# Patient Record
Sex: MALE | Race: WHITE | NOT HISPANIC OR LATINO | Employment: OTHER | URBAN - METROPOLITAN AREA
[De-identification: names, ages, dates, MRNs, and addresses within clinical notes are randomized per-mention and may not be internally consistent; named-entity substitution may affect disease eponyms.]

---

## 2017-01-02 ENCOUNTER — OFFICE VISIT (OUTPATIENT)
Dept: URGENT CARE | Facility: PHYSICIAN GROUP | Age: 77
End: 2017-01-02
Payer: COMMERCIAL

## 2017-01-02 VITALS
WEIGHT: 160 LBS | HEIGHT: 67 IN | OXYGEN SATURATION: 95 % | TEMPERATURE: 99.9 F | RESPIRATION RATE: 12 BRPM | BODY MASS INDEX: 25.11 KG/M2 | SYSTOLIC BLOOD PRESSURE: 124 MMHG | HEART RATE: 81 BPM | DIASTOLIC BLOOD PRESSURE: 62 MMHG

## 2017-01-02 DIAGNOSIS — J02.9 SORE THROAT: ICD-10-CM

## 2017-01-02 DIAGNOSIS — J10.1 INFLUENZA A: ICD-10-CM

## 2017-01-02 DIAGNOSIS — R05.9 COUGH: ICD-10-CM

## 2017-01-02 DIAGNOSIS — R50.9 FEVER, UNSPECIFIED FEVER CAUSE: ICD-10-CM

## 2017-01-02 DIAGNOSIS — R68.89 FLU-LIKE SYMPTOMS: ICD-10-CM

## 2017-01-02 LAB
FLUAV+FLUBV AG SPEC QL IA: NORMAL
INT CON NEG: NEGATIVE
INT CON NEG: NEGATIVE
INT CON POS: POSITIVE
INT CON POS: POSITIVE
S PYO AG THROAT QL: NEGATIVE

## 2017-01-02 PROCEDURE — 99203 OFFICE O/P NEW LOW 30 MIN: CPT | Performed by: NURSE PRACTITIONER

## 2017-01-02 PROCEDURE — 87880 STREP A ASSAY W/OPTIC: CPT | Performed by: NURSE PRACTITIONER

## 2017-01-02 PROCEDURE — 87804 INFLUENZA ASSAY W/OPTIC: CPT | Performed by: NURSE PRACTITIONER

## 2017-01-02 RX ORDER — PRAVASTATIN SODIUM 80 MG/1
80 TABLET ORAL NIGHTLY
Status: ON HOLD | COMMUNITY
End: 2023-05-19

## 2017-01-02 RX ORDER — OSELTAMIVIR PHOSPHATE 75 MG/1
75 CAPSULE ORAL 2 TIMES DAILY
Qty: 10 CAP | Refills: 0 | Status: ON HOLD | OUTPATIENT
Start: 2017-01-02 | End: 2023-05-19

## 2017-01-02 RX ORDER — ALBUTEROL SULFATE 90 UG/1
2 AEROSOL, METERED RESPIRATORY (INHALATION) EVERY 6 HOURS PRN
Qty: 8.5 G | Refills: 0 | Status: ON HOLD | OUTPATIENT
Start: 2017-01-02 | End: 2023-05-19

## 2017-01-02 RX ORDER — DILTIAZEM HYDROCHLORIDE 60 MG/1
60 TABLET, FILM COATED ORAL 4 TIMES DAILY
Status: ON HOLD | COMMUNITY
End: 2023-05-19

## 2017-01-02 RX ORDER — EZETIMIBE 10 MG/1
10 TABLET ORAL EVERY EVENING
COMMUNITY

## 2017-01-02 ASSESSMENT — ENCOUNTER SYMPTOMS
ABDOMINAL PAIN: 0
SHORTNESS OF BREATH: 1
CONSTIPATION: 0
DIARRHEA: 0
VOMITING: 0
NAUSEA: 0
BACK PAIN: 0
CHILLS: 1
PALPITATIONS: 0
WEAKNESS: 0
SORE THROAT: 1
ORTHOPNEA: 0
FEVER: 1
SPUTUM PRODUCTION: 0
WHEEZING: 0
HEADACHES: 0
DIZZINESS: 0
COUGH: 1
MYALGIAS: 0

## 2017-01-02 NOTE — MR AVS SNAPSHOT
"        Sandip Zhao   2017 5:00 PM   Office Visit   MRN: [id number]    Department:  [facility]   Dept Phone:  [phone]    Description:  Male : 1940   Provider:  CORBIN Kirk           Reason for Visit     Cough Sore throat, fever, chest congestion, SOB, loss of voice, headache x 1 day       Allergies as of 2017     No Known Allergies      You were diagnosed with     Fever, unspecified fever cause   [1146830]       Flu-like symptoms   [389816]       Cough   [786.2.ICD-9-CM]       Sore throat   [114424]       Influenza A   [652792]         Vital Signs     Blood Pressure Pulse Temperature Respirations Height Weight    124/62 mmHg 81 37.7 °C (99.9 °F) 12 1.702 m (5' 7\") 72.576 kg (160 lb)    Body Mass Index Oxygen Saturation Smoking Status             25.05 kg/m2 95% Never Smoker          Basic Information     Date Of Birth Sex Race Ethnicity Preferred Language    1940 Male White Non- English      Health Maintenance        Date Due Completion Dates    IMM DTaP/Tdap/Td Vaccine (1 - Tdap) 1959 ---    COLONOSCOPY 1990 ---    IMM ZOSTER VACCINE 2000 ---    IMM PNEUMOCOCCAL 65+ (ADULT) LOW/MEDIUM RISK SERIES (1 of 2 - PCV13) 2005 ---    IMM INFLUENZA (1) 2016 ---            Current Immunizations     No immunizations on file.      Below and/or attached are the medications your provider expects you to take. Review all of your home medications and newly ordered medications with your provider and/or pharmacist. Follow medication instructions as directed by your provider and/or pharmacist. Please keep your medication list with you and share with your provider. Update the information when medications are discontinued, doses are changed, or new medications (including over-the-counter products) are added; and carry medication information at all times in the event of emergency situations     Allergies:  No Known Allergies          Medications  Valid as " of: January 02, 2017 -  6:02 PM    Generic Name Brand Name Tablet Size Instructions for use    Albuterol Sulfate (Aero Soln) albuterol 108 (90 BASE) MCG/ACT Inhale 2 Puffs by mouth every 6 hours as needed for Shortness of Breath.        Apixaban (Tab) ELIQUIS 5mg Take 5 mg by mouth 2 Times a Day.        Ascorbic Acid   Take  by mouth.        Azithromycin (Tab) ZITHROMAX 250 MG 2 tabs by mouth day 1, 1 tab by mouth days 2-5        DiltiaZEM HCl (Tab) CARDIZEM 60 MG Take 60 mg by mouth 4 times a day.        Ezetimibe (Tab) ZETIA 10 MG Take 10 mg by mouth every day.        Ibuprofen (Cap) Ibuprofen 200 MG Take  by mouth.        Lisinopril   Take  by mouth.        Oseltamivir Phosphate (Cap) TAMIFLU 75 MG Take 1 Cap by mouth 2 times a day.        Pravastatin Sodium (Tab) PRAVACHOL 80 MG Take 80 mg by mouth every evening.        .                 Medicines prescribed today were sent to:     [facility][id number] - [place] - [address]    [address] [place] [address]    Phone: [phone] Fax: [phone]    Open 24 Hours?: No      Medication refill instructions:       If your prescription bottle indicates you have medication refills left, it is not necessary to call your provider’s office. Please contact your pharmacy and they will refill your medication.    If your prescription bottle indicates you do not have any refills left, you may request refills at any time through one of the following ways: The online Mobcart system (except Urgent Care), by calling your provider’s office, or by asking your pharmacy to contact your provider’s office with a refill request. Medication refills are processed only during regular business hours and may not be available until the next business day. Your provider may request additional information or to have a follow-up visit with you prior to refilling your medication.   *Please Note: Medication refills are assigned a new Rx number when refilled electronically. Your pharmacy may  indicate that no refills were authorized even though a new prescription for the same medication is available at the pharmacy. Please request the medicine by name with the pharmacy before contacting your provider for a refill.           Sciencescape Access Code: [email or web address]  Expires: 2/1/2017  6:02 PM    Your email address is not on file at [facility].  Email Addresses are required for you to sign up for Sciencescape, please contact [phone] to verify your personal information and to provide your email address prior to attempting to register for Sciencescape.    Sandip Zhao  [address] [place] [address]    Sciencescape  A secure, online tool to manage your health information     [facility]’s Sciencescape® is a secure, online tool that connects you to your personalized health information from the privacy of your home -- day or night - making it very easy for you to manage your healthcare. Once the activation process is completed, you can even access your medical information using the Sciencescape chance, which is available for free in the Apple Chance store or Google Play store.     To learn more about Sciencescape, visit [email or web address]    There are two levels of access available (as shown below):   My Chart Features  [facility] Primary Care Doctor [facility]  Specialists [facility]  Urgent  Care Non-[facility] Primary Care Doctor   Email your healthcare team securely and privately 24/7 X X X    Manage appointments: schedule your next appointment; view details of past/upcoming appointments X      Request prescription refills. X      View recent personal medical records, including lab and immunizations X X X X   View health record, including health history, allergies, medications X X X X   Read reports about your outpatient visits, procedures, consult and ER notes X X X X   See your discharge summary, which is a recap of your hospital and/or ER visit that includes your diagnosis, lab results, and care plan X X  X     How to register for  MyChart:  Once your e-mail address has been verified, follow the following steps to sign up for Fluther.     1. Go to  [email or web address]  2. Click on the Sign Up Now box, which takes you to the New Member Sign Up page. You will need to provide the following information:  a. Enter your Fluther Access Code exactly as it appears at the top of this page. (You will not need to use this code after you’ve completed the sign-up process. If you do not sign up before the expiration date, you must request a new code.)   b. Enter your date of birth.   c. Enter your home email address.   d. Click Submit, and follow the next screen’s instructions.  3. Create a ReviewProt ID. This will be your ReviewProt login ID and cannot be changed, so think of one that is secure and easy to remember.  4. Create a ReviewProt password. You can change your password at any time.  5. Enter your Password Reset Question and Answer. This can be used at a later time if you forget your password.   6. Enter your e-mail address. This allows you to receive e-mail notifications when new information is available in Fluther.  7. Click Sign Up. You can now view your health information.    For assistance activating your Fluther account, call [phone]

## 2017-01-03 NOTE — PROGRESS NOTES
Subjective:      Sandip Zhao is a 76 y.o. male who presents with Cough            Cough  Associated symptoms include chills, a fever, a sore throat and shortness of breath. Pertinent negatives include no chest pain, ear pain, headaches, myalgias or wheezing. There is no history of environmental allergies.   Sandip is a 76 year old male who is here for cough and flu-like symptoms and fever today. Wife present. C/o nasal congestion, chills, body aches. Slight cough without phlegm but slight SOB without chest tightness. Taking no meds for symptoms. Admits to poor water drinking. Low grade fever at home. Denies facial pressure, ear pressure. Slight sore throat without difficulty swallowing. Has tessalon perles at home.    PMH:  has a past medical history of Environmental allergies.  MEDS:   Current outpatient prescriptions:   •  apixaban (ELIQUIS) 5mg Tab, Take 5 mg by mouth 2 Times a Day., Disp: , Rfl:   •  pravastatin (PRAVACHOL) 80 MG tablet, Take 80 mg by mouth every evening., Disp: , Rfl:   •  diltiazem (CARDIZEM) 60 MG Tab, Take 60 mg by mouth 4 times a day., Disp: , Rfl:   •  ezetimibe (ZETIA) 10 MG Tab, Take 10 mg by mouth every day., Disp: , Rfl:   •  Ascorbic Acid (VITAMIN C PO), Take  by mouth., Disp: , Rfl:   •  oseltamivir (TAMIFLU) 75 MG Cap, Take 1 Cap by mouth 2 times a day., Disp: 10 Cap, Rfl: 0  •  albuterol 108 (90 BASE) MCG/ACT Aero Soln inhalation aerosol, Inhale 2 Puffs by mouth every 6 hours as needed for Shortness of Breath., Disp: 8.5 g, Rfl: 0  •  LISINOPRIL PO, Take  by mouth., Disp: , Rfl:   •  azithromycin (ZITHROMAX) 250 MG TABS, 2 tabs by mouth day 1, 1 tab by mouth days 2-5, Disp: 6 Tab, Rfl: 0  •  Ibuprofen (ADVIL) 200 MG CAPS, Take  by mouth., Disp: , Rfl:   ALLERGIES: No Known Allergies  SURGHX:   Past Surgical History   Procedure Laterality Date   • Abdominal exploration     • Appendectomy     • Knee arthroscopy       SOCHX:  reports that he has never smoked. He does not have  "any smokeless tobacco history on file. He reports that he drinks alcohol.  FH: Family history was reviewed, no pertinent findings to report      Review of Systems   Constitutional: Positive for fever and chills. Negative for malaise/fatigue.   HENT: Positive for congestion and sore throat. Negative for ear pain.    Respiratory: Positive for cough and shortness of breath. Negative for sputum production and wheezing.    Cardiovascular: Negative for chest pain, palpitations and orthopnea.   Gastrointestinal: Negative for nausea, vomiting, abdominal pain, diarrhea and constipation.   Musculoskeletal: Negative for myalgias and back pain.   Neurological: Negative for dizziness, weakness and headaches.   Endo/Heme/Allergies: Negative for environmental allergies.          Objective:     /62 mmHg  Pulse 81  Temp(Src) 37.7 °C (99.9 °F)  Resp 12  Ht 1.702 m (5' 7\")  Wt 72.576 kg (160 lb)  BMI 25.05 kg/m2  SpO2 95%     Physical Exam   Constitutional: He is oriented to person, place, and time. He appears well-developed and well-nourished. No distress.   HENT:   Head: Normocephalic.   Right Ear: Hearing, tympanic membrane, external ear and ear canal normal.   Left Ear: Hearing, tympanic membrane, external ear and ear canal normal.   Nose: Rhinorrhea present. No mucosal edema or sinus tenderness.   Mouth/Throat: Mucous membranes are dry. No uvula swelling. Posterior oropharyngeal erythema present. No posterior oropharyngeal edema.   Eyes: Conjunctivae and EOM are normal. Pupils are equal, round, and reactive to light.   Neck: Normal range of motion. Neck supple.   Cardiovascular: Normal rate and regular rhythm.    Pulmonary/Chest: Effort normal and breath sounds normal. No accessory muscle usage. No respiratory distress. He has no decreased breath sounds. He has no wheezes. He has no rhonchi. He has no rales.   Musculoskeletal: Normal range of motion.   Neurological: He is alert and oriented to person, place, and " time.   Skin: Skin is warm and dry. He is not diaphoretic.   Vitals reviewed.              Assessment/Plan:     1. Fever, unspecified fever cause    - POCT Influenza A/B: POS    2. Flu-like symptoms  - POCT Influenza A/B: POS    3. Cough    - albuterol 108 (90 BASE) MCG/ACT Aero Soln inhalation aerosol; Inhale 2 Puffs by mouth every 6 hours as needed for Shortness of Breath.  Dispense: 8.5 g; Refill: 0    4. Sore throat    - POCT Rapid Strep A: NEG    5. Influenza A    - oseltamivir (TAMIFLU) 75 MG Cap; Take 1 Cap by mouth 2 times a day.  Dispense: 10 Cap; Refill: 0    Increase water intake  May use Ibuprofen/Tylenol prn for fever or body aches  Get rest  May use saline nasal spray prn to flush nasal congestion  May use Mucinex as an expectorant prn  Use inhaler prn for SOB with cough  May use OTC cough suppressant medications like Robitussin prn or prescribed Tessalon perles  Monitor for fevers, productive cough, SOB, CP, chest tightness- need re-evaluation

## 2017-12-28 ENCOUNTER — HOSPITAL ENCOUNTER (INPATIENT)
Dept: HOSPITAL 8 - ED | Age: 77
LOS: 1 days | Discharge: HOME | DRG: 303 | End: 2017-12-29
Attending: HOSPITALIST | Admitting: HOSPITALIST
Payer: COMMERCIAL

## 2017-12-28 VITALS — DIASTOLIC BLOOD PRESSURE: 82 MMHG | SYSTOLIC BLOOD PRESSURE: 147 MMHG

## 2017-12-28 VITALS — BODY MASS INDEX: 24.33 KG/M2 | HEIGHT: 67 IN | WEIGHT: 154.98 LBS

## 2017-12-28 VITALS — SYSTOLIC BLOOD PRESSURE: 137 MMHG | DIASTOLIC BLOOD PRESSURE: 72 MMHG

## 2017-12-28 VITALS — DIASTOLIC BLOOD PRESSURE: 76 MMHG | SYSTOLIC BLOOD PRESSURE: 126 MMHG

## 2017-12-28 DIAGNOSIS — I25.110: Primary | ICD-10-CM

## 2017-12-28 DIAGNOSIS — Z90.49: ICD-10-CM

## 2017-12-28 DIAGNOSIS — Z95.5: ICD-10-CM

## 2017-12-28 DIAGNOSIS — Z79.82: ICD-10-CM

## 2017-12-28 DIAGNOSIS — I25.2: ICD-10-CM

## 2017-12-28 DIAGNOSIS — E78.00: ICD-10-CM

## 2017-12-28 DIAGNOSIS — I48.0: ICD-10-CM

## 2017-12-28 DIAGNOSIS — I10: ICD-10-CM

## 2017-12-28 LAB
ALBUMIN SERPL-MCNC: 3.9 G/DL (ref 3.4–5)
ALP SERPL-CCNC: 93 U/L (ref 45–117)
ALT SERPL-CCNC: 27 U/L (ref 12–78)
ANION GAP SERPL CALC-SCNC: 6 MMOL/L (ref 5–15)
BASOPHILS # BLD AUTO: 0 X10^3/UL (ref 0–0.1)
BASOPHILS NFR BLD AUTO: 0 % (ref 0–1)
BILIRUB SERPL-MCNC: 0.9 MG/DL (ref 0.2–1)
CALCIUM SERPL-MCNC: 8.7 MG/DL (ref 8.5–10.1)
CHLORIDE SERPL-SCNC: 107 MMOL/L (ref 98–107)
CREAT SERPL-MCNC: 0.89 MG/DL (ref 0.7–1.3)
EOSINOPHIL # BLD AUTO: 0.08 X10^3/UL (ref 0–0.4)
EOSINOPHIL NFR BLD AUTO: 2 % (ref 1–7)
ERYTHROCYTE [DISTWIDTH] IN BLOOD BY AUTOMATED COUNT: 12.3 % (ref 9.4–14.8)
LYMPHOCYTES # BLD AUTO: 1.35 X10^3/UL (ref 1–3.4)
LYMPHOCYTES NFR BLD AUTO: 29 % (ref 22–44)
MCH RBC QN AUTO: 32.4 PG (ref 27.5–34.5)
MCHC RBC AUTO-ENTMCNC: 35 G/DL (ref 33.2–36.2)
MCV RBC AUTO: 92.5 FL (ref 81–97)
MD: NO
MONOCYTES # BLD AUTO: 0.47 X10^3/UL (ref 0.2–0.8)
MONOCYTES NFR BLD AUTO: 10 % (ref 2–9)
NEUTROPHILS # BLD AUTO: 2.71 X10^3/UL (ref 1.8–6.8)
NEUTROPHILS NFR BLD AUTO: 59 % (ref 42–75)
PLATELET # BLD AUTO: 174 X10^3/UL (ref 130–400)
PMV BLD AUTO: 7.7 FL (ref 7.4–10.4)
PROT SERPL-MCNC: 7.8 G/DL (ref 6.4–8.2)
RBC # BLD AUTO: 3.96 X10^6/UL (ref 4.38–5.82)
TROPONIN I SERPL-MCNC: < 0.015 NG/ML (ref 0–0.04)

## 2017-12-28 PROCEDURE — 93017 CV STRESS TEST TRACING ONLY: CPT

## 2017-12-28 PROCEDURE — C9898 INPNT STAY RADIOLABELED ITEM: HCPCS

## 2017-12-28 PROCEDURE — A9502 TC99M TETROFOSMIN: HCPCS

## 2017-12-28 PROCEDURE — 80053 COMPREHEN METABOLIC PANEL: CPT

## 2017-12-28 PROCEDURE — 71010: CPT

## 2017-12-28 PROCEDURE — 84484 ASSAY OF TROPONIN QUANT: CPT

## 2017-12-28 PROCEDURE — 78452 HT MUSCLE IMAGE SPECT MULT: CPT

## 2017-12-28 PROCEDURE — 85025 COMPLETE CBC W/AUTO DIFF WBC: CPT

## 2017-12-28 PROCEDURE — 93005 ELECTROCARDIOGRAM TRACING: CPT

## 2017-12-28 PROCEDURE — 36415 COLL VENOUS BLD VENIPUNCTURE: CPT

## 2017-12-28 RX ADMIN — APIXABAN SCH MG: 5 TABLET, FILM COATED ORAL at 21:13

## 2017-12-28 RX ADMIN — APIXABAN SCH MG: 5 TABLET, FILM COATED ORAL at 10:53

## 2017-12-28 RX ADMIN — DILTIAZEM HYDROCHLORIDE SCH MG: 120 TABLET, FILM COATED ORAL at 21:12

## 2017-12-29 VITALS — DIASTOLIC BLOOD PRESSURE: 82 MMHG | SYSTOLIC BLOOD PRESSURE: 136 MMHG

## 2017-12-29 VITALS — SYSTOLIC BLOOD PRESSURE: 133 MMHG | DIASTOLIC BLOOD PRESSURE: 69 MMHG

## 2017-12-29 VITALS — DIASTOLIC BLOOD PRESSURE: 63 MMHG | SYSTOLIC BLOOD PRESSURE: 110 MMHG

## 2017-12-29 RX ADMIN — DILTIAZEM HYDROCHLORIDE SCH MG: 120 TABLET, FILM COATED ORAL at 10:26

## 2017-12-29 RX ADMIN — APIXABAN SCH MG: 5 TABLET, FILM COATED ORAL at 10:28

## 2018-02-20 ENCOUNTER — HOSPITAL ENCOUNTER (OUTPATIENT)
Dept: LAB | Facility: MEDICAL CENTER | Age: 78
End: 2018-02-20
Attending: INTERNAL MEDICINE
Payer: COMMERCIAL

## 2018-02-20 LAB
ALBUMIN SERPL BCP-MCNC: 4.6 G/DL (ref 3.2–4.9)
ALP SERPL-CCNC: 77 U/L (ref 30–99)
ALT SERPL-CCNC: 18 U/L (ref 2–50)
AST SERPL-CCNC: 15 U/L (ref 12–45)
BILIRUB CONJ SERPL-MCNC: 0.2 MG/DL (ref 0.1–0.5)
BILIRUB INDIRECT SERPL-MCNC: 0.4 MG/DL (ref 0–1)
BILIRUB SERPL-MCNC: 0.6 MG/DL (ref 0.1–1.5)
CHOLEST SERPL-MCNC: 150 MG/DL (ref 100–199)
HDLC SERPL-MCNC: 54 MG/DL
LDLC SERPL CALC-MCNC: 70 MG/DL
PROT SERPL-MCNC: 7.6 G/DL (ref 6–8.2)
TRIGL SERPL-MCNC: 129 MG/DL (ref 0–149)

## 2018-02-20 PROCEDURE — 36415 COLL VENOUS BLD VENIPUNCTURE: CPT

## 2018-02-20 PROCEDURE — 80061 LIPID PANEL: CPT

## 2018-02-20 PROCEDURE — 80076 HEPATIC FUNCTION PANEL: CPT

## 2019-02-14 ENCOUNTER — HOSPITAL ENCOUNTER (OUTPATIENT)
Dept: LAB | Facility: MEDICAL CENTER | Age: 79
End: 2019-02-14
Attending: INTERNAL MEDICINE
Payer: COMMERCIAL

## 2019-02-14 LAB
ALBUMIN SERPL BCP-MCNC: 4.6 G/DL (ref 3.2–4.9)
ALBUMIN/GLOB SERPL: 1.4 G/DL
ALP SERPL-CCNC: 79 U/L (ref 30–99)
ALT SERPL-CCNC: 23 U/L (ref 2–50)
ANION GAP SERPL CALC-SCNC: 6 MMOL/L (ref 0–11.9)
AST SERPL-CCNC: 21 U/L (ref 12–45)
BILIRUB SERPL-MCNC: 0.6 MG/DL (ref 0.1–1.5)
BUN SERPL-MCNC: 21 MG/DL (ref 8–22)
CALCIUM SERPL-MCNC: 10.3 MG/DL (ref 8.5–10.5)
CHLORIDE SERPL-SCNC: 103 MMOL/L (ref 96–112)
CHOLEST SERPL-MCNC: 158 MG/DL (ref 100–199)
CO2 SERPL-SCNC: 27 MMOL/L (ref 20–33)
CREAT SERPL-MCNC: 0.98 MG/DL (ref 0.5–1.4)
ERYTHROCYTE [DISTWIDTH] IN BLOOD BY AUTOMATED COUNT: 46.2 FL (ref 35.9–50)
EST. AVERAGE GLUCOSE BLD GHB EST-MCNC: 120 MG/DL
FASTING STATUS PATIENT QL REPORTED: NORMAL
GLOBULIN SER CALC-MCNC: 3.3 G/DL (ref 1.9–3.5)
GLUCOSE SERPL-MCNC: 121 MG/DL (ref 65–99)
HBA1C MFR BLD: 5.8 % (ref 0–5.6)
HCT VFR BLD AUTO: 41.5 % (ref 42–52)
HDLC SERPL-MCNC: 58 MG/DL
HGB BLD-MCNC: 13.5 G/DL (ref 14–18)
LDLC SERPL CALC-MCNC: 80 MG/DL
MCH RBC QN AUTO: 32.1 PG (ref 27–33)
MCHC RBC AUTO-ENTMCNC: 32.5 G/DL (ref 33.7–35.3)
MCV RBC AUTO: 98.6 FL (ref 81.4–97.8)
PLATELET # BLD AUTO: 186 K/UL (ref 164–446)
PMV BLD AUTO: 10.4 FL (ref 9–12.9)
POTASSIUM SERPL-SCNC: 4.4 MMOL/L (ref 3.6–5.5)
PROT SERPL-MCNC: 7.9 G/DL (ref 6–8.2)
RBC # BLD AUTO: 4.21 M/UL (ref 4.7–6.1)
SODIUM SERPL-SCNC: 136 MMOL/L (ref 135–145)
TRIGL SERPL-MCNC: 101 MG/DL (ref 0–149)
WBC # BLD AUTO: 4.8 K/UL (ref 4.8–10.8)

## 2019-02-14 PROCEDURE — 85027 COMPLETE CBC AUTOMATED: CPT

## 2019-02-14 PROCEDURE — 80053 COMPREHEN METABOLIC PANEL: CPT

## 2019-02-14 PROCEDURE — 80061 LIPID PANEL: CPT

## 2019-02-14 PROCEDURE — 36415 COLL VENOUS BLD VENIPUNCTURE: CPT

## 2019-02-14 PROCEDURE — 83036 HEMOGLOBIN GLYCOSYLATED A1C: CPT

## 2019-06-13 ENCOUNTER — HOSPITAL ENCOUNTER (OUTPATIENT)
Dept: LAB | Facility: MEDICAL CENTER | Age: 79
End: 2019-06-13
Attending: INTERNAL MEDICINE
Payer: COMMERCIAL

## 2019-06-13 LAB
ALBUMIN SERPL BCP-MCNC: 4.4 G/DL (ref 3.2–4.9)
ALBUMIN/GLOB SERPL: 1.4 G/DL
ALP SERPL-CCNC: 79 U/L (ref 30–99)
ALT SERPL-CCNC: 21 U/L (ref 2–50)
ANION GAP SERPL CALC-SCNC: 7 MMOL/L (ref 0–11.9)
AST SERPL-CCNC: 18 U/L (ref 12–45)
BILIRUB SERPL-MCNC: 0.6 MG/DL (ref 0.1–1.5)
BUN SERPL-MCNC: 29 MG/DL (ref 8–22)
CALCIUM SERPL-MCNC: 9.5 MG/DL (ref 8.5–10.5)
CHLORIDE SERPL-SCNC: 106 MMOL/L (ref 96–112)
CHOLEST SERPL-MCNC: 145 MG/DL (ref 100–199)
CO2 SERPL-SCNC: 24 MMOL/L (ref 20–33)
CREAT SERPL-MCNC: 1.1 MG/DL (ref 0.5–1.4)
EST. AVERAGE GLUCOSE BLD GHB EST-MCNC: 120 MG/DL
FASTING STATUS PATIENT QL REPORTED: NORMAL
GLOBULIN SER CALC-MCNC: 3.2 G/DL (ref 1.9–3.5)
GLUCOSE SERPL-MCNC: 122 MG/DL (ref 65–99)
HBA1C MFR BLD: 5.8 % (ref 0–5.6)
HDLC SERPL-MCNC: 53 MG/DL
LDLC SERPL CALC-MCNC: 69 MG/DL
POTASSIUM SERPL-SCNC: 4.2 MMOL/L (ref 3.6–5.5)
PROT SERPL-MCNC: 7.6 G/DL (ref 6–8.2)
SODIUM SERPL-SCNC: 137 MMOL/L (ref 135–145)
TRIGL SERPL-MCNC: 117 MG/DL (ref 0–149)

## 2019-06-13 PROCEDURE — 36415 COLL VENOUS BLD VENIPUNCTURE: CPT | Mod: GA

## 2019-06-13 PROCEDURE — 80053 COMPREHEN METABOLIC PANEL: CPT

## 2019-06-13 PROCEDURE — 83036 HEMOGLOBIN GLYCOSYLATED A1C: CPT | Mod: GA

## 2019-06-13 PROCEDURE — 80061 LIPID PANEL: CPT

## 2019-12-04 ENCOUNTER — HOSPITAL ENCOUNTER (OUTPATIENT)
Dept: LAB | Facility: MEDICAL CENTER | Age: 79
End: 2019-12-04
Attending: INTERNAL MEDICINE
Payer: COMMERCIAL

## 2019-12-04 LAB
ALBUMIN SERPL BCP-MCNC: 4.4 G/DL (ref 3.2–4.9)
ALBUMIN/GLOB SERPL: 1.4 G/DL
ALP SERPL-CCNC: 75 U/L (ref 30–99)
ALT SERPL-CCNC: 24 U/L (ref 2–50)
ANION GAP SERPL CALC-SCNC: 9 MMOL/L (ref 0–11.9)
AST SERPL-CCNC: 19 U/L (ref 12–45)
BILIRUB SERPL-MCNC: 0.6 MG/DL (ref 0.1–1.5)
BUN SERPL-MCNC: 22 MG/DL (ref 8–22)
CALCIUM SERPL-MCNC: 9.5 MG/DL (ref 8.5–10.5)
CHLORIDE SERPL-SCNC: 106 MMOL/L (ref 96–112)
CHOLEST SERPL-MCNC: 132 MG/DL (ref 100–199)
CO2 SERPL-SCNC: 25 MMOL/L (ref 20–33)
CREAT SERPL-MCNC: 1 MG/DL (ref 0.5–1.4)
EST. AVERAGE GLUCOSE BLD GHB EST-MCNC: 111 MG/DL
FASTING STATUS PATIENT QL REPORTED: NORMAL
GLOBULIN SER CALC-MCNC: 3.1 G/DL (ref 1.9–3.5)
GLUCOSE SERPL-MCNC: 111 MG/DL (ref 65–99)
HBA1C MFR BLD: 5.5 % (ref 0–5.6)
HDLC SERPL-MCNC: 56 MG/DL
LDLC SERPL CALC-MCNC: 63 MG/DL
POTASSIUM SERPL-SCNC: 4.4 MMOL/L (ref 3.6–5.5)
PROT SERPL-MCNC: 7.5 G/DL (ref 6–8.2)
SODIUM SERPL-SCNC: 140 MMOL/L (ref 135–145)
TRIGL SERPL-MCNC: 64 MG/DL (ref 0–149)

## 2019-12-04 PROCEDURE — 83036 HEMOGLOBIN GLYCOSYLATED A1C: CPT | Mod: GA

## 2019-12-04 PROCEDURE — 80053 COMPREHEN METABOLIC PANEL: CPT

## 2019-12-04 PROCEDURE — 80061 LIPID PANEL: CPT

## 2020-03-03 ENCOUNTER — HOSPITAL ENCOUNTER (OUTPATIENT)
Dept: LAB | Facility: MEDICAL CENTER | Age: 80
End: 2020-03-03
Attending: FAMILY MEDICINE
Payer: COMMERCIAL

## 2020-03-03 LAB
ALBUMIN SERPL BCP-MCNC: 4.3 G/DL (ref 3.2–4.9)
ALBUMIN/GLOB SERPL: 1.3 G/DL
ALP SERPL-CCNC: 76 U/L (ref 30–99)
ALT SERPL-CCNC: 20 U/L (ref 2–50)
ANION GAP SERPL CALC-SCNC: 8 MMOL/L (ref 0–11.9)
APPEARANCE UR: CLEAR
AST SERPL-CCNC: 17 U/L (ref 12–45)
BASOPHILS # BLD AUTO: 0.3 % (ref 0–1.8)
BASOPHILS # BLD: 0.02 K/UL (ref 0–0.12)
BILIRUB SERPL-MCNC: 0.4 MG/DL (ref 0.1–1.5)
BILIRUB UR QL STRIP.AUTO: NEGATIVE
BUN SERPL-MCNC: 24 MG/DL (ref 8–22)
CALCIUM SERPL-MCNC: 9.5 MG/DL (ref 8.5–10.5)
CHLORIDE SERPL-SCNC: 104 MMOL/L (ref 96–112)
CHOLEST SERPL-MCNC: 136 MG/DL (ref 100–199)
CO2 SERPL-SCNC: 24 MMOL/L (ref 20–33)
COLOR UR: YELLOW
CREAT SERPL-MCNC: 1.35 MG/DL (ref 0.5–1.4)
EOSINOPHIL # BLD AUTO: 0.16 K/UL (ref 0–0.51)
EOSINOPHIL NFR BLD: 2.7 % (ref 0–6.9)
ERYTHROCYTE [DISTWIDTH] IN BLOOD BY AUTOMATED COUNT: 49.9 FL (ref 35.9–50)
GLOBULIN SER CALC-MCNC: 3.4 G/DL (ref 1.9–3.5)
GLUCOSE SERPL-MCNC: 109 MG/DL (ref 65–99)
GLUCOSE UR STRIP.AUTO-MCNC: NEGATIVE MG/DL
HCT VFR BLD AUTO: 38.5 % (ref 42–52)
HDLC SERPL-MCNC: 41 MG/DL
HGB BLD-MCNC: 11.6 G/DL (ref 14–18)
IMM GRANULOCYTES # BLD AUTO: 0.07 K/UL (ref 0–0.11)
IMM GRANULOCYTES NFR BLD AUTO: 1.2 % (ref 0–0.9)
KETONES UR STRIP.AUTO-MCNC: NEGATIVE MG/DL
LDLC SERPL CALC-MCNC: 74 MG/DL
LEUKOCYTE ESTERASE UR QL STRIP.AUTO: NEGATIVE
LYMPHOCYTES # BLD AUTO: 1.38 K/UL (ref 1–4.8)
LYMPHOCYTES NFR BLD: 22.9 % (ref 22–41)
MCH RBC QN AUTO: 30.4 PG (ref 27–33)
MCHC RBC AUTO-ENTMCNC: 30.1 G/DL (ref 33.7–35.3)
MCV RBC AUTO: 100.8 FL (ref 81.4–97.8)
MICRO URNS: NORMAL
MONOCYTES # BLD AUTO: 0.39 K/UL (ref 0–0.85)
MONOCYTES NFR BLD AUTO: 6.5 % (ref 0–13.4)
NEUTROPHILS # BLD AUTO: 4 K/UL (ref 1.82–7.42)
NEUTROPHILS NFR BLD: 66.4 % (ref 44–72)
NITRITE UR QL STRIP.AUTO: NEGATIVE
NRBC # BLD AUTO: 0 K/UL
NRBC BLD-RTO: 0 /100 WBC
PH UR STRIP.AUTO: 5.5 [PH] (ref 5–8)
PLATELET # BLD AUTO: 252 K/UL (ref 164–446)
PMV BLD AUTO: 9.9 FL (ref 9–12.9)
POTASSIUM SERPL-SCNC: 4.7 MMOL/L (ref 3.6–5.5)
PROT SERPL-MCNC: 7.7 G/DL (ref 6–8.2)
PROT UR QL STRIP: NEGATIVE MG/DL
PSA SERPL-MCNC: 18.36 NG/ML (ref 0–4)
RBC # BLD AUTO: 3.82 M/UL (ref 4.7–6.1)
RBC UR QL AUTO: NEGATIVE
SODIUM SERPL-SCNC: 136 MMOL/L (ref 135–145)
SP GR UR STRIP.AUTO: 1.02
TRIGL SERPL-MCNC: 105 MG/DL (ref 0–149)
TSH SERPL DL<=0.005 MIU/L-ACNC: 1.24 UIU/ML (ref 0.38–5.33)
UROBILINOGEN UR STRIP.AUTO-MCNC: 0.2 MG/DL
WBC # BLD AUTO: 6 K/UL (ref 4.8–10.8)

## 2020-03-03 PROCEDURE — 81003 URINALYSIS AUTO W/O SCOPE: CPT

## 2020-03-03 PROCEDURE — 85025 COMPLETE CBC W/AUTO DIFF WBC: CPT

## 2020-03-03 PROCEDURE — 80053 COMPREHEN METABOLIC PANEL: CPT

## 2020-03-03 PROCEDURE — 80061 LIPID PANEL: CPT

## 2020-03-03 PROCEDURE — 36415 COLL VENOUS BLD VENIPUNCTURE: CPT

## 2020-03-03 PROCEDURE — 84153 ASSAY OF PSA TOTAL: CPT | Mod: GA

## 2020-03-03 PROCEDURE — 84443 ASSAY THYROID STIM HORMONE: CPT

## 2020-06-11 ENCOUNTER — HOSPITAL ENCOUNTER (OUTPATIENT)
Dept: LAB | Facility: MEDICAL CENTER | Age: 80
End: 2020-06-11
Attending: UROLOGY
Payer: COMMERCIAL

## 2020-06-11 PROCEDURE — 36415 COLL VENOUS BLD VENIPUNCTURE: CPT

## 2020-06-11 PROCEDURE — 84153 ASSAY OF PSA TOTAL: CPT

## 2020-06-11 PROCEDURE — 84154 ASSAY OF PSA FREE: CPT

## 2020-06-12 LAB
PSA FREE MFR SERPL: 21 %
PSA FREE SERPL-MCNC: 2.2 NG/ML
PSA SERPL-MCNC: 10.4 NG/ML (ref 0–4)

## 2020-06-19 ENCOUNTER — APPOINTMENT (RX ONLY)
Dept: URBAN - METROPOLITAN AREA CLINIC 4 | Facility: CLINIC | Age: 80
Setting detail: DERMATOLOGY
End: 2020-06-19

## 2020-06-19 ENCOUNTER — RX ONLY (OUTPATIENT)
Age: 80
Setting detail: RX ONLY
End: 2020-06-19

## 2020-06-19 PROBLEM — C44.91 BASAL CELL CARCINOMA OF SKIN, UNSPECIFIED: Status: ACTIVE | Noted: 2020-06-19

## 2020-06-19 PROCEDURE — ? MOHS SURGERY PHONE CONSULTATION

## 2020-06-19 RX ORDER — AMOXICILLIN 500 MG/1
4 CAPSULE ORAL QD
Qty: 4 | Refills: 0 | Status: ERX | COMMUNITY
Start: 2020-06-19

## 2020-06-19 NOTE — PROCEDURE: MOHS SURGERY PHONE CONSULTATION
Has The Patient Ever Received A Transplant?: No
Has The Pathology Report Been Received?: Yes
Which Antibiotic Do They Take For Surgical Prophylaxis?: Amoxicillin (2 grams)
Detail Level: Simple
Patient Preferred Phone Number: [phone]
If Yes- What Blood Thinners Do They Take?: DIANELYSUIST
If Yes- Additional Details: 3 STENTS 7/4/2015
If Yes- Details?: MILD HEARTATTACK 7/4/2015
Pathology Accession #: [id number]
Patient's Insurance: [facility]/
If Yes- What Is The Patient's Pharmacy Number?: [facility]
Referring Provider: UYEN GILLETTE
Patient Reported Location: [facility] LEFT FOREHEAD

## 2020-06-23 ENCOUNTER — RX ONLY (OUTPATIENT)
Age: 80
Setting detail: RX ONLY
End: 2020-06-23

## 2020-06-25 ENCOUNTER — APPOINTMENT (RX ONLY)
Dept: URBAN - METROPOLITAN AREA CLINIC 36 | Facility: CLINIC | Age: 80
Setting detail: DERMATOLOGY
End: 2020-06-25

## 2020-06-25 PROBLEM — C44.319 BASAL CELL CARCINOMA OF SKIN OF OTHER PARTS OF FACE: Status: ACTIVE | Noted: 2020-06-25

## 2020-06-25 PROCEDURE — 14040 TIS TRNFR F/C/C/M/N/A/G/H/F: CPT

## 2020-06-25 PROCEDURE — ? MOHS SURGERY

## 2020-06-25 PROCEDURE — 17312 MOHS ADDL STAGE: CPT

## 2020-06-25 PROCEDURE — 17311 MOHS 1 STAGE H/N/HF/G: CPT

## 2020-06-25 NOTE — HPI: PROCEDURE (MOHS)
Has The Growth Been Previously Biopsied?: has been previously biopsied
Additional History: Patient had ER visit on 6/23/20 that required sutures, he is on keflex for 1 week. His last done will be on 6/30/20, he did not take his amoxicillin this morning because he was on the keflex.

## 2020-06-25 NOTE — PROCEDURE: MOHS SURGERY
Intermediate Repair Preamble Text (Leave Blank If You Do Not Want): Undermining was performed with blunt dissection.
Special Stains Stage 5 - Results: Base On Clearance Noted Above
Wound Care (No Sutures): Petrolatum
Referred To Oculoplastics For Closure Text (Leave Blank If You Do Not Want): After obtaining clear surgical margins the patient was sent to oculoplastics for surgical repair.  The patient understands they will receive post-surgical care and follow-up from the referring physician's office.
Estimated Blood Loss (Cc): minimal
Mercedes Flap Text: The defect edges were debeveled with a #15 scalpel blade.  Given the location of the defect, shape of the defect and the proximity to free margins a Mercedes flap was deemed most appropriate.  Using a sterile surgical marker, an appropriate advancement flap was drawn incorporating the defect and placing the expected incisions within the relaxed skin tension lines where possible. The area thus outlined was incised deep to adipose tissue with a #15 scalpel blade.  The skin margins were undermined to an appropriate distance in all directions utilizing iris scissors.
Area M Indication Text: Tumors in this location are included in Area M (cheek, forehead, scalp, neck, jawline and pretibial skin).  Mohs surgery is indicated for tumors in these anatomic locations.
Additional Anesthesia Volume In Cc: 6
Quadrants Reporting?: 0
Suturegard Retention Suture: 0-0 Nylon
Dressing (No Sutures): pressure dressing with telfa
Area L Indication Text: Tumors in this location are included in Area L (trunk and extremities).  Mohs surgery is indicated for larger tumors, or tumors with aggressive histologic features, in these anatomic locations.
Tissue Cultured Epidermal Autograft Text: The defect edges were debeveled with a #15 scalpel blade.  Given the location of the defect, shape of the defect and the proximity to free margins a tissue cultured epidermal autograft was deemed most appropriate.  The graft was then trimmed to fit the size of the defect.  The graft was then placed in the primary defect and oriented appropriately.
Bcc Histology Text: There were numerous aggregates of basaloid cells.
Show Specific Providers When Referring To Others For Closure?: Yes
Date Of Previous Biopsy (Optional): 6/11/2020
Anesthesia Type: 1% lidocaine with epinephrine
Localized Dermabrasion With Wire Brush Text: The patient was draped in routine manner.  Localized dermabrasion using 3 x 17 mm wire brush was performed in routine manner to papillary dermis. This spot dermabrasion is being performed to complete skin cancer reconstruction. It also will eliminate the other sun damaged precancerous cells that are known to be part of the regional effect of a lifetime's worth of sun exposure. This localized dermabrasion is therapeutic and should not be considered cosmetic in any regard.
Quadrant Reporting?: no
Island Pedicle Flap With Canthal Suspension Text: The defect edges were debeveled with a #15 scalpel blade.  Given the location of the defect, shape of the defect and the proximity to free margins an island pedicle advancement flap was deemed most appropriate.  Using a sterile surgical marker, an appropriate advancement flap was drawn incorporating the defect, outlining the appropriate donor tissue and placing the expected incisions within the relaxed skin tension lines where possible. The area thus outlined was incised deep to adipose tissue with a #15 scalpel blade.  The skin margins were undermined to an appropriate distance in all directions around the primary defect and laterally outward around the island pedicle utilizing iris scissors.  There was minimal undermining beneath the pedicle flap. A suspension suture was placed in the canthal tendon to prevent tension and prevent ectropion.
Closure 3 Information: This tab is for additional flaps and grafts above and beyond our usual structured repairs.  Please note if you enter information here it will not currently bill and you will need to add the billing information manually.
Consent (Temporal Branch)/Introductory Paragraph: The rationale for Mohs was explained to the patient and consent was obtained. The risks, benefits and alternatives to therapy were discussed in detail. Specifically, the risks of damage to the temporal branch of the facial nerve, infection, scarring, bleeding, prolonged wound healing, incomplete removal, allergy to anesthesia, and recurrence were addressed. Prior to the procedure, the treatment site was clearly identified and confirmed by the patient. All components of Universal Protocol/PAUSE Rule completed.
Asc Procedure Text (D): After obtaining clear surgical margins the patient was sent to an ASC for surgical repair.  The patient understands they will receive post-surgical care and follow-up from the ASC physician.
Full Thickness Lip Wedge Repair (Flap) Text: Given the location of the defect and the proximity to free margins a full thickness wedge repair was deemed most appropriate.  Using a sterile surgical marker, the appropriate repair was drawn incorporating the defect and placing the expected incisions perpendicular to the vermilion border.  The vermilion border was also meticulously outlined to ensure appropriate reapproximation during the repair.  The area thus outlined was incised through and through with a #15 scalpel blade.  The muscularis and dermis were reaproximated with deep sutures following hemostasis. Care was taken to realign the vermilion border before proceeding with the superficial closure.  Once the vermilion was realigned the superfical and mucosal closure was finished.
Mart-1 - Positive Histology Text: MART-1 staining demonstrates areas of higher density and clustering of melanocytes with Pagetoid spread upwards within the epidermis. The surgical margins are positive for tumor cells.
Consent (Near Eyelid Margin)/Introductory Paragraph: The rationale for Mohs was explained to the patient and consent was obtained. The risks, benefits and alternatives to therapy were discussed in detail. Specifically, the risks of ectropion or eyelid deformity, infection, scarring, bleeding, prolonged wound healing, incomplete removal, allergy to anesthesia, nerve injury and recurrence were addressed. Prior to the procedure, the treatment site was clearly identified and confirmed by the patient. All components of Universal Protocol/PAUSE Rule completed.
O-Z Flap Text: The defect edges were debeveled with a #15 scalpel blade.  Given the location of the defect, shape of the defect and the proximity to free margins an O-Z flap was deemed most appropriate.  Using a sterile surgical marker, an appropriate transposition flap was drawn incorporating the defect and placing the expected incisions within the relaxed skin tension lines where possible. The area thus outlined was incised deep to adipose tissue with a #15 scalpel blade.  The skin margins were undermined to an appropriate distance in all directions utilizing iris scissors.
Posterior Auricular Interpolation Flap Text: A decision was made to reconstruct the defect utilizing an interpolation axial flap and a staged reconstruction.  A telfa template was made of the defect.  This telfa template was then used to outline the posterior auricular interpolation flap.  The donor area for the pedicle flap was then injected with anesthesia.  The flap was excised through the skin and subcutaneous tissue down to the layer of the underlying musculature.  The pedicle flap was carefully excised within this deep plane to maintain its blood supply.  The edges of the donor site were undermined.   The donor site was closed in a primary fashion.  The pedicle was then rotated into position and sutured.  Once the tube was sutured into place, adequate blood supply was confirmed with blanching and refill.  The pedicle was then wrapped with xeroform gauze and dressed appropriately with a telfa and gauze bandage to ensure continued blood supply and protect the attached pedicle.
Tumor Debulked?: curette
Where Do You Want The Question To Include Opioid Counseling Located?: Case Summary Tab
Dorsal Nasal Flap Text: The defect edges were debeveled with a #15 scalpel blade.  Given the location of the defect and the proximity to free margins a dorsal nasal flap was deemed most appropriate.  Using a sterile surgical marker, an appropriate dorsal nasal flap was drawn around the defect.    The area thus outlined was incised deep to adipose tissue with a #15 scalpel blade.  The skin margins were undermined to an appropriate distance in all directions utilizing iris scissors.
Complex Repair Preamble Text (Leave Blank If You Do Not Want): Extensive wide undermining was performed.
Otolaryngologist Procedure Text (E): After obtaining clear surgical margins the patient was sent to otolaryngology for surgical repair.  The patient understands they will receive post-surgical care and follow-up from the referring physician's office.
Interpolation Flap Text: A decision was made to reconstruct the defect utilizing an interpolation axial flap and a staged reconstruction.  A telfa template was made of the defect.  This telfa template was then used to outline the interpolation flap.  The donor area for the pedicle flap was then injected with anesthesia.  The flap was excised through the skin and subcutaneous tissue down to the layer of the underlying musculature.  The interpolation flap was carefully excised within this deep plane to maintain its blood supply.  The edges of the donor site were undermined.   The donor site was closed in a primary fashion.  The pedicle was then rotated into position and sutured.  Once the tube was sutured into place, adequate blood supply was confirmed with blanching and refill.  The pedicle was then wrapped with xeroform gauze and dressed appropriately with a telfa and gauze bandage to ensure continued blood supply and protect the attached pedicle.
Suturegard Intro: Intraoperative tissue expansion was performed, utilizing the SUTUREGARD device, in order to reduce wound tension.
Graft Cartilage Fenestration Text: The cartilage was fenestrated with a 2mm punch biopsy to help facilitate graft survival and healing.
Stage 2: Number Of Blocks?: 1
Cheek-To-Nose Interpolation Flap Text: A decision was made to reconstruct the defect utilizing an interpolation axial flap and a staged reconstruction.  A telfa template was made of the defect.  This telfa template was then used to outline the Cheek-To-Nose Interpolation flap.  The donor area for the pedicle flap was then injected with anesthesia.  The flap was excised through the skin and subcutaneous tissue down to the layer of the underlying musculature.  The interpolation flap was carefully excised within this deep plane to maintain its blood supply.  The edges of the donor site were undermined.   The donor site was closed in a primary fashion.  The pedicle was then rotated into position and sutured.  Once the tube was sutured into place, adequate blood supply was confirmed with blanching and refill.  The pedicle was then wrapped with xeroform gauze and dressed appropriately with a telfa and gauze bandage to ensure continued blood supply and protect the attached pedicle.
Manual Repair Warning Statement: We plan on removing the manually selected variable below in favor of our much easier automatic structured text blocks found in the previous tab. We decided to do this to help make the flow better and give you the full power of structured data. Manual selection is never going to be ideal in our platform and I would encourage you to avoid using manual selection from this point on, especially since I will be sunsetting this feature. It is important that you do one of two things with the customized text below. First, you can save all of the text in a word file so you can have it for future reference. Second, transfer the text to the appropriate area in the Library tab. Lastly, if there is a flap or graft type which we do not have you need to let us know right away so I can add it in before the variable is hidden. No need to panic, we plan to give you roughly 6 months to make the change.
Primary Defect Length In Cm (Final Defect Size - Required For Flaps/Grafts): 2.6
W Plasty Text: The lesion was extirpated to the level of the fat with a #15 scalpel blade.  Given the location of the defect, shape of the defect and the proximity to free margins a W-plasty was deemed most appropriate for repair.  Using a sterile surgical marker, the appropriate transposition arms of the W-plasty were drawn incorporating the defect and placing the expected incisions within the relaxed skin tension lines where possible.    The area thus outlined was incised deep to adipose tissue with a #15 scalpel blade.  The skin margins were undermined to an appropriate distance in all directions utilizing iris scissors.  The opposing transposition arms were then transposed into place in opposite direction and anchored with interrupted buried subcutaneous sutures.
Epidermal Sutures: 5-0 Surgipro
Island Pedicle Flap-Requiring Vessel Identification Text: The defect edges were debeveled with a #15 scalpel blade.  Given the location of the defect, shape of the defect and the proximity to free margins an island pedicle advancement flap was deemed most appropriate.  Using a sterile surgical marker, an appropriate advancement flap was drawn, based on the axial vessel mentioned above, incorporating the defect, outlining the appropriate donor tissue and placing the expected incisions within the relaxed skin tension lines where possible.    The area thus outlined was incised deep to adipose tissue with a #15 scalpel blade.  The skin margins were undermined to an appropriate distance in all directions around the primary defect and laterally outward around the island pedicle utilizing iris scissors.  There was minimal undermining beneath the pedicle flap.
Deep Sutures: 5-0 Polysorb
Suture Removal: 10 days
X Size Of Lesion In Cm (Optional): 0.9
Skin Substitute Text: The defect edges were debeveled with a #15 scalpel blade.  Given the location of the defect, shape of the defect and the proximity to free margins a skin substitute graft was deemed most appropriate.  The graft material was trimmed to fit the size of the defect. The graft was then placed in the primary defect and oriented appropriately.
Wound Check: 6 weeks
Biopsy Photograph Reviewed: No (no photograph available)
Keystone Flap Text: The defect edges were debeveled with a #15 scalpel blade.  Given the location of the defect, shape of the defect a keystone flap was deemed most appropriate.  Using a sterile surgical marker, an appropriate keystone flap was drawn incorporating the defect, outlining the appropriate donor tissue and placing the expected incisions within the relaxed skin tension lines where possible. The area thus outlined was incised deep to adipose tissue with a #15 scalpel blade.  The skin margins were undermined to an appropriate distance in all directions around the primary defect and laterally outward around the flap utilizing iris scissors.
Suturegard Body: The suture ends were repeatedly re-tightened and re-clamped to achieve the desired tissue expansion.
Initial Size Of Lesion: 1.6
Mid-Level Procedure Text (B): After obtaining clear surgical margins the patient was sent to a mid-level provider for surgical repair.  The patient understands they will receive post-surgical care and follow-up from the mid-level provider.
Nostril Rim Text: The closure involved the nostril rim.
Medical Necessity Statement: Based on my medical judgement, Mohs surgery is the most appropriate treatment for this cancer compared to other treatments.
Partial Purse String (Intermediate) Text: Given the location of the defect and the characteristics of the surrounding skin an intermediate purse string closure was deemed most appropriate.  Undermining was performed circumfirentially around the surgical defect.  A purse string suture was then placed and tightened. Wound tension only allowed a partial closure of the circular defect.
Anesthesia Volume In Cc: 9
Alar Island Pedicle Flap Text: The defect edges were debeveled with a #15 scalpel blade.  Given the location of the defect, shape of the defect and the proximity to the alar rim an island pedicle advancement flap was deemed most appropriate.  Using a sterile surgical marker, an appropriate advancement flap was drawn incorporating the defect, outlining the appropriate donor tissue and placing the expected incisions within the nasal ala running parallel to the alar rim. The area thus outlined was incised with a #15 scalpel blade.  The skin margins were undermined minimally to an appropriate distance in all directions around the primary defect and laterally outward around the island pedicle utilizing iris scissors.  There was minimal undermining beneath the pedicle flap.
Secondary Defect Width In Cm (Required For Flaps): 2
Mucosal Advancement Flap Text: Given the location of the defect, shape of the defect and the proximity to free margins a mucosal advancement flap was deemed most appropriate. Incisions were made with a 15 blade scalpel in the appropriate fashion along the cutaneous vermilion border and the mucosal lip. The remaining actinically damaged mucosal tissue was excised.  The mucosal advancement flap was then elevated to the gingival sulcus with care taken to preserve the neurovascular structures and advanced into the primary defect. Care was taken to ensure that precise realignment of the vermilion border was achieved.
Location Indication Override (Is Already Calculated Based On Selected Body Location): Area M
Consent (Scalp)/Introductory Paragraph: The rationale for Mohs was explained to the patient and consent was obtained. The risks, benefits and alternatives to therapy were discussed in detail. Specifically, the risks of changes in hair growth pattern secondary to repair, infection, scarring, bleeding, prolonged wound healing, incomplete removal, allergy to anesthesia, nerve injury and recurrence were addressed. Prior to the procedure, the treatment site was clearly identified and confirmed by the patient. All components of Universal Protocol/PAUSE Rule completed.
Ftsg Text: The defect edges were debeveled with a #15 scalpel blade.  Given the location of the defect, shape of the defect and the proximity to free margins a full thickness skin graft was deemed most appropriate.  Using a sterile surgical marker, the primary defect shape was transferred to the donor site. The area thus outlined was incised deep to adipose tissue with a #15 scalpel blade.  The harvested graft was then trimmed of adipose tissue until only dermis and epidermis was left.  The skin margins of the secondary defect were undermined to an appropriate distance in all directions utilizing iris scissors.  The secondary defect was closed with interrupted buried subcutaneous sutures.  The skin edges were then re-apposed with running  sutures.  The skin graft was then placed in the primary defect and oriented appropriately.
O-T Plasty Text: The defect edges were debeveled with a #15 scalpel blade.  Given the location of the defect, shape of the defect and the proximity to free margins an O-T plasty was deemed most appropriate.  Using a sterile surgical marker, an appropriate O-T plasty was drawn incorporating the defect and placing the expected incisions within the relaxed skin tension lines where possible.    The area thus outlined was incised deep to adipose tissue with a #15 scalpel blade.  The skin margins were undermined to an appropriate distance in all directions utilizing iris scissors.
Consent 2/Introductory Paragraph: Mohs surgery was explained to the patient and consent was obtained. The risks, benefits and alternatives to therapy were discussed in detail. Specifically, the risks of infection, scarring, bleeding, prolonged wound healing, incomplete removal, allergy to anesthesia, nerve injury and recurrence were addressed. Prior to the procedure, the treatment site was clearly identified and confirmed by the patient. All components of Universal Protocol/PAUSE Rule completed.
Chonodrocutaneous Helical Advancement Flap Text: The defect edges were debeveled with a #15 scalpel blade.  Given the location of the defect and the proximity to free margins a chondrocutaneous helical advancement flap was deemed most appropriate.  Using a sterile surgical marker, the appropriate advancement flap was drawn incorporating the defect and placing the expected incisions within the relaxed skin tension lines where possible.    The area thus outlined was incised deep to adipose tissue with a #15 scalpel blade.  The skin margins were undermined to an appropriate distance in all directions utilizing iris scissors.
Repair Hemostasis (Optional): Pinpoint electrocautery
Consent (Lip)/Introductory Paragraph: The rationale for Mohs was explained to the patient and consent was obtained. The risks, benefits and alternatives to therapy were discussed in detail. Specifically, the risks of lip deformity, changes in the oral aperture, infection, scarring, bleeding, prolonged wound healing, incomplete removal, allergy to anesthesia, nerve injury and recurrence were addressed. Prior to the procedure, the treatment site was clearly identified and confirmed by the patient. All components of Universal Protocol/PAUSE Rule completed.
Consent (Spinal Accessory)/Introductory Paragraph: The rationale for Mohs was explained to the patient and consent was obtained. The risks, benefits and alternatives to therapy were discussed in detail. Specifically, the risks of damage to the spinal accessory nerve, infection, scarring, bleeding, prolonged wound healing, incomplete removal, allergy to anesthesia, and recurrence were addressed. Prior to the procedure, the treatment site was clearly identified and confirmed by the patient. All components of Universal Protocol/PAUSE Rule completed.
Epidermal Closure: running cuticular
Dermal Autograft Text: The defect edges were debeveled with a #15 scalpel blade.  Given the location of the defect, shape of the defect and the proximity to free margins a dermal autograft was deemed most appropriate.  Using a sterile surgical marker, the primary defect shape was transferred to the donor site. The area thus outlined was incised deep to adipose tissue with a #15 scalpel blade.  The harvested graft was then trimmed of adipose and epidermal tissue until only dermis was left.  The skin graft was then placed in the primary defect and oriented appropriately.
Partial Purse String (Simple) Text: Given the location of the defect and the characteristics of the surrounding skin a simple purse string closure was deemed most appropriate.  Undermining was performed circumfirentially around the surgical defect.  A purse string suture was then placed and tightened. Wound tension only allowed a partial closure of the circular defect.
Primary Defect Width In Cm (Final Defect Size - Required For Flaps/Grafts): 1.7
Repair Type: Flap
Unna Boot Text: An Unna boot was placed to help immobilize the limb and facilitate more rapid healing.
Tarsorrhaphy Text: A tarsorrhaphy was performed using Frost sutures.
O-T Advancement Flap Text: The defect edges were debeveled with a #15 scalpel blade.  Given the location of the defect, shape of the defect and the proximity to free margins an O-T advancement flap was deemed most appropriate.  Using a sterile surgical marker, an appropriate advancement flap was drawn incorporating the defect and placing the expected incisions within the relaxed skin tension lines where possible.    The area thus outlined was incised deep to adipose tissue with a #15 scalpel blade.  The skin margins were undermined to an appropriate distance in all directions utilizing iris scissors.
O-L Flap Text: The defect edges were debeveled with a #15 scalpel blade.  Given the location of the defect, shape of the defect and the proximity to free margins an O-L flap was deemed most appropriate.  Using a sterile surgical marker, an appropriate advancement flap was drawn incorporating the defect and placing the expected incisions within the relaxed skin tension lines where possible.    The area thus outlined was incised deep to adipose tissue with a #15 scalpel blade.  The skin margins were undermined to an appropriate distance in all directions utilizing iris scissors.
Same Histology In Subsequent Stages Text: The pattern and morphology of the tumor is as described in the first stage.
Vermilion Border Text: The closure involved the vermilion border.
Spiral Flap Text: The defect edges were debeveled with a #15 scalpel blade.  Given the location of the defect, shape of the defect and the proximity to free margins a spiral flap was deemed most appropriate.  Using a sterile surgical marker, an appropriate rotation flap was drawn incorporating the defect and placing the expected incisions within the relaxed skin tension lines where possible. The area thus outlined was incised deep to adipose tissue with a #15 scalpel blade.  The skin margins were undermined to an appropriate distance in all directions utilizing iris scissors.
Information: Selecting Yes will display possible errors in your note based on the variables you have selected. This validation is only offered as a suggestion for you. PLEASE NOTE THAT THE VALIDATION TEXT WILL BE REMOVED WHEN YOU FINALIZE YOUR NOTE. IF YOU WANT TO FAX A PRELIMINARY NOTE YOU WILL NEED TO TOGGLE THIS TO 'NO' IF YOU DO NOT WANT IT IN YOUR FAXED NOTE.
Modified Advancement Flap Text: The defect edges were debeveled with a #15 scalpel blade.  Given the location of the defect, shape of the defect and the proximity to free margins a modified advancement flap was deemed most appropriate.  Using a sterile surgical marker, an appropriate advancement flap was drawn incorporating the defect and placing the expected incisions within the relaxed skin tension lines where possible.    The area thus outlined was incised deep to adipose tissue with a #15 scalpel blade.  The skin margins were undermined to an appropriate distance in all directions utilizing iris scissors.
Xenograft Text: The defect edges were debeveled with a #15 scalpel blade.  Given the location of the defect, shape of the defect and the proximity to free margins a xenograft was deemed most appropriate.  The graft was then trimmed to fit the size of the defect.  The graft was then placed in the primary defect and oriented appropriately.
Alternatives Discussed Intro (Do Not Add Period): I discussed alternative treatments to Mohs surgery and specifically discussed the risks and benefits of
Double Island Pedicle Flap Text: The defect edges were debeveled with a #15 scalpel blade.  Given the location of the defect, shape of the defect and the proximity to free margins a double island pedicle advancement flap was deemed most appropriate.  Using a sterile surgical marker, an appropriate advancement flap was drawn incorporating the defect, outlining the appropriate donor tissue and placing the expected incisions within the relaxed skin tension lines where possible.    The area thus outlined was incised deep to adipose tissue with a #15 scalpel blade.  The skin margins were undermined to an appropriate distance in all directions around the primary defect and laterally outward around the island pedicle utilizing iris scissors.  There was minimal undermining beneath the pedicle flap.
Provider Procedure Text (B): After obtaining clear surgical margins the defect was repaired by another provider.
Bi-Rhombic Flap Text: The defect edges were debeveled with a #15 scalpel blade.  Given the location of the defect and the proximity to free margins a bi-rhombic flap was deemed most appropriate.  Using a sterile surgical marker, an appropriate rhombic flap was drawn incorporating the defect. The area thus outlined was incised deep to adipose tissue with a #15 scalpel blade.  The skin margins were undermined to an appropriate distance in all directions utilizing iris scissors.
Advancement-Rotation Flap Text: The defect edges were debeveled with a #15 scalpel blade.  Given the location of the defect, shape of the defect and the proximity to free margins an advancement-rotation flap was deemed most appropriate.  Using a sterile surgical marker, an appropriate flap was drawn incorporating the defect and placing the expected incisions within the relaxed skin tension lines where possible. The area thus outlined was incised deep to adipose tissue with a #15 scalpel blade.  The skin margins were undermined to an appropriate distance in all directions utilizing iris scissors.
Consent 1/Introductory Paragraph: The rationale for Mohs was explained to the patient and consent was obtained. The risks, benefits and alternatives to therapy were discussed in detail. Specifically, the risks of infection, scarring, bleeding, prolonged wound healing, incomplete removal, allergy to anesthesia, nerve injury and recurrence were addressed. Prior to the procedure, the treatment site was clearly identified and confirmed by the patient. All components of Universal Protocol/PAUSE Rule completed.
Body Location Override (Optional - Billing Will Still Be Based On Selected Body Map Location If Applicable): left forehead
Transposition Flap Text: The defect edges were debeveled with a #15 scalpel blade.  Given the location of the defect and the proximity to free margins a transposition flap was deemed most appropriate.  Using a sterile surgical marker, an appropriate transposition flap was drawn incorporating the defect.    The area thus outlined was incised deep to adipose tissue with a #15 scalpel blade.  The skin margins were undermined to an appropriate distance in all directions utilizing iris scissors.
Mohs Method Verbiage: An incision at a 45 degree angle following the standard Mohs approach was done and the specimen was harvested as a microscopic controlled layer.
Detail Level: Detailed
Bilobed Flap Text: The defect edges were debeveled with a #15 scalpel blade.  Given the location of the defect and the proximity to free margins a bilobe flap was deemed most appropriate.  Using a sterile surgical marker, an appropriate bilobe flap drawn around the defect.    The area thus outlined was incised deep to adipose tissue with a #15 scalpel blade.  The skin margins were undermined to an appropriate distance in all directions utilizing iris scissors.
Non-Graft Cartilage Fenestration Text: The cartilage was fenestrated with a 2mm punch biopsy to help facilitate healing.
Island Pedicle Flap Text: The defect edges were debeveled with a #15 scalpel blade.  Given the location of the defect, shape of the defect and the proximity to free margins an island pedicle advancement flap was deemed most appropriate.  Using a sterile surgical marker, an appropriate advancement flap was drawn incorporating the defect, outlining the appropriate donor tissue and placing the expected incisions within the relaxed skin tension lines where possible.    The area thus outlined was incised deep to adipose tissue with a #15 scalpel blade.  The skin margins were undermined to an appropriate distance in all directions around the primary defect and laterally outward around the island pedicle utilizing iris scissors.  There was minimal undermining beneath the pedicle flap.
Plastic Surgeon Procedure Text (D): After obtaining clear surgical margins the patient was sent to plastics for surgical repair.  The patient understands they will receive post-surgical care and follow-up from the referring physician's office.
Rhomboid Transposition Flap Text: The defect edges were debeveled with a #15 scalpel blade.  Given the location of the defect and the proximity to free margins a rhomboid transposition flap was deemed most appropriate.  Using a sterile surgical marker, an appropriate rhomboid flap was drawn incorporating the defect.    The area thus outlined was incised deep to adipose tissue with a #15 scalpel blade.  The skin margins were undermined to an appropriate distance in all directions utilizing iris scissors.
Melolabial Interpolation Flap Text: A decision was made to reconstruct the defect utilizing an interpolation axial flap and a staged reconstruction.  A telfa template was made of the defect.  This telfa template was then used to outline the melolabial interpolation flap.  The donor area for the pedicle flap was then injected with anesthesia.  The flap was excised through the skin and subcutaneous tissue down to the layer of the underlying musculature.  The pedicle flap was carefully excised within this deep plane to maintain its blood supply.  The edges of the donor site were undermined.   The donor site was closed in a primary fashion.  The pedicle was then rotated into position and sutured.  Once the tube was sutured into place, adequate blood supply was confirmed with blanching and refill.  The pedicle was then wrapped with xeroform gauze and dressed appropriately with a telfa and gauze bandage to ensure continued blood supply and protect the attached pedicle.
Cheiloplasty (Less Than 50%) Text: A decision was made to reconstruct the defect with a  cheiloplasty.  The defect was undermined extensively.  Additional obicularis oris muscle was excised with a 15 blade scalpel.  The defect was converted into a full thickness wedge, of less than 50% of the vertical height of the lip, to facilite a better cosmetic result.  Small vessels were then tied off with 5-0 monocyrl. The obicularis oris, superficial fascia, adipose and dermis were then reapproximated.  After the deeper layers were approximated the epidermis was reapproximated with particular care given to realign the vermilion border.
Purse String (Simple) Text: Given the location of the defect and the characteristics of the surrounding skin a purse string closure was deemed most appropriate.  Undermining was performed circumfirentially around the surgical defect.  A purse string suture was then placed and tightened.
Referring Physician (Optional): Dr. Renee Mendoza
Previous Accession (Optional): outside pathology
Cheiloplasty (Complex) Text: A decision was made to reconstruct the defect with a  cheiloplasty.  The defect was undermined extensively.  Additional obicularis oris muscle was excised with a 15 blade scalpel.  The defect was converted into a full thickness wedge to facilite a better cosmetic result.  Small vessels were then tied off with 5-0 monocyrl. The obicularis oris, superficial fascia, adipose and dermis were then reapproximated.  After the deeper layers were approximated the epidermis was reapproximated with particular care given to realign the vermilion border.
Surgical Defect Length In Cm (Optional): 2.4
Surgical Defect Width In Cm (Optional): 1.1
Composite Graft Text: The defect edges were debeveled with a #15 scalpel blade.  Given the location of the defect, shape of the defect, the proximity to free margins and the fact the defect was full thickness a composite graft was deemed most appropriate.  The defect was outline and then transferred to the donor site.  A full thickness graft was then excised from the donor site. The graft was then placed in the primary defect, oriented appropriately and then sutured into place.  The secondary defect was then repaired using a primary closure.
Undermining Type: Entire Wound
Cartilage Graft Text: The defect edges were debeveled with a #15 scalpel blade.  Given the location of the defect, shape of the defect, the fact the defect involved a full thickness cartilage defect a cartilage graft was deemed most appropriate.  An appropriate donor site was identified, cleansed, and anesthetized. The cartilage graft was then harvested and transferred to the recipient site, oriented appropriately and then sutured into place.  The secondary defect was then repaired using a primary closure.
Surgeon: Nadine Allen MD
Postop Diagnosis: same
Double O-Z Flap Text: The defect edges were debeveled with a #15 scalpel blade.  Given the location of the defect, shape of the defect and the proximity to free margins a Double O-Z flap was deemed most appropriate.  Using a sterile surgical marker, an appropriate transposition flap was drawn incorporating the defect and placing the expected incisions within the relaxed skin tension lines where possible. The area thus outlined was incised deep to adipose tissue with a #15 scalpel blade.  The skin margins were undermined to an appropriate distance in all directions utilizing iris scissors.
Ear Star Wedge Flap Text: The defect edges were debeveled with a #15 blade scalpel.  Given the location of the defect and the proximity to free margins (helical rim) an ear star wedge flap was deemed most appropriate.  Using a sterile surgical marker, the appropriate flap was drawn incorporating the defect and placing the expected incisions between the helical rim and antihelix where possible.  The area thus outlined was incised through and through with a #15 scalpel blade.
Hemostasis: Electrocautery
A-T Advancement Flap Text: The defect edges were debeveled with a #15 scalpel blade.  Given the location of the defect, shape of the defect and the proximity to free margins an A-T advancement flap was deemed most appropriate.  Using a sterile surgical marker, an appropriate advancement flap was drawn incorporating the defect and placing the expected incisions within the relaxed skin tension lines where possible.    The area thus outlined was incised deep to adipose tissue with a #15 scalpel blade.  The skin margins were undermined to an appropriate distance in all directions utilizing iris scissors.
Pain Refusal Text: I offered to prescribe pain medication but the patient refused to take this medication.
No Repair - Repaired With Adjacent Surgical Defect Text (Leave Blank If You Do Not Want): After obtaining clear surgical margins the defect was repaired concurrently with another surgical defect which was in close approximation.
Purse String (Intermediate) Text: Given the location of the defect and the characteristics of the surrounding skin a purse string intermediate closure was deemed most appropriate.  Undermining was performed circumfirentially around the surgical defect.  A purse string suture was then placed and tightened.
Mohs Case Number: [id number]
Advancement Flap (Double) Text: The defect edges were debeveled with a #15 scalpel blade.  Given the location of the defect and the proximity to free margins a double advancement flap was deemed most appropriate.  Using a sterile surgical marker, the appropriate advancement flaps were drawn incorporating the defect and placing the expected incisions within the relaxed skin tension lines where possible.    The area thus outlined was incised deep to adipose tissue with a #15 scalpel blade.  The skin margins were undermined to an appropriate distance in all directions utilizing iris scissors.
Eye Protection Verbiage: Before proceeding with the stage, a plastic scleral shield was inserted. The globe was anesthetized with a few drops of 1% lidocaine with 1:100,000 epinephrine. Then, an appropriate sized scleral shield was chosen and coated with lacrilube ointment. The shield was gently inserted and left in place for the duration of each stage. After the stage was completed, the shield was gently removed.
Melolabial Transposition Flap Text: The defect edges were debeveled with a #15 scalpel blade.  Given the location of the defect and the proximity to free margins a melolabial flap was deemed most appropriate.  Using a sterile surgical marker, an appropriate melolabial transposition flap was drawn incorporating the defect.    The area thus outlined was incised deep to adipose tissue with a #15 scalpel blade.  The skin margins were undermined to an appropriate distance in all directions utilizing iris scissors.
Debridement Text: The wound edges were debrided prior to proceeding with the closure to facilitate wound healing.
Mohs Histo Method Verbiage: Each section was then chromacoded and processed in the Mohs lab using the Mohs protocol and submitted for frozen section.
H Plasty Text: Given the location of the defect, shape of the defect and the proximity to free margins a H-plasty was deemed most appropriate for repair.  Using a sterile surgical marker, the appropriate advancement arms of the H-plasty were drawn incorporating the defect and placing the expected incisions within the relaxed skin tension lines where possible. The area thus outlined was incised deep to adipose tissue with a #15 scalpel blade. The skin margins were undermined to an appropriate distance in all directions utilizing iris scissors.  The opposing advancement arms were then advanced into place in opposite direction and anchored with interrupted buried subcutaneous sutures.
Trilobed Flap Text: The defect edges were debeveled with a #15 scalpel blade.  Given the location of the defect and the proximity to free margins a trilobed flap was deemed most appropriate.  Using a sterile surgical marker, an appropriate trilobed flap drawn around the defect.    The area thus outlined was incised deep to adipose tissue with a #15 scalpel blade.  The skin margins were undermined to an appropriate distance in all directions utilizing iris scissors.
Complex Repair And Graft Additional Text (Will Appearing After The Standard Complex Repair Text): The complex repair was not sufficient to completely close the primary defect. The remaining additional defect was repaired with the graft mentioned below.
Muscle Hinge Flap Text: The defect edges were debeveled with a #15 scalpel blade.  Given the size, depth and location of the defect and the proximity to free margins a muscle hinge flap was deemed most appropriate.  Using a sterile surgical marker, an appropriate hinge flap was drawn incorporating the defect. The area thus outlined was incised with a #15 scalpel blade.  The skin margins were undermined to an appropriate distance in all directions utilizing iris scissors.
Star Wedge Flap Text: The defect edges were debeveled with a #15 scalpel blade.  Given the location of the defect, shape of the defect and the proximity to free margins a star wedge flap was deemed most appropriate.  Using a sterile surgical marker, an appropriate rotation flap was drawn incorporating the defect and placing the expected incisions within the relaxed skin tension lines where possible. The area thus outlined was incised deep to adipose tissue with a #15 scalpel blade.  The skin margins were undermined to an appropriate distance in all directions utilizing iris scissors.
Ear Wedge Repair Text: A wedge excision was completed by carrying down an excision through the full thickness of the ear and cartilage with an inward facing Burow's triangle. The wound was then closed in a layered fashion.
Inflammation Suggestive Of Cancer Camouflage Histology Text: There was a dense lymphocytic infiltrate which prevented adequate histologic evaluation of adjacent structures.
Mohs Rapid Report Verbiage: The area of clinically evident tumor was marked with skin marking ink and appropriately hatched.  The initial incision was made following the Mohs approach through the skin.  The specimen was taken to the lab, divided into the necessary number of pieces, chromacoded and processed according to the Mohs protocol.  This was repeated in successive stages until a tumor free defect was achieved.
Home Suture Removal Text: Patient was provided instructions on removing sutures and will remove their sutures at home.  If they have any questions or difficulties they will call the office.
Paramedian Forehead Flap Text: A decision was made to reconstruct the defect utilizing an interpolation axial flap and a staged reconstruction.  A telfa template was made of the defect.  This telfa template was then used to outline the paramedian forehead pedicle flap.  The donor area for the pedicle flap was then injected with anesthesia.  The flap was excised through the skin and subcutaneous tissue down to the layer of the underlying musculature.  The pedicle flap was carefully excised within this deep plane to maintain its blood supply.  The edges of the donor site were undermined.   The donor site was closed in a primary fashion.  The pedicle was then rotated into position and sutured.  Once the tube was sutured into place, adequate blood supply was confirmed with blanching and refill.  The pedicle was then wrapped with xeroform gauze and dressed appropriately with a telfa and gauze bandage to ensure continued blood supply and protect the attached pedicle.
Crescentic Advancement Flap Text: The defect edges were debeveled with a #15 scalpel blade.  Given the location of the defect and the proximity to free margins a crescentic advancement flap was deemed most appropriate.  Using a sterile surgical marker, the appropriate advancement flap was drawn incorporating the defect and placing the expected incisions within the relaxed skin tension lines where possible.    The area thus outlined was incised deep to adipose tissue with a #15 scalpel blade.  The skin margins were undermined to an appropriate distance in all directions utilizing iris scissors.
Rhombic Flap Text: The defect edges were debeveled with a #15 scalpel blade.  Given the location of the defect and the proximity to free margins a rhombic flap was deemed most appropriate.  Using a sterile surgical marker, an appropriate rhombic flap was drawn incorporating the defect.    The area thus outlined was incised deep to adipose tissue with a #15 scalpel blade.  The skin margins were undermined to an appropriate distance in all directions utilizing iris scissors.
Post-Care Instructions: I reviewed with the patient in detail post-care instructions. Patient is not to engage in any heavy lifting, exercise, or swimming for the next 14 days. Should the patient develop any fevers, chills, bleeding, severe pain patient will contact the office immediately.
Closure 2 Information: This tab is for additional flaps and grafts, including complex repair and grafts and complex repair and flaps. You can also specify a different location for the additional defect, if the location is the same you do not need to select a new one. We will insert the automated text for the repair you select below just as we do for solitary flaps and grafts. Please note that at this time if you select a location with a different insurance zone you will need to override the ICD10 and CPT if appropriate.
Consent (Ear)/Introductory Paragraph: The rationale for Mohs was explained to the patient and consent was obtained. The risks, benefits and alternatives to therapy were discussed in detail. Specifically, the risks of ear deformity, infection, scarring, bleeding, prolonged wound healing, incomplete removal, allergy to anesthesia, nerve injury and recurrence were addressed. Prior to the procedure, the treatment site was clearly identified and confirmed by the patient. All components of Universal Protocol/PAUSE Rule completed.
Area H Indication Text: Tumors in this location are included in Area H (eyelids, eyebrows, nose, lips, chin, ear, pre-auricular, post-auricular, temple, genitalia, hands, feet, ankles and areola).  Tissue conservation is critical in these anatomic locations.
V-Y Plasty Text: The defect edges were debeveled with a #15 scalpel blade.  Given the location of the defect, shape of the defect and the proximity to free margins an V-Y advancement flap was deemed most appropriate.  Using a sterile surgical marker, an appropriate advancement flap was drawn incorporating the defect and placing the expected incisions within the relaxed skin tension lines where possible.    The area thus outlined was incised deep to adipose tissue with a #15 scalpel blade.  The skin margins were undermined to an appropriate distance in all directions utilizing iris scissors.
V-Y Flap Text: The defect edges were debeveled with a #15 scalpel blade.  Given the location of the defect, shape of the defect and the proximity to free margins a V-Y flap was deemed most appropriate.  Using a sterile surgical marker, an appropriate advancement flap was drawn incorporating the defect and placing the expected incisions within the relaxed skin tension lines where possible.    The area thus outlined was incised deep to adipose tissue with a #15 scalpel blade.  The skin margins were undermined to an appropriate distance in all directions utilizing iris scissors.
Hatchet Flap Text: The defect edges were debeveled with a #15 scalpel blade.  Given the location of the defect, shape of the defect and the proximity to free margins a hatchet flap was deemed most appropriate.  Using a sterile surgical marker, an appropriate hatchet flap was drawn incorporating the defect and placing the expected incisions within the relaxed skin tension lines where possible.    The area thus outlined was incised deep to adipose tissue with a #15 scalpel blade.  The skin margins were undermined to an appropriate distance in all directions utilizing iris scissors.
Split-Thickness Skin Graft Text: The defect edges were debeveled with a #15 scalpel blade.  Given the location of the defect, shape of the defect and the proximity to free margins a split thickness skin graft was deemed most appropriate.  Using a sterile surgical marker, the primary defect shape was transferred to the donor site. The split thickness graft was then harvested.  The skin graft was then placed in the primary defect and oriented appropriately.
Banner Transposition Flap Text: The defect edges were debeveled with a #15 scalpel blade.  Given the location of the defect and the proximity to free margins a Banner transposition flap was deemed most appropriate.  Using a sterile surgical marker, an appropriate flap drawn around the defect. The area thus outlined was incised deep to adipose tissue with a #15 scalpel blade.  The skin margins were undermined to an appropriate distance in all directions utilizing iris scissors.
Consent (Marginal Mandibular)/Introductory Paragraph: The rationale for Mohs was explained to the patient and consent was obtained. The risks, benefits and alternatives to therapy were discussed in detail. Specifically, the risks of damage to the marginal mandibular branch of the facial nerve, infection, scarring, bleeding, prolonged wound healing, incomplete removal, allergy to anesthesia, and recurrence were addressed. Prior to the procedure, the treatment site was clearly identified and confirmed by the patient. All components of Universal Protocol/PAUSE Rule completed.
Rotation Flap Text: The defect edges were debeveled with a #15 scalpel blade.  Given the location of the defect, shape of the defect and the proximity to free margins a rotation flap was deemed most appropriate.  Using a sterile surgical marker, an appropriate rotation flap was drawn incorporating the defect and placing the expected incisions within the relaxed skin tension lines where possible.    The area thus outlined was incised deep to adipose tissue with a #15 scalpel blade.  The skin margins were undermined to an appropriate distance in all directions utilizing iris scissors.
Retention Suture Text: Retention sutures were placed to support the closure and prevent dehiscence.
Z Plasty Text: The lesion was extirpated to the level of the fat with a #15 scalpel blade.  Given the location of the defect, shape of the defect and the proximity to free margins a Z-plasty was deemed most appropriate for repair.  Using a sterile surgical marker, the appropriate transposition arms of the Z-plasty were drawn incorporating the defect and placing the expected incisions within the relaxed skin tension lines where possible.    The area thus outlined was incised deep to adipose tissue with a #15 scalpel blade.  The skin margins were undermined to an appropriate distance in all directions utilizing iris scissors.  The opposing transposition arms were then transposed into place in opposite direction and anchored with interrupted buried subcutaneous sutures.
Flap Type: Advancement Flap (Single)
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
Surgical Defect Width In Cm (Optional): 1.4
Subsequent Stages Histo Method Verbiage: Using a similar technique to that described above, a thin layer of tissue was removed from all areas where tumor was visible on the previous stage.  The tissue was again oriented, mapped, dyed, and processed as above.
Mastoid Interpolation Flap Text: A decision was made to reconstruct the defect utilizing an interpolation axial flap and a staged reconstruction.  A telfa template was made of the defect.  This telfa template was then used to outline the mastoid interpolation flap.  The donor area for the pedicle flap was then injected with anesthesia.  The flap was excised through the skin and subcutaneous tissue down to the layer of the underlying musculature.  The pedicle flap was carefully excised within this deep plane to maintain its blood supply.  The edges of the donor site were undermined.   The donor site was closed in a primary fashion.  The pedicle was then rotated into position and sutured.  Once the tube was sutured into place, adequate blood supply was confirmed with blanching and refill.  The pedicle was then wrapped with xeroform gauze and dressed appropriately with a telfa and gauze bandage to ensure continued blood supply and protect the attached pedicle.
Secondary Intention Text (Leave Blank If You Do Not Want): The defect will heal with secondary intention.
Epidermal Autograft Text: The defect edges were debeveled with a #15 scalpel blade.  Given the location of the defect, shape of the defect and the proximity to free margins an epidermal autograft was deemed most appropriate.  Using a sterile surgical marker, the primary defect shape was transferred to the donor site. The epidermal graft was then harvested.  The skin graft was then placed in the primary defect and oriented appropriately.
Double O-Z Plasty Text: The defect edges were debeveled with a #15 scalpel blade.  Given the location of the defect, shape of the defect and the proximity to free margins a Double O-Z plasty (double transposition flap) was deemed most appropriate.  Using a sterile surgical marker, the appropriate transposition flaps were drawn incorporating the defect and placing the expected incisions within the relaxed skin tension lines where possible. The area thus outlined was incised deep to adipose tissue with a #15 scalpel blade.  The skin margins were undermined to an appropriate distance in all directions utilizing iris scissors.  Hemostasis was achieved with electrocautery.  The flaps were then transposed into place, one clockwise and the other counterclockwise, and anchored with interrupted buried subcutaneous sutures.
Consent 3/Introductory Paragraph: I gave the patient a chance to ask questions they had about the procedure.  Following this I explained the Mohs procedure and consent was obtained. The risks, benefits and alternatives to therapy were discussed in detail. Specifically, the risks of infection, scarring, bleeding, prolonged wound healing, incomplete removal, allergy to anesthesia, nerve injury and recurrence were addressed. Prior to the procedure, the treatment site was clearly identified and confirmed by the patient. All components of Universal Protocol/PAUSE Rule completed.
Helical Rim Advancement Flap Text: The defect edges were debeveled with a #15 blade scalpel.  Given the location of the defect and the proximity to free margins (helical rim) a double helical rim advancement flap was deemed most appropriate.  Using a sterile surgical marker, the appropriate advancement flaps were drawn incorporating the defect and placing the expected incisions between the helical rim and antihelix where possible.  The area thus outlined was incised through and through with a #15 scalpel blade.  With a skin hook and iris scissors, the flaps were gently and sharply undermined and freed up.
Surgical Defect Length In Cm (Optional): 1.9
Surgeon/Pathologist Verbiage (Will Incorporate Name Of Surgeon From Intro If Not Blank): operated in two distinct and integrated capacities as the surgeon and pathologist.
Mart-1 - Negative Histology Text: MART-1 staining demonstrates a normal density and pattern of melanocytes along the dermal-epidermal junction. The surgical margins are negative for tumor cells.
Donor Site Anesthesia Type: same as repair anesthesia
Consent (Nose)/Introductory Paragraph: The rationale for Mohs was explained to the patient and consent was obtained. The risks, benefits and alternatives to therapy were discussed in detail. Specifically, the risks of nasal deformity, changes in the flow of air through the nose, infection, scarring, bleeding, prolonged wound healing, incomplete removal, allergy to anesthesia, nerve injury and recurrence were addressed. Prior to the procedure, the treatment site was clearly identified and confirmed by the patient. All components of Universal Protocol/PAUSE Rule completed.
Cheek Interpolation Flap Text: A decision was made to reconstruct the defect utilizing an interpolation axial flap and a staged reconstruction.  A telfa template was made of the defect.  This telfa template was then used to outline the Cheek Interpolation flap.  The donor area for the pedicle flap was then injected with anesthesia.  The flap was excised through the skin and subcutaneous tissue down to the layer of the underlying musculature.  The interpolation flap was carefully excised within this deep plane to maintain its blood supply.  The edges of the donor site were undermined.   The donor site was closed in a primary fashion.  The pedicle was then rotated into position and sutured.  Once the tube was sutured into place, adequate blood supply was confirmed with blanching and refill.  The pedicle was then wrapped with xeroform gauze and dressed appropriately with a telfa and gauze bandage to ensure continued blood supply and protect the attached pedicle.
O-Z Plasty Text: The defect edges were debeveled with a #15 scalpel blade.  Given the location of the defect, shape of the defect and the proximity to free margins an O-Z plasty (double transposition flap) was deemed most appropriate.  Using a sterile surgical marker, the appropriate transposition flaps were drawn incorporating the defect and placing the expected incisions within the relaxed skin tension lines where possible.    The area thus outlined was incised deep to adipose tissue with a #15 scalpel blade.  The skin margins were undermined to an appropriate distance in all directions utilizing iris scissors.  Hemostasis was achieved with electrocautery.  The flaps were then transposed into place, one clockwise and the other counterclockwise, and anchored with interrupted buried subcutaneous sutures.
Bilateral Helical Rim Advancement Flap Text: The defect edges were debeveled with a #15 blade scalpel.  Given the location of the defect and the proximity to free margins (helical rim) a bilateral helical rim advancement flap was deemed most appropriate.  Using a sterile surgical marker, the appropriate advancement flaps were drawn incorporating the defect and placing the expected incisions between the helical rim and antihelix where possible.  The area thus outlined was incised through and through with a #15 scalpel blade.  With a skin hook and iris scissors, the flaps were gently and sharply undermined and freed up.
Advancement Flap (Single) Text: The defect edges were debeveled with a #15 scalpel blade.  Given the location of the defect and the proximity to free margins a single advancement flap was deemed most appropriate.  Using a sterile surgical marker, an appropriate advancement flap was drawn incorporating the defect and placing the expected incisions within the relaxed skin tension lines where possible.    The area thus outlined was incised deep to adipose tissue with a #15 scalpel blade.  The skin margins were undermined to an appropriate distance in all directions utilizing iris scissors.
Retention Suture Bite Size: 1 mm
Burow's Advancement Flap Text: The defect edges were debeveled with a #15 scalpel blade.  Given the location of the defect and the proximity to free margins a Burow's advancement flap was deemed most appropriate.  Using a sterile surgical marker, the appropriate advancement flap was drawn incorporating the defect and placing the expected incisions within the relaxed skin tension lines where possible.    The area thus outlined was incised deep to adipose tissue with a #15 scalpel blade.  The skin margins were undermined to an appropriate distance in all directions utilizing iris scissors.
Repair Anesthesia Method: local infiltration
Complex Repair And Flap Additional Text (Will Appearing After The Standard Complex Repair Text): The complex repair was not sufficient to completely close the primary defect. The remaining additional defect was repaired with the flap mentioned below.
Epidermal Closure Graft Donor Site (Optional): running
S Plasty Text: Given the location and shape of the defect, and the orientation of relaxed skin tension lines, an S-plasty was deemed most appropriate for repair.  Using a sterile surgical marker, the appropriate outline of the S-plasty was drawn, incorporating the defect and placing the expected incisions within the relaxed skin tension lines where possible.  The area thus outlined was incised deep to adipose tissue with a #15 scalpel blade.  The skin margins were undermined to an appropriate distance in all directions utilizing iris scissors. The skin flaps were advanced over the defect.  The opposing margins were then approximated with interrupted buried subcutaneous sutures.
No Residual Tumor Seen Histology Text: There were no malignant cells seen in the sections examined.
Wound Care: Vaseline
Graft Donor Site Bandage (Optional-Leave Blank If You Don't Want In Note): Aquaplast was fitted to the graft site and sewn into place. A pressure bandage were applied to the donor site and over the aquaplast bolster.
Mauc Instructions: By selecting yes to the question below the MAUC number will be added into the note.  This will be calculated automatically based on the diagnosis chosen, the size entered, the body zone selected (H,M,L) and the specific indications you chose. You will also have the option to override the Mohs AUC if you disagree with the automatically calculated number and this option is found in the Case Summary tab.
Helical Rim Text: The closure involved the helical rim.
Undermining Location (Optional): in the superficial subcutaneous fat
Number Of Stages: 3
Consent Type: Consent 1 (Standard)
Bilobed Transposition Flap Text: The defect edges were debeveled with a #15 scalpel blade.  Given the location of the defect and the proximity to free margins a bilobed transposition flap was deemed most appropriate.  Using a sterile surgical marker, an appropriate bilobe flap drawn around the defect.    The area thus outlined was incised deep to adipose tissue with a #15 scalpel blade.  The skin margins were undermined to an appropriate distance in all directions utilizing iris scissors.
Peng Advancement Flap Text: The defect edges were debeveled with a #15 scalpel blade.  Given the location of the defect, shape of the defect and the proximity to free margins a Peng advancement flap was deemed most appropriate.  Using a sterile surgical marker, an appropriate advancement flap was drawn incorporating the defect and placing the expected incisions within the relaxed skin tension lines where possible. The area thus outlined was incised deep to adipose tissue with a #15 scalpel blade.  The skin margins were undermined to an appropriate distance in all directions utilizing iris scissors.
Include Tumor Staging In Mohs Note?: Please Select the Appropriate Response
Hemigard Postcare Instructions: The HEMIGARD strips are to remain completely dry for at least 5-7 days.
Burow's Graft Text: The defect edges were debeveled with a #15 scalpel blade.  Given the location of the defect, shape of the defect, the proximity to free margins and the presence of a standing cone deformity a Burow's skin graft was deemed most appropriate. The standing cone was removed and this tissue was then trimmed to the shape of the primary defect. The adipose tissue was also removed until only dermis and epidermis were left.  The skin margins of the secondary defect were undermined to an appropriate distance in all directions utilizing iris scissors.  The secondary defect was closed with interrupted buried subcutaneous sutures.  The skin edges were then re-apposed with running  sutures.  The skin graft was then placed in the primary defect and oriented appropriately.
Tumor Depth: Less than 6mm from granular layer and no invasion beyond the subcutaneous fat
Hemigard Intro: Due to skin fragility and wound tension, it was decided to use HEMIGARD adhesive retention suture devices to permit a linear closure. The skin was cleaned and dried for a 6cm distance away from the wound. Excessive hair, if present, was removed to allow for adhesion.
Staging Info: By selecting yes to the question above you will include information on AJCC 8 tumor staging in your Mohs note. Information on tumor staging will be automatically added for SCCs on the head and neck. AJCC 8 includes tumor size, tumor depth, perineural involvement and bone invasion.

## 2020-07-02 ENCOUNTER — APPOINTMENT (RX ONLY)
Dept: URBAN - METROPOLITAN AREA CLINIC 36 | Facility: CLINIC | Age: 80
Setting detail: DERMATOLOGY
End: 2020-07-02

## 2020-07-02 DIAGNOSIS — Z48.817 ENCOUNTER FOR SURGICAL AFTERCARE FOLLOWING SURGERY ON THE SKIN AND SUBCUTANEOUS TISSUE: ICD-10-CM

## 2020-07-02 DIAGNOSIS — Z48.02 ENCOUNTER FOR REMOVAL OF SUTURES: ICD-10-CM

## 2020-07-02 DIAGNOSIS — D22 MELANOCYTIC NEVI: ICD-10-CM

## 2020-07-02 PROBLEM — D48.5 NEOPLASM OF UNCERTAIN BEHAVIOR OF SKIN: Status: ACTIVE | Noted: 2020-07-02

## 2020-07-02 PROCEDURE — 99024 POSTOP FOLLOW-UP VISIT: CPT

## 2020-07-02 PROCEDURE — 11102 TANGNTL BX SKIN SINGLE LES: CPT | Mod: 79

## 2020-07-02 PROCEDURE — ? POST-OP WOUND CHECK

## 2020-07-02 PROCEDURE — ? BIOPSY BY SHAVE METHOD

## 2020-07-02 PROCEDURE — ? SUTURE REMOVAL (GLOBAL PERIOD)

## 2020-07-02 ASSESSMENT — LOCATION ZONE DERM
LOCATION ZONE: FACE
LOCATION ZONE: TRUNK
LOCATION ZONE: FINGER

## 2020-07-02 ASSESSMENT — LOCATION SIMPLE DESCRIPTION DERM
LOCATION SIMPLE: LEFT THUMB
LOCATION SIMPLE: LEFT UPPER BACK
LOCATION SIMPLE: LEFT FOREHEAD

## 2020-07-02 ASSESSMENT — LOCATION DETAILED DESCRIPTION DERM
LOCATION DETAILED: LEFT PROXIMAL DORSAL THUMB
LOCATION DETAILED: LEFT SUPERIOR UPPER BACK
LOCATION DETAILED: LEFT LATERAL FOREHEAD

## 2020-07-02 NOTE — PROCEDURE: POST-OP WOUND CHECK
Detail Level: Simple
Add 19706 Cpt? (Important Note: In 2017 The Use Of 62457 Is Being Tracked By Cms To Determine Future Global Period Reimbursement For Global Periods): yes
Wound Evaluated By: Nadine Allen md

## 2020-07-02 NOTE — PROCEDURE: BIOPSY BY SHAVE METHOD
Detail Level: Detailed
Depth Of Biopsy: dermis
Was A Bandage Applied: Yes
Size Of Lesion In Cm: 0
Biopsy Type: H and E
Biopsy Method: Personna blade
Anesthesia Type: 1% lidocaine with epinephrine
Anesthesia Volume In Cc: 0.5
Hemostasis: Drysol
Wound Care: Vaseline
Dressing: bandage
Destruction After The Procedure: No
Type Of Destruction Used: Curettage
Curettage Text: The wound bed was treated with curettage after the biopsy was performed.
Cryotherapy Text: The wound bed was treated with cryotherapy after the biopsy was performed.
Electrodesiccation Text: The wound bed was treated with electrodesiccation after the biopsy was performed.
Electrodesiccation And Curettage Text: The wound bed was treated with electrodesiccation and curettage after the biopsy was performed.
Silver Nitrate Text: The wound bed was treated with silver nitrate after the biopsy was performed.
Lab: [id number]
Lab Facility: 
Path Notes (To The Dermatopathologist): In melanoma scar, see prior biopsy
Consent: Written consent was obtained and risks were reviewed including but not limited to scarring, infection, bleeding, scabbing, incomplete removal, nerve damage and allergy to anesthesia.
Post-Care Instructions: I reviewed with the patient in detail post-care instructions. Patient is to keep the biopsy site dry overnight, and then apply bacitracin twice daily until healed. Patient may apply hydrogen peroxide soaks to remove any crusting.
Notification Instructions: Patient will be notified of biopsy results. However, patient instructed to call the office if not contacted within 2 weeks.
Billing Type: Third-Party Bill
Information: Selecting Yes will display possible errors in your note based on the variables you have selected. This validation is only offered as a suggestion for you. PLEASE NOTE THAT THE VALIDATION TEXT WILL BE REMOVED WHEN YOU FINALIZE YOUR NOTE. IF YOU WANT TO FAX A PRELIMINARY NOTE YOU WILL NEED TO TOGGLE THIS TO 'NO' IF YOU DO NOT WANT IT IN YOUR FAXED NOTE.

## 2020-07-02 NOTE — PROCEDURE: SUTURE REMOVAL (GLOBAL PERIOD)
Detail Level: Detailed
Add 02613 Cpt? (Important Note: In 2017 The Use Of 28711 Is Being Tracked By Cms To Determine Future Global Period Reimbursement For Global Periods): yes

## 2020-07-13 ENCOUNTER — HOSPITAL ENCOUNTER (OUTPATIENT)
Dept: LAB | Facility: MEDICAL CENTER | Age: 80
End: 2020-07-13
Attending: UROLOGY
Payer: COMMERCIAL

## 2020-07-13 LAB — PSA SERPL-MCNC: 10.1 NG/ML (ref 0–4)

## 2020-07-13 PROCEDURE — 36415 COLL VENOUS BLD VENIPUNCTURE: CPT

## 2020-07-13 PROCEDURE — 84153 ASSAY OF PSA TOTAL: CPT

## 2020-07-21 ENCOUNTER — APPOINTMENT (RX ONLY)
Dept: URBAN - METROPOLITAN AREA CLINIC 36 | Facility: CLINIC | Age: 80
Setting detail: DERMATOLOGY
End: 2020-07-21

## 2020-07-21 DIAGNOSIS — D22 MELANOCYTIC NEVI: ICD-10-CM

## 2020-07-21 PROBLEM — D22.5 MELANOCYTIC NEVI OF TRUNK: Status: ACTIVE | Noted: 2020-07-21

## 2020-07-21 PROCEDURE — ? BIOPSY BY SHAVE METHOD

## 2020-07-21 PROCEDURE — ? EXCISION

## 2020-07-21 PROCEDURE — 11402 EXC TR-EXT B9+MARG 1.1-2 CM: CPT | Mod: 79

## 2020-07-21 PROCEDURE — 12032 INTMD RPR S/A/T/EXT 2.6-7.5: CPT | Mod: 79,59

## 2020-07-21 PROCEDURE — 11102 TANGNTL BX SKIN SINGLE LES: CPT | Mod: 79,59

## 2020-07-21 ASSESSMENT — LOCATION ZONE DERM: LOCATION ZONE: TRUNK

## 2020-07-21 ASSESSMENT — LOCATION DETAILED DESCRIPTION DERM
LOCATION DETAILED: PERIUMBILICAL SKIN
LOCATION DETAILED: LEFT SUPERIOR UPPER BACK

## 2020-07-21 ASSESSMENT — LOCATION SIMPLE DESCRIPTION DERM
LOCATION SIMPLE: LEFT UPPER BACK
LOCATION SIMPLE: ABDOMEN

## 2020-07-21 NOTE — PROCEDURE: EXCISION
Medical Necessity Information: It is in your best interest to select a reason for this procedure from the list below. All of these items fulfill various CMS LCD requirements except lesion extends to a margin.
Include Z78.9 (Other Specified Conditions Influencing Health Status) As An Associated Diagnosis?: No
Medical Necessity Clause: This procedure was medically necessary because the lesion that was treated was:
Lab: [id number]
Lab Facility: 
Previous Accession (Optional): [id number]
Date Of Previous Biopsy (Optional): 7-2-20
Surgeon Performing Repair (Optional): Nadine Allen
Size Of Lesion In Cm: 0.6
X Size Of Lesion In Cm (Optional): 0
Size Of Margin In Cm: 0.3
Anesthesia Volume In Cc: 7
Excision Method: Elliptical
Repair Type: Intermediate
Suturegard Retention Suture: 2-0 Nylon
Retention Suture Bite Size: 3 mm
Length To Time In Minutes Device Was In Place: 10
Number Of Hemigard Strips Per Side: 1
Intermediate / Complex Repair - Final Wound Length In Cm: 3.8
Undermining Type: Entire Wound
Complex Requirements: Debridement Of Wound Edges?: Yes
Debridement Text: The wound edges were debrided prior to proceeding with the closure to facilitate wound healing.
Helical Rim Text: The closure involved the helical rim.
Vermilion Border Text: The closure involved the vermilion border.
Nostril Rim Text: The closure involved the nostril rim.
Retention Suture Text: Retention sutures were placed to support the closure and prevent dehiscence.
Suture Removal: 14 days
Epidermal Closure Graft Donor Site (Optional): simple interrupted
Graft Donor Site Bandage (Optional-Leave Blank If You Don't Want In Note): Steri-strips and a pressure bandage were applied to the donor site.
Detail Level: Detailed
Excision Depth: adipose tissue
Scalpel Size: 15 blade
Anesthesia Type: 1% lidocaine with epinephrine
Additional Anesthesia Volume In Cc: 6
Hemostasis: Electrocautery
Estimated Blood Loss (Cc): minimal
Deep Sutures: 3-0 Maxon
Epidermal Sutures: 3-0 Surgipro
Wound Care: Vaseline
Dressing: pressure dressing with telfa
Suturegard Intro: Intraoperative tissue expansion was performed, utilizing the SUTUREGARD device, in order to reduce wound tension.
Suturegard Body: The suture ends were repeatedly re-tightened and re-clamped to achieve the desired tissue expansion.
Hemigard Intro: Due to skin fragility and wound tension, it was decided to use HEMIGARD adhesive retention suture devices to permit a linear closure. The skin was cleaned and dried for a 6cm distance away from the wound. Excessive hair, if present, was removed to allow for adhesion.
Hemigard Postcare Instructions: The HEMIGARD strips are to remain completely dry for at least 5-7 days.
Complex Repair Preamble Text (Leave Blank If You Do Not Want): Extensive wide undermining was performed.
Intermediate Repair Preamble Text (Leave Blank If You Do Not Want): Undermining was performed with blunt dissection.
Fusiform Excision Additional Text (Leave Blank If You Do Not Want): The margin was drawn around the clinically apparent lesion.  A fusiform shape was then drawn on the skin incorporating the lesion and margins.  Incisions were then made along these lines to the appropriate tissue plane and the lesion was extirpated.
Eliptical Excision Additional Text (Leave Blank If You Do Not Want): The margin was drawn around the clinically apparent lesion.  An elliptical shape was then drawn on the skin incorporating the lesion and margins.  Incisions were then made along these lines to the appropriate tissue plane and the lesion was extirpated.
Saucerization Excision Additional Text (Leave Blank If You Do Not Want): The margin was drawn around the clinically apparent lesion.  Incisions were then made along these lines, in a tangential fashion, to the appropriate tissue plane and the lesion was extirpated.
Slit Excision Additional Text (Leave Blank If You Do Not Want): A linear line was drawn on the skin overlying the lesion. An incision was made slowly until the lesion was visualized.  Once visualized, the lesion was removed with blunt dissection.
Excisional Biopsy Additional Text (Leave Blank If You Do Not Want): The margin was drawn around the clinically apparent lesion. An elliptical shape was then drawn on the skin incorporating the lesion and margins.  Incisions were then made along these lines to the appropriate tissue plane and the lesion was extirpated.
Perilesional Excision Additional Text (Leave Blank If You Do Not Want): The margin was drawn around the clinically apparent lesion. Incisions were then made along these lines to the appropriate tissue plane and the lesion was extirpated.
Repair Performed By Another Provider Text (Leave Blank If You Do Not Want): After the tissue was excised the defect was repaired by another provider.
No Repair - Repaired With Adjacent Surgical Defect Text (Leave Blank If You Do Not Want): After the excision the defect was repaired concurrently with another surgical defect which was in close approximation.
Advancement Flap (Single) Text: The defect edges were debeveled with a #15 scalpel blade.  Given the location of the defect and the proximity to free margins a single advancement flap was deemed most appropriate.  Using a sterile surgical marker, an appropriate advancement flap was drawn incorporating the defect and placing the expected incisions within the relaxed skin tension lines where possible.    The area thus outlined was incised deep to adipose tissue with a #15 scalpel blade.  The skin margins were undermined to an appropriate distance in all directions utilizing iris scissors.
Advancement Flap (Double) Text: The defect edges were debeveled with a #15 scalpel blade.  Given the location of the defect and the proximity to free margins a double advancement flap was deemed most appropriate.  Using a sterile surgical marker, the appropriate advancement flaps were drawn incorporating the defect and placing the expected incisions within the relaxed skin tension lines where possible.    The area thus outlined was incised deep to adipose tissue with a #15 scalpel blade.  The skin margins were undermined to an appropriate distance in all directions utilizing iris scissors.
Burow's Advancement Flap Text: The defect edges were debeveled with a #15 scalpel blade.  Given the location of the defect and the proximity to free margins a Burow's advancement flap was deemed most appropriate.  Using a sterile surgical marker, the appropriate advancement flap was drawn incorporating the defect and placing the expected incisions within the relaxed skin tension lines where possible.    The area thus outlined was incised deep to adipose tissue with a #15 scalpel blade.  The skin margins were undermined to an appropriate distance in all directions utilizing iris scissors.
Chonodrocutaneous Helical Advancement Flap Text: The defect edges were debeveled with a #15 scalpel blade.  Given the location of the defect and the proximity to free margins a chondrocutaneous helical advancement flap was deemed most appropriate.  Using a sterile surgical marker, the appropriate advancement flap was drawn incorporating the defect and placing the expected incisions within the relaxed skin tension lines where possible.    The area thus outlined was incised deep to adipose tissue with a #15 scalpel blade.  The skin margins were undermined to an appropriate distance in all directions utilizing iris scissors.
Crescentic Advancement Flap Text: The defect edges were debeveled with a #15 scalpel blade.  Given the location of the defect and the proximity to free margins a crescentic advancement flap was deemed most appropriate.  Using a sterile surgical marker, the appropriate advancement flap was drawn incorporating the defect and placing the expected incisions within the relaxed skin tension lines where possible.    The area thus outlined was incised deep to adipose tissue with a #15 scalpel blade.  The skin margins were undermined to an appropriate distance in all directions utilizing iris scissors.
A-T Advancement Flap Text: The defect edges were debeveled with a #15 scalpel blade.  Given the location of the defect, shape of the defect and the proximity to free margins an A-T advancement flap was deemed most appropriate.  Using a sterile surgical marker, an appropriate advancement flap was drawn incorporating the defect and placing the expected incisions within the relaxed skin tension lines where possible.    The area thus outlined was incised deep to adipose tissue with a #15 scalpel blade.  The skin margins were undermined to an appropriate distance in all directions utilizing iris scissors.
O-T Advancement Flap Text: The defect edges were debeveled with a #15 scalpel blade.  Given the location of the defect, shape of the defect and the proximity to free margins an O-T advancement flap was deemed most appropriate.  Using a sterile surgical marker, an appropriate advancement flap was drawn incorporating the defect and placing the expected incisions within the relaxed skin tension lines where possible.    The area thus outlined was incised deep to adipose tissue with a #15 scalpel blade.  The skin margins were undermined to an appropriate distance in all directions utilizing iris scissors.
O-L Flap Text: The defect edges were debeveled with a #15 scalpel blade.  Given the location of the defect, shape of the defect and the proximity to free margins an O-L flap was deemed most appropriate.  Using a sterile surgical marker, an appropriate advancement flap was drawn incorporating the defect and placing the expected incisions within the relaxed skin tension lines where possible.    The area thus outlined was incised deep to adipose tissue with a #15 scalpel blade.  The skin margins were undermined to an appropriate distance in all directions utilizing iris scissors.
O-Z Flap Text: The defect edges were debeveled with a #15 scalpel blade.  Given the location of the defect, shape of the defect and the proximity to free margins an O-Z flap was deemed most appropriate.  Using a sterile surgical marker, an appropriate transposition flap was drawn incorporating the defect and placing the expected incisions within the relaxed skin tension lines where possible. The area thus outlined was incised deep to adipose tissue with a #15 scalpel blade.  The skin margins were undermined to an appropriate distance in all directions utilizing iris scissors.
Double O-Z Flap Text: The defect edges were debeveled with a #15 scalpel blade.  Given the location of the defect, shape of the defect and the proximity to free margins a Double O-Z flap was deemed most appropriate.  Using a sterile surgical marker, an appropriate transposition flap was drawn incorporating the defect and placing the expected incisions within the relaxed skin tension lines where possible. The area thus outlined was incised deep to adipose tissue with a #15 scalpel blade.  The skin margins were undermined to an appropriate distance in all directions utilizing iris scissors.
V-Y Flap Text: The defect edges were debeveled with a #15 scalpel blade.  Given the location of the defect, shape of the defect and the proximity to free margins a V-Y flap was deemed most appropriate.  Using a sterile surgical marker, an appropriate advancement flap was drawn incorporating the defect and placing the expected incisions within the relaxed skin tension lines where possible.    The area thus outlined was incised deep to adipose tissue with a #15 scalpel blade.  The skin margins were undermined to an appropriate distance in all directions utilizing iris scissors.
Mercedes Flap Text: The defect edges were debeveled with a #15 scalpel blade.  Given the location of the defect, shape of the defect and the proximity to free margins a Mercedes flap was deemed most appropriate.  Using a sterile surgical marker, an appropriate advancement flap was drawn incorporating the defect and placing the expected incisions within the relaxed skin tension lines where possible. The area thus outlined was incised deep to adipose tissue with a #15 scalpel blade.  The skin margins were undermined to an appropriate distance in all directions utilizing iris scissors.
Modified Advancement Flap Text: The defect edges were debeveled with a #15 scalpel blade.  Given the location of the defect, shape of the defect and the proximity to free margins a modified advancement flap was deemed most appropriate.  Using a sterile surgical marker, an appropriate advancement flap was drawn incorporating the defect and placing the expected incisions within the relaxed skin tension lines where possible.    The area thus outlined was incised deep to adipose tissue with a #15 scalpel blade.  The skin margins were undermined to an appropriate distance in all directions utilizing iris scissors.
Mucosal Advancement Flap Text: Given the location of the defect, shape of the defect and the proximity to free margins a mucosal advancement flap was deemed most appropriate. Incisions were made with a 15 blade scalpel in the appropriate fashion along the cutaneous vermillion border and the mucosal lip. The remaining actinically damaged mucosal tissue was excised.  The mucosal advancement flap was then elevated to the gingival sulcus with care taken to preserve the neurovascular structures and advanced into the primary defect. Care was taken to ensure that precise realignment of the vermilion border was achieved.
Hatchet Flap Text: The defect edges were debeveled with a #15 scalpel blade.  Given the location of the defect, shape of the defect and the proximity to free margins a hatchet flap was deemed most appropriate.  Using a sterile surgical marker, an appropriate hatchet flap was drawn incorporating the defect and placing the expected incisions within the relaxed skin tension lines where possible.    The area thus outlined was incised deep to adipose tissue with a #15 scalpel blade.  The skin margins were undermined to an appropriate distance in all directions utilizing iris scissors.
Rotation Flap Text: The defect edges were debeveled with a #15 scalpel blade.  Given the location of the defect, shape of the defect and the proximity to free margins a rotation flap was deemed most appropriate.  Using a sterile surgical marker, an appropriate rotation flap was drawn incorporating the defect and placing the expected incisions within the relaxed skin tension lines where possible.    The area thus outlined was incised deep to adipose tissue with a #15 scalpel blade.  The skin margins were undermined to an appropriate distance in all directions utilizing iris scissors.
Spiral Flap Text: The defect edges were debeveled with a #15 scalpel blade.  Given the location of the defect, shape of the defect and the proximity to free margins a spiral flap was deemed most appropriate.  Using a sterile surgical marker, an appropriate rotation flap was drawn incorporating the defect and placing the expected incisions within the relaxed skin tension lines where possible. The area thus outlined was incised deep to adipose tissue with a #15 scalpel blade.  The skin margins were undermined to an appropriate distance in all directions utilizing iris scissors.
Star Wedge Flap Text: The defect edges were debeveled with a #15 scalpel blade.  Given the location of the defect, shape of the defect and the proximity to free margins a star wedge flap was deemed most appropriate.  Using a sterile surgical marker, an appropriate rotation flap was drawn incorporating the defect and placing the expected incisions within the relaxed skin tension lines where possible. The area thus outlined was incised deep to adipose tissue with a #15 scalpel blade.  The skin margins were undermined to an appropriate distance in all directions utilizing iris scissors.
Transposition Flap Text: The defect edges were debeveled with a #15 scalpel blade.  Given the location of the defect and the proximity to free margins a transposition flap was deemed most appropriate.  Using a sterile surgical marker, an appropriate transposition flap was drawn incorporating the defect.    The area thus outlined was incised deep to adipose tissue with a #15 scalpel blade.  The skin margins were undermined to an appropriate distance in all directions utilizing iris scissors.
Muscle Hinge Flap Text: The defect edges were debeveled with a #15 scalpel blade.  Given the size, depth and location of the defect and the proximity to free margins a muscle hinge flap was deemed most appropriate.  Using a sterile surgical marker, an appropriate hinge flap was drawn incorporating the defect. The area thus outlined was incised with a #15 scalpel blade.  The skin margins were undermined to an appropriate distance in all directions utilizing iris scissors.
Melolabial Transposition Flap Text: The defect edges were debeveled with a #15 scalpel blade.  Given the location of the defect and the proximity to free margins a melolabial flap was deemed most appropriate.  Using a sterile surgical marker, an appropriate melolabial transposition flap was drawn incorporating the defect.    The area thus outlined was incised deep to adipose tissue with a #15 scalpel blade.  The skin margins were undermined to an appropriate distance in all directions utilizing iris scissors.
Rhombic Flap Text: The defect edges were debeveled with a #15 scalpel blade.  Given the location of the defect and the proximity to free margins a rhombic flap was deemed most appropriate.  Using a sterile surgical marker, an appropriate rhombic flap was drawn incorporating the defect.    The area thus outlined was incised deep to adipose tissue with a #15 scalpel blade.  The skin margins were undermined to an appropriate distance in all directions utilizing iris scissors.
Rhomboid Transposition Flap Text: The defect edges were debeveled with a #15 scalpel blade.  Given the location of the defect and the proximity to free margins a rhomboid transposition flap was deemed most appropriate.  Using a sterile surgical marker, an appropriate rhomboid flap was drawn incorporating the defect.    The area thus outlined was incised deep to adipose tissue with a #15 scalpel blade.  The skin margins were undermined to an appropriate distance in all directions utilizing iris scissors.
Bi-Rhombic Flap Text: The defect edges were debeveled with a #15 scalpel blade.  Given the location of the defect and the proximity to free margins a bi-rhombic flap was deemed most appropriate.  Using a sterile surgical marker, an appropriate rhombic flap was drawn incorporating the defect. The area thus outlined was incised deep to adipose tissue with a #15 scalpel blade.  The skin margins were undermined to an appropriate distance in all directions utilizing iris scissors.
Helical Rim Advancement Flap Text: The defect edges were debeveled with a #15 blade scalpel.  Given the location of the defect and the proximity to free margins (helical rim) a double helical rim advancement flap was deemed most appropriate.  Using a sterile surgical marker, the appropriate advancement flaps were drawn incorporating the defect and placing the expected incisions between the helical rim and antihelix where possible.  The area thus outlined was incised through and through with a #15 scalpel blade.  With a skin hook and iris scissors, the flaps were gently and sharply undermined and freed up.
Bilateral Helical Rim Advancement Flap Text: The defect edges were debeveled with a #15 blade scalpel.  Given the location of the defect and the proximity to free margins (helical rim) a bilateral helical rim advancement flap was deemed most appropriate.  Using a sterile surgical marker, the appropriate advancement flaps were drawn incorporating the defect and placing the expected incisions between the helical rim and antihelix where possible.  The area thus outlined was incised through and through with a #15 scalpel blade.  With a skin hook and iris scissors, the flaps were gently and sharply undermined and freed up.
Ear Star Wedge Flap Text: The defect edges were debeveled with a #15 blade scalpel.  Given the location of the defect and the proximity to free margins (helical rim) an ear star wedge flap was deemed most appropriate.  Using a sterile surgical marker, the appropriate flap was drawn incorporating the defect and placing the expected incisions between the helical rim and antihelix where possible.  The area thus outlined was incised through and through with a #15 scalpel blade.
Banner Transposition Flap Text: The defect edges were debeveled with a #15 scalpel blade.  Given the location of the defect and the proximity to free margins a Banner transposition flap was deemed most appropriate.  Using a sterile surgical marker, an appropriate flap drawn around the defect. The area thus outlined was incised deep to adipose tissue with a #15 scalpel blade.  The skin margins were undermined to an appropriate distance in all directions utilizing iris scissors.
Bilobed Flap Text: The defect edges were debeveled with a #15 scalpel blade.  Given the location of the defect and the proximity to free margins a bilobe flap was deemed most appropriate.  Using a sterile surgical marker, an appropriate bilobe flap drawn around the defect.    The area thus outlined was incised deep to adipose tissue with a #15 scalpel blade.  The skin margins were undermined to an appropriate distance in all directions utilizing iris scissors.
Bilobed Transposition Flap Text: The defect edges were debeveled with a #15 scalpel blade.  Given the location of the defect and the proximity to free margins a bilobed transposition flap was deemed most appropriate.  Using a sterile surgical marker, an appropriate bilobe flap drawn around the defect.    The area thus outlined was incised deep to adipose tissue with a #15 scalpel blade.  The skin margins were undermined to an appropriate distance in all directions utilizing iris scissors.
Trilobed Flap Text: The defect edges were debeveled with a #15 scalpel blade.  Given the location of the defect and the proximity to free margins a trilobed flap was deemed most appropriate.  Using a sterile surgical marker, an appropriate trilobed flap drawn around the defect.    The area thus outlined was incised deep to adipose tissue with a #15 scalpel blade.  The skin margins were undermined to an appropriate distance in all directions utilizing iris scissors.
Dorsal Nasal Flap Text: The defect edges were debeveled with a #15 scalpel blade.  Given the location of the defect and the proximity to free margins a dorsal nasal flap was deemed most appropriate.  Using a sterile surgical marker, an appropriate dorsal nasal flap was drawn around the defect.    The area thus outlined was incised deep to adipose tissue with a #15 scalpel blade.  The skin margins were undermined to an appropriate distance in all directions utilizing iris scissors.
Island Pedicle Flap Text: The defect edges were debeveled with a #15 scalpel blade.  Given the location of the defect, shape of the defect and the proximity to free margins an island pedicle advancement flap was deemed most appropriate.  Using a sterile surgical marker, an appropriate advancement flap was drawn incorporating the defect, outlining the appropriate donor tissue and placing the expected incisions within the relaxed skin tension lines where possible.    The area thus outlined was incised deep to adipose tissue with a #15 scalpel blade.  The skin margins were undermined to an appropriate distance in all directions around the primary defect and laterally outward around the island pedicle utilizing iris scissors.  There was minimal undermining beneath the pedicle flap.
Island Pedicle Flap With Canthal Suspension Text: The defect edges were debeveled with a #15 scalpel blade.  Given the location of the defect, shape of the defect and the proximity to free margins an island pedicle advancement flap was deemed most appropriate.  Using a sterile surgical marker, an appropriate advancement flap was drawn incorporating the defect, outlining the appropriate donor tissue and placing the expected incisions within the relaxed skin tension lines where possible. The area thus outlined was incised deep to adipose tissue with a #15 scalpel blade.  The skin margins were undermined to an appropriate distance in all directions around the primary defect and laterally outward around the island pedicle utilizing iris scissors.  There was minimal undermining beneath the pedicle flap. A suspension suture was placed in the canthal tendon to prevent tension and prevent ectropion.
Alar Island Pedicle Flap Text: The defect edges were debeveled with a #15 scalpel blade.  Given the location of the defect, shape of the defect and the proximity to the alar rim an island pedicle advancement flap was deemed most appropriate.  Using a sterile surgical marker, an appropriate advancement flap was drawn incorporating the defect, outlining the appropriate donor tissue and placing the expected incisions within the nasal ala running parallel to the alar rim. The area thus outlined was incised with a #15 scalpel blade.  The skin margins were undermined minimally to an appropriate distance in all directions around the primary defect and laterally outward around the island pedicle utilizing iris scissors.  There was minimal undermining beneath the pedicle flap.
Double Island Pedicle Flap Text: The defect edges were debeveled with a #15 scalpel blade.  Given the location of the defect, shape of the defect and the proximity to free margins a double island pedicle advancement flap was deemed most appropriate.  Using a sterile surgical marker, an appropriate advancement flap was drawn incorporating the defect, outlining the appropriate donor tissue and placing the expected incisions within the relaxed skin tension lines where possible.    The area thus outlined was incised deep to adipose tissue with a #15 scalpel blade.  The skin margins were undermined to an appropriate distance in all directions around the primary defect and laterally outward around the island pedicle utilizing iris scissors.  There was minimal undermining beneath the pedicle flap.
Island Pedicle Flap-Requiring Vessel Identification Text: The defect edges were debeveled with a #15 scalpel blade.  Given the location of the defect, shape of the defect and the proximity to free margins an island pedicle advancement flap was deemed most appropriate.  Using a sterile surgical marker, an appropriate advancement flap was drawn, based on the axial vessel mentioned above, incorporating the defect, outlining the appropriate donor tissue and placing the expected incisions within the relaxed skin tension lines where possible.    The area thus outlined was incised deep to adipose tissue with a #15 scalpel blade.  The skin margins were undermined to an appropriate distance in all directions around the primary defect and laterally outward around the island pedicle utilizing iris scissors.  There was minimal undermining beneath the pedicle flap.
Keystone Flap Text: The defect edges were debeveled with a #15 scalpel blade.  Given the location of the defect, shape of the defect a keystone flap was deemed most appropriate.  Using a sterile surgical marker, an appropriate keystone flap was drawn incorporating the defect, outlining the appropriate donor tissue and placing the expected incisions within the relaxed skin tension lines where possible. The area thus outlined was incised deep to adipose tissue with a #15 scalpel blade.  The skin margins were undermined to an appropriate distance in all directions around the primary defect and laterally outward around the flap utilizing iris scissors.
O-T Plasty Text: The defect edges were debeveled with a #15 scalpel blade.  Given the location of the defect, shape of the defect and the proximity to free margins an O-T plasty was deemed most appropriate.  Using a sterile surgical marker, an appropriate O-T plasty was drawn incorporating the defect and placing the expected incisions within the relaxed skin tension lines where possible.    The area thus outlined was incised deep to adipose tissue with a #15 scalpel blade.  The skin margins were undermined to an appropriate distance in all directions utilizing iris scissors.
O-Z Plasty Text: The defect edges were debeveled with a #15 scalpel blade.  Given the location of the defect, shape of the defect and the proximity to free margins an O-Z plasty (double transposition flap) was deemed most appropriate.  Using a sterile surgical marker, the appropriate transposition flaps were drawn incorporating the defect and placing the expected incisions within the relaxed skin tension lines where possible.    The area thus outlined was incised deep to adipose tissue with a #15 scalpel blade.  The skin margins were undermined to an appropriate distance in all directions utilizing iris scissors.  Hemostasis was achieved with electrocautery.  The flaps were then transposed into place, one clockwise and the other counterclockwise, and anchored with interrupted buried subcutaneous sutures.
Double O-Z Plasty Text: The defect edges were debeveled with a #15 scalpel blade.  Given the location of the defect, shape of the defect and the proximity to free margins a Double O-Z plasty (double transposition flap) was deemed most appropriate.  Using a sterile surgical marker, the appropriate transposition flaps were drawn incorporating the defect and placing the expected incisions within the relaxed skin tension lines where possible. The area thus outlined was incised deep to adipose tissue with a #15 scalpel blade.  The skin margins were undermined to an appropriate distance in all directions utilizing iris scissors.  Hemostasis was achieved with electrocautery.  The flaps were then transposed into place, one clockwise and the other counterclockwise, and anchored with interrupted buried subcutaneous sutures.
V-Y Plasty Text: The defect edges were debeveled with a #15 scalpel blade.  Given the location of the defect, shape of the defect and the proximity to free margins an V-Y advancement flap was deemed most appropriate.  Using a sterile surgical marker, an appropriate advancement flap was drawn incorporating the defect and placing the expected incisions within the relaxed skin tension lines where possible.    The area thus outlined was incised deep to adipose tissue with a #15 scalpel blade.  The skin margins were undermined to an appropriate distance in all directions utilizing iris scissors.
H Plasty Text: Given the location of the defect, shape of the defect and the proximity to free margins a H-plasty was deemed most appropriate for repair.  Using a sterile surgical marker, the appropriate advancement arms of the H-plasty were drawn incorporating the defect and placing the expected incisions within the relaxed skin tension lines where possible. The area thus outlined was incised deep to adipose tissue with a #15 scalpel blade. The skin margins were undermined to an appropriate distance in all directions utilizing iris scissors.  The opposing advancement arms were then advanced into place in opposite direction and anchored with interrupted buried subcutaneous sutures.
W Plasty Text: The lesion was extirpated to the level of the fat with a #15 scalpel blade.  Given the location of the defect, shape of the defect and the proximity to free margins a W-plasty was deemed most appropriate for repair.  Using a sterile surgical marker, the appropriate transposition arms of the W-plasty were drawn incorporating the defect and placing the expected incisions within the relaxed skin tension lines where possible.    The area thus outlined was incised deep to adipose tissue with a #15 scalpel blade.  The skin margins were undermined to an appropriate distance in all directions utilizing iris scissors.  The opposing transposition arms were then transposed into place in opposite direction and anchored with interrupted buried subcutaneous sutures.
Z Plasty Text: The lesion was extirpated to the level of the fat with a #15 scalpel blade.  Given the location of the defect, shape of the defect and the proximity to free margins a Z-plasty was deemed most appropriate for repair.  Using a sterile surgical marker, the appropriate transposition arms of the Z-plasty were drawn incorporating the defect and placing the expected incisions within the relaxed skin tension lines where possible.    The area thus outlined was incised deep to adipose tissue with a #15 scalpel blade.  The skin margins were undermined to an appropriate distance in all directions utilizing iris scissors.  The opposing transposition arms were then transposed into place in opposite direction and anchored with interrupted buried subcutaneous sutures.
Zygomaticofacial Flap Text: Given the location of the defect, shape of the defect and the proximity to free margins a zygomaticofacial flap was deemed most appropriate for repair.  Using a sterile surgical marker, the appropriate flap was drawn incorporating the defect and placing the expected incisions within the relaxed skin tension lines where possible. The area thus outlined was incised deep to adipose tissue with a #15 scalpel blade with preservation of a vascular pedicle.  The skin margins were undermined to an appropriate distance in all directions utilizing iris scissors.  The flap was then placed into the defect and anchored with interrupted buried subcutaneous sutures.
Cheek Interpolation Flap Text: A decision was made to reconstruct the defect utilizing an interpolation axial flap and a staged reconstruction.  A telfa template was made of the defect.  This telfa template was then used to outline the Cheek Interpolation flap.  The donor area for the pedicle flap was then injected with anesthesia.  The flap was excised through the skin and subcutaneous tissue down to the layer of the underlying musculature.  The interpolation flap was carefully excised within this deep plane to maintain its blood supply.  The edges of the donor site were undermined.   The donor site was closed in a primary fashion.  The pedicle was then rotated into position and sutured.  Once the tube was sutured into place, adequate blood supply was confirmed with blanching and refill.  The pedicle was then wrapped with xeroform gauze and dressed appropriately with a telfa and gauze bandage to ensure continued blood supply and protect the attached pedicle.
Cheek-To-Nose Interpolation Flap Text: A decision was made to reconstruct the defect utilizing an interpolation axial flap and a staged reconstruction.  A telfa template was made of the defect.  This telfa template was then used to outline the Cheek-To-Nose Interpolation flap.  The donor area for the pedicle flap was then injected with anesthesia.  The flap was excised through the skin and subcutaneous tissue down to the layer of the underlying musculature.  The interpolation flap was carefully excised within this deep plane to maintain its blood supply.  The edges of the donor site were undermined.   The donor site was closed in a primary fashion.  The pedicle was then rotated into position and sutured.  Once the tube was sutured into place, adequate blood supply was confirmed with blanching and refill.  The pedicle was then wrapped with xeroform gauze and dressed appropriately with a telfa and gauze bandage to ensure continued blood supply and protect the attached pedicle.
Interpolation Flap Text: A decision was made to reconstruct the defect utilizing an interpolation axial flap and a staged reconstruction.  A telfa template was made of the defect.  This telfa template was then used to outline the interpolation flap.  The donor area for the pedicle flap was then injected with anesthesia.  The flap was excised through the skin and subcutaneous tissue down to the layer of the underlying musculature.  The interpolation flap was carefully excised within this deep plane to maintain its blood supply.  The edges of the donor site were undermined.   The donor site was closed in a primary fashion.  The pedicle was then rotated into position and sutured.  Once the tube was sutured into place, adequate blood supply was confirmed with blanching and refill.  The pedicle was then wrapped with xeroform gauze and dressed appropriately with a telfa and gauze bandage to ensure continued blood supply and protect the attached pedicle.
Melolabial Interpolation Flap Text: A decision was made to reconstruct the defect utilizing an interpolation axial flap and a staged reconstruction.  A telfa template was made of the defect.  This telfa template was then used to outline the melolabial interpolation flap.  The donor area for the pedicle flap was then injected with anesthesia.  The flap was excised through the skin and subcutaneous tissue down to the layer of the underlying musculature.  The pedicle flap was carefully excised within this deep plane to maintain its blood supply.  The edges of the donor site were undermined.   The donor site was closed in a primary fashion.  The pedicle was then rotated into position and sutured.  Once the tube was sutured into place, adequate blood supply was confirmed with blanching and refill.  The pedicle was then wrapped with xeroform gauze and dressed appropriately with a telfa and gauze bandage to ensure continued blood supply and protect the attached pedicle.
Mastoid Interpolation Flap Text: A decision was made to reconstruct the defect utilizing an interpolation axial flap and a staged reconstruction.  A telfa template was made of the defect.  This telfa template was then used to outline the mastoid interpolation flap.  The donor area for the pedicle flap was then injected with anesthesia.  The flap was excised through the skin and subcutaneous tissue down to the layer of the underlying musculature.  The pedicle flap was carefully excised within this deep plane to maintain its blood supply.  The edges of the donor site were undermined.   The donor site was closed in a primary fashion.  The pedicle was then rotated into position and sutured.  Once the tube was sutured into place, adequate blood supply was confirmed with blanching and refill.  The pedicle was then wrapped with xeroform gauze and dressed appropriately with a telfa and gauze bandage to ensure continued blood supply and protect the attached pedicle.
Posterior Auricular Interpolation Flap Text: A decision was made to reconstruct the defect utilizing an interpolation axial flap and a staged reconstruction.  A telfa template was made of the defect.  This telfa template was then used to outline the posterior auricular interpolation flap.  The donor area for the pedicle flap was then injected with anesthesia.  The flap was excised through the skin and subcutaneous tissue down to the layer of the underlying musculature.  The pedicle flap was carefully excised within this deep plane to maintain its blood supply.  The edges of the donor site were undermined.   The donor site was closed in a primary fashion.  The pedicle was then rotated into position and sutured.  Once the tube was sutured into place, adequate blood supply was confirmed with blanching and refill.  The pedicle was then wrapped with xeroform gauze and dressed appropriately with a telfa and gauze bandage to ensure continued blood supply and protect the attached pedicle.
Paramedian Forehead Flap Text: A decision was made to reconstruct the defect utilizing an interpolation axial flap and a staged reconstruction.  A telfa template was made of the defect.  This telfa template was then used to outline the paramedian forehead pedicle flap.  The donor area for the pedicle flap was then injected with anesthesia.  The flap was excised through the skin and subcutaneous tissue down to the layer of the underlying musculature.  The pedicle flap was carefully excised within this deep plane to maintain its blood supply.  The edges of the donor site were undermined.   The donor site was closed in a primary fashion.  The pedicle was then rotated into position and sutured.  Once the tube was sutured into place, adequate blood supply was confirmed with blanching and refill.  The pedicle was then wrapped with xeroform gauze and dressed appropriately with a telfa and gauze bandage to ensure continued blood supply and protect the attached pedicle.
Lip Wedge Excision Repair Text: Given the location of the defect and the proximity to free margins a full thickness wedge repair was deemed most appropriate.  Using a sterile surgical marker, the appropriate repair was drawn incorporating the defect and placing the expected incisions perpendicular to the vermilion border.  The vermilion border was also meticulously outlined to ensure appropriate reapproximation during the repair.  The area thus outlined was incised through and through with a #15 scalpel blade.  The muscularis and dermis were reaproximated with deep sutures following hemostasis. Care was taken to realign the vermilion border before proceeding with the superficial closure.  Once the vermilion was realigned the superfical and mucosal closure was finished.
Ftsg Text: The defect edges were debeveled with a #15 scalpel blade.  Given the location of the defect, shape of the defect and the proximity to free margins a full thickness skin graft was deemed most appropriate.  Using a sterile surgical marker, the primary defect shape was transferred to the donor site. The area thus outlined was incised deep to adipose tissue with a #15 scalpel blade.  The harvested graft was then trimmed of adipose tissue until only dermis and epidermis was left.  The skin margins of the secondary defect were undermined to an appropriate distance in all directions utilizing iris scissors.  The secondary defect was closed with interrupted buried subcutaneous sutures.  The skin edges were then re-apposed with running  sutures.  The skin graft was then placed in the primary defect and oriented appropriately.
Split-Thickness Skin Graft Text: The defect edges were debeveled with a #15 scalpel blade.  Given the location of the defect, shape of the defect and the proximity to free margins a split thickness skin graft was deemed most appropriate.  Using a sterile surgical marker, the primary defect shape was transferred to the donor site. The split thickness graft was then harvested.  The skin graft was then placed in the primary defect and oriented appropriately.
Burow's Graft Text: The defect edges were debeveled with a #15 scalpel blade.  Given the location of the defect, shape of the defect, the proximity to free margins and the presence of a standing cone deformity a Burow's skin graft was deemed most appropriate. The standing cone was removed and this tissue was then trimmed to the shape of the primary defect. The adipose tissue was also removed until only dermis and epidermis were left.  The skin margins of the secondary defect were undermined to an appropriate distance in all directions utilizing iris scissors.  The secondary defect was closed with interrupted buried subcutaneous sutures.  The skin edges were then re-apposed with running  sutures.  The skin graft was then placed in the primary defect and oriented appropriately.
Cartilage Graft Text: The defect edges were debeveled with a #15 scalpel blade.  Given the location of the defect, shape of the defect, the fact the defect involved a full thickness cartilage defect a cartilage graft was deemed most appropriate.  An appropriate donor site was identified, cleansed, and anesthetized. The cartilage graft was then harvested and transferred to the recipient site, oriented appropriately and then sutured into place.  The secondary defect was then repaired using a primary closure.
Composite Graft Text: The defect edges were debeveled with a #15 scalpel blade.  Given the location of the defect, shape of the defect, the proximity to free margins and the fact the defect was full thickness a composite graft was deemed most appropriate.  The defect was outline and then transferred to the donor site.  A full thickness graft was then excised from the donor site. The graft was then placed in the primary defect, oriented appropriately and then sutured into place.  The secondary defect was then repaired using a primary closure.
Epidermal Autograft Text: The defect edges were debeveled with a #15 scalpel blade.  Given the location of the defect, shape of the defect and the proximity to free margins an epidermal autograft was deemed most appropriate.  Using a sterile surgical marker, the primary defect shape was transferred to the donor site. The epidermal graft was then harvested.  The skin graft was then placed in the primary defect and oriented appropriately.
Dermal Autograft Text: The defect edges were debeveled with a #15 scalpel blade.  Given the location of the defect, shape of the defect and the proximity to free margins a dermal autograft was deemed most appropriate.  Using a sterile surgical marker, the primary defect shape was transferred to the donor site. The area thus outlined was incised deep to adipose tissue with a #15 scalpel blade.  The harvested graft was then trimmed of adipose and epidermal tissue until only dermis was left.  The skin graft was then placed in the primary defect and oriented appropriately.
Skin Substitute Text: The defect edges were debeveled with a #15 scalpel blade.  Given the location of the defect, shape of the defect and the proximity to free margins a skin substitute graft was deemed most appropriate.  The graft material was trimmed to fit the size of the defect. The graft was then placed in the primary defect and oriented appropriately.
Tissue Cultured Epidermal Autograft Text: The defect edges were debeveled with a #15 scalpel blade.  Given the location of the defect, shape of the defect and the proximity to free margins a tissue cultured epidermal autograft was deemed most appropriate.  The graft was then trimmed to fit the size of the defect.  The graft was then placed in the primary defect and oriented appropriately.
Xenograft Text: The defect edges were debeveled with a #15 scalpel blade.  Given the location of the defect, shape of the defect and the proximity to free margins a xenograft was deemed most appropriate.  The graft was then trimmed to fit the size of the defect.  The graft was then placed in the primary defect and oriented appropriately.
Purse String (Intermediate) Text: Given the location of the defect and the characteristics of the surrounding skin a purse string intermediate closure was deemed most appropriate.  Undermining was performed circumferentially around the surgical defect.  A purse string suture was then placed and tightened.
Purse String (Simple) Text: Given the location of the defect and the characteristics of the surrounding skin a purse string simple closure was deemed most appropriate.  Undermining was performed circumferentially around the surgical defect.  A purse string suture was then placed and tightened.
Complex Repair And Single Advancement Flap Text: The defect edges were debeveled with a #15 scalpel blade.  The primary defect was closed partially with a complex linear closure.  Given the location of the remaining defect, shape of the defect and the proximity to free margins a single advancement flap was deemed most appropriate for complete closure of the defect.  Using a sterile surgical marker, an appropriate advancement flap was drawn incorporating the defect and placing the expected incisions within the relaxed skin tension lines where possible.    The area thus outlined was incised deep to adipose tissue with a #15 scalpel blade.  The skin margins were undermined to an appropriate distance in all directions utilizing iris scissors.
Complex Repair And Double Advancement Flap Text: The defect edges were debeveled with a #15 scalpel blade.  The primary defect was closed partially with a complex linear closure.  Given the location of the remaining defect, shape of the defect and the proximity to free margins a double advancement flap was deemed most appropriate for complete closure of the defect.  Using a sterile surgical marker, an appropriate advancement flap was drawn incorporating the defect and placing the expected incisions within the relaxed skin tension lines where possible.    The area thus outlined was incised deep to adipose tissue with a #15 scalpel blade.  The skin margins were undermined to an appropriate distance in all directions utilizing iris scissors.
Complex Repair And Modified Advancement Flap Text: The defect edges were debeveled with a #15 scalpel blade.  The primary defect was closed partially with a complex linear closure.  Given the location of the remaining defect, shape of the defect and the proximity to free margins a modified advancement flap was deemed most appropriate for complete closure of the defect.  Using a sterile surgical marker, an appropriate advancement flap was drawn incorporating the defect and placing the expected incisions within the relaxed skin tension lines where possible.    The area thus outlined was incised deep to adipose tissue with a #15 scalpel blade.  The skin margins were undermined to an appropriate distance in all directions utilizing iris scissors.
Complex Repair And A-T Advancement Flap Text: The defect edges were debeveled with a #15 scalpel blade.  The primary defect was closed partially with a complex linear closure.  Given the location of the remaining defect, shape of the defect and the proximity to free margins an A-T advancement flap was deemed most appropriate for complete closure of the defect.  Using a sterile surgical marker, an appropriate advancement flap was drawn incorporating the defect and placing the expected incisions within the relaxed skin tension lines where possible.    The area thus outlined was incised deep to adipose tissue with a #15 scalpel blade.  The skin margins were undermined to an appropriate distance in all directions utilizing iris scissors.
Complex Repair And O-T Advancement Flap Text: The defect edges were debeveled with a #15 scalpel blade.  The primary defect was closed partially with a complex linear closure.  Given the location of the remaining defect, shape of the defect and the proximity to free margins an O-T advancement flap was deemed most appropriate for complete closure of the defect.  Using a sterile surgical marker, an appropriate advancement flap was drawn incorporating the defect and placing the expected incisions within the relaxed skin tension lines where possible.    The area thus outlined was incised deep to adipose tissue with a #15 scalpel blade.  The skin margins were undermined to an appropriate distance in all directions utilizing iris scissors.
Complex Repair And O-L Flap Text: The defect edges were debeveled with a #15 scalpel blade.  The primary defect was closed partially with a complex linear closure.  Given the location of the remaining defect, shape of the defect and the proximity to free margins an O-L flap was deemed most appropriate for complete closure of the defect.  Using a sterile surgical marker, an appropriate flap was drawn incorporating the defect and placing the expected incisions within the relaxed skin tension lines where possible.    The area thus outlined was incised deep to adipose tissue with a #15 scalpel blade.  The skin margins were undermined to an appropriate distance in all directions utilizing iris scissors.
Complex Repair And Bilobe Flap Text: The defect edges were debeveled with a #15 scalpel blade.  The primary defect was closed partially with a complex linear closure.  Given the location of the remaining defect, shape of the defect and the proximity to free margins a bilobe flap was deemed most appropriate for complete closure of the defect.  Using a sterile surgical marker, an appropriate advancement flap was drawn incorporating the defect and placing the expected incisions within the relaxed skin tension lines where possible.    The area thus outlined was incised deep to adipose tissue with a #15 scalpel blade.  The skin margins were undermined to an appropriate distance in all directions utilizing iris scissors.
Complex Repair And Melolabial Flap Text: The defect edges were debeveled with a #15 scalpel blade.  The primary defect was closed partially with a complex linear closure.  Given the location of the remaining defect, shape of the defect and the proximity to free margins a melolabial flap was deemed most appropriate for complete closure of the defect.  Using a sterile surgical marker, an appropriate advancement flap was drawn incorporating the defect and placing the expected incisions within the relaxed skin tension lines where possible.    The area thus outlined was incised deep to adipose tissue with a #15 scalpel blade.  The skin margins were undermined to an appropriate distance in all directions utilizing iris scissors.
Complex Repair And Rotation Flap Text: The defect edges were debeveled with a #15 scalpel blade.  The primary defect was closed partially with a complex linear closure.  Given the location of the remaining defect, shape of the defect and the proximity to free margins a rotation flap was deemed most appropriate for complete closure of the defect.  Using a sterile surgical marker, an appropriate advancement flap was drawn incorporating the defect and placing the expected incisions within the relaxed skin tension lines where possible.    The area thus outlined was incised deep to adipose tissue with a #15 scalpel blade.  The skin margins were undermined to an appropriate distance in all directions utilizing iris scissors.
Complex Repair And Rhombic Flap Text: The defect edges were debeveled with a #15 scalpel blade.  The primary defect was closed partially with a complex linear closure.  Given the location of the remaining defect, shape of the defect and the proximity to free margins a rhombic flap was deemed most appropriate for complete closure of the defect.  Using a sterile surgical marker, an appropriate advancement flap was drawn incorporating the defect and placing the expected incisions within the relaxed skin tension lines where possible.    The area thus outlined was incised deep to adipose tissue with a #15 scalpel blade.  The skin margins were undermined to an appropriate distance in all directions utilizing iris scissors.
Complex Repair And Transposition Flap Text: The defect edges were debeveled with a #15 scalpel blade.  The primary defect was closed partially with a complex linear closure.  Given the location of the remaining defect, shape of the defect and the proximity to free margins a transposition flap was deemed most appropriate for complete closure of the defect.  Using a sterile surgical marker, an appropriate advancement flap was drawn incorporating the defect and placing the expected incisions within the relaxed skin tension lines where possible.    The area thus outlined was incised deep to adipose tissue with a #15 scalpel blade.  The skin margins were undermined to an appropriate distance in all directions utilizing iris scissors.
Complex Repair And V-Y Plasty Text: The defect edges were debeveled with a #15 scalpel blade.  The primary defect was closed partially with a complex linear closure.  Given the location of the remaining defect, shape of the defect and the proximity to free margins a V-Y plasty was deemed most appropriate for complete closure of the defect.  Using a sterile surgical marker, an appropriate advancement flap was drawn incorporating the defect and placing the expected incisions within the relaxed skin tension lines where possible.    The area thus outlined was incised deep to adipose tissue with a #15 scalpel blade.  The skin margins were undermined to an appropriate distance in all directions utilizing iris scissors.
Complex Repair And M Plasty Text: The defect edges were debeveled with a #15 scalpel blade.  The primary defect was closed partially with a complex linear closure.  Given the location of the remaining defect, shape of the defect and the proximity to free margins an M plasty was deemed most appropriate for complete closure of the defect.  Using a sterile surgical marker, an appropriate advancement flap was drawn incorporating the defect and placing the expected incisions within the relaxed skin tension lines where possible.    The area thus outlined was incised deep to adipose tissue with a #15 scalpel blade.  The skin margins were undermined to an appropriate distance in all directions utilizing iris scissors.
Complex Repair And Double M Plasty Text: The defect edges were debeveled with a #15 scalpel blade.  The primary defect was closed partially with a complex linear closure.  Given the location of the remaining defect, shape of the defect and the proximity to free margins a double M plasty was deemed most appropriate for complete closure of the defect.  Using a sterile surgical marker, an appropriate advancement flap was drawn incorporating the defect and placing the expected incisions within the relaxed skin tension lines where possible.    The area thus outlined was incised deep to adipose tissue with a #15 scalpel blade.  The skin margins were undermined to an appropriate distance in all directions utilizing iris scissors.
Complex Repair And W Plasty Text: The defect edges were debeveled with a #15 scalpel blade.  The primary defect was closed partially with a complex linear closure.  Given the location of the remaining defect, shape of the defect and the proximity to free margins a W plasty was deemed most appropriate for complete closure of the defect.  Using a sterile surgical marker, an appropriate advancement flap was drawn incorporating the defect and placing the expected incisions within the relaxed skin tension lines where possible.    The area thus outlined was incised deep to adipose tissue with a #15 scalpel blade.  The skin margins were undermined to an appropriate distance in all directions utilizing iris scissors.
Complex Repair And Z Plasty Text: The defect edges were debeveled with a #15 scalpel blade.  The primary defect was closed partially with a complex linear closure.  Given the location of the remaining defect, shape of the defect and the proximity to free margins a Z plasty was deemed most appropriate for complete closure of the defect.  Using a sterile surgical marker, an appropriate advancement flap was drawn incorporating the defect and placing the expected incisions within the relaxed skin tension lines where possible.    The area thus outlined was incised deep to adipose tissue with a #15 scalpel blade.  The skin margins were undermined to an appropriate distance in all directions utilizing iris scissors.
Complex Repair And Dorsal Nasal Flap Text: The defect edges were debeveled with a #15 scalpel blade.  The primary defect was closed partially with a complex linear closure.  Given the location of the remaining defect, shape of the defect and the proximity to free margins a dorsal nasal flap was deemed most appropriate for complete closure of the defect.  Using a sterile surgical marker, an appropriate flap was drawn incorporating the defect and placing the expected incisions within the relaxed skin tension lines where possible.    The area thus outlined was incised deep to adipose tissue with a #15 scalpel blade.  The skin margins were undermined to an appropriate distance in all directions utilizing iris scissors.
Complex Repair And Ftsg Text: The defect edges were debeveled with a #15 scalpel blade.  The primary defect was closed partially with a complex linear closure.  Given the location of the defect, shape of the defect and the proximity to free margins a full thickness skin graft was deemed most appropriate to repair the remaining defect.  The graft was trimmed to fit the size of the remaining defect.  The graft was then placed in the primary defect, oriented appropriately, and sutured into place.
Complex Repair And Burow's Graft Text: The defect edges were debeveled with a #15 scalpel blade.  The primary defect was closed partially with a complex linear closure.  Given the location of the defect, shape of the defect, the proximity to free margins and the presence of a standing cone deformity a Burow's graft was deemed most appropriate to repair the remaining defect.  The graft was trimmed to fit the size of the remaining defect.  The graft was then placed in the primary defect, oriented appropriately, and sutured into place.
Complex Repair And Split-Thickness Skin Graft Text: The defect edges were debeveled with a #15 scalpel blade.  The primary defect was closed partially with a complex linear closure.  Given the location of the defect, shape of the defect and the proximity to free margins a split thickness skin graft was deemed most appropriate to repair the remaining defect.  The graft was trimmed to fit the size of the remaining defect.  The graft was then placed in the primary defect, oriented appropriately, and sutured into place.
Complex Repair And Epidermal Autograft Text: The defect edges were debeveled with a #15 scalpel blade.  The primary defect was closed partially with a complex linear closure.  Given the location of the defect, shape of the defect and the proximity to free margins an epidermal autograft was deemed most appropriate to repair the remaining defect.  The graft was trimmed to fit the size of the remaining defect.  The graft was then placed in the primary defect, oriented appropriately, and sutured into place.
Complex Repair And Dermal Autograft Text: The defect edges were debeveled with a #15 scalpel blade.  The primary defect was closed partially with a complex linear closure.  Given the location of the defect, shape of the defect and the proximity to free margins an dermal autograft was deemed most appropriate to repair the remaining defect.  The graft was trimmed to fit the size of the remaining defect.  The graft was then placed in the primary defect, oriented appropriately, and sutured into place.
Complex Repair And Tissue Cultured Epidermal Autograft Text: The defect edges were debeveled with a #15 scalpel blade.  The primary defect was closed partially with a complex linear closure.  Given the location of the defect, shape of the defect and the proximity to free margins an tissue cultured epidermal autograft was deemed most appropriate to repair the remaining defect.  The graft was trimmed to fit the size of the remaining defect.  The graft was then placed in the primary defect, oriented appropriately, and sutured into place.
Complex Repair And Xenograft Text: The defect edges were debeveled with a #15 scalpel blade.  The primary defect was closed partially with a complex linear closure.  Given the location of the defect, shape of the defect and the proximity to free margins a xenograft was deemed most appropriate to repair the remaining defect.  The graft was trimmed to fit the size of the remaining defect.  The graft was then placed in the primary defect, oriented appropriately, and sutured into place.
Complex Repair And Skin Substitute Graft Text: The defect edges were debeveled with a #15 scalpel blade.  The primary defect was closed partially with a complex linear closure.  Given the location of the remaining defect, shape of the defect and the proximity to free margins a skin substitute graft was deemed most appropriate to repair the remaining defect.  The graft was trimmed to fit the size of the remaining defect.  The graft was then placed in the primary defect, oriented appropriately, and sutured into place.
Path Notes (To The Dermatopathologist): Please check margins.
Consent was obtained from the patient. The risks and benefits to therapy were discussed in detail. Specifically, the risks of infection, scarring, bleeding, prolonged wound healing, incomplete removal, allergy to anesthesia, nerve injury and recurrence were addressed. Prior to the procedure, the treatment site was clearly identified and confirmed by the patient. All components of Universal Protocol/PAUSE Rule completed.
Post-Care Instructions: I reviewed with the patient in detail post-care instructions. Patient is not to engage in any heavy lifting, exercise, or swimming for the next 14 days. Should the patient develop any fevers, chills, bleeding, severe pain patient will contact the office immediately.
Home Suture Removal Text: Patient was provided a home suture removal kit and will remove their sutures at home.  If they have any questions or difficulties they will call the office.
Where Do You Want The Question To Include Opioid Counseling Located?: Case Summary Tab
Billing Type: Third-Party Bill
Information: Selecting Yes will display possible errors in your note based on the variables you have selected. This validation is only offered as a suggestion for you. PLEASE NOTE THAT THE VALIDATION TEXT WILL BE REMOVED WHEN YOU FINALIZE YOUR NOTE. IF YOU WANT TO FAX A PRELIMINARY NOTE YOU WILL NEED TO TOGGLE THIS TO 'NO' IF YOU DO NOT WANT IT IN YOUR FAXED NOTE.

## 2020-08-04 ENCOUNTER — APPOINTMENT (RX ONLY)
Dept: URBAN - METROPOLITAN AREA CLINIC 36 | Facility: CLINIC | Age: 80
Setting detail: DERMATOLOGY
End: 2020-08-04

## 2020-08-04 DIAGNOSIS — Z48.02 ENCOUNTER FOR REMOVAL OF SUTURES: ICD-10-CM

## 2020-08-04 PROCEDURE — ? SUTURE REMOVAL (GLOBAL PERIOD)

## 2020-08-04 PROCEDURE — 99024 POSTOP FOLLOW-UP VISIT: CPT

## 2020-08-04 ASSESSMENT — LOCATION ZONE DERM: LOCATION ZONE: TRUNK

## 2020-08-04 ASSESSMENT — LOCATION DETAILED DESCRIPTION DERM: LOCATION DETAILED: LEFT MEDIAL UPPER BACK

## 2020-08-04 ASSESSMENT — LOCATION SIMPLE DESCRIPTION DERM: LOCATION SIMPLE: LEFT UPPER BACK

## 2020-08-04 NOTE — PROCEDURE: SUTURE REMOVAL (GLOBAL PERIOD)
Detail Level: Detailed
Add 20584 Cpt? (Important Note: In 2017 The Use Of 90396 Is Being Tracked By Cms To Determine Future Global Period Reimbursement For Global Periods): yes

## 2020-08-19 ENCOUNTER — HOSPITAL ENCOUNTER (OUTPATIENT)
Dept: LAB | Facility: MEDICAL CENTER | Age: 80
End: 2020-08-19
Attending: FAMILY MEDICINE
Payer: COMMERCIAL

## 2020-08-19 ENCOUNTER — HOSPITAL ENCOUNTER (OUTPATIENT)
Dept: LAB | Facility: MEDICAL CENTER | Age: 80
End: 2020-08-19
Attending: UROLOGY
Payer: COMMERCIAL

## 2020-08-19 LAB
ALBUMIN SERPL BCP-MCNC: 4.4 G/DL (ref 3.2–4.9)
ALBUMIN/GLOB SERPL: 1.5 G/DL
ALP SERPL-CCNC: 98 U/L (ref 30–99)
ALT SERPL-CCNC: 21 U/L (ref 2–50)
ANION GAP SERPL CALC-SCNC: 13 MMOL/L (ref 7–16)
APPEARANCE UR: CLEAR
AST SERPL-CCNC: 15 U/L (ref 12–45)
BACTERIA #/AREA URNS HPF: ABNORMAL /HPF
BASOPHILS # BLD AUTO: 0.4 % (ref 0–1.8)
BASOPHILS # BLD: 0.02 K/UL (ref 0–0.12)
BILIRUB SERPL-MCNC: 0.6 MG/DL (ref 0.1–1.5)
BILIRUB UR QL STRIP.AUTO: NEGATIVE
BUN SERPL-MCNC: 18 MG/DL (ref 8–22)
BUN SERPL-MCNC: 18 MG/DL (ref 8–22)
CALCIUM SERPL-MCNC: 9.8 MG/DL (ref 8.5–10.5)
CHLORIDE SERPL-SCNC: 102 MMOL/L (ref 96–112)
CHOLEST SERPL-MCNC: 137 MG/DL (ref 100–199)
CO2 SERPL-SCNC: 24 MMOL/L (ref 20–33)
COLOR UR: YELLOW
CREAT SERPL-MCNC: 0.98 MG/DL (ref 0.5–1.4)
CREAT SERPL-MCNC: 1.05 MG/DL (ref 0.5–1.4)
EOSINOPHIL # BLD AUTO: 0.17 K/UL (ref 0–0.51)
EOSINOPHIL NFR BLD: 3.2 % (ref 0–6.9)
EPI CELLS #/AREA URNS HPF: NEGATIVE /HPF
ERYTHROCYTE [DISTWIDTH] IN BLOOD BY AUTOMATED COUNT: 44.8 FL (ref 35.9–50)
EST. AVERAGE GLUCOSE BLD GHB EST-MCNC: 126 MG/DL
FASTING STATUS PATIENT QL REPORTED: NORMAL
GLOBULIN SER CALC-MCNC: 3 G/DL (ref 1.9–3.5)
GLUCOSE SERPL-MCNC: 108 MG/DL (ref 65–99)
GLUCOSE UR STRIP.AUTO-MCNC: NEGATIVE MG/DL
HBA1C MFR BLD: 6 % (ref 0–5.6)
HCT VFR BLD AUTO: 39.4 % (ref 42–52)
HDLC SERPL-MCNC: 45 MG/DL
HGB BLD-MCNC: 13 G/DL (ref 14–18)
HYALINE CASTS #/AREA URNS LPF: ABNORMAL /LPF
IMM GRANULOCYTES # BLD AUTO: 0.02 K/UL (ref 0–0.11)
IMM GRANULOCYTES NFR BLD AUTO: 0.4 % (ref 0–0.9)
KETONES UR STRIP.AUTO-MCNC: NEGATIVE MG/DL
LDLC SERPL CALC-MCNC: 69 MG/DL
LEUKOCYTE ESTERASE UR QL STRIP.AUTO: ABNORMAL
LYMPHOCYTES # BLD AUTO: 1.34 K/UL (ref 1–4.8)
LYMPHOCYTES NFR BLD: 25 % (ref 22–41)
MCH RBC QN AUTO: 30.7 PG (ref 27–33)
MCHC RBC AUTO-ENTMCNC: 33 G/DL (ref 33.7–35.3)
MCV RBC AUTO: 93.1 FL (ref 81.4–97.8)
MICRO URNS: ABNORMAL
MONOCYTES # BLD AUTO: 0.48 K/UL (ref 0–0.85)
MONOCYTES NFR BLD AUTO: 8.9 % (ref 0–13.4)
NEUTROPHILS # BLD AUTO: 3.34 K/UL (ref 1.82–7.42)
NEUTROPHILS NFR BLD: 62.1 % (ref 44–72)
NITRITE UR QL STRIP.AUTO: POSITIVE
NRBC # BLD AUTO: 0 K/UL
NRBC BLD-RTO: 0 /100 WBC
PH UR STRIP.AUTO: 5 [PH] (ref 5–8)
PLATELET # BLD AUTO: 202 K/UL (ref 164–446)
PMV BLD AUTO: 10.3 FL (ref 9–12.9)
POTASSIUM SERPL-SCNC: 4.1 MMOL/L (ref 3.6–5.5)
PROT SERPL-MCNC: 7.4 G/DL (ref 6–8.2)
PROT UR QL STRIP: NEGATIVE MG/DL
PSA SERPL-MCNC: 9.63 NG/ML (ref 0–4)
RBC # BLD AUTO: 4.23 M/UL (ref 4.7–6.1)
RBC # URNS HPF: ABNORMAL /HPF
RBC UR QL AUTO: NEGATIVE
SODIUM SERPL-SCNC: 139 MMOL/L (ref 135–145)
SP GR UR STRIP.AUTO: 1.02
T4 FREE SERPL-MCNC: 1.24 NG/DL (ref 0.93–1.7)
TRIGL SERPL-MCNC: 114 MG/DL (ref 0–149)
TSH SERPL DL<=0.005 MIU/L-ACNC: 1.45 UIU/ML (ref 0.38–5.33)
UROBILINOGEN UR STRIP.AUTO-MCNC: 0.2 MG/DL
WBC # BLD AUTO: 5.4 K/UL (ref 4.8–10.8)
WBC #/AREA URNS HPF: ABNORMAL /HPF

## 2020-08-19 PROCEDURE — 84439 ASSAY OF FREE THYROXINE: CPT

## 2020-08-19 PROCEDURE — 84443 ASSAY THYROID STIM HORMONE: CPT

## 2020-08-19 PROCEDURE — 81001 URINALYSIS AUTO W/SCOPE: CPT

## 2020-08-19 PROCEDURE — 87086 URINE CULTURE/COLONY COUNT: CPT

## 2020-08-19 PROCEDURE — 83036 HEMOGLOBIN GLYCOSYLATED A1C: CPT

## 2020-08-19 PROCEDURE — 84520 ASSAY OF UREA NITROGEN: CPT

## 2020-08-19 PROCEDURE — 87077 CULTURE AEROBIC IDENTIFY: CPT

## 2020-08-19 PROCEDURE — 87186 SC STD MICRODIL/AGAR DIL: CPT

## 2020-08-19 PROCEDURE — 82565 ASSAY OF CREATININE: CPT

## 2020-08-19 PROCEDURE — 85025 COMPLETE CBC W/AUTO DIFF WBC: CPT

## 2020-08-19 PROCEDURE — 80053 COMPREHEN METABOLIC PANEL: CPT

## 2020-08-19 PROCEDURE — 36415 COLL VENOUS BLD VENIPUNCTURE: CPT

## 2020-08-19 PROCEDURE — 84153 ASSAY OF PSA TOTAL: CPT

## 2020-08-19 PROCEDURE — 80061 LIPID PANEL: CPT

## 2020-08-21 ENCOUNTER — HOSPITAL ENCOUNTER (OUTPATIENT)
Dept: RADIOLOGY | Facility: MEDICAL CENTER | Age: 80
End: 2020-08-21
Attending: UROLOGY
Payer: COMMERCIAL

## 2020-08-21 DIAGNOSIS — R97.20 ELEVATED PROSTATE SPECIFIC ANTIGEN (PSA): ICD-10-CM

## 2020-08-21 LAB
BACTERIA UR CULT: ABNORMAL
BACTERIA UR CULT: ABNORMAL
SIGNIFICANT IND 70042: ABNORMAL
SITE SITE: ABNORMAL
SOURCE SOURCE: ABNORMAL

## 2020-08-21 PROCEDURE — 700117 HCHG RX CONTRAST REV CODE 255: Performed by: UROLOGY

## 2020-08-21 PROCEDURE — 700111 HCHG RX REV CODE 636 W/ 250 OVERRIDE (IP): Mod: JG | Performed by: UROLOGY

## 2020-08-21 PROCEDURE — 72197 MRI PELVIS W/O & W/DYE: CPT

## 2020-08-21 PROCEDURE — A9576 INJ PROHANCE MULTIPACK: HCPCS | Performed by: UROLOGY

## 2020-08-21 RX ADMIN — GADOTERIDOL 15 ML: 279.3 INJECTION, SOLUTION INTRAVENOUS at 14:07

## 2020-08-21 RX ADMIN — GLUCAGON HYDROCHLORIDE 1 MG: KIT at 13:00

## 2021-01-15 DIAGNOSIS — Z23 NEED FOR VACCINATION: ICD-10-CM

## 2021-03-02 ENCOUNTER — HOSPITAL ENCOUNTER (OUTPATIENT)
Dept: LAB | Facility: MEDICAL CENTER | Age: 81
End: 2021-03-02
Attending: INTERNAL MEDICINE
Payer: MEDICARE

## 2021-03-02 LAB
ALBUMIN SERPL BCP-MCNC: 4.4 G/DL (ref 3.2–4.9)
ALBUMIN/GLOB SERPL: 1.3 G/DL
ALP SERPL-CCNC: 102 U/L (ref 30–99)
ALT SERPL-CCNC: 17 U/L (ref 2–50)
ANION GAP SERPL CALC-SCNC: 9 MMOL/L (ref 7–16)
AST SERPL-CCNC: 19 U/L (ref 12–45)
BASOPHILS # BLD AUTO: 0 % (ref 0–1.8)
BASOPHILS # BLD: 0 K/UL (ref 0–0.12)
BILIRUB SERPL-MCNC: 0.5 MG/DL (ref 0.1–1.5)
BUN SERPL-MCNC: 14 MG/DL (ref 8–22)
CALCIUM SERPL-MCNC: 9.8 MG/DL (ref 8.5–10.5)
CHLORIDE SERPL-SCNC: 102 MMOL/L (ref 96–112)
CHOLEST SERPL-MCNC: 132 MG/DL (ref 100–199)
CO2 SERPL-SCNC: 26 MMOL/L (ref 20–33)
CREAT SERPL-MCNC: 0.93 MG/DL (ref 0.5–1.4)
EOSINOPHIL # BLD AUTO: 0.09 K/UL (ref 0–0.51)
EOSINOPHIL NFR BLD: 1.8 % (ref 0–6.9)
ERYTHROCYTE [DISTWIDTH] IN BLOOD BY AUTOMATED COUNT: 47 FL (ref 35.9–50)
EST. AVERAGE GLUCOSE BLD GHB EST-MCNC: 111 MG/DL
FASTING STATUS PATIENT QL REPORTED: NORMAL
GLOBULIN SER CALC-MCNC: 3.3 G/DL (ref 1.9–3.5)
GLUCOSE SERPL-MCNC: 108 MG/DL (ref 65–99)
HBA1C MFR BLD: 5.5 % (ref 4–5.6)
HCT VFR BLD AUTO: 42.1 % (ref 42–52)
HDLC SERPL-MCNC: 47 MG/DL
HGB BLD-MCNC: 13.6 G/DL (ref 14–18)
IMM GRANULOCYTES # BLD AUTO: 0.02 K/UL (ref 0–0.11)
IMM GRANULOCYTES NFR BLD AUTO: 0.4 % (ref 0–0.9)
LDLC SERPL CALC-MCNC: 64 MG/DL
LYMPHOCYTES # BLD AUTO: 1.21 K/UL (ref 1–4.8)
LYMPHOCYTES NFR BLD: 23.7 % (ref 22–41)
MCH RBC QN AUTO: 30.8 PG (ref 27–33)
MCHC RBC AUTO-ENTMCNC: 32.3 G/DL (ref 33.7–35.3)
MCV RBC AUTO: 95.5 FL (ref 81.4–97.8)
MONOCYTES # BLD AUTO: 0.52 K/UL (ref 0–0.85)
MONOCYTES NFR BLD AUTO: 10.2 % (ref 0–13.4)
NEUTROPHILS # BLD AUTO: 3.27 K/UL (ref 1.82–7.42)
NEUTROPHILS NFR BLD: 63.9 % (ref 44–72)
NRBC # BLD AUTO: 0 K/UL
NRBC BLD-RTO: 0 /100 WBC
PLATELET # BLD AUTO: 197 K/UL (ref 164–446)
PMV BLD AUTO: 10.2 FL (ref 9–12.9)
POTASSIUM SERPL-SCNC: 4.3 MMOL/L (ref 3.6–5.5)
PROT SERPL-MCNC: 7.7 G/DL (ref 6–8.2)
RBC # BLD AUTO: 4.41 M/UL (ref 4.7–6.1)
SODIUM SERPL-SCNC: 137 MMOL/L (ref 135–145)
TRIGL SERPL-MCNC: 107 MG/DL (ref 0–149)
WBC # BLD AUTO: 5.1 K/UL (ref 4.8–10.8)

## 2021-03-02 PROCEDURE — 85025 COMPLETE CBC W/AUTO DIFF WBC: CPT

## 2021-03-02 PROCEDURE — 83036 HEMOGLOBIN GLYCOSYLATED A1C: CPT | Mod: GA

## 2021-03-02 PROCEDURE — 36415 COLL VENOUS BLD VENIPUNCTURE: CPT | Mod: GA

## 2021-03-02 PROCEDURE — 80053 COMPREHEN METABOLIC PANEL: CPT

## 2021-03-02 PROCEDURE — 80061 LIPID PANEL: CPT

## 2021-03-12 ENCOUNTER — IMMUNIZATION (OUTPATIENT)
Dept: FAMILY PLANNING/WOMEN'S HEALTH CLINIC | Facility: IMMUNIZATION CENTER | Age: 81
End: 2021-03-12
Attending: INTERNAL MEDICINE
Payer: MEDICARE

## 2021-03-12 DIAGNOSIS — Z23 ENCOUNTER FOR VACCINATION: Primary | ICD-10-CM

## 2021-03-12 DIAGNOSIS — Z23 NEED FOR VACCINATION: ICD-10-CM

## 2021-03-12 PROCEDURE — 0011A MODERNA SARS-COV-2 VACCINE: CPT

## 2021-03-12 PROCEDURE — 91301 MODERNA SARS-COV-2 VACCINE: CPT

## 2021-04-10 ENCOUNTER — IMMUNIZATION (OUTPATIENT)
Dept: FAMILY PLANNING/WOMEN'S HEALTH CLINIC | Facility: IMMUNIZATION CENTER | Age: 81
End: 2021-04-10
Attending: INTERNAL MEDICINE
Payer: MEDICARE

## 2021-04-10 DIAGNOSIS — Z23 ENCOUNTER FOR VACCINATION: Primary | ICD-10-CM

## 2021-04-10 PROCEDURE — 91301 MODERNA SARS-COV-2 VACCINE: CPT

## 2021-04-10 PROCEDURE — 0012A MODERNA SARS-COV-2 VACCINE: CPT

## 2021-04-28 ENCOUNTER — HOSPITAL ENCOUNTER (OUTPATIENT)
Dept: RADIOLOGY | Facility: MEDICAL CENTER | Age: 81
End: 2021-04-28
Attending: INTERNAL MEDICINE
Payer: MEDICARE

## 2021-04-28 DIAGNOSIS — I21.4 NON-ST ELEVATION MYOCARDIAL INFARCTION (NSTEMI) (HCC): ICD-10-CM

## 2021-04-28 PROCEDURE — 93018 CV STRESS TEST I&R ONLY: CPT | Performed by: INTERNAL MEDICINE

## 2021-04-28 PROCEDURE — 700111 HCHG RX REV CODE 636 W/ 250 OVERRIDE (IP): Performed by: INTERNAL MEDICINE

## 2021-04-28 PROCEDURE — A9502 TC99M TETROFOSMIN: HCPCS

## 2021-04-28 PROCEDURE — 78452 HT MUSCLE IMAGE SPECT MULT: CPT | Mod: 26 | Performed by: INTERNAL MEDICINE

## 2021-04-28 RX ORDER — REGADENOSON 0.08 MG/ML
0.4 INJECTION, SOLUTION INTRAVENOUS ONCE
Status: COMPLETED | OUTPATIENT
Start: 2021-04-28 | End: 2021-04-28

## 2021-04-28 RX ORDER — AMINOPHYLLINE 25 MG/ML
100 INJECTION, SOLUTION INTRAVENOUS
Status: DISCONTINUED | OUTPATIENT
Start: 2021-04-28 | End: 2021-04-29 | Stop reason: HOSPADM

## 2021-04-28 RX ADMIN — REGADENOSON 0.4 MG: 0.08 INJECTION, SOLUTION INTRAVENOUS at 09:55

## 2021-04-28 NOTE — PROCEDURES
Patient presents to NM suite for cardiac stress test with MPI. Nursing goals identified: knowledge deficit, potential for anxiety r/t stress test, potential for compromised cardiac output. Care plan includes educating patient, reassurance, access to Aminophylline and access to ACLS cart/team. Labs and ECG reviewed. No caffeine and NPO confirmed. Resting images attained and patient prepped for pharmacological stress study. Lexiscan given while patient ambulated on TM x 2 min. Reported &/or observed symptoms include: mild increased WOB. Denied cardiac symptoms Caffeinated beverage provided. Symptoms resolved.

## 2021-05-17 ENCOUNTER — OFFICE VISIT (OUTPATIENT)
Dept: URGENT CARE | Facility: PHYSICIAN GROUP | Age: 81
End: 2021-05-17
Payer: MEDICARE

## 2021-05-17 ENCOUNTER — HOSPITAL ENCOUNTER (OUTPATIENT)
Facility: MEDICAL CENTER | Age: 81
End: 2021-05-17
Attending: FAMILY MEDICINE
Payer: MEDICARE

## 2021-05-17 VITALS
HEIGHT: 64 IN | DIASTOLIC BLOOD PRESSURE: 60 MMHG | HEART RATE: 70 BPM | OXYGEN SATURATION: 96 % | TEMPERATURE: 97.6 F | BODY MASS INDEX: 27.31 KG/M2 | SYSTOLIC BLOOD PRESSURE: 124 MMHG | WEIGHT: 160 LBS

## 2021-05-17 DIAGNOSIS — R31.9 HEMATURIA, UNSPECIFIED TYPE: ICD-10-CM

## 2021-05-17 DIAGNOSIS — N40.0 BENIGN PROSTATIC HYPERPLASIA WITHOUT LOWER URINARY TRACT SYMPTOMS: ICD-10-CM

## 2021-05-17 PROBLEM — R97.20 RAISED PROSTATE SPECIFIC ANTIGEN: Status: ACTIVE | Noted: 2020-03-09

## 2021-05-17 PROBLEM — Z87.440 HISTORY OF URINARY TRACT INFECTION: Status: ACTIVE | Noted: 2020-03-09

## 2021-05-17 PROBLEM — R35.1 NOCTURIA: Status: ACTIVE | Noted: 2020-03-09

## 2021-05-17 PROBLEM — N13.8 BENIGN PROSTATIC HYPERPLASIA WITH URINARY OBSTRUCTION: Status: ACTIVE | Noted: 2020-03-09

## 2021-05-17 PROBLEM — N40.1 BENIGN PROSTATIC HYPERPLASIA WITH URINARY OBSTRUCTION: Status: ACTIVE | Noted: 2020-03-09

## 2021-05-17 LAB
ALBUMIN SERPL BCP-MCNC: 4.5 G/DL (ref 3.2–4.9)
ALBUMIN/GLOB SERPL: 1.6 G/DL
ALP SERPL-CCNC: 102 U/L (ref 30–99)
ALT SERPL-CCNC: 17 U/L (ref 2–50)
ANION GAP SERPL CALC-SCNC: 14 MMOL/L (ref 7–16)
APPEARANCE UR: NORMAL
AST SERPL-CCNC: 15 U/L (ref 12–45)
BASOPHILS # BLD AUTO: 0.2 % (ref 0–1.8)
BASOPHILS # BLD: 0.01 K/UL (ref 0–0.12)
BILIRUB SERPL-MCNC: 0.7 MG/DL (ref 0.1–1.5)
BILIRUB UR STRIP-MCNC: NORMAL MG/DL
BUN SERPL-MCNC: 20 MG/DL (ref 8–22)
CALCIUM SERPL-MCNC: 9.5 MG/DL (ref 8.5–10.5)
CHLORIDE SERPL-SCNC: 104 MMOL/L (ref 96–112)
CO2 SERPL-SCNC: 19 MMOL/L (ref 20–33)
COLOR UR AUTO: NORMAL
CREAT SERPL-MCNC: 1.17 MG/DL (ref 0.5–1.4)
EOSINOPHIL # BLD AUTO: 0.07 K/UL (ref 0–0.51)
EOSINOPHIL NFR BLD: 1.2 % (ref 0–6.9)
ERYTHROCYTE [DISTWIDTH] IN BLOOD BY AUTOMATED COUNT: 43.5 FL (ref 35.9–50)
GLOBULIN SER CALC-MCNC: 2.8 G/DL (ref 1.9–3.5)
GLUCOSE SERPL-MCNC: 100 MG/DL (ref 65–99)
GLUCOSE UR STRIP.AUTO-MCNC: NEGATIVE MG/DL
HCT VFR BLD AUTO: 36.7 % (ref 42–52)
HGB BLD-MCNC: 12.6 G/DL (ref 14–18)
IMM GRANULOCYTES # BLD AUTO: 0.02 K/UL (ref 0–0.11)
IMM GRANULOCYTES NFR BLD AUTO: 0.3 % (ref 0–0.9)
KETONES UR STRIP.AUTO-MCNC: NORMAL MG/DL
LEUKOCYTE ESTERASE UR QL STRIP.AUTO: NEGATIVE
LYMPHOCYTES # BLD AUTO: 1.51 K/UL (ref 1–4.8)
LYMPHOCYTES NFR BLD: 26.3 % (ref 22–41)
MCH RBC QN AUTO: 31.6 PG (ref 27–33)
MCHC RBC AUTO-ENTMCNC: 34.3 G/DL (ref 33.7–35.3)
MCV RBC AUTO: 92 FL (ref 81.4–97.8)
MONOCYTES # BLD AUTO: 0.58 K/UL (ref 0–0.85)
MONOCYTES NFR BLD AUTO: 10.1 % (ref 0–13.4)
NEUTROPHILS # BLD AUTO: 3.55 K/UL (ref 1.82–7.42)
NEUTROPHILS NFR BLD: 61.9 % (ref 44–72)
NITRITE UR QL STRIP.AUTO: NEGATIVE
NRBC # BLD AUTO: 0 K/UL
NRBC BLD-RTO: 0 /100 WBC
PH UR STRIP.AUTO: 5 [PH] (ref 5–8)
PLATELET # BLD AUTO: 225 K/UL (ref 164–446)
PMV BLD AUTO: 10.1 FL (ref 9–12.9)
POTASSIUM SERPL-SCNC: 4.1 MMOL/L (ref 3.6–5.5)
PROT SERPL-MCNC: 7.3 G/DL (ref 6–8.2)
PROT UR QL STRIP: NEGATIVE MG/DL
PSA SERPL-MCNC: 15.8 NG/ML (ref 0–4)
RBC # BLD AUTO: 3.99 M/UL (ref 4.7–6.1)
RBC UR QL AUTO: NORMAL
SODIUM SERPL-SCNC: 137 MMOL/L (ref 135–145)
SP GR UR STRIP.AUTO: 1.03
UROBILINOGEN UR STRIP-MCNC: 0.2 MG/DL
WBC # BLD AUTO: 5.7 K/UL (ref 4.8–10.8)

## 2021-05-17 PROCEDURE — 80053 COMPREHEN METABOLIC PANEL: CPT

## 2021-05-17 PROCEDURE — 99204 OFFICE O/P NEW MOD 45 MIN: CPT | Performed by: FAMILY MEDICINE

## 2021-05-17 PROCEDURE — 81002 URINALYSIS NONAUTO W/O SCOPE: CPT | Performed by: FAMILY MEDICINE

## 2021-05-17 PROCEDURE — 84153 ASSAY OF PSA TOTAL: CPT

## 2021-05-17 PROCEDURE — 87086 URINE CULTURE/COLONY COUNT: CPT

## 2021-05-17 PROCEDURE — 85025 COMPLETE CBC W/AUTO DIFF WBC: CPT

## 2021-05-17 RX ORDER — ROSUVASTATIN CALCIUM 20 MG/1
TABLET, COATED ORAL
Status: ON HOLD | COMMUNITY
End: 2023-05-19

## 2021-05-17 ASSESSMENT — FIBROSIS 4 INDEX: FIB4 SCORE: 1.87

## 2021-05-18 ENCOUNTER — HOSPITAL ENCOUNTER (OUTPATIENT)
Dept: RADIOLOGY | Facility: MEDICAL CENTER | Age: 81
End: 2021-05-18
Attending: FAMILY MEDICINE
Payer: MEDICARE

## 2021-05-18 DIAGNOSIS — R31.9 HEMATURIA, UNSPECIFIED TYPE: ICD-10-CM

## 2021-05-18 PROCEDURE — 72193 CT PELVIS W/DYE: CPT | Mod: MG

## 2021-05-18 PROCEDURE — 700117 HCHG RX CONTRAST REV CODE 255: Performed by: FAMILY MEDICINE

## 2021-05-18 RX ADMIN — IOHEXOL 100 ML: 350 INJECTION, SOLUTION INTRAVENOUS at 14:45

## 2021-05-18 NOTE — PROGRESS NOTES
"Chief Complaint   Patient presents with   • Bleeding/Bruising     penis bleeding         HPI:      Pt has hx enlarged prostate.     Pt here for evaluation of blood from tip of penis x 1 d.   States that he had small amount of blood leaking from uretheral meateus today.    Bright red blood and he characterizes as a small amount.   Denies dysuria or bleeding that occurs with urination.  Has hx enlarged prostate and elevated PSA, but not on any medication for that.    He is currently on Eliquis, s/p STEMI with stent placement, but denies any other bleeding - no epistaxis, hemoptysis, bleeding gums, etc.    He does complain of some minor lower back pain, L>R, but feels that this is more related to doing yard work recently.      Past Medical History:   Diagnosis Date   •     BPH        •      STEMI           Social History     Tobacco Use   • Smoking status: Never Smoker   Substance Use Topics   • Alcohol use: Yes   • Drug use: Not on file         Review of Systems   Constitutional: Negative for fever, chills and malaise/fatigue.   Eyes: Negative for vision changes, d/c.    Respiratory: Negative for cough and sputum production.    Cardiovascular: Negative for chest pain and palpitations.   Gastrointestinal: Negative for nausea, vomiting, abdominal pain, diarrhea and constipation.   Genitourinary: Negative for dysuria, urgency and frequency.   Skin: Negative for rash or  itching.   Neurological: Negative for dizziness and tingling.   Psychiatric/Behavioral: Negative for depression.   Hematologic/lymphatic - denies bruising or excessive bleeding  All other systems reviewed and are negative.      OBJECTIVE  /60 (BP Location: Left arm, Patient Position: Sitting, BP Cuff Size: Adult)   Pulse 70   Temp 36.4 °C (97.6 °F) (Temporal)   Ht 1.626 m (5' 4\")   Wt 72.6 kg (160 lb)   SpO2 96%   HEENT - PERRLA, EOMI  Neuro - alert and oriented x3. CN 2-12 grossly intact.  Lungs - CTA. No wheezes, rhonchi or rales.  Heart - " regular rate and rhythm without murmur.  Abdomen - soft and non-tender, bowel sounds active x4.  Musculoskeletal - No lower extremity edema noted.  Sharee's sign negative bilaterally  Lumbar spine - full AROM.   No TTP or muscle spasm      Lab Results   Component Value Date/Time    POCCOLOR mallika 05/17/2021 04:31 PM    POCAPPEAR cloudy 05/17/2021 04:31 PM    POCLEUKEST negative 05/17/2021 04:31 PM    POCNITRITE negative 05/17/2021 04:31 PM    POCUROBILIGE 0.2 05/17/2021 04:31 PM    POCPROTEIN negative 05/17/2021 04:31 PM    POCURPH 5.0 05/17/2021 04:31 PM    POCBLOOD moderate 05/17/2021 04:31 PM    POCSPGRV 1.030 05/17/2021 04:31 PM    POCKETONES trace 05/17/2021 04:31 PM    POCBILIRUBIN small 05/17/2021 04:31 PM    POCGLUCUA negative 05/17/2021 04:31 PM          A/P:  1. Hematuria, unspecified type     2. Benign prostatic hyperplasia without lower urinary tract symptoms       Cause of hematuria is unclear at this time.      UA unremarkable, except for moderate blood - infection unlikely, but will send urine sample for culture.     CT pelvis scheduled for am to evaluate prostate.     Labs ordered:        - Comp Metabolic Panel; Future  - CBC WITH DIFFERENTIAL; Future  - PROSTATE SPECIFIC AG DIAGNOSTIC; Future      - REFERRAL TO UROLOGY

## 2021-05-19 ENCOUNTER — TELEPHONE (OUTPATIENT)
Dept: URGENT CARE | Facility: PHYSICIAN GROUP | Age: 81
End: 2021-05-19

## 2021-05-19 ENCOUNTER — HOSPITAL ENCOUNTER (OUTPATIENT)
Dept: RADIOLOGY | Facility: MEDICAL CENTER | Age: 81
End: 2021-05-19
Attending: FAMILY MEDICINE
Payer: MEDICARE

## 2021-05-19 DIAGNOSIS — R31.9 HEMATURIA, UNSPECIFIED TYPE: ICD-10-CM

## 2021-05-19 PROCEDURE — 74176 CT ABD & PELVIS W/O CONTRAST: CPT | Mod: MG

## 2021-05-19 NOTE — PROGRESS NOTES
Spoke w/pt:    CT shows enlarged prostate, otherwise unremarkable    He is having back pain and hematuria    Urine cx pending.   Referred to urology    Will schedule CT renal colic for am to r/o kidney stone.

## 2021-05-19 NOTE — TELEPHONE ENCOUNTER
Pt informed CT negative for kidney stones      Pt has f/u on Friday w/urology for further evaluation of the hematuria

## 2021-05-20 LAB
BACTERIA UR CULT: NORMAL
SIGNIFICANT IND 70042: NORMAL
SITE SITE: NORMAL
SOURCE SOURCE: NORMAL

## 2021-05-21 ENCOUNTER — HOSPITAL ENCOUNTER (OUTPATIENT)
Facility: MEDICAL CENTER | Age: 81
End: 2021-05-21
Attending: PHYSICIAN ASSISTANT
Payer: MEDICARE

## 2021-05-21 PROCEDURE — 87086 URINE CULTURE/COLONY COUNT: CPT

## 2021-05-24 LAB
BACTERIA UR CULT: NORMAL
SIGNIFICANT IND 70042: NORMAL
SITE SITE: NORMAL
SOURCE SOURCE: NORMAL

## 2021-06-24 ENCOUNTER — HOSPITAL ENCOUNTER (OUTPATIENT)
Dept: RADIOLOGY | Facility: MEDICAL CENTER | Age: 81
End: 2021-06-24
Attending: FAMILY MEDICINE
Payer: MEDICARE

## 2021-06-24 DIAGNOSIS — M25.50 PAIN IN JOINT INVOLVING MULTIPLE SITES: ICD-10-CM

## 2021-06-24 PROCEDURE — 72202 X-RAY EXAM SI JOINTS 3/> VWS: CPT

## 2021-08-18 ENCOUNTER — PATIENT OUTREACH (OUTPATIENT)
Dept: HEALTH INFORMATION MANAGEMENT | Facility: OTHER | Age: 81
End: 2021-08-18

## 2021-08-18 NOTE — NON-PROVIDER
Comprehensive Health Assessment. Scheduled on 8/27/21 at 2:00pm with Cheri Baird. Verified HIPAA, no questions or concerns. He did not receive any notification. Scheduled by his wife.  Attempt #1

## 2021-08-19 NOTE — NON-PROVIDER
Comprehensive Health Assessment. Member's wife call to cancel their appointments. They will call us back if intereted.

## 2021-08-31 ENCOUNTER — HOSPITAL ENCOUNTER (OUTPATIENT)
Dept: LAB | Facility: MEDICAL CENTER | Age: 81
End: 2021-08-31
Attending: FAMILY MEDICINE
Payer: MEDICARE

## 2021-08-31 LAB
ALBUMIN SERPL BCP-MCNC: 4.5 G/DL (ref 3.2–4.9)
ALBUMIN/GLOB SERPL: 1.5 G/DL
ALP SERPL-CCNC: 111 U/L (ref 30–99)
ALT SERPL-CCNC: 21 U/L (ref 2–50)
ANION GAP SERPL CALC-SCNC: 10 MMOL/L (ref 7–16)
AST SERPL-CCNC: 11 U/L (ref 12–45)
BASOPHILS # BLD AUTO: 0.3 % (ref 0–1.8)
BASOPHILS # BLD: 0.02 K/UL (ref 0–0.12)
BILIRUB SERPL-MCNC: 0.9 MG/DL (ref 0.1–1.5)
BUN SERPL-MCNC: 15 MG/DL (ref 8–22)
CALCIUM SERPL-MCNC: 9.7 MG/DL (ref 8.5–10.5)
CHLORIDE SERPL-SCNC: 102 MMOL/L (ref 96–112)
CHOLEST SERPL-MCNC: 152 MG/DL (ref 100–199)
CO2 SERPL-SCNC: 26 MMOL/L (ref 20–33)
CREAT SERPL-MCNC: 1.03 MG/DL (ref 0.5–1.4)
EOSINOPHIL # BLD AUTO: 0.08 K/UL (ref 0–0.51)
EOSINOPHIL NFR BLD: 1.3 % (ref 0–6.9)
ERYTHROCYTE [DISTWIDTH] IN BLOOD BY AUTOMATED COUNT: 46.5 FL (ref 35.9–50)
FASTING STATUS PATIENT QL REPORTED: NORMAL
GLOBULIN SER CALC-MCNC: 3.1 G/DL (ref 1.9–3.5)
GLUCOSE SERPL-MCNC: 113 MG/DL (ref 65–99)
HCT VFR BLD AUTO: 42.9 % (ref 42–52)
HDLC SERPL-MCNC: 47 MG/DL
HGB BLD-MCNC: 13.9 G/DL (ref 14–18)
IMM GRANULOCYTES # BLD AUTO: 0.02 K/UL (ref 0–0.11)
IMM GRANULOCYTES NFR BLD AUTO: 0.3 % (ref 0–0.9)
LDLC SERPL CALC-MCNC: 77 MG/DL
LYMPHOCYTES # BLD AUTO: 1.18 K/UL (ref 1–4.8)
LYMPHOCYTES NFR BLD: 18.9 % (ref 22–41)
MCH RBC QN AUTO: 30.6 PG (ref 27–33)
MCHC RBC AUTO-ENTMCNC: 32.4 G/DL (ref 33.7–35.3)
MCV RBC AUTO: 94.5 FL (ref 81.4–97.8)
MONOCYTES # BLD AUTO: 0.56 K/UL (ref 0–0.85)
MONOCYTES NFR BLD AUTO: 9 % (ref 0–13.4)
NEUTROPHILS # BLD AUTO: 4.39 K/UL (ref 1.82–7.42)
NEUTROPHILS NFR BLD: 70.2 % (ref 44–72)
NRBC # BLD AUTO: 0 K/UL
NRBC BLD-RTO: 0 /100 WBC
PLATELET # BLD AUTO: 198 K/UL (ref 164–446)
PMV BLD AUTO: 9.8 FL (ref 9–12.9)
POTASSIUM SERPL-SCNC: 4.5 MMOL/L (ref 3.6–5.5)
PROT SERPL-MCNC: 7.6 G/DL (ref 6–8.2)
RBC # BLD AUTO: 4.54 M/UL (ref 4.7–6.1)
SODIUM SERPL-SCNC: 138 MMOL/L (ref 135–145)
TRIGL SERPL-MCNC: 138 MG/DL (ref 0–149)
TSH SERPL DL<=0.005 MIU/L-ACNC: 1.6 UIU/ML (ref 0.38–5.33)
URATE SERPL-MCNC: 7.7 MG/DL (ref 2.5–8.3)
WBC # BLD AUTO: 6.3 K/UL (ref 4.8–10.8)

## 2021-08-31 PROCEDURE — 80061 LIPID PANEL: CPT

## 2021-08-31 PROCEDURE — 36415 COLL VENOUS BLD VENIPUNCTURE: CPT

## 2021-08-31 PROCEDURE — 80053 COMPREHEN METABOLIC PANEL: CPT

## 2021-08-31 PROCEDURE — 84550 ASSAY OF BLOOD/URIC ACID: CPT

## 2021-08-31 PROCEDURE — 85025 COMPLETE CBC W/AUTO DIFF WBC: CPT

## 2021-08-31 PROCEDURE — 84443 ASSAY THYROID STIM HORMONE: CPT

## 2021-10-29 ENCOUNTER — TELEPHONE (OUTPATIENT)
Dept: HEALTH INFORMATION MANAGEMENT | Facility: OTHER | Age: 81
End: 2021-10-29

## 2021-10-29 NOTE — TELEPHONE ENCOUNTER
hipaa verified. Spouse call regarding pharmacy stating no coverage. Advised spouse there was a glitch and [facility] is working in getting this issue fixed asap. Advised spouse if member is still having issues to please call back first thing Monday morning. no further questions at this time. used [email or web address] and epic

## 2021-12-08 ENCOUNTER — HOSPITAL ENCOUNTER (OUTPATIENT)
Dept: LAB | Facility: MEDICAL CENTER | Age: 81
End: 2021-12-08
Attending: INTERNAL MEDICINE
Payer: MEDICARE

## 2021-12-08 ENCOUNTER — HOSPITAL ENCOUNTER (OUTPATIENT)
Dept: LAB | Facility: MEDICAL CENTER | Age: 81
End: 2021-12-08
Attending: FAMILY MEDICINE
Payer: MEDICARE

## 2021-12-08 LAB
ALBUMIN SERPL BCP-MCNC: 4.6 G/DL (ref 3.2–4.9)
ALBUMIN SERPL BCP-MCNC: 4.7 G/DL (ref 3.2–4.9)
ALBUMIN/GLOB SERPL: 2.1 G/DL
ALBUMIN/GLOB SERPL: 2.2 G/DL
ALP SERPL-CCNC: 145 U/L (ref 30–99)
ALP SERPL-CCNC: 146 U/L (ref 30–99)
ALT SERPL-CCNC: 54 U/L (ref 2–50)
ALT SERPL-CCNC: 58 U/L (ref 2–50)
ANION GAP SERPL CALC-SCNC: 11 MMOL/L (ref 7–16)
ANION GAP SERPL CALC-SCNC: 12 MMOL/L (ref 7–16)
AST SERPL-CCNC: 44 U/L (ref 12–45)
AST SERPL-CCNC: 45 U/L (ref 12–45)
BASOPHILS # BLD AUTO: 0.5 % (ref 0–1.8)
BASOPHILS # BLD: 0.02 K/UL (ref 0–0.12)
BILIRUB SERPL-MCNC: 0.4 MG/DL (ref 0.1–1.5)
BILIRUB SERPL-MCNC: 0.4 MG/DL (ref 0.1–1.5)
BUN SERPL-MCNC: 15 MG/DL (ref 8–22)
BUN SERPL-MCNC: 15 MG/DL (ref 8–22)
CALCIUM SERPL-MCNC: 9.2 MG/DL (ref 8.5–10.5)
CALCIUM SERPL-MCNC: 9.3 MG/DL (ref 8.5–10.5)
CHLORIDE SERPL-SCNC: 106 MMOL/L (ref 96–112)
CHLORIDE SERPL-SCNC: 106 MMOL/L (ref 96–112)
CHOLEST SERPL-MCNC: 109 MG/DL (ref 100–199)
CHOLEST SERPL-MCNC: 110 MG/DL (ref 100–199)
CO2 SERPL-SCNC: 24 MMOL/L (ref 20–33)
CO2 SERPL-SCNC: 24 MMOL/L (ref 20–33)
CREAT SERPL-MCNC: 0.92 MG/DL (ref 0.5–1.4)
CREAT SERPL-MCNC: 0.93 MG/DL (ref 0.5–1.4)
EOSINOPHIL # BLD AUTO: 0.14 K/UL (ref 0–0.51)
EOSINOPHIL NFR BLD: 3.3 % (ref 0–6.9)
ERYTHROCYTE [DISTWIDTH] IN BLOOD BY AUTOMATED COUNT: 43.7 FL (ref 35.9–50)
EST. AVERAGE GLUCOSE BLD GHB EST-MCNC: 108 MG/DL
EST. AVERAGE GLUCOSE BLD GHB EST-MCNC: 114 MG/DL
FASTING STATUS PATIENT QL REPORTED: NORMAL
GLOBULIN SER CALC-MCNC: 2.1 G/DL (ref 1.9–3.5)
GLOBULIN SER CALC-MCNC: 2.2 G/DL (ref 1.9–3.5)
GLUCOSE SERPL-MCNC: 106 MG/DL (ref 65–99)
GLUCOSE SERPL-MCNC: 107 MG/DL (ref 65–99)
HBA1C MFR BLD: 5.4 % (ref 4–5.6)
HBA1C MFR BLD: 5.6 % (ref 4–5.6)
HCT VFR BLD AUTO: 40.6 % (ref 42–52)
HDLC SERPL-MCNC: 38 MG/DL
HDLC SERPL-MCNC: 38 MG/DL
HGB BLD-MCNC: 13.5 G/DL (ref 14–18)
IMM GRANULOCYTES # BLD AUTO: 0.01 K/UL (ref 0–0.11)
IMM GRANULOCYTES NFR BLD AUTO: 0.2 % (ref 0–0.9)
LDLC SERPL CALC-MCNC: 56 MG/DL
LDLC SERPL CALC-MCNC: 57 MG/DL
LYMPHOCYTES # BLD AUTO: 1.11 K/UL (ref 1–4.8)
LYMPHOCYTES NFR BLD: 26.2 % (ref 22–41)
MCH RBC QN AUTO: 30.5 PG (ref 27–33)
MCHC RBC AUTO-ENTMCNC: 33.3 G/DL (ref 33.7–35.3)
MCV RBC AUTO: 91.6 FL (ref 81.4–97.8)
MONOCYTES # BLD AUTO: 0.35 K/UL (ref 0–0.85)
MONOCYTES NFR BLD AUTO: 8.3 % (ref 0–13.4)
NEUTROPHILS # BLD AUTO: 2.61 K/UL (ref 1.82–7.42)
NEUTROPHILS NFR BLD: 61.5 % (ref 44–72)
NRBC # BLD AUTO: 0 K/UL
NRBC BLD-RTO: 0 /100 WBC
PLATELET # BLD AUTO: 248 K/UL (ref 164–446)
PMV BLD AUTO: 9.9 FL (ref 9–12.9)
POTASSIUM SERPL-SCNC: 4.1 MMOL/L (ref 3.6–5.5)
POTASSIUM SERPL-SCNC: 4.1 MMOL/L (ref 3.6–5.5)
PROT SERPL-MCNC: 6.8 G/DL (ref 6–8.2)
PROT SERPL-MCNC: 6.8 G/DL (ref 6–8.2)
RBC # BLD AUTO: 4.43 M/UL (ref 4.7–6.1)
SODIUM SERPL-SCNC: 141 MMOL/L (ref 135–145)
SODIUM SERPL-SCNC: 142 MMOL/L (ref 135–145)
TRIGL SERPL-MCNC: 74 MG/DL (ref 0–149)
TRIGL SERPL-MCNC: 75 MG/DL (ref 0–149)
TSH SERPL DL<=0.005 MIU/L-ACNC: 1.69 UIU/ML (ref 0.38–5.33)
URATE SERPL-MCNC: 5.4 MG/DL (ref 2.5–8.3)
WBC # BLD AUTO: 4.2 K/UL (ref 4.8–10.8)

## 2021-12-08 PROCEDURE — 80061 LIPID PANEL: CPT | Mod: 91

## 2021-12-08 PROCEDURE — 83036 HEMOGLOBIN GLYCOSYLATED A1C: CPT | Mod: 91

## 2021-12-08 PROCEDURE — 84443 ASSAY THYROID STIM HORMONE: CPT

## 2021-12-08 PROCEDURE — 83036 HEMOGLOBIN GLYCOSYLATED A1C: CPT

## 2021-12-08 PROCEDURE — 36415 COLL VENOUS BLD VENIPUNCTURE: CPT

## 2021-12-08 PROCEDURE — 80053 COMPREHEN METABOLIC PANEL: CPT

## 2021-12-08 PROCEDURE — 84550 ASSAY OF BLOOD/URIC ACID: CPT

## 2021-12-08 PROCEDURE — 80053 COMPREHEN METABOLIC PANEL: CPT | Mod: 91

## 2021-12-08 PROCEDURE — 80061 LIPID PANEL: CPT

## 2021-12-08 PROCEDURE — 85025 COMPLETE CBC W/AUTO DIFF WBC: CPT

## 2022-01-20 ENCOUNTER — HOSPITAL ENCOUNTER (OUTPATIENT)
Dept: LAB | Facility: MEDICAL CENTER | Age: 82
End: 2022-01-20
Attending: FAMILY MEDICINE
Payer: MEDICARE

## 2022-01-20 LAB
ERYTHROCYTE [SEDIMENTATION RATE] IN BLOOD BY WESTERGREN METHOD: 17 MM/HOUR (ref 0–20)
FERRITIN SERPL-MCNC: 274 NG/ML (ref 22–322)
HAV IGM SERPL QL IA: NORMAL
HBV CORE IGM SER QL: NORMAL
HBV SURFACE AG SER QL: NORMAL
HCV AB SER QL: NORMAL
IRON SATN MFR SERPL: 20 % (ref 15–55)
IRON SERPL-MCNC: 69 UG/DL (ref 50–180)
TIBC SERPL-MCNC: 340 UG/DL (ref 250–450)
TRANSFERRIN SERPL-MCNC: 288 MG/DL (ref 200–370)
UIBC SERPL-MCNC: 271 UG/DL (ref 110–370)

## 2022-01-20 PROCEDURE — 85652 RBC SED RATE AUTOMATED: CPT

## 2022-01-20 PROCEDURE — 86015 ACTIN ANTIBODY EACH: CPT

## 2022-01-20 PROCEDURE — 36415 COLL VENOUS BLD VENIPUNCTURE: CPT

## 2022-01-20 PROCEDURE — 84075 ASSAY ALKALINE PHOSPHATASE: CPT

## 2022-01-20 PROCEDURE — 82390 ASSAY OF CERULOPLASMIN: CPT

## 2022-01-20 PROCEDURE — 82103 ALPHA-1-ANTITRYPSIN TOTAL: CPT

## 2022-01-20 PROCEDURE — 86376 MICROSOMAL ANTIBODY EACH: CPT

## 2022-01-20 PROCEDURE — 83540 ASSAY OF IRON: CPT

## 2022-01-20 PROCEDURE — 84080 ASSAY ALKALINE PHOSPHATASES: CPT

## 2022-01-20 PROCEDURE — 84466 ASSAY OF TRANSFERRIN: CPT

## 2022-01-20 PROCEDURE — 83550 IRON BINDING TEST: CPT

## 2022-01-20 PROCEDURE — 86038 ANTINUCLEAR ANTIBODIES: CPT

## 2022-01-20 PROCEDURE — 80074 ACUTE HEPATITIS PANEL: CPT

## 2022-01-20 PROCEDURE — 82728 ASSAY OF FERRITIN: CPT

## 2022-01-20 PROCEDURE — 86381 MITOCHONDRIAL ANTIBODY EACH: CPT

## 2022-01-21 LAB
LKM AB TITR SER IF: NORMAL {TITER}
NUCLEAR IGG SER QL IA: NORMAL

## 2022-01-22 LAB
A1AT SERPL-MCNC: 176 MG/DL (ref 90–200)
ALP BONE SERPL-CCNC: 38 U/L (ref 0–55)
ALP ISOS SERPL HS-CCNC: 0 U/L
ALP LIVER SERPL-CCNC: 98 U/L (ref 0–94)
ALP SERPL-CCNC: 136 U/L (ref 40–120)
CERULOPLASMIN SERPL-MCNC: 25 MG/DL (ref 17–54)
MITOCHONDRIA M2 IGG SER-ACNC: 2.9 UNITS (ref 0–24.9)
SMA IGG SER-ACNC: 5 UNITS (ref 0–19)

## 2022-01-25 ENCOUNTER — APPOINTMENT (OUTPATIENT)
Dept: RADIOLOGY | Facility: MEDICAL CENTER | Age: 82
End: 2022-01-25
Attending: FAMILY MEDICINE
Payer: MEDICARE

## 2022-01-28 ENCOUNTER — HOSPITAL ENCOUNTER (OUTPATIENT)
Dept: RADIOLOGY | Facility: MEDICAL CENTER | Age: 82
End: 2022-01-28
Attending: FAMILY MEDICINE
Payer: MEDICARE

## 2022-01-28 DIAGNOSIS — R74.01 ELEVATED LIVER TRANSAMINASE LEVEL: ICD-10-CM

## 2022-01-28 PROCEDURE — 76700 US EXAM ABDOM COMPLETE: CPT

## 2022-02-10 ENCOUNTER — PATIENT MESSAGE (OUTPATIENT)
Dept: HEALTH INFORMATION MANAGEMENT | Facility: OTHER | Age: 82
End: 2022-02-10
Payer: MEDICARE

## 2022-02-11 ENCOUNTER — HOSPITAL ENCOUNTER (OUTPATIENT)
Dept: LAB | Facility: MEDICAL CENTER | Age: 82
End: 2022-02-11
Attending: PHYSICIAN ASSISTANT
Payer: MEDICARE

## 2022-02-11 ENCOUNTER — HOSPITAL ENCOUNTER (OUTPATIENT)
Dept: LAB | Facility: MEDICAL CENTER | Age: 82
End: 2022-02-11
Attending: INTERNAL MEDICINE
Payer: MEDICARE

## 2022-02-11 LAB
FOLATE SERPL-MCNC: 13 NG/ML
PSA SERPL-MCNC: 9.28 NG/ML (ref 0–4)
TSH SERPL DL<=0.005 MIU/L-ACNC: 1.26 UIU/ML (ref 0.38–5.33)
VIT B12 SERPL-MCNC: 278 PG/ML (ref 211–911)

## 2022-02-11 PROCEDURE — 82607 VITAMIN B-12: CPT

## 2022-02-11 PROCEDURE — 84443 ASSAY THYROID STIM HORMONE: CPT

## 2022-02-11 PROCEDURE — 82746 ASSAY OF FOLIC ACID SERUM: CPT

## 2022-02-11 PROCEDURE — 82652 VIT D 1 25-DIHYDROXY: CPT

## 2022-02-11 PROCEDURE — 36415 COLL VENOUS BLD VENIPUNCTURE: CPT

## 2022-02-11 PROCEDURE — 84153 ASSAY OF PSA TOTAL: CPT

## 2022-02-12 LAB — 1,25(OH)2D3 SERPL-MCNC: 51.1 PG/ML (ref 19.9–79.3)

## 2022-02-24 ENCOUNTER — HOSPITAL ENCOUNTER (OUTPATIENT)
Dept: RADIOLOGY | Facility: MEDICAL CENTER | Age: 82
End: 2022-02-24
Attending: INTERNAL MEDICINE
Payer: MEDICARE

## 2022-02-24 DIAGNOSIS — M79.2 NERVE PAIN: ICD-10-CM

## 2022-02-24 PROCEDURE — 72050 X-RAY EXAM NECK SPINE 4/5VWS: CPT

## 2022-04-29 ENCOUNTER — HOSPITAL ENCOUNTER (OUTPATIENT)
Dept: CARDIOLOGY | Facility: MEDICAL CENTER | Age: 82
End: 2022-04-29
Attending: INTERNAL MEDICINE
Payer: MEDICARE

## 2022-04-29 DIAGNOSIS — I25.10 ATHEROSCLEROSIS OF NATIVE CORONARY ARTERY, UNSPECIFIED WHETHER ANGINA PRESENT, UNSPECIFIED WHETHER NATIVE OR TRANSPLANTED HEART: ICD-10-CM

## 2022-04-29 DIAGNOSIS — I48.0 PAROXYSMAL ATRIAL FIBRILLATION (HCC): ICD-10-CM

## 2022-04-29 DIAGNOSIS — I10 ESSENTIAL HYPERTENSION, MALIGNANT: ICD-10-CM

## 2022-04-29 LAB
LV EJECT FRACT  99904: 65
LV EJECT FRACT MOD 2C 99903: 55.48
LV EJECT FRACT MOD 4C 99902: 68.1
LV EJECT FRACT MOD BP 99901: 61.25

## 2022-04-29 PROCEDURE — 93306 TTE W/DOPPLER COMPLETE: CPT | Mod: 26 | Performed by: INTERNAL MEDICINE

## 2022-04-29 PROCEDURE — 93306 TTE W/DOPPLER COMPLETE: CPT

## 2022-05-31 ENCOUNTER — OFFICE VISIT (OUTPATIENT)
Dept: NEUROLOGY | Facility: MEDICAL CENTER | Age: 82
End: 2022-05-31
Attending: PSYCHIATRY & NEUROLOGY
Payer: MEDICARE

## 2022-05-31 VITALS
HEIGHT: 66 IN | WEIGHT: 150 LBS | OXYGEN SATURATION: 96 % | HEART RATE: 80 BPM | SYSTOLIC BLOOD PRESSURE: 132 MMHG | DIASTOLIC BLOOD PRESSURE: 84 MMHG | TEMPERATURE: 97.7 F | BODY MASS INDEX: 24.11 KG/M2 | RESPIRATION RATE: 16 BRPM

## 2022-05-31 DIAGNOSIS — G56.03 BILATERAL CARPAL TUNNEL SYNDROME: Primary | ICD-10-CM

## 2022-05-31 PROCEDURE — 99203 OFFICE O/P NEW LOW 30 MIN: CPT | Performed by: PSYCHIATRY & NEUROLOGY

## 2022-05-31 PROCEDURE — 99212 OFFICE O/P EST SF 10 MIN: CPT | Performed by: PSYCHIATRY & NEUROLOGY

## 2022-05-31 ASSESSMENT — FIBROSIS 4 INDEX: FIB4 SCORE: 2

## 2022-05-31 ASSESSMENT — PAIN SCALES - GENERAL: PAINLEVEL: 2=MINIMAL-SLIGHT

## 2022-05-31 ASSESSMENT — PATIENT HEALTH QUESTIONNAIRE - PHQ9: CLINICAL INTERPRETATION OF PHQ2 SCORE: 0

## 2022-05-31 NOTE — PROGRESS NOTES
No chief complaint on file.      History of present illness:  Sandip Zhao 81 y.o. male with bilateral hand tingling, referred by his primary physician.   During thanksgiving, a glass fell from his hand. He has numbness of his both hands affecting the first 3 fingers of each hand.   He has worked with physical therapy.  He wakes up at night with stiff hands and he has trouble making a fist due to severe burning in his hands from the tip of his 2nd to 4th fingers.   Hobbies include writing and gardening.   This started intermittently a few years but has worsened since Thanksgiving.     Past medical history:   Past Medical History:   Diagnosis Date   • Environmental allergies        Past surgical history:   Past Surgical History:   Procedure Laterality Date   • ABDOMINAL EXPLORATION     • APPENDECTOMY     • KNEE ARTHROSCOPY         Family history:   Family History   Problem Relation Age of Onset   • Hypertension Father    • Heart Disease Father         mitral valve replacement       Social history:   Social History     Socioeconomic History   • Marital status:      Spouse name: Not on file   • Number of children: Not on file   • Years of education: Not on file   • Highest education level: Not on file   Occupational History   • Not on file   Tobacco Use   • Smoking status: Never Smoker   • Smokeless tobacco: Never Used   Vaping Use   • Vaping Use: Never used   Substance and Sexual Activity   • Alcohol use: Not Currently   • Drug use: Never   • Sexual activity: Not on file     Comment: food and beverage management   Other Topics Concern   • Not on file   Social History Narrative   • Not on file     Social Determinants of Health     Financial Resource Strain: Not on file   Food Insecurity: Not on file   Transportation Needs: Not on file   Physical Activity: Not on file   Stress: Not on file   Social Connections: Not on file   Intimate Partner Violence: Not on file   Housing Stability: Not on file  "      Current medications:   Current Outpatient Medications   Medication   • methylPREDNISolone (MEDROL DOSEPAK) 4 MG Tablet Therapy Pack   • Acetaminophen (TYLENOL ARTHRITIS PAIN PO)   • rosuvastatin (CRESTOR) 20 MG Tab   • apixaban (ELIQUIS) 5mg Tab   • diltiazem (CARDIZEM) 60 MG Tab   • ezetimibe (ZETIA) 10 MG Tab   • albuterol 108 (90 BASE) MCG/ACT Aero Soln inhalation aerosol   • pravastatin (PRAVACHOL) 80 MG tablet   • Ascorbic Acid (VITAMIN C PO)   • oseltamivir (TAMIFLU) 75 MG Cap   • LISINOPRIL PO   • azithromycin (ZITHROMAX) 250 MG TABS   • Ibuprofen 200 MG Cap     No current facility-administered medications for this visit.       Medication Allergy:  No Known Allergies    Physical examination:   Vitals:    05/31/22 1241   BP: 132/84   BP Location: Right arm   Patient Position: Sitting   BP Cuff Size: Adult   Pulse: 80   Resp: 16   Temp: 36.5 °C (97.7 °F)   TempSrc: Temporal   SpO2: 96%   Weight: 68 kg (150 lb)   Height: 1.676 m (5' 6\")     Neurological Exam  Mental Status  Awake and alert. Speech is normal. Language is fluent with no aphasia.    Motor                                               Right                     Left   Shoulder abduction               5                          5   Shoulder adduction               5                          5  Elbow flexion                         5                          5  Elbow extension                    5                          5  Wrist flexion                           5                          5  Wrist extension                      5                          5  Finger flexion                         5                          5  Thumb flexion                        5                          5    Sensory  Pinprick abnormality: There is diminished sensation over the second finger to pinprick. .     Reflexes                                            Right                      Left  Brachioradialis                   2+                          Biceps "                                2+                          Triceps                               2+                              Labs:  I reviewed the following labs personally:  none    Imaging:   None     ASSESSMENT AND PLAN:  Problem List Items Addressed This Visit     Bilateral carpal tunnel syndrome - Primary    Relevant Orders    Referral to Physical Medicine Rehab          1. 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  His examination does not reveal weakness of his fingers, and there is mild numbness to pinprick over the second fingers distal to the wrist bilaterally.  His history is most consistent with bilateral carpal tunnel syndrome.  There are no proximal motor or sensory symptoms suggest this is coming from the cervical spine.  I have counseled him on the diagnosis.  I have referred him to physical medicine for further treatment given that he is not demonstrating marked improvement with physical therapy.    FOLLOW-UP:   Return if symptoms worsen or fail to improve.    Total time spent for the day for this patient unrelated to procedure time is: 25 minutes. I spent 20 minutes in face to face time and I spent 1 minutes pre-charting and 4 minutes in post-visit documentation.      Dr. Ayan Taylor D.O.  [facility] Neurology

## 2022-06-29 NOTE — PROGRESS NOTES
New Patient Note    Interventional Pain and Spine  Physiatry (Physical Medicine and Rehabilitation)     Patient Name: Sandip Zhao  : 1940  Date of Service: 2022  PCP: Meagan Mcneil M.D.  Referring Provider: Ben Taylor D.O.    Chief Complaint:   Chief Complaint   Patient presents with   • Establish Care     BL Carpal tunnel       HPI  HISTORY (2022):  Presents today with wife Kimberley who helps contribute to history  Sandip Zhao is a 81 y.o. RHD male who presents today with pain, numbness, and tingling in bilateral hands, mostly 1st-3rd fingers. These symptoms initially began 2 years ago. Over Thanksgiving he noticed weakness of his hand when he dropped a wine glass a few times. Endorses weakness of his right hand , and loss of muscle mass in his arms. Over time he has also developed burning pain in his hands which wakes him up at night and sometimes impairs his ability to fall asleep.    Pain severity rates 8/10.  He wears wrist splints throughout the day as tolerated.  Pain does not change with activity.  Pain interferes somewhat with ADLs.  He otherwise denies numbness or tingling or radiating pain from his neck down to his hands.    Retired about 2 years ago and wife reports he spends about 12 hours per day writing and working on a computer.  Able to write while wearing a wrist brace.    The patient has done physical therapy for suspected cervical radiculopathy. They discharged him after about 1 month due to no improvement in symptoms.     Patient has tried the following medications with varied success (current meds in bold):   OTC topical analgesic  Avoids ASA-containing products    Therapeutic modalities and interventional therapies to date include:  -no injections. States he has been told by his PCP that he needs obtain clearance for steroid injections in the future given his history of MI    Medical history includes BPH, MI on Eliquis     Psychological  testing for pain as depression and pain commonly coexist and need to both be treated.     Opioid Risk Score: 0    Interpretation of Opioid Risk Score   Score 0-3 = Low risk of abuse. Do UDS at least once per year.  Score 4-7 = Moderate risk of abuse. Do UDS 1-4 times per year.  Score 8+ = High risk of abuse. Refer to specialist.    PHQ  Depression Screen (PHQ-2/PHQ-9) 5/31/2022   PHQ-2 Total Score 0       Interpretation of PHQ-9 Total Score   Score Severity   1-4 No Depression   5-9 Mild Depression   10-14 Moderate Depression   15-19 Moderately Severe Depression   20-27 Severe Depression      Medical records review:  I reviewed the note from the referring provider Ben Taylor D.O. including the note dated 5/31/2022    ROS:   Red Flags ROS:   Fever, Chills, Sweats: Denies  Involuntary Weight Loss: Denies  Bladder Incontinence: Denies  Bowel Incontinence: denies  Saddle Anesthesia: Denies    All other systems reviewed and negative.     PMHx:   Past Medical History:   Diagnosis Date   • Environmental allergies        PSHx:   Past Surgical History:   Procedure Laterality Date   • ABDOMINAL EXPLORATION     • APPENDECTOMY     • KNEE ARTHROSCOPY         Family Hx:   Family History   Problem Relation Age of Onset   • Hypertension Father    • Heart Disease Father         mitral valve replacement       Social Hx:  Social History     Socioeconomic History   • Marital status:      Spouse name: Not on file   • Number of children: Not on file   • Years of education: Not on file   • Highest education level: Not on file   Occupational History   • Not on file   Tobacco Use   • Smoking status: Never Smoker   • Smokeless tobacco: Never Used   Vaping Use   • Vaping Use: Never used   Substance and Sexual Activity   • Alcohol use: Not Currently   • Drug use: Never   • Sexual activity: Not on file     Comment: food and beverage management   Other Topics Concern   • Not on file   Social History Narrative   • Not on file      Social Determinants of Health     Financial Resource Strain: Not on file   Food Insecurity: Not on file   Transportation Needs: Not on file   Physical Activity: Not on file   Stress: Not on file   Social Connections: Not on file   Intimate Partner Violence: Not on file   Housing Stability: Not on file       Allergies:  No Known Allergies    Medications: reviewed on epic.   Outpatient Medications Marked as Taking for the 7/1/22 encounter (Office Visit) with Donna Tobias M.D.   Medication Sig Dispense Refill   • Acetaminophen (TYLENOL ARTHRITIS PAIN PO) Take  by mouth.     • rosuvastatin (CRESTOR) 20 MG Tab rosuvastatin calcium 20 mg tabs     • apixaban (ELIQUIS) 5mg Tab Take 5 mg by mouth 2 Times a Day.     • diltiazem (CARDIZEM) 60 MG Tab Take 60 mg by mouth 4 times a day.     • ezetimibe (ZETIA) 10 MG Tab Take 10 mg by mouth every day.          Current Outpatient Medications on File Prior to Visit   Medication Sig Dispense Refill   • Acetaminophen (TYLENOL ARTHRITIS PAIN PO) Take  by mouth.     • rosuvastatin (CRESTOR) 20 MG Tab rosuvastatin calcium 20 mg tabs     • apixaban (ELIQUIS) 5mg Tab Take 5 mg by mouth 2 Times a Day.     • diltiazem (CARDIZEM) 60 MG Tab Take 60 mg by mouth 4 times a day.     • ezetimibe (ZETIA) 10 MG Tab Take 10 mg by mouth every day.     • methylPREDNISolone (MEDROL DOSEPAK) 4 MG Tablet Therapy Pack Follow schedule on package instructions. 21 Tablet 0   • pravastatin (PRAVACHOL) 80 MG tablet Take 80 mg by mouth every evening. (Patient not taking: No sig reported)     • Ascorbic Acid (VITAMIN C PO) Take  by mouth. (Patient not taking: No sig reported)     • oseltamivir (TAMIFLU) 75 MG Cap Take 1 Cap by mouth 2 times a day. (Patient not taking: No sig reported) 10 Cap 0   • albuterol 108 (90 BASE) MCG/ACT Aero Soln inhalation aerosol Inhale 2 Puffs by mouth every 6 hours as needed for Shortness of Breath. (Patient not taking: Reported on 7/1/2022) 8.5 g 0   • LISINOPRIL PO Take  by  "mouth. (Patient not taking: No sig reported)     • azithromycin (ZITHROMAX) 250 MG TABS 2 tabs by mouth day 1, 1 tab by mouth days 2-5 (Patient not taking: No sig reported) 6 Tab 0   • Ibuprofen 200 MG Cap Take  by mouth. (Patient not taking: No sig reported)       No current facility-administered medications on file prior to visit.         EXAMINATION     Physical Exam:   /54 (BP Location: Right arm, Patient Position: Sitting)   Pulse (!) 57   Temp 36.2 °C (97.2 °F) (Temporal)   Resp 14   Ht 1.651 m (5' 5\")   Wt 70.7 kg (155 lb 13.8 oz)   SpO2 95%     Constitutional:   Body Habitus: Body mass index is 25.94 kg/m².  Cooperation: Fully cooperates with exam  Appearance: Well-groomed, well-nourished.    Eyes: No scleral icterus to suggest severe liver disease, no proptosis to suggest severe hyperthyroidism    ENT -no obvious auditory deficits, no noticeable facial droop     Skin -no rashes or lesions noted     Respiratory-  breathing comfortably on room air, no audible wheezing    Cardiovascular-distal extremities warm and well perfused.  No lower extremity edema is noted.     Gastrointestinal - no obvious abdominal masses, non-distended    Psychiatric- alert and oriented ×3. Normal affect.     Gait - steady gait    Musculoskeletal and Neuro -     Cervical spine   Inspection: No deformities of the skin over the cervical spine. No rashes or lesions.    full active range of motion in all directions    Spurling's sign  negative bilaterally  Cervical facet loading maneuver  negative bilaterally    No signs of muscular atrophy in bilateral upper extremities     Key points for the international standards for neurological classification of spinal cord injury (ISNCSCI) to light touch.     Dermatome R L   C4 2 2   C5 2 2   C6 2 2   C7 2 2   C8 2 2   T1 2 2   T2 2 2       Motor Exam Upper Extremities   ? Myotome R L   Shoulder abduction C5 5 5   Elbow flexion C5 5 5   Wrist extension C6 5 5   Elbow extension C7 5 5 "   Finger flexion C8 5 5   Finger abduction T1 5 5     Reflexes  ?  R L   Biceps  2+ 2+   Brachioradialis  2+ 2+     Aguillon's sign negative bilaterally     Bilateral hands:   Inspection: No swelling, deformities, or rashes. Symmetric appearing thenar and hyperthenar regions bilaterally.  Palpation: no significant tenderness to palpation throughout the bilateral hands  Range of motion is slightly limited in bilateral wrists with flexion and extension    Special tests:  Tinel's at the wrist over the median nerve positive bilaterally  Carpal tunnel compression: positive bilaterally  Phalen's test: positive bilaterally  Tinel's at the cubital tunnel: negative bilaterally    MEDICAL DECISION MAKING    Medical records review: see under HPI section.     DATA    Labs: Personally reviewed at today's visit:     Lab Results   Component Value Date/Time    SODIUM 141 12/08/2021 08:31 AM    POTASSIUM 4.1 12/08/2021 08:31 AM    CHLORIDE 106 12/08/2021 08:31 AM    CO2 24 12/08/2021 08:31 AM    ANION 11.0 12/08/2021 08:31 AM    GLUCOSE 106 (H) 12/08/2021 08:31 AM    BUN 15 12/08/2021 08:31 AM    CREATININE 0.92 12/08/2021 08:31 AM    CALCIUM 9.2 12/08/2021 08:31 AM    ASTSGOT 45 12/08/2021 08:31 AM    ALTSGPT 54 (H) 12/08/2021 08:31 AM    TBILIRUBIN 0.4 12/08/2021 08:31 AM    ALBUMIN 4.6 12/08/2021 08:31 AM    TOTPROTEIN 6.8 12/08/2021 08:31 AM    GLOBULIN 2.2 12/08/2021 08:31 AM    AGRATIO 2.1 12/08/2021 08:31 AM       No results found for: PROTHROMBTM, INR     Lab Results   Component Value Date/Time    WBC 4.2 (L) 12/08/2021 08:30 AM    RBC 4.43 (L) 12/08/2021 08:30 AM    HEMOGLOBIN 13.5 (L) 12/08/2021 08:30 AM    HEMATOCRIT 40.6 (L) 12/08/2021 08:30 AM    MCV 91.6 12/08/2021 08:30 AM    MCH 30.5 12/08/2021 08:30 AM    MCHC 33.3 (L) 12/08/2021 08:30 AM    MPV 9.9 12/08/2021 08:30 AM    NEUTSPOLYS 61.50 12/08/2021 08:30 AM    LYMPHOCYTES 26.20 12/08/2021 08:30 AM    MONOCYTES 8.30 12/08/2021 08:30 AM    EOSINOPHILS 3.30 12/08/2021  08:30 AM    BASOPHILS 0.50 2021 08:30 AM        Lab Results   Component Value Date/Time    HBA1C 5.6 2021 08:31 AM        Imaging:   I personally reviewed following images, these are my reads  X-ray cervical spine 2022  Anterior body osteophytes at C3, C4, C5, C6, C7.  Facet arthropathy at C3-4, C4-5, C5-6, C6-7. See formal radiology report for further details.      IMAGING radiology reads. I reviewed the following radiology reads    Results for orders placed during the hospital encounter of 22    DX-CERVICAL SPINE-4+ VIEWS    Impression  Moderate cervical spondylosis with multilevel degenerative anterolisthesis, minimal motion                 Diagnosis  Visit Diagnoses     ICD-10-CM   1. Carpal tunnel syndrome on both sides  G56.03   2. Numbness and tingling in both hands  R20.0    R20.2   3. Hand weakness  R29.898         ASSESSMENT AND PLAN:  Sandip Zhao ( 1940) is a RHD male with history of BPH, MI 2015 on Eliquis who presents with 2 years of gradually worsening numbness, tingling, pain in bilateral hands and weakness with bilateral handgrip    Exam today notable for positive Tinel's at carpal tunnel bilaterally, positive Phalen's bilaterally, positive carpal compression test bilaterally reproducing symptoms.  Negative Spurling's     Sandip was seen today for establish care.    Diagnoses and all orders for this visit:    Carpal tunnel syndrome on both sides  -     Referral to Hand Therapy  -     Referral to Pain Clinic    Numbness and tingling in both hands  -     Referral to Hand Therapy    Hand weakness  -     Referral to Hand Therapy          PLAN  Physical Therapy: I ordered hand therapy to focus on strengthening and stretching as well as a home exercise program.     Diagnostic workup: Personally reviewed at today's visit: X-ray cervical spine 2022    Medications:   - given the inflammatory component of pain, I have recommended OTC Voltaren gel    Interventions:  Bilateral median nerve steroid injections with ultrasound guidance. The risks, benefits, and alternatives to this procedure were discussed and the patient wishes to proceed with the procedure. Risks include but are not limited to damage to surrounding structures, infection, bleeding, worsening of pain which can be permanent. Benefits include pain relief and improved function. Alternatives include not doing the procedure.    -Patient reports that he needs clearance from his PCP for steroid injections given his history of MI.  I will have my office staff reach out to his PCP Dr. Meagan Mcneil for clearance.    Other  -Counseled that he should wear wrist splints at night and as tolerated during the day    Follow-up: For injection above    Orders Placed This Encounter   • Referral to Hand Therapy   • Referral to Pain Clinic       Donna Tobias MD  Interventional Pain and Spine  Physical Medicine and Rehabilitation  [facility]    Ben Noel D.O.     The above note documents my personal evaluation of this patient. In addition, I have reviewed and confirmed with the patient and MA the supportive information documented in today's Patient Health Questionnaire and Office Note.     Total time: 49 minutes. I spent greater than 50% of the time for patient care and coordination on this date, including face-to-face time with the patient as per assessment and plan above.     Please note that this dictation was created using voice recognition software. I have made every reasonable attempt to correct obvious errors, but I expect that there are errors of grammar and possibly content that I did not discover before finalizing the note.

## 2022-07-01 ENCOUNTER — OFFICE VISIT (OUTPATIENT)
Dept: PHYSICAL MEDICINE AND REHAB | Facility: MEDICAL CENTER | Age: 82
End: 2022-07-01
Payer: MEDICARE

## 2022-07-01 ENCOUNTER — TELEPHONE (OUTPATIENT)
Dept: PHYSICAL MEDICINE AND REHAB | Facility: MEDICAL CENTER | Age: 82
End: 2022-07-01

## 2022-07-01 VITALS
DIASTOLIC BLOOD PRESSURE: 54 MMHG | HEIGHT: 65 IN | HEART RATE: 57 BPM | BODY MASS INDEX: 25.97 KG/M2 | WEIGHT: 155.87 LBS | TEMPERATURE: 97.2 F | OXYGEN SATURATION: 95 % | SYSTOLIC BLOOD PRESSURE: 106 MMHG | RESPIRATION RATE: 14 BRPM

## 2022-07-01 DIAGNOSIS — R20.0 NUMBNESS AND TINGLING IN BOTH HANDS: ICD-10-CM

## 2022-07-01 DIAGNOSIS — R20.2 NUMBNESS AND TINGLING IN BOTH HANDS: ICD-10-CM

## 2022-07-01 DIAGNOSIS — G56.03 CARPAL TUNNEL SYNDROME ON BOTH SIDES: Primary | ICD-10-CM

## 2022-07-01 DIAGNOSIS — R29.898 HAND WEAKNESS: ICD-10-CM

## 2022-07-01 PROCEDURE — 99204 OFFICE O/P NEW MOD 45 MIN: CPT | Performed by: STUDENT IN AN ORGANIZED HEALTH CARE EDUCATION/TRAINING PROGRAM

## 2022-07-01 ASSESSMENT — FIBROSIS 4 INDEX: FIB4 SCORE: 2

## 2022-07-01 ASSESSMENT — PAIN SCALES - GENERAL: PAINLEVEL: 8=MODERATE-SEVERE PAIN

## 2022-07-01 NOTE — TELEPHONE ENCOUNTER
Sent clearance request (via epic and fax) to Dr. Meagan Mcneil to clear pt for carpal tunnel injections with Dr. Tobias. See pt letters if needed.

## 2022-07-01 NOTE — TELEPHONE ENCOUNTER
----- Message from Donna Tobias M.D. sent at 7/1/2022 10:50 AM PDT -----  Regarding: clearance for steroid injections  Please contact pt's PCP office Meagan Mcneil MD (I don't think they're within the [email or web address] system) to get clearance to perform bilateral carpal tunnel steroid injections. I would use a total of 8mg dexamethasone. The patient and his wife have expressed that clearance needs to be obtained from their PCP for steroid injections given his history of MI.    [email or web address]

## 2022-07-07 ENCOUNTER — TELEPHONE (OUTPATIENT)
Dept: HEALTH INFORMATION MANAGEMENT | Facility: OTHER | Age: 82
End: 2022-07-07
Payer: MEDICARE

## 2022-07-07 NOTE — TELEPHONE ENCOUNTER
Outcome: Mbr wife declined for mbr stating that he sees his PCP and Cardiologist regularly and they do not need to opt into this.     Please transfer to Patient Outreach Team at [phone] when patient returns call.    Attempt # 2

## 2022-07-14 ENCOUNTER — OCCUPATIONAL THERAPY (OUTPATIENT)
Dept: OCCUPATIONAL THERAPY | Facility: REHABILITATION | Age: 82
End: 2022-07-14
Attending: STUDENT IN AN ORGANIZED HEALTH CARE EDUCATION/TRAINING PROGRAM
Payer: MEDICARE

## 2022-07-14 DIAGNOSIS — R20.2 NUMBNESS AND TINGLING IN BOTH HANDS: ICD-10-CM

## 2022-07-14 DIAGNOSIS — R20.0 NUMBNESS AND TINGLING IN BOTH HANDS: ICD-10-CM

## 2022-07-14 DIAGNOSIS — G56.03 CARPAL TUNNEL SYNDROME ON BOTH SIDES: ICD-10-CM

## 2022-07-14 DIAGNOSIS — R29.898 HAND WEAKNESS: ICD-10-CM

## 2022-07-14 PROCEDURE — 97110 THERAPEUTIC EXERCISES: CPT

## 2022-07-14 PROCEDURE — 97165 OT EVAL LOW COMPLEX 30 MIN: CPT

## 2022-07-14 SDOH — ECONOMIC STABILITY: GENERAL: QUALITY OF LIFE: GOOD

## 2022-07-14 ASSESSMENT — ENCOUNTER SYMPTOMS
PAIN SCALE AT HIGHEST: 9
QUALITY: BURNING
PAIN SCALE: 9
PAIN TIMING: ALL DAY
ALLEVIATING FACTORS: REST
EXACERBATED BY: ACTIVITY
ALLEVIATING FACTORS: ORTHOTICS
PAIN SCALE AT LOWEST: 9

## 2022-07-14 NOTE — OP THERAPY EVALUATION
Outpatient Occupational Therapy  HAND THERAPY INITIAL EVALUATION    [facility] Occupational Therapy [address].  [address]  [place] [address]  Phone:  [phone]  Fax:  [phone]    Date of Evaluation: 07/14/2022    Patient: Sandip Zhao  YOB: 1940  MRN: [id number]     Referring Provider: Donna Tobias M.D.  [address]  [address]  [place] [address]   Referring Diagnosis Anesthesia of skin [R20.0];Paresthesia of skin [R20.2];Other symptoms and signs involving the musculoskeletal system [R29.898];Carpal tunnel syndrome, bilateral upper limbs [G56.03]     Time Calculation    Start time: 0100  Stop time: 0145 Time Calculation (min): 45 minutes             Chief Complaint: Hand Weakness, Self Care Duties, and Hand Problem    Visit Diagnoses     ICD-10-CM   1. Carpal tunnel syndrome on both sides  G56.03   2. Numbness and tingling in both hands  R20.0    R20.2   3. Hand weakness  R29.898       Subjective:   History of Present Illness:     Date of onset:  7/14/2020    Mechanism of injury:  As per MD note:  HPI  HISTORY (7/1/2022):  Presents today with wife Kimberley who helps contribute to history  Sandip Zhao is a 81 y.o. RHD male who presents today with pain, numbness, and tingling in bilateral hands, mostly 1st-3rd fingers. These symptoms initially began 2 years ago. Over Thanksgiving he noticed weakness of his hand when he dropped a wine glass a few times. Endorses weakness of his right hand , and loss of muscle mass in his arms. Over time he has also developed burning pain in his hands which wakes him up at night and sometimes impairs his ability to fall asleep.     Pain severity rates 8/10.  He wears wrist splints throughout the day as tolerated.  Pain does not change with activity.  Pain interferes somewhat with ADLs.  He otherwise denies numbness or tingling or radiating pain from his neck down to his hands.     Retired about 2 years ago and wife reports  he spends about 12 hours per day writing and working on a computer.  Able to write while wearing a wrist brace.     The patient has done physical therapy for suspected cervical radiculopathy. They discharged him after about 1 month due to no improvement in symptoms.   Carpal tunnel syndrome on both sides  -     Referral to Hand Therapy  -     Referral to Pain Clinic     Numbness and tingling in both hands  -     Referral to Hand Therapy     Hand weakness  -     Referral to Hand Therapy   PMHx of moderate to severe OA in both hands    Quality of life:  Good  Prior level of function:  Independent with all ADLs  Headaches:  no headaches  Ear problems: none  Sleep disturbance:  Non-restful sleep  Pain:     Current pain ratin    At best pain ratin    At worst pain ratin    Location:  Patient describes it as a constant burning pain in both hands    Quality:  Burning    Pain timing:  All day    Relieving factors:  Rest and orthotics    Aggravating factors:  Activity    Progression:  Worsening  Social Support:     Lives in:  Multiple-level home    Lives with:  Spouse and adult children  Hand dominance:  Right  Diagnostic Tests:     X-ray: abnormal      Diagnostic Tests Comments:     2022 11:33 AM     HISTORY/REASON FOR EXAM: No injury. Bilateral hand numbness for one month     TECHNIQUE/EXAM DESCRIPTION AND NUMBER OF VIEWS:  Cervical spine series, 5 views.     COMPARISON: None.     FINDINGS:  No acute displaced fracture is seen. The vertebral body heights are maintained.     There is no prevertebral soft tissue swelling.     The alignment of the cervical spine is evaluated through C7 on T1.     There is C2/3/4/5 and C7/T1 mild anterolisthesis. This resolves at C3/4 with extension. There is no other motion     There is facet arthropathy.     There is C5/6/7/T1 moderate disc height loss and endplate spurring     Moderate uncovertebral hypertrophy at C5/6     Carotid atherosclerosis moderate  severity     IMPRESSION:     Moderate cervical spondylosis with multilevel degenerative anterolisthesis, minimal motion    2/24/2022 11:33 AM     HISTORY/REASON FOR EXAM: No injury. Bilateral hand numbness for one month     TECHNIQUE/EXAM DESCRIPTION AND NUMBER OF VIEWS:  Cervical spine series, 5 views.     COMPARISON: None.     FINDINGS:  No acute displaced fracture is seen. The vertebral body heights are maintained.     There is no prevertebral soft tissue swelling.     The alignment of the cervical spine is evaluated through C7 on T1.     There is C2/3/4/5 and C7/T1 mild anterolisthesis. This resolves at C3/4 with extension. There is no other motion     There is facet arthropathy.     There is C5/6/7/T1 moderate disc height loss and endplate spurring     Moderate uncovertebral hypertrophy at C5/6     Carotid atherosclerosis moderate severity     IMPRESSION:     Moderate cervical spondylosis with multilevel degenerative anterolisthesis, minimal motion          Exam Ended: 02/24/22 11:51 AM Last Resulted: 02/24/22  2:49 PM                          Treatments:     Previous treatment:  Physical therapy (for neck)  Activities of Daily Living:     Patient reported ADL status: Difficulty with meal prep, opening jars, fine motor dressing.    Patient Goals:     Patient goals for therapy:  [facility] with ADLs/IADLs, decreased pain, increased motion, increased strength and return to sport/leisure activities      Past Medical History:   Diagnosis Date   • Environmental allergies      Past Surgical History:   Procedure Laterality Date   • ABDOMINAL EXPLORATION     • APPENDECTOMY     • KNEE ARTHROSCOPY       Social History     Tobacco Use   • Smoking status: Never Smoker   • Smokeless tobacco: Never Used   Substance Use Topics   • Alcohol use: Not Currently     Family and Occupational History     Socioeconomic History   • Marital status:      Spouse name: Not on file   • Number of children: Not on file   • Years of  "education: Not on file   • Highest education level: Not on file   Occupational History   • Not on file       Objective     Neurological Testing   Sensation   Wrist/Hand   Left   Diminished: light touch    Right   Diminished: light touch          Active Range of Motion     Left Wrist   Wrist flexion: 60 degrees   Wrist extension: 60 degrees   Wrist pronation: WFL  Wrist supination: 60 degrees   Radial deviation: WFL  Ulnar deviation: WFL      Right Wrist   Wrist flexion: 60 degrees   Wrist extension: 60 degrees   Wrist pronation: WFL  Wrist supination: 60 degrees   Radial deviation: WFL  Ulnar deviation: WFL    Passive Range of Motion     Left Wrist   Normal passive range of motion    Right Wrist   Normal passive range of motion    Additional Passive Range of Motion Details  Moderate tightness of median nerve for both UEs.     Strength     Left Wrist/Hand      (2nd hand position)     Trial 1: 32    Right Wrist/Hand      (2nd hand position)     Trial 2: 44    Tests     Left Wrist/Hand   Positive Tinel's sign (median nerve).     Right Wrist/Hand   Positive Tinel's sign (median nerve).     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    Patient has been issued bilateral wrist splints in aide of alleviating CTS and painful \"burning\" sensation        Therapeutic Exercises (CPT 95469):     1. Median nerve stretches    2. AROM and AAROM of B wrist flexion/extension    3. Medium resistance theraputty    4. Fine motor manipulation and dexterity    Therapeutic Exercise Summary:  Initiated HEP with written, verbal and visual instruction.  To complete 3 x a day      Time-based " treatments/modalities:  Occupational Therapy Timed Treatment Charges  Therapeutic exercise minutes (CPT 12225): 15 minutes      Assessment and Plan:   Problem list/assessment: abnormal or restricted ROM, decreased coordination, decreased HEP knowledge, decreased strength, limited ADL's and pain    Assessment details:  Patient is an 82 y/o male with hx of moderate/severe OA in bilateral hands, recent diagnosis of B CTS and seen recently by PT for    Moderate cervical spondylosis with multilevel degenerative anterolisthesis.  Patient presenting with high pain levels, impaired wrist ROM of both hands, impaired strength and impaired dexterity.  Patient also sitting in prolonged static positions that may be attributing to symptoms.  Patient would benefit from OT intervention to enhance functional use of B hands and minimize painful symptoms.   Barriers to therapy:  None    Goals:   Short Term Goals: decrease pain, increase ADL independence, increase range of motion, increase strength and independent with HEP performance  Short term goal timespan:  2-4 weeks    Long Term Goals:   Patient will have full AROM of bilateral wrists  Patient will have increased knowledge of body mechanics/ergonomics to enhance posture and postioning while writing or using computer  Patient will increase  strength by at least 10 pounds to enhance domestic and leisure tasks  Patient will score 25 seconds or less on the 9 hole peg test due to enhance manipulation and dexterity skills  Patient will score at least 55/80 on the UEFI  Long term goal timespan:  6-8 weeks    Plan:   Occupational/Hand Therapy options:  Occupational therapy treatment to continue  Planned therapy interventions:  AROM, A/AROM, PROM, passive stretch, graded resistive tasks to increase strength, pain management, fine motor coordination training, home exercise training and patient/family/caregiver education  Other planned therapy interventions:  40888 and 28567  Prognosis:  excellent    Frequency:  1x week  Discussed with:  Patient and family  Patient/caregiver understanding of therapy treatment and goals: Good understanding of therapy treatment and goals      Functional Assessment Used:  UEFI= 37/80        Referring provider co-signature:  I have reviewed this plan of care and my co-signature certifies the need for services.    Certification Period: 07/14/2022 to  09/08/22    Physician Signature: ________________________________ Date: ______________

## 2022-07-15 ENCOUNTER — OFFICE VISIT (OUTPATIENT)
Dept: PHYSICAL MEDICINE AND REHAB | Facility: MEDICAL CENTER | Age: 82
End: 2022-07-15
Payer: MEDICARE

## 2022-07-15 VITALS
TEMPERATURE: 97.5 F | OXYGEN SATURATION: 96 % | SYSTOLIC BLOOD PRESSURE: 108 MMHG | DIASTOLIC BLOOD PRESSURE: 64 MMHG | HEART RATE: 61 BPM | HEIGHT: 66 IN | WEIGHT: 156.97 LBS | BODY MASS INDEX: 25.23 KG/M2

## 2022-07-15 DIAGNOSIS — R29.898 HAND WEAKNESS: ICD-10-CM

## 2022-07-15 DIAGNOSIS — R20.2 NUMBNESS AND TINGLING IN BOTH HANDS: ICD-10-CM

## 2022-07-15 DIAGNOSIS — G56.03 CARPAL TUNNEL SYNDROME ON BOTH SIDES: ICD-10-CM

## 2022-07-15 DIAGNOSIS — R20.0 NUMBNESS AND TINGLING IN BOTH HANDS: ICD-10-CM

## 2022-07-15 PROCEDURE — 76942 ECHO GUIDE FOR BIOPSY: CPT | Performed by: STUDENT IN AN ORGANIZED HEALTH CARE EDUCATION/TRAINING PROGRAM

## 2022-07-15 PROCEDURE — 20526 THER INJECTION CARP TUNNEL: CPT | Mod: 50 | Performed by: STUDENT IN AN ORGANIZED HEALTH CARE EDUCATION/TRAINING PROGRAM

## 2022-07-15 RX ORDER — DEXAMETHASONE SODIUM PHOSPHATE 4 MG/ML
4 INJECTION, SOLUTION INTRA-ARTICULAR; INTRALESIONAL; INTRAMUSCULAR; INTRAVENOUS; SOFT TISSUE ONCE
Status: COMPLETED | OUTPATIENT
Start: 2022-07-15 | End: 2022-07-15

## 2022-07-15 RX ADMIN — DEXAMETHASONE SODIUM PHOSPHATE 4 MG: 4 INJECTION, SOLUTION INTRA-ARTICULAR; INTRALESIONAL; INTRAMUSCULAR; INTRAVENOUS; SOFT TISSUE at 09:50

## 2022-07-15 RX ADMIN — DEXAMETHASONE SODIUM PHOSPHATE 4 MG: 4 INJECTION, SOLUTION INTRA-ARTICULAR; INTRALESIONAL; INTRAMUSCULAR; INTRAVENOUS; SOFT TISSUE at 09:51

## 2022-07-15 ASSESSMENT — FIBROSIS 4 INDEX: FIB4 SCORE: 2

## 2022-07-15 ASSESSMENT — PAIN SCALES - GENERAL: PAINLEVEL: 5=MODERATE PAIN

## 2022-07-15 ASSESSMENT — PATIENT HEALTH QUESTIONNAIRE - PHQ9: CLINICAL INTERPRETATION OF PHQ2 SCORE: 0

## 2022-07-15 NOTE — PROCEDURES
Patient Name: Sandip Zhao  : 1940  Date of Service: 7/15/2022    Physician/s: Donna Tobias MD    Pre-operative Diagnosis: bilateral carpal tunnel syndrome    Post-operative Diagnosis: bilateral carpal tunnel syndrome    Procedure: right and left ultrasound-guided carpal tunnel injection    Description of procedure:    The risks, benefits, and alternatives of the procedure were reviewed and discussed with the patient.  Written informed consent was freely obtained. A pre-procedural time-out was conducted by the physician verifying patient’s identity, procedure to be performed, procedure site and side, and allergy verification. Appropriate equipment was determined to be in place for the procedure.     No sedation was used for this procedure.     Two separate solutions were prepared, each with 1 mL of 4 mg/mL of dexamethasone and 1 mL of 1% lidocaine.      In the office suite the patient was placed in a supine position with the left hand up and the skin was prepped and draped in the usual sterile fashion. The median nerve at the carpal tunnel was identified under ultrasound guidance. Also identified were the ulnar nerve, ulnar artery, radial artery to confirm the needle path would not be impacting these structures. Under ultrasound guidance with an in plane approach, a 27g 1.5 inch needle was placed into skin and advanced adjacent so the needle path was deep to the median nerve. Following negative aspiration, the mixture containing 1 mL of 4 mg/mL of dexamethasone and 1 mL of 1% lidocaine was injected perineurally. The patient's skin was wiped with a 4x4 gauze, the area was cleansed with alcohol prep, and a bandaid was applied. There were no complications noted.     Then the patient was placed in a supine position with the right hand up and the skin was prepped and draped in the usual sterile fashion. The median nerve at the carpal tunnel was identified under ultrasound guidance. Also identified were the  ulnar nerve, ulnar artery, radial artery to confirm the needle path would not be impacting these structures. Under ultrasound guidance with an in plane approach, a 27g 1.5 inch needle was placed into skin and advanced adjacent so the needle path was deep to the median nerve. Following negative aspiration, the mixture containing 1 mL of 4 mg/mL of dexamethasone and 1 mL of 1% lidocaine was injected perineurally. The patient's skin was wiped with a 4x4 gauze, the area was cleansed with alcohol prep, and a bandaid was applied. There were no complications noted.     The images were uploaded to our secure system for permanent storage.     The patient noted improvement in pain after the procedure.    Donna Tobias MD  Interventional Pain and Spine  Physical Medicine and Rehabilitation  [facility]

## 2022-07-20 ENCOUNTER — OCCUPATIONAL THERAPY (OUTPATIENT)
Dept: OCCUPATIONAL THERAPY | Facility: REHABILITATION | Age: 82
End: 2022-07-20
Attending: STUDENT IN AN ORGANIZED HEALTH CARE EDUCATION/TRAINING PROGRAM
Payer: MEDICARE

## 2022-07-20 DIAGNOSIS — R29.898 HAND WEAKNESS: ICD-10-CM

## 2022-07-20 DIAGNOSIS — R20.0 NUMBNESS AND TINGLING IN BOTH HANDS: ICD-10-CM

## 2022-07-20 DIAGNOSIS — G56.03 CARPAL TUNNEL SYNDROME ON BOTH SIDES: ICD-10-CM

## 2022-07-20 DIAGNOSIS — R20.2 NUMBNESS AND TINGLING IN BOTH HANDS: ICD-10-CM

## 2022-07-20 PROCEDURE — 97110 THERAPEUTIC EXERCISES: CPT

## 2022-07-20 PROCEDURE — 97530 THERAPEUTIC ACTIVITIES: CPT

## 2022-07-20 NOTE — OP THERAPY DAILY TREATMENT
Outpatient Occupational Therapy  DAILY TREATMENT     [facility] Occupational [address].  [address]  [place] [address]  Phone:  [phone]  Fax:  [phone]    Date: 07/20/2022    Patient: Sandip Zhao  YOB: 1940  MRN: [id number]     Time Calculation  Start time: 0845  Stop time: 0930 Time Calculation (min): 45 minutes         Chief Complaint: Hand Weakness, Hand Problem, and Self Care Duties    Visit #: 2    SUBJECTIVE:  I have been doing all my exercises for you    OBJECTIVE:  Current objective measures:   Neurological Testing   Sensation   Wrist/Hand   Left   Diminished: light touch     Right   Diminished: light touch              Active Range of Motion      Left Wrist   Wrist flexion: 63 degrees   Wrist extension: 65 degrees   Wrist pronation: WFL  Wrist supination: 60 degrees   Radial deviation: WFL  Ulnar deviation: WFL      Right Wrist   Wrist flexion: 60 degrees   Wrist extension: 60 degrees   Wrist pronation: WFL  Wrist supination: 60 degrees   Radial deviation: WFL  Ulnar deviation: WFL     Passive Range of Motion      Left Wrist   Normal passive range of motion     Right Wrist   Normal passive range of motion    Additional Passive Range of Motion Details  Moderate tightness of median nerve for both UEs.      Strength      Left Wrist/Hand       (2nd hand position)     Trial 1: 50     Right Wrist/Hand       (2nd hand position)     Trial 2: 50     Tests      Left Wrist/Hand   Positive Tinel's sign (median nerve).      Right Wrist/Hand   Positive Tinel's sign (median nerve).      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     Patient has been issued bilateral wrist splints in aide of alleviating CTS and painful \"burning\" sensation        Therapeutic Exercises (CPT 99293):     1. Median nerve stretches    2. AROM and AAROM of B wrist flexion/extension    3. Medium resistance theraputty    4. Fine motor manipulation and dexterity, manipulation of coins and shuffling cards    5. Velcro roll with cylindrical grasp,  5 x for each hand    6. 9# digi-flex , 10 x for each hand    7. 11# graded clip in lateral and 3 point pinch , 5 x for each prehension for each hand    8. Flexion/extension exercise stick in horizontal and vertical positions , 10 x in each position    Therapeutic Treatments and Modalities:    1. Therapeutic Activities (CPT 13835)    Therapeutic Treatments and Modalities Summary: Discussed ergonomics and posture during computer and writing tasks.  Patient's wife stated that patient is not adhering to advice given on evaluation.  OTR reviewed this with patient and wife and issued handout on seating position at a desk and wife is going to obtain a more appropriate ergonomic chair for the desk area.  Encouraged patient to get up and stretch and re-position self every 20-30 minutes during tasks.    Time-based treatments/modalities:  Therapeutic exercise minutes (CPT 47373): 30 minutes  Therapeutic activity minutes (CPT 61951): 15 minutes        Pain rating before treatment: 2  Pain rating after treatment: 2    ASSESSMENT:   Response to treatment: improved left wrist AROM and improved bilateral  strength and dexterity.  Patient reporting \"less numbness in right hand\" and feels his hands are better after receiving shots from Dr. Tobias last week.      PLAN/RECOMMENDATIONS:   Plan for treatment: therapy treatment to continue next visit.  Planned interventions for next visit: continue with current treatment, therapeutic activities (CPT 09831) and therapeutic exercise (CPT 05319)       "

## 2022-07-26 ENCOUNTER — APPOINTMENT (OUTPATIENT)
Dept: OCCUPATIONAL THERAPY | Facility: REHABILITATION | Age: 82
End: 2022-07-26
Attending: STUDENT IN AN ORGANIZED HEALTH CARE EDUCATION/TRAINING PROGRAM
Payer: MEDICARE

## 2022-07-28 ENCOUNTER — OCCUPATIONAL THERAPY (OUTPATIENT)
Dept: OCCUPATIONAL THERAPY | Facility: REHABILITATION | Age: 82
End: 2022-07-28
Attending: STUDENT IN AN ORGANIZED HEALTH CARE EDUCATION/TRAINING PROGRAM
Payer: MEDICARE

## 2022-07-28 DIAGNOSIS — R29.898 HAND WEAKNESS: ICD-10-CM

## 2022-07-28 DIAGNOSIS — R20.0 NUMBNESS AND TINGLING IN BOTH HANDS: ICD-10-CM

## 2022-07-28 DIAGNOSIS — G56.03 CARPAL TUNNEL SYNDROME ON BOTH SIDES: ICD-10-CM

## 2022-07-28 DIAGNOSIS — R20.2 NUMBNESS AND TINGLING IN BOTH HANDS: ICD-10-CM

## 2022-07-28 PROCEDURE — 97530 THERAPEUTIC ACTIVITIES: CPT

## 2022-07-28 PROCEDURE — 97110 THERAPEUTIC EXERCISES: CPT

## 2022-07-28 NOTE — OP THERAPY DAILY TREATMENT
Outpatient Occupational Therapy  DAILY TREATMENT     [facility] Occupational [address]  [address]  [place] [address]  Phone:  [phone]  Fax:  [phone]    Date: 07/28/2022    Patient: Sandip Zhao  YOB: 1940  MRN: [id number]     Time Calculation  Start time: 1015  Stop time: 1100 Time Calculation (min): 45 minutes         Chief Complaint: Hand Problem, Hand Weakness, and Self Care Duties    Visit #: 3    SUBJECTIVE:  We added a new ergonomic chair for the office and there is such a difference in my posture.    OBJECTIVE:  Current objective measures:   Neurological Testing   Sensation   Wrist/Hand   Left   Diminished: light touch     Right   Diminished: light touch              Active Range of Motion      Left Wrist   Wrist flexion: 68 degrees   Wrist extension: 73 degrees   Wrist pronation: WFL  Wrist supination: 60 degrees   Radial deviation: WFL  Ulnar deviation: WFL      Right Wrist   Wrist flexion: 65 degrees   Wrist extension: 66 degrees   Wrist pronation: WFL  Wrist supination: 90 degrees - WNL    Radial deviation: WFL  Ulnar deviation: WFL     Passive Range of Motion      Left Wrist   Normal passive range of motion     Right Wrist   Normal passive range of motion    Additional Passive Range of Motion Details  Moderate tightness of median nerve for both UEs.      Strength      Left Wrist/Hand       (2nd hand position)     Trial 1: 51     Right Wrist/Hand       (2nd hand position)     Trial 2: 53     Tests      Left Wrist/Hand   Positive Tinel's sign (median nerve).      Right Wrist/Hand   Positive Tinel's sign (median nerve).      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     Patient has been issued bilateral wrist splints in aide of alleviating CTS and painful \"burning\" sensation        Therapeutic Exercises (CPT 41880):     1. Median nerve stretches    2. AROM and AAROM of B wrist flexion/extension    3. Medium resistance theraputty    4. Fine motor manipulation and dexterity, manipulation of coins and shuffling cards    5. Velcro roll with cylindrical grasp,  5 x for each hand    6. 9# digi-flex , 10 x for each hand    7. 11# graded clip in lateral and 3 point pinch , 5 x for each prehension for each hand    8. Flexion/extension exercise stick in horizontal and vertical positions , 10 x in each position    Therapeutic Treatments and Modalities:    1. Therapeutic Activities (CPT 39111)    Therapeutic Treatments and Modalities Summary: Discussed use of ergonomic chair and reviewed joint protection and energy conservation techniques  Patient stated he is going to re-start practicing the accordion to enhance his hand function.    Time-based treatments/modalities:  Therapeutic exercise minutes (CPT 44090): 37 minutes  Therapeutic activity minutes (CPT 21525): 8 minutes        Pain rating before treatment: 0  Pain rating after treatment: 0    ASSESSMENT:   Response to treatment: Progressing well in therapy and adhering well with HEP.  Encouraged to complete stretches every 20 - 30 minutes when working at desk in office.      PLAN/RECOMMENDATIONS:   Plan for treatment: therapy treatment to continue next visit.  Planned interventions for next visit: continue with current treatment, therapeutic activities (CPT 95484) and therapeutic exercise (CPT 91536)       "

## 2022-08-02 ENCOUNTER — APPOINTMENT (OUTPATIENT)
Dept: OCCUPATIONAL THERAPY | Facility: REHABILITATION | Age: 82
End: 2022-08-02
Attending: STUDENT IN AN ORGANIZED HEALTH CARE EDUCATION/TRAINING PROGRAM
Payer: MEDICARE

## 2022-08-04 ENCOUNTER — OCCUPATIONAL THERAPY (OUTPATIENT)
Dept: OCCUPATIONAL THERAPY | Facility: REHABILITATION | Age: 82
End: 2022-08-04
Attending: STUDENT IN AN ORGANIZED HEALTH CARE EDUCATION/TRAINING PROGRAM
Payer: MEDICARE

## 2022-08-04 DIAGNOSIS — R29.898 HAND WEAKNESS: ICD-10-CM

## 2022-08-04 DIAGNOSIS — R20.0 NUMBNESS AND TINGLING IN BOTH HANDS: ICD-10-CM

## 2022-08-04 DIAGNOSIS — R20.2 NUMBNESS AND TINGLING IN BOTH HANDS: ICD-10-CM

## 2022-08-04 DIAGNOSIS — G56.03 CARPAL TUNNEL SYNDROME ON BOTH SIDES: ICD-10-CM

## 2022-08-04 PROCEDURE — 97110 THERAPEUTIC EXERCISES: CPT

## 2022-08-04 NOTE — OP THERAPY DAILY TREATMENT
Outpatient Occupational Therapy  DAILY TREATMENT     [facility] Occupational [address]  [address]  [place] [address]  Phone:  [phone]  Fax:  [phone]    Date: 08/04/2022    Patient: Sandip Zhao  YOB: 1940  MRN: [id number]     Time Calculation  Start time: 0930  Stop time: 1015 Time Calculation (min): 45 minutes         Chief Complaint: Hand Weakness    Visit #: 4    SUBJECTIVE:  My hands and wrists are a bit achy and numb today with the change in this weather    OBJECTIVE:  Current objective measures:   Neurological Testing   Sensation   Wrist/Hand   Left   Diminished: light touch     Right   Diminished: light touch     Active Range of Motion      Left Wrist   Wrist flexion: 70 degrees   Wrist extension: 80 degrees   Wrist pronation: WFL  Wrist supination: 60 degrees   Radial deviation: WFL  Ulnar deviation: WFL      Right Wrist   Wrist flexion: 70 degrees   Wrist extension: 70 degrees   Wrist pronation: WFL  Wrist supination: 90 degrees - WNL    Radial deviation: WFL  Ulnar deviation: WFL     Passive Range of Motion      Left Wrist   Normal passive range of motion     Right Wrist   Normal passive range of motion    Additional Passive Range of Motion Details  Moderate tightness of median nerve for both UEs.      Strength      Left Wrist/Hand       (2nd hand position)     Trial 1: 51     Right Wrist/Hand       (2nd hand position)     Trial 2: 55     Tests      Left Wrist/Hand   Positive Tinel's sign (median nerve).      Right Wrist/Hand   Positive Tinel's sign (median nerve).      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     Patient has been issued bilateral wrist splints in aide of alleviating CTS and painful \"burning\" sensation        Therapeutic Exercises (CPT 10673):     1. Median nerve stretches    2. AROM and AAROM of B wrist flexion/extension    3. Medium resistance theraputty    4. Fine motor manipulation and dexterity, manipulation of coins and shuffling cards    5. Velcro roll with cylindrical grasp,  5 x for each hand    6. 9# digi-flex , 10 x for each hand    7. 11# graded clip in lateral and 3 point pinch , 5 x for each prehension for each hand    8. Flexion/extension exercise stick in horizontal and vertical positions , 10 x in each position    9. Isolated supination exercises of left arm, 10 x       Time-based treatments/modalities:  Therapeutic exercise minutes (CPT 45959): 45 minutes        Pain rating before treatment: 3  Pain rating after treatment: 3    ASSESSMENT:   Response to treatment: Improved AROM of both wrists and increased  strength.  Using ergonomic chair on a regular basis now and still needs encouragement to take breaks to reposition self to minimize prolonged fixed positions.      PLAN/RECOMMENDATIONS:   Plan for treatment: therapy treatment to continue next visit.  Planned interventions for next visit: continue with current treatment, therapeutic activities (CPT 54679) and therapeutic exercise (CPT 98694)       "

## 2022-08-09 ENCOUNTER — APPOINTMENT (OUTPATIENT)
Dept: OCCUPATIONAL THERAPY | Facility: REHABILITATION | Age: 82
End: 2022-08-09
Attending: STUDENT IN AN ORGANIZED HEALTH CARE EDUCATION/TRAINING PROGRAM
Payer: MEDICARE

## 2022-08-11 ENCOUNTER — OCCUPATIONAL THERAPY (OUTPATIENT)
Dept: OCCUPATIONAL THERAPY | Facility: REHABILITATION | Age: 82
End: 2022-08-11
Attending: STUDENT IN AN ORGANIZED HEALTH CARE EDUCATION/TRAINING PROGRAM
Payer: MEDICARE

## 2022-08-11 DIAGNOSIS — R20.2 NUMBNESS AND TINGLING IN BOTH HANDS: ICD-10-CM

## 2022-08-11 DIAGNOSIS — R20.0 NUMBNESS AND TINGLING IN BOTH HANDS: ICD-10-CM

## 2022-08-11 DIAGNOSIS — R29.898 HAND WEAKNESS: ICD-10-CM

## 2022-08-11 DIAGNOSIS — G56.03 CARPAL TUNNEL SYNDROME ON BOTH SIDES: ICD-10-CM

## 2022-08-11 PROCEDURE — 97110 THERAPEUTIC EXERCISES: CPT

## 2022-08-11 NOTE — OP THERAPY DAILY TREATMENT
Outpatient Occupational Therapy  DAILY TREATMENT     [facility] Occupational [address].  [address]  [place] [address]  Phone:  [phone]  Fax:  [phone]    Date: 08/11/2022    Patient: Sandip Zhao  YOB: 1940  MRN: [id number]     Time Calculation  Start time: 0845  Stop time: 0930 Time Calculation (min): 45 minutes         Chief Complaint: Hand Weakness and Self Care Duties    Visit #: 5    SUBJECTIVE:  My joints in my wrists and hands are quite achy the last few days.      OBJECTIVE:  Current objective measures:   Neurological Testing   Sensation   Wrist/Hand   Left   Diminished: light touch     Right   Diminished: light touch     Active Range of Motion      Left Wrist   Wrist flexion: 73 degrees   Wrist extension: 75 degrees   Wrist pronation: WFL  Wrist supination: 80 degrees   Radial deviation: WFL  Ulnar deviation: WFL      Right Wrist   Wrist flexion: 60 degrees   Wrist extension: 75 degrees   Wrist pronation: WFL  Wrist supination: 90 degrees - WNL    Radial deviation: WFL  Ulnar deviation: WFL     Passive Range of Motion      Left Wrist   Normal passive range of motion     Right Wrist   Normal passive range of motion    Additional Passive Range of Motion Details  Moderate tightness of median nerve for both UEs.      Strength      Left Wrist/Hand       (2nd hand position)     Trial 1: 51     Right Wrist/Hand       (2nd hand position)     Trial 2: 50     Tests      Left Wrist/Hand   Positive Tinel's sign (median nerve).      Right Wrist/Hand   Positive Tinel's sign (median nerve).      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 Continues to need reminders to take a rest break and stretch at LEAST every 30 minutes     Not using right splint at night as his hand is \"less numb\" and continues to wear left splint at night.          Therapeutic Exercises (CPT 51346):     1. median nerve stretches    2. AROM and AAROM of B wrist flexion/extension    3. medium resistance theraputty    4. fine motor manipulation and dexterity, manipulation of coins, 9 hole peg test, grooved pegboard and use of tweezers and screwdriver    5. velcro roll with cylindrical grasp,  5 x for each hand    6. 9# digi-flex , 10 x for each hand    7. 11# graded clip in lateral and 3 point pinch , 5 x for each prehension for each hand    8. Flexion/extension exercise stick in horizontal and vertical positions , 15 x in each position    9. isolated supination exercises of left arm, 10 x     Therapeutic Exercise Summary:  Wife video taped ROM exercises/stretches for supination, wrists and digits.      Time-based treatments/modalities:  Therapeutic exercise minutes (CPT 02757): 45 minutes        Pain rating before treatment: 5  Pain rating after treatment: 5    ASSESSMENT:   Response to treatment: Patient requesting that appointments be spaced out to 1 x every 2 wkks and will continue with HEP    PLAN/RECOMMENDATIONS:   Plan for treatment: therapy treatment to continue next visit.  Planned interventions for next visit: continue with current treatment, therapeutic activities (CPT 77420), and therapeutic exercise (CPT 19529)         "

## 2022-08-15 ENCOUNTER — OFFICE VISIT (OUTPATIENT)
Dept: PHYSICAL MEDICINE AND REHAB | Facility: MEDICAL CENTER | Age: 82
End: 2022-08-15
Payer: MEDICARE

## 2022-08-15 VITALS
TEMPERATURE: 97.3 F | DIASTOLIC BLOOD PRESSURE: 62 MMHG | BODY MASS INDEX: 25.26 KG/M2 | HEIGHT: 66 IN | WEIGHT: 157.19 LBS | OXYGEN SATURATION: 97 % | HEART RATE: 56 BPM | SYSTOLIC BLOOD PRESSURE: 126 MMHG

## 2022-08-15 DIAGNOSIS — G56.03 CARPAL TUNNEL SYNDROME ON BOTH SIDES: ICD-10-CM

## 2022-08-15 DIAGNOSIS — R20.0 NUMBNESS AND TINGLING IN BOTH HANDS: ICD-10-CM

## 2022-08-15 DIAGNOSIS — R20.2 NUMBNESS AND TINGLING IN BOTH HANDS: ICD-10-CM

## 2022-08-15 PROCEDURE — 99213 OFFICE O/P EST LOW 20 MIN: CPT | Performed by: STUDENT IN AN ORGANIZED HEALTH CARE EDUCATION/TRAINING PROGRAM

## 2022-08-15 ASSESSMENT — PATIENT HEALTH QUESTIONNAIRE - PHQ9: CLINICAL INTERPRETATION OF PHQ2 SCORE: 0

## 2022-08-15 ASSESSMENT — PAIN SCALES - GENERAL: PAINLEVEL: 4=SLIGHT-MODERATE PAIN

## 2022-08-15 ASSESSMENT — FIBROSIS 4 INDEX: FIB4 SCORE: 2

## 2022-08-15 NOTE — PROGRESS NOTES
Follow-up patient Note    Interventional Pain and Spine  Physiatry (Physical Medicine and Rehabilitation)     Patient Name: Sandip Zhao  : 1940  Date of service: 8/15/2022    Chief Complaint:   Chief Complaint   Patient presents with    Follow-Up     Carpal tunnel syndrome on both sides       HISTORY (2022):  Presents today with wife Kimberley who helps contribute to history  Sandip Zhao is a 81 y.o. RHD male who presents today with pain, numbness, and tingling in bilateral hands, mostly 1st-3rd fingers. These symptoms initially began 2 years ago. Over  he noticed weakness of his hand when he dropped a wine glass a few times. Endorses weakness of his right hand , and loss of muscle mass in his arms. Over time he has also developed burning pain in his hands which wakes him up at night and sometimes impairs his ability to fall asleep.     Pain severity rates 8/10.  He wears wrist splints throughout the day as tolerated.  Pain does not change with activity.  Pain interferes somewhat with ADLs.  He otherwise denies numbness or tingling or radiating pain from his neck down to his hands.     Retired about 2 years ago and wife reports he spends about 12 hours per day writing and working on a computer.  Able to write while wearing a wrist brace.     The patient has done physical therapy for suspected cervical radiculopathy. They discharged him after about 1 month due to no improvement in symptoms.      Patient has tried the following medications with varied success (current meds in bold):   OTC topical analgesic  Avoids ASA-containing products     Therapeutic modalities and interventional therapies to date include:  -no injections. States he has been told by his PCP that he needs obtain clearance for steroid injections in the future given his history of MI     Medical history includes BPH, MI on Eliquis 2015    HPI  Today's visit   Sandip Zhao ( 1940) is a male with  Diagnoses of Carpal tunnel syndrome on both sides and Numbness and tingling in both hands were pertinent to this visit.    S/p 7/15/22 bilateral carpal tunnel steroid injections with 70% improvmeent in symptoms.  Notes ongoing numbness and tingling in bilateral hands, first-third digits, worse on the left.  Endorses some weakness in his left hand and pain in left thumb which has improved.     Doing hand therapy with improvement.  Diligently does and therapy exercises at home.    Wearing a wrist brace on the left at night.  Wearing the right wrist brace infrequently.    Pain severity 4/10 currently  Pt denies new numbness, tingling, or weakness.    Procedure history:  - 7/15/22 bilateral carpal tunnel steroid injections - 70% improvement in symptoms.      ROS:   Red Flags ROS:   Fever, Chills, Sweats: Denies  Involuntary Weight Loss: Denies  Bladder Incontinence: Denies  Bowel Incontinence: denies  Saddle Anesthesia: Denies    All other systems reviewed and negative.     PMHx:   Past Medical History:   Diagnosis Date    Environmental allergies        PSHx:   Past Surgical History:   Procedure Laterality Date    ABDOMINAL EXPLORATION      APPENDECTOMY      KNEE ARTHROSCOPY         Family Hx:   Family History   Problem Relation Age of Onset    Hypertension Father     Heart Disease Father         mitral valve replacement       Social Hx:  Social History     Socioeconomic History    Marital status:      Spouse name: Not on file    Number of children: Not on file    Years of education: Not on file    Highest education level: Not on file   Occupational History    Not on file   Tobacco Use    Smoking status: Never    Smokeless tobacco: Never   Vaping Use    Vaping Use: Never used   Substance and Sexual Activity    Alcohol use: Not Currently    Drug use: Never    Sexual activity: Not on file     Comment: food and beverage management   Other Topics Concern    Not on file   Social History Narrative    Not on file      Social Determinants of Health     Financial Resource Strain: Not on file   Food Insecurity: Not on file   Transportation Needs: Not on file   Physical Activity: Not on file   Stress: Not on file   Social Connections: Not on file   Intimate Partner Violence: Not on file   Housing Stability: Not on file       Allergies:  No Known Allergies    Medications: reviewed on epic.   Outpatient Medications Marked as Taking for the 8/15/22 encounter (Office Visit) with Donna Tobias M.D.   Medication Sig Dispense Refill    Acetaminophen (TYLENOL ARTHRITIS PAIN PO) Take  by mouth.      rosuvastatin (CRESTOR) 20 MG Tab rosuvastatin calcium 20 mg tabs      apixaban (ELIQUIS) 5mg Tab Take 5 mg by mouth 2 Times a Day.      diltiazem (CARDIZEM) 60 MG Tab Take 60 mg by mouth 4 times a day.      ezetimibe (ZETIA) 10 MG Tab Take 10 mg by mouth every day.          Current Outpatient Medications on File Prior to Visit   Medication Sig Dispense Refill    Acetaminophen (TYLENOL ARTHRITIS PAIN PO) Take  by mouth.      rosuvastatin (CRESTOR) 20 MG Tab rosuvastatin calcium 20 mg tabs      apixaban (ELIQUIS) 5mg Tab Take 5 mg by mouth 2 Times a Day.      diltiazem (CARDIZEM) 60 MG Tab Take 60 mg by mouth 4 times a day.      ezetimibe (ZETIA) 10 MG Tab Take 10 mg by mouth every day.      methylPREDNISolone (MEDROL DOSEPAK) 4 MG Tablet Therapy Pack Follow schedule on package instructions. (Patient not taking: No sig reported) 21 Tablet 0    pravastatin (PRAVACHOL) 80 MG tablet Take 80 mg by mouth every evening. (Patient not taking: No sig reported)      Ascorbic Acid (VITAMIN C PO) Take  by mouth. (Patient not taking: No sig reported)      oseltamivir (TAMIFLU) 75 MG Cap Take 1 Cap by mouth 2 times a day. (Patient not taking: No sig reported) 10 Cap 0    albuterol 108 (90 BASE) MCG/ACT Aero Soln inhalation aerosol Inhale 2 Puffs by mouth every 6 hours as needed for Shortness of Breath. (Patient not taking: No sig reported) 8.5 g 0     "LISINOPRIL PO Take  by mouth. (Patient not taking: No sig reported)      azithromycin (ZITHROMAX) 250 MG TABS 2 tabs by mouth day 1, 1 tab by mouth days 2-5 (Patient not taking: No sig reported) 6 Tab 0    Ibuprofen 200 MG Cap Take  by mouth. (Patient not taking: No sig reported)       No current facility-administered medications on file prior to visit.         EXAMINATION     Physical Exam:   /62 (BP Location: Right arm, Patient Position: Sitting, BP Cuff Size: Adult)   Pulse (!) 56   Temp 36.3 °C (97.3 °F) (Temporal)   Ht 1.676 m (5' 6\")   Wt 71.3 kg (157 lb 3 oz)   SpO2 97%     Constitutional:   Body Habitus: Body mass index is 25.37 kg/m².  Cooperation: Fully cooperates with exam  Appearance: Well-groomed, well-nourished.    Eyes: No scleral icterus to suggest severe liver disease, no proptosis to suggest severe hyperthyroidism    ENT -no obvious auditory deficits, no noticeable facial droop     Skin -no rashes or lesions noted     Respiratory-  breathing comfortably on room air, no audible wheezing    Cardiovascular-distal extremities warm and well perfused.  No lower extremity edema is noted.     Gastrointestinal - no obvious abdominal masses, non-distended    Psychiatric- alert and oriented ×3. Normal affect.     Gait - steady gait     Musculoskeletal and Neuro -      Cervical spine   Inspection: No deformities of the skin over the cervical spine. No rashes or lesions.     full active range of motion in all directions     Spurling's sign  negative bilaterally  Cervical facet loading maneuver  negative bilaterally     No signs of muscular atrophy in bilateral upper extremities      Key points for the international standards for neurological classification of spinal cord injury (ISNCSCI) to light touch.      Dermatome R L   C4 2 2   C5 2 2   C6 1 1   C7 1 1   C8 2 2   T1 2 2   T2 2 2         Motor Exam Upper Extremities   ? Myotome R L   Shoulder abduction C5 5 5   Elbow flexion C5 5 5   Wrist " extension C6 5 5   Elbow extension C7 5 5   Finger flexion C8 5 5   Finger abduction T1 5 5       Bilateral hands:   Inspection: No swelling, deformities, or rashes. Symmetric appearing thenar and hyperthenar regions bilaterally.  Palpation: no significant tenderness to palpation throughout the bilateral hands  Range of motion is slightly limited in bilateral wrists with flexion and extension     Special tests:  Tinel's at the wrist over the median nerve positive bilaterally  Carpal tunnel compression: positive bilaterally  Tinel's at the cubital tunnel: negative bilaterally  Finkelstein's negative bilaterally    Previous exam  Phalen's test: positive bilaterally  Reflexes  ?   R L   Biceps   2+ 2+   Brachioradialis   2+ 2+      Aguillon's sign negative bilaterally     MEDICAL DECISION MAKING    Medical records review: see under HPI section.     DATA    Labs: No new labs available for review since last visit.    Lab Results   Component Value Date/Time    SODIUM 141 12/08/2021 08:31 AM    POTASSIUM 4.1 12/08/2021 08:31 AM    CHLORIDE 106 12/08/2021 08:31 AM    CO2 24 12/08/2021 08:31 AM    ANION 11.0 12/08/2021 08:31 AM    GLUCOSE 106 (H) 12/08/2021 08:31 AM    BUN 15 12/08/2021 08:31 AM    CREATININE 0.92 12/08/2021 08:31 AM    CALCIUM 9.2 12/08/2021 08:31 AM    ASTSGOT 45 12/08/2021 08:31 AM    ALTSGPT 54 (H) 12/08/2021 08:31 AM    TBILIRUBIN 0.4 12/08/2021 08:31 AM    ALBUMIN 4.6 12/08/2021 08:31 AM    TOTPROTEIN 6.8 12/08/2021 08:31 AM    GLOBULIN 2.2 12/08/2021 08:31 AM    AGRATIO 2.1 12/08/2021 08:31 AM       No results found for: PROTHROMBTM, INR     Lab Results   Component Value Date/Time    WBC 4.2 (L) 12/08/2021 08:30 AM    RBC 4.43 (L) 12/08/2021 08:30 AM    HEMOGLOBIN 13.5 (L) 12/08/2021 08:30 AM    HEMATOCRIT 40.6 (L) 12/08/2021 08:30 AM    MCV 91.6 12/08/2021 08:30 AM    MCH 30.5 12/08/2021 08:30 AM    MCHC 33.3 (L) 12/08/2021 08:30 AM    MPV 9.9 12/08/2021 08:30 AM    NEUTSPOLYS 61.50 12/08/2021 08:30  AM    LYMPHOCYTES 26.20 2021 08:30 AM    MONOCYTES 8.30 2021 08:30 AM    EOSINOPHILS 3.30 2021 08:30 AM    BASOPHILS 0.50 2021 08:30 AM        Lab Results   Component Value Date/Time    HBA1C 5.6 2021 08:31 AM        Imaging:   I personally reviewed following images, these are my reads  X-ray cervical spine 2022  Anterior body osteophytes at C3, C4, C5, C6, C7.  Facet arthropathy at C3-4, C4-5, C5-6, C6-7. See formal radiology report for further details.        IMAGING radiology reads. I reviewed the following radiology reads    Results for orders placed during the hospital encounter of 22     DX-CERVICAL SPINE-4+ VIEWS     Impression  Moderate cervical spondylosis with multilevel degenerative anterolisthesis, minimal motion                              Diagnosis  Visit Diagnoses     ICD-10-CM   1. Carpal tunnel syndrome on both sides  G56.03   2. Numbness and tingling in both hands  R20.0    R20.2         ASSESSMENT AND PLAN:  Sandip Zhao (: 1940) is a RHD male with history of BPH, MI 2015 on Eliquis who originally presented with 2 years of gradually worsening numbness, tingling, pain in bilateral hands and weakness with bilateral handgrip, now improved after bilateral median nerve steroid injections and hand therapy.       Sandip was seen today for follow-up.    Diagnoses and all orders for this visit:    Carpal tunnel syndrome on both sides    Numbness and tingling in both hands        PLAN  Physical Therapy: cont hand therapy      Diagnostic workup: no new imaging needed at this time     Medications:   - continue OTC Voltaren gel PRN     Interventions:   -Discussed that we could repeat bilateral median nerve steroid injections PRN,  ideally no more frequently than every 3 months.      Other  -Counseled that he should wear bilateral wrist splints at night and as tolerated during the day    Follow-up:   2 months or earlier as needed    No orders of the defined  types were placed in this encounter.      Donna Tobias MD  Interventional Pain and Spine  Physical Medicine and Rehabilitation  [facility]      The above note documents my personal evaluation of this patient. In addition, I have reviewed and confirmed with the patient and MA the supportive information documented in today's Patient Health Questionnaire and Office Note.     Please note that this dictation was created using voice recognition software. I have made every reasonable attempt to correct obvious errors, but I expect that there are errors of grammar and possibly content that I did not discover before finalizing the note.

## 2022-08-15 NOTE — PATIENT INSTRUCTIONS
It appears you have two main issues: arthritis in your hands and carpal tunnel syndrome on both sides.    The carpal tunnel syndrome is due to a pinched nerve in your wrists. This can potentially improve with hand therapy and wearing wrist splints on both sides at night. We can repeat steroid injections for carpal tunnel syndrome as needed, ideally no more frequently than every 3 months.     Continue doing hand therapy until Moira thinks you can be discharged and continue your exercises at home.

## 2022-08-16 ENCOUNTER — APPOINTMENT (OUTPATIENT)
Dept: OCCUPATIONAL THERAPY | Facility: REHABILITATION | Age: 82
End: 2022-08-16
Attending: STUDENT IN AN ORGANIZED HEALTH CARE EDUCATION/TRAINING PROGRAM
Payer: MEDICARE

## 2022-08-18 ENCOUNTER — APPOINTMENT (OUTPATIENT)
Dept: OCCUPATIONAL THERAPY | Facility: REHABILITATION | Age: 82
End: 2022-08-18
Attending: STUDENT IN AN ORGANIZED HEALTH CARE EDUCATION/TRAINING PROGRAM
Payer: MEDICARE

## 2022-08-19 ENCOUNTER — HOSPITAL ENCOUNTER (OUTPATIENT)
Dept: LAB | Facility: MEDICAL CENTER | Age: 82
End: 2022-08-19
Attending: INTERNAL MEDICINE
Payer: MEDICARE

## 2022-08-19 LAB
ALBUMIN SERPL BCP-MCNC: 4.6 G/DL (ref 3.2–4.9)
ALBUMIN SERPL BCP-MCNC: 4.8 G/DL (ref 3.2–4.9)
ALBUMIN/GLOB SERPL: 1.6 G/DL
ALBUMIN/GLOB SERPL: 1.7 G/DL
ALP SERPL-CCNC: 102 U/L (ref 30–99)
ALP SERPL-CCNC: 103 U/L (ref 30–99)
ALT SERPL-CCNC: 20 U/L (ref 2–50)
ALT SERPL-CCNC: 21 U/L (ref 2–50)
ANION GAP SERPL CALC-SCNC: 12 MMOL/L (ref 7–16)
ANION GAP SERPL CALC-SCNC: 13 MMOL/L (ref 7–16)
AST SERPL-CCNC: 17 U/L (ref 12–45)
AST SERPL-CCNC: 18 U/L (ref 12–45)
BASOPHILS # BLD AUTO: 0.7 % (ref 0–1.8)
BASOPHILS # BLD: 0.03 K/UL (ref 0–0.12)
BILIRUB SERPL-MCNC: 0.6 MG/DL (ref 0.1–1.5)
BILIRUB SERPL-MCNC: 0.6 MG/DL (ref 0.1–1.5)
BUN SERPL-MCNC: 12 MG/DL (ref 8–22)
BUN SERPL-MCNC: 12 MG/DL (ref 8–22)
CALCIUM SERPL-MCNC: 9.6 MG/DL (ref 8.5–10.5)
CALCIUM SERPL-MCNC: 9.6 MG/DL (ref 8.5–10.5)
CHLORIDE SERPL-SCNC: 103 MMOL/L (ref 96–112)
CHLORIDE SERPL-SCNC: 105 MMOL/L (ref 96–112)
CHOLEST SERPL-MCNC: 114 MG/DL (ref 100–199)
CHOLEST SERPL-MCNC: 116 MG/DL (ref 100–199)
CO2 SERPL-SCNC: 22 MMOL/L (ref 20–33)
CO2 SERPL-SCNC: 23 MMOL/L (ref 20–33)
CREAT SERPL-MCNC: 0.93 MG/DL (ref 0.5–1.4)
CREAT SERPL-MCNC: 0.99 MG/DL (ref 0.5–1.4)
EOSINOPHIL # BLD AUTO: 0.09 K/UL (ref 0–0.51)
EOSINOPHIL NFR BLD: 2.1 % (ref 0–6.9)
ERYTHROCYTE [DISTWIDTH] IN BLOOD BY AUTOMATED COUNT: 46.8 FL (ref 35.9–50)
EST. AVERAGE GLUCOSE BLD GHB EST-MCNC: 111 MG/DL
EST. AVERAGE GLUCOSE BLD GHB EST-MCNC: 114 MG/DL
FASTING STATUS PATIENT QL REPORTED: NORMAL
FASTING STATUS PATIENT QL REPORTED: NORMAL
GFR SERPLBLD CREATININE-BSD FMLA CKD-EPI: 76 ML/MIN/1.73 M 2
GFR SERPLBLD CREATININE-BSD FMLA CKD-EPI: 82 ML/MIN/1.73 M 2
GLOBULIN SER CALC-MCNC: 2.8 G/DL (ref 1.9–3.5)
GLOBULIN SER CALC-MCNC: 2.9 G/DL (ref 1.9–3.5)
GLUCOSE SERPL-MCNC: 102 MG/DL (ref 65–99)
GLUCOSE SERPL-MCNC: 103 MG/DL (ref 65–99)
HBA1C MFR BLD: 5.5 % (ref 4–5.6)
HBA1C MFR BLD: 5.6 % (ref 4–5.6)
HCT VFR BLD AUTO: 38 % (ref 42–52)
HDLC SERPL-MCNC: 38 MG/DL
HDLC SERPL-MCNC: 40 MG/DL
HGB BLD-MCNC: 12.3 G/DL (ref 14–18)
IMM GRANULOCYTES # BLD AUTO: 0.01 K/UL (ref 0–0.11)
IMM GRANULOCYTES NFR BLD AUTO: 0.2 % (ref 0–0.9)
LDLC SERPL CALC-MCNC: 58 MG/DL
LDLC SERPL CALC-MCNC: 63 MG/DL
LYMPHOCYTES # BLD AUTO: 1.02 K/UL (ref 1–4.8)
LYMPHOCYTES NFR BLD: 23.7 % (ref 22–41)
MCH RBC QN AUTO: 28.9 PG (ref 27–33)
MCHC RBC AUTO-ENTMCNC: 32.4 G/DL (ref 33.7–35.3)
MCV RBC AUTO: 89.2 FL (ref 81.4–97.8)
MONOCYTES # BLD AUTO: 0.38 K/UL (ref 0–0.85)
MONOCYTES NFR BLD AUTO: 8.8 % (ref 0–13.4)
NEUTROPHILS # BLD AUTO: 2.77 K/UL (ref 1.82–7.42)
NEUTROPHILS NFR BLD: 64.5 % (ref 44–72)
NRBC # BLD AUTO: 0 K/UL
NRBC BLD-RTO: 0 /100 WBC
PLATELET # BLD AUTO: 235 K/UL (ref 164–446)
PMV BLD AUTO: 9.8 FL (ref 9–12.9)
POTASSIUM SERPL-SCNC: 4 MMOL/L (ref 3.6–5.5)
POTASSIUM SERPL-SCNC: 4 MMOL/L (ref 3.6–5.5)
PROT SERPL-MCNC: 7.5 G/DL (ref 6–8.2)
PROT SERPL-MCNC: 7.6 G/DL (ref 6–8.2)
RBC # BLD AUTO: 4.26 M/UL (ref 4.7–6.1)
SODIUM SERPL-SCNC: 138 MMOL/L (ref 135–145)
SODIUM SERPL-SCNC: 140 MMOL/L (ref 135–145)
TRIGL SERPL-MCNC: 76 MG/DL (ref 0–149)
TRIGL SERPL-MCNC: 78 MG/DL (ref 0–149)
TSH SERPL DL<=0.005 MIU/L-ACNC: 1.34 UIU/ML (ref 0.38–5.33)
WBC # BLD AUTO: 4.3 K/UL (ref 4.8–10.8)

## 2022-08-19 PROCEDURE — 80053 COMPREHEN METABOLIC PANEL: CPT | Mod: 91

## 2022-08-19 PROCEDURE — 80053 COMPREHEN METABOLIC PANEL: CPT

## 2022-08-19 PROCEDURE — 83036 HEMOGLOBIN GLYCOSYLATED A1C: CPT | Mod: 91

## 2022-08-19 PROCEDURE — 85025 COMPLETE CBC W/AUTO DIFF WBC: CPT

## 2022-08-19 PROCEDURE — 80061 LIPID PANEL: CPT

## 2022-08-19 PROCEDURE — 83036 HEMOGLOBIN GLYCOSYLATED A1C: CPT

## 2022-08-19 PROCEDURE — 36415 COLL VENOUS BLD VENIPUNCTURE: CPT

## 2022-08-19 PROCEDURE — 84443 ASSAY THYROID STIM HORMONE: CPT

## 2022-08-19 PROCEDURE — 80061 LIPID PANEL: CPT | Mod: 91

## 2022-08-25 ENCOUNTER — OCCUPATIONAL THERAPY (OUTPATIENT)
Dept: OCCUPATIONAL THERAPY | Facility: REHABILITATION | Age: 82
End: 2022-08-25
Attending: STUDENT IN AN ORGANIZED HEALTH CARE EDUCATION/TRAINING PROGRAM
Payer: MEDICARE

## 2022-08-25 DIAGNOSIS — R20.2 NUMBNESS AND TINGLING IN BOTH HANDS: ICD-10-CM

## 2022-08-25 DIAGNOSIS — G56.03 CARPAL TUNNEL SYNDROME ON BOTH SIDES: ICD-10-CM

## 2022-08-25 DIAGNOSIS — R29.898 HAND WEAKNESS: ICD-10-CM

## 2022-08-25 DIAGNOSIS — R20.0 NUMBNESS AND TINGLING IN BOTH HANDS: ICD-10-CM

## 2022-08-25 PROCEDURE — 97110 THERAPEUTIC EXERCISES: CPT

## 2022-08-25 PROCEDURE — 97530 THERAPEUTIC ACTIVITIES: CPT

## 2022-08-25 NOTE — OP THERAPY DAILY TREATMENT
Outpatient Occupational Therapy  DAILY TREATMENT     [facility] Occupational [address]  [address]  [place] [address]  Phone:  [phone]  Fax:  [phone]    Date: 08/25/2022    Patient: Sandip Zhao  YOB: 1940  MRN: [id number]     Time Calculation  Start time: 0800  Stop time: 0845 Time Calculation (min): 45 minutes         Chief Complaint: Hand Weakness and Hand Problem    Visit #: 6    SUBJECTIVE:  I have noticed it is getting easier to tie my shoes.     OBJECTIVE:  Current objective measures:   Neurological Testing   Sensation   Wrist/Hand   Left   Diminished: light touch     Right   Diminished: light touch     Active Range of Motion      Left Wrist   Wrist flexion: 73 degrees   Wrist extension: 75 degrees   Wrist pronation: WFL  Wrist supination: 80 degrees   Radial deviation: WFL  Ulnar deviation: WFL      Right Wrist   Wrist flexion: 60 degrees   Wrist extension: 75 degrees   Wrist pronation: WFL  Wrist supination: 90 degrees - WNL    Radial deviation: WFL  Ulnar deviation: WFL     Passive Range of Motion      Left Wrist   Normal passive range of motion     Right Wrist   Normal passive range of motion    Additional Passive Range of Motion Details  Moderate tightness of median nerve for both UEs.      Strength      Left Wrist/Hand       (2nd hand position)     Trial 1: 55     Right Wrist/Hand       (2nd hand position)     Trial 2: 52     Tests      Left Wrist/Hand   Positive Tinel's sign (median nerve).      Right Wrist/Hand   Positive Tinel's sign (median nerve).      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  "Continues to need reminders to take a rest break and stretch at LEAST every 30 minutes     Not using right splint at night as his hand is \"less numb\" and continues to wear left splint at night.          Therapeutic Exercises (CPT 95260):     1. median nerve stretches    2. AROM and AAROM of B wrist flexion/extension    3. medium resistance theraputty    4. fine motor manipulation and dexterity, manipulation of coins, 9 hole peg test, grooved pegboard and use of tweezers and screwdriver    5. velcro roll with cylindrical grasp,  5 x for each hand    6. 9# digi-flex , 10 x for each hand    7. 11# graded clip in lateral and 3 point pinch , 5 x for each prehension for each hand    8. Flexion/extension exercise stick in horizontal and vertical positions , 15 x in each position    9. isolated supination exercises of left arm, 10 x     Therapeutic Treatments and Modalities:    1. Therapeutic Activities (CPT 74027)    Therapeutic Treatments and Modalities Summary: Reviewed CTS it's causes and what to do to minimize symptoms.  Patient stated he is taking his rest breaks \"more or less\" every 30 - 45 minutes.  OTR encouraged patient to engage in stretches every 30 minutes while working at the [employer].  Time-based treatments/modalities:  Therapeutic exercise minutes (CPT 17582): 30 minutes  Therapeutic activity minutes (CPT 94358): 15 minutes        Pain rating before treatment: 0  Pain rating after treatment: 0    ASSESSMENT:   Response to treatment: improved bilateral  strength; however dexterity remains status quo.  Progress note completed.      PLAN/RECOMMENDATIONS:   Plan for treatment: therapy treatment to continue next visit.  Planned interventions for next visit: continue with current treatment, therapeutic activities (CPT 13592), and therapeutic exercise (CPT 19040)         "

## 2022-08-25 NOTE — OP THERAPY PROGRESS SUMMARY
Outpatient Occupational Therapy  PROGRESS SUMMARY NOTE    [facility] Occupational Therapy [address].  [address]  [place] [address]  Phone:  [phone]  Fax:  [phone]    Date of Visit: 08/25/2022    Patient: Sandip Zhao  YOB: 1940  MRN: [id number]     Referring Provider: Donna Tobias M.D.  [address]  [address]  [place] [address]   Referring Diagnosis Carpal tunnel syndrome, bilateral upper limbs [G56.03];Paresthesia of skin [R20.2];Anesthesia of skin [R20.0];Other symptoms and signs involving the musculoskeletal system [R29.898]     Visit Diagnoses     ICD-10-CM   1. Carpal tunnel syndrome on both sides  G56.03   2. Numbness and tingling in both hands  R20.0    R20.2   3. Hand weakness  R29.898       Rehab Potential: excellent    Progress Report Period: 7/14/22-8/25/22    Functional Assessment Used:  UEFI = 58/80          Objective Findings and Assessment:   Patient progression towards goals: Patient was attending OT 1 x a week for hand therapy, education on CTS and preventative measures and also ergonomics/positioning to minimize aggravation of symptoms.  Patient is progressing well and we have decided to review now on a monthly basis .      Objective findings and assessment details: Current objective measures:   Neurological Testing   Sensation   Wrist/Hand   Left   Diminished: light touch     Right   Diminished: light touch     Active Range of Motion      Left Wrist   Wrist flexion: 73 degrees   Wrist extension: 75 degrees   Wrist pronation: WFL  Wrist supination: 80 degrees   Radial deviation: WFL  Ulnar deviation: WFL      Right Wrist   Wrist flexion: 60 degrees   Wrist extension: 75 degrees   Wrist pronation: WFL  Wrist supination: 90 degrees - WNL    Radial deviation: WFL  Ulnar deviation: WFL     Passive Range of Motion      Left Wrist   Normal passive range of motion     Right Wrist   Normal passive range of motion    Additional Passive Range of  "Motion Details  Moderate tightness of median nerve for both UEs.      Strength      Left Wrist/Hand       (2nd hand position)     Trial 1: 55     Right Wrist/Hand       (2nd hand position)     Trial 2: 52     Tests      Left Wrist/Hand   Positive Tinel's sign (median nerve).      Right Wrist/Hand   Positive Tinel's sign (median nerve).      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 Continues to need reminders to take a rest break and stretch at LEAST every 30 minutes     Not using right splint at night as his hand is \"less numb\" and continues to wear left splint at night.     Goals:   Short Term Goals:   Patient will have increased knowledge of body mechanics/ergonomics to enhance posture and postioning while writing or using computer  decrease pain, increase ADL independence, increase range of motion, increase strength and independent with HEP performance  Short term goal timespan:  4-6 weeks    Long Term Goals:   Patient will increase  strength by at least 20 pounds to enhance domestic and leisure tasks  Patient will have full AROM of wrists.  Patient will score 25 seconds or less on the 9 hole peg test due to enhance manipulation and dexterity skills  Patient will score at least 55/80 on the UEFI  Long term goal timespan:  6-8 weeks    Plan:   Planned therapy interventions:  Therapeutic Exercise (CPT 42890) and Therapeutic Activities (CPT 46564)  Frequency:  1x month  Duration in weeks:  8  Duration in visits:  2    Referring provider co-signature:  I have reviewed this plan of care and my co-signature certifies the need for services.    Certification Period: " 08/25/2022 to 10/20/22    Physician Signature: ________________________________ Date: ______________

## 2022-09-08 ENCOUNTER — APPOINTMENT (OUTPATIENT)
Dept: OCCUPATIONAL THERAPY | Facility: REHABILITATION | Age: 82
End: 2022-09-08
Attending: STUDENT IN AN ORGANIZED HEALTH CARE EDUCATION/TRAINING PROGRAM
Payer: MEDICARE

## 2022-09-20 ENCOUNTER — OCCUPATIONAL THERAPY (OUTPATIENT)
Dept: OCCUPATIONAL THERAPY | Facility: REHABILITATION | Age: 82
End: 2022-09-20
Attending: STUDENT IN AN ORGANIZED HEALTH CARE EDUCATION/TRAINING PROGRAM
Payer: MEDICARE

## 2022-09-20 DIAGNOSIS — R29.898 HAND WEAKNESS: ICD-10-CM

## 2022-09-20 DIAGNOSIS — R20.0 NUMBNESS AND TINGLING IN BOTH HANDS: ICD-10-CM

## 2022-09-20 DIAGNOSIS — G56.03 CARPAL TUNNEL SYNDROME ON BOTH SIDES: ICD-10-CM

## 2022-09-20 DIAGNOSIS — R20.2 NUMBNESS AND TINGLING IN BOTH HANDS: ICD-10-CM

## 2022-09-20 PROCEDURE — 97110 THERAPEUTIC EXERCISES: CPT

## 2022-09-20 NOTE — OP THERAPY DAILY TREATMENT
Outpatient Occupational Therapy  DAILY TREATMENT     [facility] Occupational Therapy [address]  [address]  [place] [address]  Phone:  [phone]  Fax:  [phone]    Date: 09/20/2022    Patient: Sandip Zhao  YOB: 1940  MRN: [id number]     Time Calculation  Start time: 0920  Stop time: 1005 Time Calculation (min): 45 minutes         Chief Complaint: Hand Weakness and Hand Problem    Visit #: 7    SUBJECTIVE:  My hands are so much better.  I am writing better too!      OBJECTIVE:  Current objective measures:   Neurological Testing   Sensation   Wrist/Hand   Left   Diminished: light touch     Right   Diminished: light touch     Active Range of Motion      Left Wrist   Wrist flexion: 73 degrees   Wrist extension: 75 degrees   Wrist pronation: WFL  Wrist supination: 80 degrees   Radial deviation: WFL  Ulnar deviation: WFL      Right Wrist   Wrist flexion: 60 degrees   Wrist extension: 75 degrees   Wrist pronation: WFL  Wrist supination: 90 degrees - WNL    Radial deviation: WFL  Ulnar deviation: WFL     Passive Range of Motion      Left Wrist   Normal passive range of motion     Right Wrist   Normal passive range of motion     Additional Passive Range of Motion Details  Moderate tightness of median nerve for both UEs.      Strength      Left Wrist/Hand       (2nd hand position)     Trial 1: 57     Right Wrist/Hand       (2nd hand position)     Trial 2: 61     Tests      Left Wrist/Hand   Positive Tinel's sign (median nerve).      Right Wrist/Hand   Positive Tinel's sign (median nerve).      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  "Continues to need reminders to take a rest break and stretch at LEAST every 30 minutes     Not using right splint at night as his hand is \"less numb\" and continues to wear left splint at night.         Therapeutic Exercises (CPT 94929):     1. median nerve stretches    2. AROM and AAROM of B wrist flexion/extension    3. medium resistance theraputty    4. fine motor manipulation and dexterity, manipulation of coins, 9 hole peg test, grooved pegboard and use of tweezers and screwdriver    5. velcro roll with cylindrical grasp,  5 x for each hand    6. 9# digi-flex , 10 x for each hand    7. 11# graded clip in lateral and 3 point pinch , 5 x for each prehension for each hand    8. Flexion/extension exercise stick in horizontal and vertical positions , 15 x in each position    9. isolated supination exercises of left arm, 10 x     Therapeutic Treatments and Modalities:    1. Therapeutic Activities (CPT 15866)    Therapeutic Treatments and Modalities Summary: Reviewed CTS it's causes and what to do to minimize symptoms.  Patient stated he is taking his rest breaks \"more or less\" every 30 - 45 minutes.  OTR encouraged patient to engage in stretches every 30 minutes while working at the [employer].  Time-based treatments/modalities:  Therapeutic exercise minutes (CPT 74873): 45 minutes        Pain rating before treatment: 0  Pain rating after treatment: 0    ASSESSMENT:   Response to treatment: Patient has progressed well in therapy and to continue with HEP and body mechanics/posture     PLAN/RECOMMENDATIONS:   Plan for treatment: no further treatment needed.  Planned interventions for next visit:  Last treatment session today.         "

## 2022-09-20 NOTE — OP THERAPY DISCHARGE SUMMARY
Outpatient Occupational Therapy  DISCHARGE SUMMARY NOTE    [address]  [address]  [place] [address]  Phone:  [phone]  Fax:  [phone]    Date of Visit: 09/20/2022    Patient: Sandip Zhao  YOB: 1940  MRN: [id number]     Referring Provider: Donna Tobias M.D.  [address]  [address]  [place] [address]   Referring Diagnosis: Carpal tunnel syndrome, bilateral upper limbs [G56.03];Anesthesia of skin [R20.0];Paresthesia of skin [R20.2];Other symptoms and signs involving the musculoskeletal system [R29.898]         Functional Assessment Used:  UEFI  65/80        Your patient is being discharged from Occupational Therapy with the following comments:   Goals met  Patient will have full AROM of bilateral wrists  Patient will have increased knowledge of body mechanics/ergonomics to enhance posture and postioning while writing or using computer  Patient will increase  strength by at least 10 pounds to enhance domestic and leisure tasks  Patient will score 25 seconds or less on the 9 hole peg test due to enhance manipulation and dexterity skills  Patient will score at least 55/80 on the UEFI  Comments:  Current objective findings:   Current objective measures:   Neurological Testing   Sensation   Wrist/Hand   Left   Diminished: light touch     Right   Diminished: light touch     Active Range of Motion      Left Wrist   Wrist flexion: 73 degrees   Wrist extension: 75 degrees   Wrist pronation: WFL  Wrist supination: 80 degrees   Radial deviation: WFL  Ulnar deviation: WFL      Right Wrist   Wrist flexion: 60 degrees   Wrist extension: 75 degrees   Wrist pronation: WFL  Wrist supination: 90 degrees - WNL    Radial deviation: WFL  Ulnar deviation: WFL     Passive Range of Motion      Left Wrist   Normal passive range of motion     Right Wrist   Normal passive range of motion     Additional Passive Range of Motion Details  Moderate  "tightness of median nerve for both UEs.      Strength      Left Wrist/Hand       (2nd hand position)     Trial 1: 57     Right Wrist/Hand       (2nd hand position)     Trial 2: 61     Tests      Left Wrist/Hand   Positive Tinel's sign (median nerve).      Right Wrist/Hand   Positive Tinel's sign (median nerve).      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 Continues to need reminders to take a rest break and stretch at LEAST every 30 minutes     Not using right splint at night as his hand is \"less numb\" and continues to wear left splint at night.     Limitations Remaining  none    Recommendations:  Continue with KATERINA Beck/L    Date: 9/20/2022           "

## 2022-10-04 ENCOUNTER — APPOINTMENT (OUTPATIENT)
Dept: OCCUPATIONAL THERAPY | Facility: REHABILITATION | Age: 82
End: 2022-10-04
Attending: STUDENT IN AN ORGANIZED HEALTH CARE EDUCATION/TRAINING PROGRAM
Payer: MEDICARE

## 2022-10-17 ENCOUNTER — HOSPITAL ENCOUNTER (EMERGENCY)
Facility: MEDICAL CENTER | Age: 82
End: 2022-10-17
Payer: MEDICARE

## 2022-10-17 VITALS
RESPIRATION RATE: 13 BRPM | SYSTOLIC BLOOD PRESSURE: 156 MMHG | DIASTOLIC BLOOD PRESSURE: 77 MMHG | BODY MASS INDEX: 26.31 KG/M2 | OXYGEN SATURATION: 97 % | HEIGHT: 64 IN | WEIGHT: 154.1 LBS | TEMPERATURE: 97.1 F | HEART RATE: 66 BPM

## 2022-10-17 PROCEDURE — 302449 STATCHG TRIAGE ONLY (STATISTIC)

## 2022-10-17 ASSESSMENT — FIBROSIS 4 INDEX: FIB4 SCORE: 1.4

## 2022-10-17 NOTE — ED TRIAGE NOTES
Chief Complaint   Patient presents with    Blood Pressure Problem     Pt states that his blood pressure has been elevated this morning. States he's compliant with current BP meds. Pt states 1st BP this morning was 140/83, 2nd was 156/82. Pt states these are unusual as he is normally around 125/70.     Pt denies any chest pain.    Pt educated upon triage process and told to inform  staff of any changes in condition so that Pt may be reassessed. No further questions at this time. Pt sitting out in lobby.

## 2022-10-18 ENCOUNTER — HOSPITAL ENCOUNTER (OUTPATIENT)
Dept: LAB | Facility: MEDICAL CENTER | Age: 82
End: 2022-10-18
Attending: INTERNAL MEDICINE
Payer: MEDICARE

## 2022-10-18 LAB
ALBUMIN SERPL BCP-MCNC: 4.3 G/DL (ref 3.2–4.9)
ALBUMIN/GLOB SERPL: 1.6 G/DL
ALP SERPL-CCNC: 106 U/L (ref 30–99)
ALT SERPL-CCNC: 41 U/L (ref 2–50)
ANION GAP SERPL CALC-SCNC: 10 MMOL/L (ref 7–16)
AST SERPL-CCNC: 24 U/L (ref 12–45)
BILIRUB SERPL-MCNC: 0.7 MG/DL (ref 0.1–1.5)
BUN SERPL-MCNC: 12 MG/DL (ref 8–22)
CALCIUM SERPL-MCNC: 9.6 MG/DL (ref 8.5–10.5)
CHLORIDE SERPL-SCNC: 104 MMOL/L (ref 96–112)
CHOLEST SERPL-MCNC: 128 MG/DL (ref 100–199)
CO2 SERPL-SCNC: 26 MMOL/L (ref 20–33)
CREAT SERPL-MCNC: 0.86 MG/DL (ref 0.5–1.4)
EST. AVERAGE GLUCOSE BLD GHB EST-MCNC: 120 MG/DL
FASTING STATUS PATIENT QL REPORTED: NORMAL
GFR SERPLBLD CREATININE-BSD FMLA CKD-EPI: 86 ML/MIN/1.73 M 2
GLOBULIN SER CALC-MCNC: 2.7 G/DL (ref 1.9–3.5)
GLUCOSE SERPL-MCNC: 99 MG/DL (ref 65–99)
HBA1C MFR BLD: 5.8 % (ref 4–5.6)
HDLC SERPL-MCNC: 46 MG/DL
LDLC SERPL CALC-MCNC: 64 MG/DL
POTASSIUM SERPL-SCNC: 3.8 MMOL/L (ref 3.6–5.5)
PROT SERPL-MCNC: 7 G/DL (ref 6–8.2)
SODIUM SERPL-SCNC: 140 MMOL/L (ref 135–145)
TRIGL SERPL-MCNC: 90 MG/DL (ref 0–149)

## 2022-10-18 PROCEDURE — 36415 COLL VENOUS BLD VENIPUNCTURE: CPT

## 2022-10-18 PROCEDURE — 83036 HEMOGLOBIN GLYCOSYLATED A1C: CPT

## 2022-10-18 PROCEDURE — 80061 LIPID PANEL: CPT

## 2022-10-18 PROCEDURE — 80053 COMPREHEN METABOLIC PANEL: CPT

## 2022-10-27 ENCOUNTER — TELEPHONE (OUTPATIENT)
Dept: PHYSICAL MEDICINE AND REHAB | Facility: MEDICAL CENTER | Age: 82
End: 2022-10-27
Payer: MEDICARE

## 2022-10-27 NOTE — TELEPHONE ENCOUNTER
Patient called and canceled his up coming apt he feels great and finished his Physical therapy. Unless Dr. Tobias needs to see him.          Thank you         Leida

## 2022-11-01 ENCOUNTER — APPOINTMENT (OUTPATIENT)
Dept: PHYSICAL MEDICINE AND REHAB | Facility: MEDICAL CENTER | Age: 82
End: 2022-11-01
Payer: MEDICARE

## 2023-01-01 ENCOUNTER — HOSPITAL ENCOUNTER (EMERGENCY)
Facility: MEDICAL CENTER | Age: 83
End: 2023-01-01
Attending: EMERGENCY MEDICINE
Payer: MEDICARE

## 2023-01-01 ENCOUNTER — APPOINTMENT (OUTPATIENT)
Dept: RADIOLOGY | Facility: MEDICAL CENTER | Age: 83
End: 2023-01-01
Attending: EMERGENCY MEDICINE
Payer: MEDICARE

## 2023-01-01 VITALS
HEIGHT: 63 IN | RESPIRATION RATE: 18 BRPM | DIASTOLIC BLOOD PRESSURE: 60 MMHG | HEART RATE: 71 BPM | WEIGHT: 149 LBS | OXYGEN SATURATION: 98 % | TEMPERATURE: 98.6 F | BODY MASS INDEX: 26.4 KG/M2 | SYSTOLIC BLOOD PRESSURE: 125 MMHG

## 2023-01-01 DIAGNOSIS — Z79.01 CHRONIC ANTICOAGULATION: ICD-10-CM

## 2023-01-01 DIAGNOSIS — R00.2 PALPITATIONS: ICD-10-CM

## 2023-01-01 DIAGNOSIS — I48.0 PAROXYSMAL ATRIAL FIBRILLATION (HCC): ICD-10-CM

## 2023-01-01 DIAGNOSIS — I45.5 SINUS PAUSE: ICD-10-CM

## 2023-01-01 DIAGNOSIS — R07.9 CHEST PAIN, UNSPECIFIED TYPE: ICD-10-CM

## 2023-01-01 LAB
ALBUMIN SERPL BCP-MCNC: 4.2 G/DL (ref 3.2–4.9)
ALBUMIN/GLOB SERPL: 1.6 G/DL
ALP SERPL-CCNC: 95 U/L (ref 30–99)
ALT SERPL-CCNC: 33 U/L (ref 2–50)
ANION GAP SERPL CALC-SCNC: 11 MMOL/L (ref 7–16)
AST SERPL-CCNC: 20 U/L (ref 12–45)
BASOPHILS # BLD AUTO: 0.5 % (ref 0–1.8)
BASOPHILS # BLD: 0.03 K/UL (ref 0–0.12)
BILIRUB SERPL-MCNC: 0.5 MG/DL (ref 0.1–1.5)
BUN SERPL-MCNC: 15 MG/DL (ref 8–22)
CALCIUM ALBUM COR SERPL-MCNC: 9.3 MG/DL (ref 8.5–10.5)
CALCIUM SERPL-MCNC: 9.5 MG/DL (ref 8.5–10.5)
CHLORIDE SERPL-SCNC: 105 MMOL/L (ref 96–112)
CO2 SERPL-SCNC: 22 MMOL/L (ref 20–33)
CREAT SERPL-MCNC: 0.92 MG/DL (ref 0.5–1.4)
EKG IMPRESSION: NORMAL
EOSINOPHIL # BLD AUTO: 0.07 K/UL (ref 0–0.51)
EOSINOPHIL NFR BLD: 1.2 % (ref 0–6.9)
ERYTHROCYTE [DISTWIDTH] IN BLOOD BY AUTOMATED COUNT: 42.1 FL (ref 35.9–50)
GFR SERPLBLD CREATININE-BSD FMLA CKD-EPI: 83 ML/MIN/1.73 M 2
GLOBULIN SER CALC-MCNC: 2.7 G/DL (ref 1.9–3.5)
GLUCOSE SERPL-MCNC: 115 MG/DL (ref 65–99)
HCT VFR BLD AUTO: 39.7 % (ref 42–52)
HGB BLD-MCNC: 13.6 G/DL (ref 14–18)
IMM GRANULOCYTES # BLD AUTO: 0.01 K/UL (ref 0–0.11)
IMM GRANULOCYTES NFR BLD AUTO: 0.2 % (ref 0–0.9)
LYMPHOCYTES # BLD AUTO: 1.78 K/UL (ref 1–4.8)
LYMPHOCYTES NFR BLD: 30.5 % (ref 22–41)
MCH RBC QN AUTO: 30.6 PG (ref 27–33)
MCHC RBC AUTO-ENTMCNC: 34.3 G/DL (ref 33.7–35.3)
MCV RBC AUTO: 89.4 FL (ref 81.4–97.8)
MONOCYTES # BLD AUTO: 0.65 K/UL (ref 0–0.85)
MONOCYTES NFR BLD AUTO: 11.1 % (ref 0–13.4)
NEUTROPHILS # BLD AUTO: 3.29 K/UL (ref 1.82–7.42)
NEUTROPHILS NFR BLD: 56.5 % (ref 44–72)
NRBC # BLD AUTO: 0 K/UL
NRBC BLD-RTO: 0 /100 WBC
PLATELET # BLD AUTO: 223 K/UL (ref 164–446)
PMV BLD AUTO: 9.1 FL (ref 9–12.9)
POTASSIUM SERPL-SCNC: 3.8 MMOL/L (ref 3.6–5.5)
PROT SERPL-MCNC: 6.9 G/DL (ref 6–8.2)
RBC # BLD AUTO: 4.44 M/UL (ref 4.7–6.1)
SODIUM SERPL-SCNC: 138 MMOL/L (ref 135–145)
TROPONIN T SERPL-MCNC: 14 NG/L (ref 6–19)
TROPONIN T SERPL-MCNC: 15 NG/L (ref 6–19)
WBC # BLD AUTO: 5.8 K/UL (ref 4.8–10.8)

## 2023-01-01 PROCEDURE — 99285 EMERGENCY DEPT VISIT HI MDM: CPT

## 2023-01-01 PROCEDURE — 36415 COLL VENOUS BLD VENIPUNCTURE: CPT

## 2023-01-01 PROCEDURE — A9270 NON-COVERED ITEM OR SERVICE: HCPCS | Performed by: EMERGENCY MEDICINE

## 2023-01-01 PROCEDURE — 93005 ELECTROCARDIOGRAM TRACING: CPT

## 2023-01-01 PROCEDURE — 700102 HCHG RX REV CODE 250 W/ 637 OVERRIDE(OP): Performed by: EMERGENCY MEDICINE

## 2023-01-01 PROCEDURE — 93005 ELECTROCARDIOGRAM TRACING: CPT | Performed by: EMERGENCY MEDICINE

## 2023-01-01 PROCEDURE — 80053 COMPREHEN METABOLIC PANEL: CPT

## 2023-01-01 PROCEDURE — 71045 X-RAY EXAM CHEST 1 VIEW: CPT

## 2023-01-01 PROCEDURE — 85025 COMPLETE CBC W/AUTO DIFF WBC: CPT

## 2023-01-01 PROCEDURE — 84484 ASSAY OF TROPONIN QUANT: CPT

## 2023-01-01 RX ORDER — DILTIAZEM HYDROCHLORIDE 60 MG/1
60 TABLET, FILM COATED ORAL ONCE
Status: COMPLETED | OUTPATIENT
Start: 2023-01-01 | End: 2023-01-01

## 2023-01-01 RX ADMIN — DILTIAZEM HYDROCHLORIDE 60 MG: 60 TABLET, FILM COATED ORAL at 09:08

## 2023-01-01 ASSESSMENT — FIBROSIS 4 INDEX: FIB4 SCORE: 1.31

## 2023-01-01 NOTE — ED NOTES
Patient medicated per MAR.  Pt discharged to home. Pt was given follow up instructions. Pt verbalized understanding of all instructions for discharge and is ambulatory out of ED with steady gait. AOx4

## 2023-01-01 NOTE — ED PROVIDER NOTES
ER Provider Note    Scribed for Keith Milner M.d. by Karla White. 1/1/2023  5:43 AM    Primary Care Provider: Meagan Mcneil M.D.    CHIEF COMPLAINT  Chief Complaint   Patient presents with    Chest Pain     Pt reports chest pressure radiating to L neck since 0300.      EXTERNAL RECORDS REVIEWED  Select: Outpatient Notes      HPI  LIMITATION TO HISTORY   Select: : None  OUTSIDE HISTORIAN(S):  Select: Significant other who corroborated the patients story.    Sandip Zhao is a 82 y.o. male brought in by EMS who presents to the ED complaining of left-sided chest pain onset this morning. He states that the pain radiates to the left side of his neck. He was sleeping and he first began to feel symptoms. He has associated symptoms of tachycardia and irregular heart beat. He denies shortness of breath. He checked his blood pressure at home and it was elevated. 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 He follows with Dr. Shilpa Mcclendon (Cardiology) every 3-6 months. He states that he exercises regularly.    REVIEW OF SYSTEMS  Pertinent positives include chest pain, tachycardia, and irregular heart beat. Pertinent negatives include no shortness of breath.  All other systems reviewed and negative.     PAST MEDICAL HISTORY  Past Medical History:   Diagnosis Date    Environmental allergies        SURGICAL HISTORY  Past Surgical History:   Procedure Laterality Date    ABDOMINAL EXPLORATION      APPENDECTOMY      KNEE ARTHROSCOPY         FAMILY HISTORY  Family History   Problem Relation Age of Onset    Hypertension Father     Heart Disease Father         mitral valve replacement       SOCIAL HISTORY   reports that he has never smoked. He has never used smokeless tobacco. He reports that he does not currently use alcohol. He reports that he does not use drugs.    CURRENT MEDICATIONS  Discharge Medication List as of 1/1/2023  8:31 AM        CONTINUE these medications which  "have NOT CHANGED    Details   methylPREDNISolone (MEDROL DOSEPAK) 4 MG Tablet Therapy Pack Follow schedule on package instructions., Disp-21 Tablet, R-0, Normal      Acetaminophen (TYLENOL ARTHRITIS PAIN PO) Take  by mouth., Historical Med      rosuvastatin (CRESTOR) 20 MG Tab rosuvastatin calcium 20 mg tabs, Historical Med      apixaban (ELIQUIS) 5mg Tab Take 5 mg by mouth 2 Times a Day., Historical Med      pravastatin (PRAVACHOL) 80 MG tablet Take 80 mg by mouth every evening., Historical Med      diltiazem (CARDIZEM) 60 MG Tab Take 60 mg by mouth 4 times a day., Historical Med      ezetimibe (ZETIA) 10 MG Tab Take 10 mg by mouth every day., Historical Med      Ascorbic Acid (VITAMIN C PO) Take  by mouth., Historical Med      oseltamivir (TAMIFLU) 75 MG Cap Take 1 Cap by mouth 2 times a day., Disp-10 Cap, R-0, Normal      albuterol 108 (90 BASE) MCG/ACT Aero Soln inhalation aerosol Inhale 2 Puffs by mouth every 6 hours as needed for Shortness of Breath., Disp-8.5 g, R-0, Normal      LISINOPRIL PO Take  by mouth., Historical Med      azithromycin (ZITHROMAX) 250 MG TABS 2 tabs by mouth day 1, 1 tab by mouth days 2-5Start if your sputum becomes purulentDisp-6 Tab, R-0, Print      Ibuprofen 200 MG Cap Take  by mouth., Historical Med             ALLERGIES  Patient has no known allergies.    PHYSICAL EXAM  BP (!) 152/72   Pulse 73   Temp 36.7 °C (98.1 °F) (Temporal)   Resp 16   Ht 1.6 m (5' 3\")   Wt 67.6 kg (149 lb)   SpO2 99%   BMI 26.39 kg/m²     Nursing note and vitals reviewed.  Constitutional: Well-developed and well-nourished. No distress.  HENT: Head is normocephalic and atraumatic. Oropharynx is clear and moist without exudate or erythema.   Eyes: Pupils are equal, round, and reactive to light. Conjunctiva are normal.   Cardiovascular: Normal rate. Irregular heart rhythm, correlates with sinus pause on the monitor. No murmur heard. Normal radial pulses.   Pulmonary/Chest: Breath sounds normal. No " wheezes or rales. No chest wall tenderness.   Abdominal: Soft and non-tender. No distention   Musculoskeletal: Extremities exhibit normal range of motion without edema or tenderness. No calf tenderness or palpable cords.   Neurological: Awake, alert and oriented to person, place, and time. No focal deficits noted.  Skin: Skin is warm and dry. No rash.   Psychiatric: Normal mood and affect. Appropriate for clinical situation     DIAGNOSTIC STUDIES    Labs:   Results for orders placed or performed during the hospital encounter of 01/01/23   CBC with Differential   Result Value Ref Range    WBC 5.8 4.8 - 10.8 K/uL    RBC 4.44 (L) 4.70 - 6.10 M/uL    Hemoglobin 13.6 (L) 14.0 - 18.0 g/dL    Hematocrit 39.7 (L) 42.0 - 52.0 %    MCV 89.4 81.4 - 97.8 fL    MCH 30.6 27.0 - 33.0 pg    MCHC 34.3 33.7 - 35.3 g/dL    RDW 42.1 35.9 - 50.0 fL    Platelet Count 223 164 - 446 K/uL    MPV 9.1 9.0 - 12.9 fL    Neutrophils-Polys 56.50 44.00 - 72.00 %    Lymphocytes 30.50 22.00 - 41.00 %    Monocytes 11.10 0.00 - 13.40 %    Eosinophils 1.20 0.00 - 6.90 %    Basophils 0.50 0.00 - 1.80 %    Immature Granulocytes 0.20 0.00 - 0.90 %    Nucleated RBC 0.00 /100 WBC    Neutrophils (Absolute) 3.29 1.82 - 7.42 K/uL    Lymphs (Absolute) 1.78 1.00 - 4.80 K/uL    Monos (Absolute) 0.65 0.00 - 0.85 K/uL    Eos (Absolute) 0.07 0.00 - 0.51 K/uL    Baso (Absolute) 0.03 0.00 - 0.12 K/uL    Immature Granulocytes (abs) 0.01 0.00 - 0.11 K/uL    NRBC (Absolute) 0.00 K/uL   Complete Metabolic Panel (CMP)   Result Value Ref Range    Sodium 138 135 - 145 mmol/L    Potassium 3.8 3.6 - 5.5 mmol/L    Chloride 105 96 - 112 mmol/L    Co2 22 20 - 33 mmol/L    Anion Gap 11.0 7.0 - 16.0    Glucose 115 (H) 65 - 99 mg/dL    Bun 15 8 - 22 mg/dL    Creatinine 0.92 0.50 - 1.40 mg/dL    Calcium 9.5 8.5 - 10.5 mg/dL    AST(SGOT) 20 12 - 45 U/L    ALT(SGPT) 33 2 - 50 U/L    Alkaline Phosphatase 95 30 - 99 U/L    Total Bilirubin 0.5 0.1 - 1.5 mg/dL    Albumin 4.2 3.2 - 4.9 g/dL     Total Protein 6.9 6.0 - 8.2 g/dL    Globulin 2.7 1.9 - 3.5 g/dL    A-G Ratio 1.6 g/dL   Troponins NOW   Result Value Ref Range    Troponin T 15 6 - 19 ng/L   Troponins in two (2) hours   Result Value Ref Range    Troponin T 14 6 - 19 ng/L   CORRECTED CALCIUM   Result Value Ref Range    Correct Calcium 9.3 8.5 - 10.5 mg/dL   ESTIMATED GFR   Result Value Ref Range    GFR (CKD-EPI) 83 >60 mL/min/1.73 m 2   EKG   Result Value Ref Range    Report       [facility] Emergency Dept.    Test Date:  2023  Pt Name:    EVELYN WEAVER             Department: ER  MRN:        [id number]                      Room:       RD 09  Gender:     Male                         Technician: [id number]  :        1940                   Requested By:ER TRIAGE PROTOCOL  Order #:    [id number]                    Reading MD: GILL MCCRAY MD    Measurements  Intervals                                Axis  Rate:       75                           P:          67  ND:         215                          QRS:        16  QRSD:       96                           T:          54  QT:         400  QTc:        447    Interpretive Statements  Sinus rhythm  Sinus pause  Borderline prolonged ND interval  Minimal ST elevation, anterior leads  No previous ECG available for comparison  Electronically Signed On 2023 5:42:59 PST by GILL MCCRAY MD           EKG:   Results for orders placed or performed during the hospital encounter of 23   EKG   Result Value Ref Range    Report       [facility] Emergency Dept.    Test Date:  2023  Pt Name:    EVELYN WEAVER             Department: ER  MRN:        [id number]                      Room:       RD 09  Gender:     Male                         Technician: [id number]  :        1940                   Requested By:ER TRIAGE PROTOCOL  Order #:    [id number]                    Reading MD: GILL MCCRAY MD    Measurements  Intervals                                 Axis  Rate:       75                           P:          67  WI:         215                          QRS:        16  QRSD:       96                           T:          54  QT:         400  QTc:        447    Interpretive Statements  Sinus rhythm  Sinus pause  Borderline prolonged WI interval  Minimal ST elevation, anterior leads  No previous ECG available for comparison  Electronically Signed On 1-1-2023 5:42:59 PST by GILL MCCRAY MD           Radiology:   The attending emergency physician has independently interpreted the diagnostic imaging associated with this visit and am waiting the final reading from the radiologist.   DX-CHEST-PORTABLE (1 VIEW)   Final Result      No acute cardiopulmonary abnormality.             COURSE & MEDICAL DECISION MAKING     Nursing notes, vital signs, PMSFSHx reviewed in chart     5:50 AM - Patient seen and examined at bedside.The patient will be medicated with Cardizem 60 mg . Ordered for DX-chest, troponins every 2 hours, corrected calcium, eGFR, CBC w/ diff, CMP, and Troponins now to evaluate his symptoms. Differential diagnoses include but not limited to: ACS, arrhythmia, and electrolyte abnormality     ED Observation Status? Yes; Patient placed in observation status at 5:50 AM, 1/1/2023.     Placed on cardiac monitor to evaluate for any evidence of arrhythmia such as paroxysmal atrial fibrillation or other arrhythmia    Independent evaluation of the patient's rhythm strip demonstrates occasional limited sinus pause.    6:53 AM - Reevaluated the patient at bedside. Will check delta troponin.    8:15 AM - Delta troponin remains unchanged.    INITIAL ASSESSMENT AND PLAN  Prior records from [facility] reviewed which indicate paroxsymal atrial fibrillation.    Escalation of care considered, and ultimately not performed: IV fluids and acute inpatient care management, however at this time, the patient is most appropriate for outpatient  management.    Barriers to care at this time, including but not limited to:      Diagnostic tests and prescription drugs considered including, but not limited to: Select: Antibiotics however found no evidence of infectious process, Medication modification current diltiazem and anticoagulation is appropriate, D-dimer no clinical evidence of pulmonary embolism, and HEART Score not beneficial given the patient symptoms are more consistent with palpitations as opposed to ACS .    FINAL PROBLEM LIST AND DISPOSITION    In addition to the chief complaint, the following problems were addressed: EKG demonstrates a sinus pause.  Patient was observed on the cardiac monitor.  Pauses were not prolonged and did not seem to correlate with symptoms.  I feel this is incidental.    I have discussed management of the patient with the following physicians and OK's:      Discussion of management with other [facility] or appropriate source(s):      The patient will return for new or worsening symptoms and is stable at the time of discharge.    DISPOSITION:  Patient will be discharged home in stable condition.    FOLLOW UP:  Meagan Mcneil M.D.  [address]  [place] [facility] [id number] [phone]    Schedule an appointment as soon as possible for a visit       [facility], Emergency Dept  [address] [address] [phone]    If symptoms worsen    Shilpa Mcclendon M.D.  [address]  [place] [id number] [phone]    Schedule an appointment as soon as possible for a visit       FINAL IMPRESSION   1. Chest pain, unspecified type    2. Palpitations    3. Paroxysmal atrial fibrillation (HCC)    4. Chronic anticoagulation    5. Sinus pause          IKarla), am scribing for, and in the presence of, No att. providers found.    Electronically signed by: Karla Medley), 1/1/2023    I, No att. providers found personally performed the  services described in this documentation, as scribed by Karla White in my presence, and it is both accurate and complete.     The note accurately reflects work and decisions made by me.  Keith Milner M.D.  1/1/2023  12:19 PM

## 2023-01-01 NOTE — ED TRIAGE NOTES
"Chief Complaint   Patient presents with    Chest Pain     Pt reports chest pressure radiating to L neck since 0300.        BIB EMS to [facility], pt on monitor and in gown, labs drawn and sent. Pt reports waking up at about 0300 this morning with L sided chest pressure radiating to L neck and irregular and fast HR. Pt states hx of MI in 2015 and cardiac stents.  Pt states this pain is different than his MI.     BP (!) 152/72   Pulse 73   Temp 36.7 °C (98.1 °F) (Temporal)   Resp 16   Ht 1.6 m (5' 3\")   Wt 67.6 kg (149 lb)   SpO2 99%   BMI 26.39 kg/m²    "

## 2023-01-06 ENCOUNTER — HOSPITAL ENCOUNTER (OUTPATIENT)
Dept: RADIOLOGY | Facility: MEDICAL CENTER | Age: 83
End: 2023-01-06
Attending: NURSE PRACTITIONER
Payer: MEDICARE

## 2023-01-06 ENCOUNTER — NON-PROVIDER VISIT (OUTPATIENT)
Dept: CARDIOLOGY | Facility: MEDICAL CENTER | Age: 83
End: 2023-01-06
Payer: MEDICARE

## 2023-01-06 DIAGNOSIS — R94.31 ABNORMAL EKG: ICD-10-CM

## 2023-01-06 DIAGNOSIS — R07.9 CHEST PAIN, UNSPECIFIED TYPE: ICD-10-CM

## 2023-01-06 DIAGNOSIS — R00.2 PALPITATIONS: ICD-10-CM

## 2023-01-06 PROCEDURE — 78452 HT MUSCLE IMAGE SPECT MULT: CPT | Mod: 26 | Performed by: INTERNAL MEDICINE

## 2023-01-06 PROCEDURE — 93018 CV STRESS TEST I&R ONLY: CPT | Performed by: INTERNAL MEDICINE

## 2023-01-06 PROCEDURE — 700111 HCHG RX REV CODE 636 W/ 250 OVERRIDE (IP)

## 2023-01-06 PROCEDURE — 78452 HT MUSCLE IMAGE SPECT MULT: CPT

## 2023-01-06 RX ORDER — REGADENOSON 0.08 MG/ML
INJECTION, SOLUTION INTRAVENOUS
Status: COMPLETED
Start: 2023-01-06 | End: 2023-01-06

## 2023-01-06 RX ADMIN — REGADENOSON 0.4 MG: 0.08 INJECTION, SOLUTION INTRAVENOUS at 14:36

## 2023-01-06 NOTE — PROGRESS NOTES
Patient enrolled in the 14 day [facility] Heart monitoring program per BUTCH Torrez..  >Office hook-up with Baseline transmitted, serial # [id number]..  >Pending EOS

## 2023-01-23 ENCOUNTER — TELEPHONE (OUTPATIENT)
Dept: CARDIOLOGY | Facility: MEDICAL CENTER | Age: 83
End: 2023-01-23
Payer: MEDICARE

## 2023-02-01 ENCOUNTER — HOSPITAL ENCOUNTER (OUTPATIENT)
Dept: LAB | Facility: MEDICAL CENTER | Age: 83
End: 2023-02-01
Attending: INTERNAL MEDICINE
Payer: MEDICARE

## 2023-02-01 LAB
CHOLEST SERPL-MCNC: 113 MG/DL (ref 100–199)
EST. AVERAGE GLUCOSE BLD GHB EST-MCNC: 111 MG/DL
FASTING STATUS PATIENT QL REPORTED: NORMAL
HBA1C MFR BLD: 5.5 % (ref 4–5.6)
HDLC SERPL-MCNC: 43 MG/DL
LDLC SERPL CALC-MCNC: 56 MG/DL
TRIGL SERPL-MCNC: 72 MG/DL (ref 0–149)

## 2023-02-01 PROCEDURE — 83036 HEMOGLOBIN GLYCOSYLATED A1C: CPT

## 2023-02-01 PROCEDURE — 80061 LIPID PANEL: CPT

## 2023-02-01 PROCEDURE — 36415 COLL VENOUS BLD VENIPUNCTURE: CPT

## 2023-02-08 PROCEDURE — 93268 ECG RECORD/REVIEW: CPT | Performed by: INTERNAL MEDICINE

## 2023-02-13 ENCOUNTER — APPOINTMENT (OUTPATIENT)
Dept: RADIOLOGY | Facility: MEDICAL CENTER | Age: 83
End: 2023-02-13
Attending: EMERGENCY MEDICINE
Payer: MEDICARE

## 2023-02-13 ENCOUNTER — HOSPITAL ENCOUNTER (EMERGENCY)
Facility: MEDICAL CENTER | Age: 83
End: 2023-02-13
Attending: EMERGENCY MEDICINE
Payer: MEDICARE

## 2023-02-13 VITALS
WEIGHT: 155.2 LBS | DIASTOLIC BLOOD PRESSURE: 67 MMHG | BODY MASS INDEX: 24.94 KG/M2 | HEIGHT: 66 IN | RESPIRATION RATE: 18 BRPM | SYSTOLIC BLOOD PRESSURE: 144 MMHG | HEART RATE: 76 BPM | TEMPERATURE: 97.6 F | OXYGEN SATURATION: 98 %

## 2023-02-13 DIAGNOSIS — R00.2 PALPITATIONS: ICD-10-CM

## 2023-02-13 DIAGNOSIS — R07.89 ATYPICAL CHEST PAIN: ICD-10-CM

## 2023-02-13 DIAGNOSIS — I49.3 PVC'S (PREMATURE VENTRICULAR CONTRACTIONS): ICD-10-CM

## 2023-02-13 LAB
ALBUMIN SERPL BCP-MCNC: 4.2 G/DL (ref 3.2–4.9)
ALBUMIN/GLOB SERPL: 1.6 G/DL
ALP SERPL-CCNC: 90 U/L (ref 30–99)
ALT SERPL-CCNC: 99 U/L (ref 2–50)
ANION GAP SERPL CALC-SCNC: 13 MMOL/L (ref 7–16)
AST SERPL-CCNC: 48 U/L (ref 12–45)
BASOPHILS # BLD AUTO: 0.3 % (ref 0–1.8)
BASOPHILS # BLD: 0.02 K/UL (ref 0–0.12)
BILIRUB SERPL-MCNC: 0.4 MG/DL (ref 0.1–1.5)
BLOOD CULTURE HOLD CXBCH: NORMAL
BUN SERPL-MCNC: 22 MG/DL (ref 8–22)
CALCIUM ALBUM COR SERPL-MCNC: 8.8 MG/DL (ref 8.5–10.5)
CALCIUM SERPL-MCNC: 9 MG/DL (ref 8.5–10.5)
CHLORIDE SERPL-SCNC: 106 MMOL/L (ref 96–112)
CO2 SERPL-SCNC: 19 MMOL/L (ref 20–33)
CREAT SERPL-MCNC: 1.08 MG/DL (ref 0.5–1.4)
EKG IMPRESSION: NORMAL
EOSINOPHIL # BLD AUTO: 0 K/UL (ref 0–0.51)
EOSINOPHIL NFR BLD: 0 % (ref 0–6.9)
ERYTHROCYTE [DISTWIDTH] IN BLOOD BY AUTOMATED COUNT: 42.1 FL (ref 35.9–50)
GFR SERPLBLD CREATININE-BSD FMLA CKD-EPI: 68 ML/MIN/1.73 M 2
GLOBULIN SER CALC-MCNC: 2.7 G/DL (ref 1.9–3.5)
GLUCOSE SERPL-MCNC: 138 MG/DL (ref 65–99)
HCT VFR BLD AUTO: 38.9 % (ref 42–52)
HGB BLD-MCNC: 13.3 G/DL (ref 14–18)
IMM GRANULOCYTES # BLD AUTO: 0.11 K/UL (ref 0–0.11)
IMM GRANULOCYTES NFR BLD AUTO: 1.5 % (ref 0–0.9)
LYMPHOCYTES # BLD AUTO: 1.35 K/UL (ref 1–4.8)
LYMPHOCYTES NFR BLD: 17.8 % (ref 22–41)
MCH RBC QN AUTO: 30.2 PG (ref 27–33)
MCHC RBC AUTO-ENTMCNC: 34.2 G/DL (ref 33.7–35.3)
MCV RBC AUTO: 88.4 FL (ref 81.4–97.8)
MONOCYTES # BLD AUTO: 0.69 K/UL (ref 0–0.85)
MONOCYTES NFR BLD AUTO: 9.1 % (ref 0–13.4)
NEUTROPHILS # BLD AUTO: 5.41 K/UL (ref 1.82–7.42)
NEUTROPHILS NFR BLD: 71.3 % (ref 44–72)
NRBC # BLD AUTO: 0 K/UL
NRBC BLD-RTO: 0 /100 WBC
PLATELET # BLD AUTO: 305 K/UL (ref 164–446)
PMV BLD AUTO: 9.4 FL (ref 9–12.9)
POTASSIUM SERPL-SCNC: 4 MMOL/L (ref 3.6–5.5)
PROT SERPL-MCNC: 6.9 G/DL (ref 6–8.2)
RBC # BLD AUTO: 4.4 M/UL (ref 4.7–6.1)
SODIUM SERPL-SCNC: 138 MMOL/L (ref 135–145)
TROPONIN T SERPL-MCNC: 12 NG/L (ref 6–19)
TROPONIN T SERPL-MCNC: 13 NG/L (ref 6–19)
WBC # BLD AUTO: 7.6 K/UL (ref 4.8–10.8)

## 2023-02-13 PROCEDURE — 99284 EMERGENCY DEPT VISIT MOD MDM: CPT

## 2023-02-13 PROCEDURE — 80053 COMPREHEN METABOLIC PANEL: CPT

## 2023-02-13 PROCEDURE — 84484 ASSAY OF TROPONIN QUANT: CPT

## 2023-02-13 PROCEDURE — 85025 COMPLETE CBC W/AUTO DIFF WBC: CPT

## 2023-02-13 PROCEDURE — 93005 ELECTROCARDIOGRAM TRACING: CPT | Performed by: EMERGENCY MEDICINE

## 2023-02-13 PROCEDURE — 93005 ELECTROCARDIOGRAM TRACING: CPT

## 2023-02-13 PROCEDURE — 71045 X-RAY EXAM CHEST 1 VIEW: CPT

## 2023-02-13 PROCEDURE — 36415 COLL VENOUS BLD VENIPUNCTURE: CPT

## 2023-02-13 ASSESSMENT — FIBROSIS 4 INDEX: FIB4 SCORE: 1.28

## 2023-02-13 NOTE — ED TRIAGE NOTES
".  Chief Complaint   Patient presents with    Chest Pain     Shooting pain/pressure from chest to neck. Hx MI 2015 with stent placement x 3, eliquis patient.     Palpitations     Reports he is having \"skipped beats\"       83 yo male ambulatory to triage for above complaint. Chest pain protocol ordered. EKG done upon arrival.      Pt is alert and oriented, speaking in full sentences, follows commands and responds appropriately to questions.      Patient placed back in lobby and educated on triage process. Asked to inform RN of any changes.    /68   Pulse 68   Temp 36.7 °C (98.1 °F) (Temporal)   Resp 18   Ht 1.676 m (5' 6\")   Wt 70.4 kg (155 lb 3.3 oz)   SpO2 96%   BMI 25.05 kg/m²     "

## 2023-02-14 NOTE — ED PROVIDER NOTES
"  ER Provider Note    Scribed for Jeronimo Sampson M.d. by Hi Du. 2/13/2023  4:44 PM    Primary Care Provider: Meagan Mcneil M.D.    CHIEF COMPLAINT  Chief Complaint   Patient presents with    Chest Pain     Shooting pain/pressure from chest to neck. Hx MI 2015 with stent placement x 3, eliquis patient.     Palpitations     Reports he is having \"skipped beats\"     LIMITATION TO HISTORY   Select: : None    HPI/ROS  OUTSIDE HISTORIAN(S):  Significant other wife    EXTERNAL RECORDS REVIEWED  Care everywhere He was seen here 1/1/23 w/ chest pain and palpitations. He was referred for an outpatient stress test, which was done on 6th and was non-diagnostic. The patient's HR did not go up. Showed old infarct.     Sandip Zhao is a 82 y.o. male with history of myocardial infarction in 2015 with 3 stents placed and Eliquis patient who presents to the ED for intermittent heart palpitations lasting approximately 10-15 seconds and constant \"uneasy\" chest pressure shooting from his chest to his neck onset a few days ago. He states he feels his heart \"skip half beats\". He states his head also feels \"heavy\" and uncomfortable. He denies his chest pressure worsening with exercise. He admits to associated symptoms of increased burping, but denies nausea or diaphoresis. No alleviating factors were reported. He reports his Cardiologist is Dr. Mcclendon. He reports he had a Holter monitor for 2 weeks after experiencing similar symptoms last month, but did not capture the palpitations. He takes Eliquis, Diltiazem, and Ezetimibe daily. He additionally notes he had cortisone injections to his knees last week.      PAST MEDICAL HISTORY  Past Medical History:   Diagnosis Date    Environmental allergies        SURGICAL HISTORY  Past Surgical History:   Procedure Laterality Date    ABDOMINAL EXPLORATION      APPENDECTOMY      KNEE ARTHROSCOPY         FAMILY HISTORY  Family History   Problem Relation Age of Onset    " "Hypertension Father     Heart Disease Father         mitral valve replacement       SOCIAL HISTORY   reports that he has never smoked. He has never used smokeless tobacco. He reports that he does not currently use alcohol. He reports that he does not use drugs.    CURRENT MEDICATIONS  Previous Medications    ACETAMINOPHEN (TYLENOL ARTHRITIS PAIN PO)    Take  by mouth.    ALBUTEROL 108 (90 BASE) MCG/ACT AERO SOLN INHALATION AEROSOL    Inhale 2 Puffs by mouth every 6 hours as needed for Shortness of Breath.    APIXABAN (ELIQUIS) 5MG TAB    Take 5 mg by mouth 2 Times a Day.    ASCORBIC ACID (VITAMIN C PO)    Take  by mouth.    AZITHROMYCIN (ZITHROMAX) 250 MG TABS    2 tabs by mouth day 1, 1 tab by mouth days 2-5    DILTIAZEM (CARDIZEM) 60 MG TAB    Take 60 mg by mouth 4 times a day.    DILTIAZEM (TIAZAC) 120 MG SR CAPSULE    Take 120 mg by mouth.    EZETIMIBE (ZETIA) 10 MG TAB    Take 10 mg by mouth every day.    IBUPROFEN 200 MG CAP    Take  by mouth.    LISINOPRIL PO    Take  by mouth.    METHYLPREDNISOLONE (MEDROL DOSEPAK) 4 MG TABLET THERAPY PACK    Follow schedule on package instructions.    OSELTAMIVIR (TAMIFLU) 75 MG CAP    Take 1 Cap by mouth 2 times a day.    PRAVASTATIN (PRAVACHOL) 80 MG TABLET    Take 80 mg by mouth every evening.    ROSUVASTATIN (CRESTOR) 20 MG TAB    rosuvastatin calcium 20 mg tabs    ROSUVASTATIN (CRESTOR) 20 MG TAB    Take 20 mg by mouth every day.    TRIAMCINOLONE ACETONIDE (KENALOG) 0.1 % CREAM           ALLERGIES  Patient has no known allergies.    PHYSICAL EXAM  BP (!) 146/76   Pulse 69   Temp 36.4 °C (97.6 °F) (Temporal)   Resp 18   Ht 1.676 m (5' 6\")   Wt 70.4 kg (155 lb 3.3 oz)   SpO2 99%   BMI 25.05 kg/m²     Constitutional: Well developed, Well nourished, mild distress.   HENT: Normocephalic, Atraumatic, Oropharynx moist, No oral exudates.   Eyes: Conjunctiva normal, No discharge.   Neck: Supple, No stridor  Cardiovascular: Normal heart rate, Normal rhythm, No murmurs, " equal pulses. No edema to lower extremities. No calf tenderness.  Pulmonary: Normal breath sounds, No respiratory distress, No wheezing, No rales, No rhonchi.  Chest: No chest wall tenderness or deformity.   Abdomen:Soft, No tenderness, No masses, no rebound, no guarding.   Musculoskeletal: No major deformities noted, No tenderness.   Skin: Warm, Dry, No erythema, No rash.   Neurologic: Alert & oriented x 3, Normal motor function,  No focal deficits noted.   Psychiatric: Affect normal, Judgment normal, Mood normal.       DIAGNOSTIC STUDIES & PROCEDURES    Labs:   Results for orders placed or performed during the hospital encounter of 02/13/23   CBC with Differential   Result Value Ref Range    WBC 7.6 4.8 - 10.8 K/uL    RBC 4.40 (L) 4.70 - 6.10 M/uL    Hemoglobin 13.3 (L) 14.0 - 18.0 g/dL    Hematocrit 38.9 (L) 42.0 - 52.0 %    MCV 88.4 81.4 - 97.8 fL    MCH 30.2 27.0 - 33.0 pg    MCHC 34.2 33.7 - 35.3 g/dL    RDW 42.1 35.9 - 50.0 fL    Platelet Count 305 164 - 446 K/uL    MPV 9.4 9.0 - 12.9 fL    Neutrophils-Polys 71.30 44.00 - 72.00 %    Lymphocytes 17.80 (L) 22.00 - 41.00 %    Monocytes 9.10 0.00 - 13.40 %    Eosinophils 0.00 0.00 - 6.90 %    Basophils 0.30 0.00 - 1.80 %    Immature Granulocytes 1.50 (H) 0.00 - 0.90 %    Nucleated RBC 0.00 /100 WBC    Neutrophils (Absolute) 5.41 1.82 - 7.42 K/uL    Lymphs (Absolute) 1.35 1.00 - 4.80 K/uL    Monos (Absolute) 0.69 0.00 - 0.85 K/uL    Eos (Absolute) 0.00 0.00 - 0.51 K/uL    Baso (Absolute) 0.02 0.00 - 0.12 K/uL    Immature Granulocytes (abs) 0.11 0.00 - 0.11 K/uL    NRBC (Absolute) 0.00 K/uL   Complete Metabolic Panel (CMP)   Result Value Ref Range    Sodium 138 135 - 145 mmol/L    Potassium 4.0 3.6 - 5.5 mmol/L    Chloride 106 96 - 112 mmol/L    Co2 19 (L) 20 - 33 mmol/L    Anion Gap 13.0 7.0 - 16.0    Glucose 138 (H) 65 - 99 mg/dL    Bun 22 8 - 22 mg/dL    Creatinine 1.08 0.50 - 1.40 mg/dL    Calcium 9.0 8.5 - 10.5 mg/dL    AST(SGOT) 48 (H) 12 - 45 U/L     ALT(SGPT) 99 (H) 2 - 50 U/L    Alkaline Phosphatase 90 30 - 99 U/L    Total Bilirubin 0.4 0.1 - 1.5 mg/dL    Albumin 4.2 3.2 - 4.9 g/dL    Total Protein 6.9 6.0 - 8.2 g/dL    Globulin 2.7 1.9 - 3.5 g/dL    A-G Ratio 1.6 g/dL   Troponins NOW   Result Value Ref Range    Troponin T 13 6 - 19 ng/L   Troponins in two (2) hours   Result Value Ref Range    Troponin T 12 6 - 19 ng/L   Blood Culture,Hold   Result Value Ref Range    Blood Culture Hold Collected    CORRECTED CALCIUM   Result Value Ref Range    Correct Calcium 8.8 8.5 - 10.5 mg/dL   ESTIMATED GFR   Result Value Ref Range    GFR (CKD-EPI) 68 >60 mL/min/1.73 m 2   EKG (NOW)   Result Value Ref Range    Report       [facility] Emergency [facility]    Test Date:  2023  Pt Name:    EVELYN WEAVER             Department: ER  MRN:        [id number]                      Room:  Gender:     Male                         [facility]  :        [id number]                   Requested By:ER TRIAGE PROTOCOL  Order #:    [id number]                    Reading MD: ALEJANDRA BLOOM MD    Measurements  Intervals                                Axis  Rate:       69                           P:          67  NE:         183                          QRS:        34  QRSD:       90                           T:          65  QT:         370  QTc:        397    Interpretive Statements  Sinus rhythm, normal axis, no significant changes  Probable left atrial enlargement  Minimal ST elevation, anterior leads  Compared to ECG 2023 05:14:03  Sinus pause or arrest no longer present  ST (T wave) deviation still present  Electronically Signed On 2023 16:49:06 PST by JANNIE TRUJILLO         All labs reviewed by me.    EKG:   I have independently interpreted this EKG as above     Radiology:   The attending Emergency Physician has independently interpreted the diagnostic imaging associated with this visit and is awaiting the final reading from the  radiologist, which will be displayed below.    Preliminary interpretation is a follows: No pneumonia, no pneumothorax  Radiologist interpretation:   DX-CHEST-PORTABLE (1 VIEW)   Final Result      No evidence of acute cardiopulmonary process.           COURSE & MEDICAL DECISION MAKING    ED Observation Status? Yes; I am placing the patient in to an observation status due to a diagnostic uncertainty as well as therapeutic intensity. Patient placed in observation status at 4:45 PM, 2/13/2023.     Observation plan is as follows: Troponin, monitoring for cardiac activity    Upon Reevaluation, the patient's condition has: Improved; and will be discharged.    Patient discharged from ED Observation status at 7 PM (Time) date (Date).     INITIAL ASSESSMENT AND PLAN  Care Narrative:       4:44 PM - Patient seen and evaluated at bedside. Ordered EKG, DX-Chest, troponins now and every 2 hours, CMP, CBC w/ diff, and blood culture hold to evaluate. He understands and agrees to the plan of care. I informed the patient there is no evidence of myocardial infarction. Differential diagnoses include but are not limited to: Pain, PVCs, electrolyte abnormality, anxiety reaction, myocardial infarction.    5:11 PM Paged Cardiology.     6:15 PM - I discussed the patient's case and the above findings with Dr. Sam (Cardiology) who looked at his stress test and said it is low risk. Dr. Sam recommends outpatient follow-up so long as the patient maintains his symptoms do not feel similar to his prior myocardial infarction. In that case, Dr. Sam recommends consulting [facility].    6:49 PM - I reevaluated the patient at bedside. I discussed the patient's diagnostic study results which show no evidence of acute cardiopulmonary process. After discussing results and consult with Dr. Sam, the patient expresses he feels comfortable going home and following up with Cardiology as an outpatient. I discussed plan for discharge and  follow up as outlined below. The patient is stable for discharge at this time and will return for any new or worsening symptoms. Patient verbalizes understanding and support with my plan for discharge.       ADDITIONAL PROBLEM LIST AND DISPOSITION  Problem #1 palpitations at this point time I think the patient's palpitations are secondary to PVCs and I think this is what the patient is feeling.  I think secondary to this he is having some anxiety causing his chest discomfort.    Problem #2 chest discomfort at this point time patient has 2 negative troponins with over 24 hours of discomfort.  He has had a stress test done last month that did not show any reversible ischemia.  And after discussion with cardiology is felt this patient is very low risk for myocardial infarction.  Before his heart score does not matter even though it is elevated given his age.  His EKG does not show any changes.  Given I think this is more likely anxiety causing his chest pain from the palpitations he was feeling which appear to be PVCs.  At this point time I will have the patient follow-up with his cardiologist Dr. Mcclendon               DISPOSITION AND DISCUSSIONS  I have discussed management of the patient with the following physicians and OK's: Dr. Sam (Cardiology)     Decision tools and prescription drugs considered including, but not limited to:  Antianxiety medications .  At this point time patient does not want any antianxiety meds  The patient will return for new or worsening symptoms and is stable at the time of discharge.    The patient is referred to a primary physician for blood pressure management, diabetic screening, and for all other preventative health concerns.    DISPOSITION:  Patient will be discharged home in stable condition.    FOLLOW UP:  Shilpa Mcclendon M.D.  [address]  [address] [id number] [phone]    Schedule an appointment as soon as possible for a visit in 3 days        FINAL  IMPRESSION   1. Palpitations    2. PVC's (premature ventricular contractions)    3. Atypical chest pain         IHi (Scribe), am scribing for, and in the presence of, ELLIOTT Villarreal*.    Electronically signed by: Hi Du (Scribe), 2/13/2023    Jeronimo SHEPPARD M.* personally performed the services described in this documentation, as scribed by Hi Du in my presence, and it is both accurate and complete.    The note accurately reflects work and decisions made by me.  Jeronimo Sampson M.D.  2/13/2023  7:39 PM

## 2023-02-14 NOTE — ED NOTES
Pt ambulated to Y65 with a steady gait. Pt in gown and connected to monitor. Pt reports chest tightness that radiates up the neck to the jaw. ERP at bedside.

## 2023-02-14 NOTE — ED NOTES
"Pt discharged to home w/ steady gait. Pt A&Ox4 on RA. IV discontinued and gauze placed, pt in possession of belongings. Pt provided discharge education and information pertaining to medications and follow up appointments. Pt received copy of discharge instructions and verbalized understanding. BP (!) 144/67   Pulse 76   Temp 36.4 °C (97.6 °F) (Temporal)   Resp 18   Ht 1.676 m (5' 6\")   Wt 70.4 kg (155 lb 3.3 oz)   SpO2 98%   BMI 25.05 kg/m²   "

## 2023-02-14 NOTE — DISCHARGE INSTRUCTIONS
Return if you have new or different chest pain, increasing shortness of breath, cough up blood or passout.

## 2023-03-13 ENCOUNTER — APPOINTMENT (RX ONLY)
Dept: URBAN - METROPOLITAN AREA CLINIC 35 | Facility: CLINIC | Age: 83
Setting detail: DERMATOLOGY
End: 2023-03-13

## 2023-03-13 DIAGNOSIS — D485 NEOPLASM OF UNCERTAIN BEHAVIOR OF SKIN: ICD-10-CM | Status: INADEQUATELY CONTROLLED

## 2023-03-13 PROBLEM — D48.5 NEOPLASM OF UNCERTAIN BEHAVIOR OF SKIN: Status: ACTIVE | Noted: 2023-03-13

## 2023-03-13 PROCEDURE — 99214 OFFICE O/P EST MOD 30 MIN: CPT | Mod: 25

## 2023-03-13 PROCEDURE — ? SURGICAL DECISION MAKING

## 2023-03-13 PROCEDURE — 11102 TANGNTL BX SKIN SINGLE LES: CPT

## 2023-03-13 PROCEDURE — ? BIOPSY BY SHAVE METHOD

## 2023-03-13 PROCEDURE — ? SEPARATE AND IDENTIFIABLE DOCUMENTATION

## 2023-03-13 ASSESSMENT — LOCATION DETAILED DESCRIPTION DERM: LOCATION DETAILED: RIGHT INFERIOR CENTRAL MALAR CHEEK

## 2023-03-13 ASSESSMENT — LOCATION SIMPLE DESCRIPTION DERM: LOCATION SIMPLE: RIGHT CHEEK

## 2023-03-13 ASSESSMENT — LOCATION ZONE DERM: LOCATION ZONE: FACE

## 2023-03-13 NOTE — PROCEDURE: MIPS QUALITY
Quality 111:Pneumonia Vaccination Status For Older Adults: Pneumococcal vaccine (PPSV23) administered on or after patient’s 60th birthday and before the end of the measurement period
Quality 226: Preventive Care And Screening: Tobacco Use: Screening And Cessation Intervention: Patient screened for tobacco use and is an ex/non-smoker
Detail Level: Generalized
Quality 130: Documentation Of Current Medications In The Medical Record: Current Medications Documented
Quality 402: Tobacco Use And Help With Quitting Among Adolescents: Patient screened for tobacco and never smoked

## 2023-03-13 NOTE — PROCEDURE: SURGICAL DECISION MAKING
Discussion: We discussed not only the risks of the procedure but also the likely magallon that further treatment will be required to treat lesion. This could involve another visit here to destroy or excise  lesion, a visit to a Mohs or general or plastic surgeon, scarring, limited  activity, cosmetic imperfections.
Identified Risk Factors Documented?: yes
Risk Assessment Explanation (Does Not Render In The Note): Clinical determination of the probability and/or consequences of an event, such as surgery. Clinical assessment of the level of risk is affected by the nature of the event under consideration for the patient. Modifier 57 is used to indicate an Evaluation and Management (E/M) service resulted in the initial decision to perform surgery either the day before a major surgery (90 day global) or the day of a major surgery.
Complexity (Necessary For Coding; Major - 90 Day Global With Some Exceptions; Minor - 10 Day Global): minor
Date Of Surgery - Today Or Tomorrow?: today

## 2023-04-24 ENCOUNTER — APPOINTMENT (RX ONLY)
Dept: URBAN - METROPOLITAN AREA CLINIC 36 | Facility: CLINIC | Age: 83
Setting detail: DERMATOLOGY
End: 2023-04-24

## 2023-04-24 PROBLEM — C44.319 BASAL CELL CARCINOMA OF SKIN OF OTHER PARTS OF FACE: Status: ACTIVE | Noted: 2023-04-24

## 2023-04-24 PROCEDURE — 13132 CMPLX RPR F/C/C/M/N/AX/G/H/F: CPT

## 2023-04-24 PROCEDURE — ? MOHS SURGERY

## 2023-04-24 PROCEDURE — 17311 MOHS 1 STAGE H/N/HF/G: CPT

## 2023-04-24 NOTE — PROCEDURE: MOHS SURGERY
crestor 10 mg daily  3 mos FLP, ALT, NOHELIAK    Alley,   Ele MITCHELL, Valeria GOODRICH, Bruna OSCAR, and I were pleased to meet with you today.     HERE IS SOME IMPORTANT INFORMATION FOR OUR PATIENTS   If you need refills - call your pharmacist and they will contact our office.   If you have medical concerns after hours, and to make appointments, call [phone].    If you have a question during office hours call [phone].  You will eventually speak with my nurses.  If you need to speak to me directly, let them know and I will get back to you as soon as possible.  Better yet, you can consider signing up for [facility], our popular online communication portal to ask for a refill or contact our staff.  Go to:  [email or web address]    Dr. Rony Philip MD        
Stage 9: Additional Anesthesia Type: 1% lidocaine with epinephrine
M-Plasty Complex Repair Preamble Text (Leave Blank If You Do Not Want): Extensive wide undermining was performed.
Tarsorrhaphy Performed?: No
Asc Procedure Text (B): After obtaining clear surgical margins the patient was sent to an ASC for surgical repair.  The patient understands they will receive post-surgical care and follow-up from the ASC physician.
Show Asc Variables: Yes
Alternatives Discussed Intro (Do Not Add Period): I discussed alternative treatments to Mohs surgery and specifically discussed the risks and benefits of
Composite Graft Text: The defect edges were debeveled with a #15 scalpel blade.  Given the location of the defect, shape of the defect, the proximity to free margins and the fact the defect was full thickness a composite graft was deemed most appropriate.  The defect was outline and then transferred to the donor site.  A full thickness graft was then excised from the donor site. The graft was then placed in the primary defect, oriented appropriately and then sutured into place.  The secondary defect was then repaired using a primary closure.
Stage 14: Additional Anesthesia Volume In Cc: 0
Consent (Lip)/Introductory Paragraph: The rationale for Mohs was explained to the patient and consent was obtained. The risks, benefits and alternatives to therapy were discussed in detail. Specifically, the risks of lip deformity, changes in the oral aperture, infection, scarring, bleeding, prolonged wound healing, incomplete removal, allergy to anesthesia, nerve injury and recurrence were addressed. Prior to the procedure, the treatment site was clearly identified and confirmed by the patient. All components of Universal Protocol/PAUSE Rule completed.
Burow's Graft Text: The defect edges were debeveled with a #15 scalpel blade.  Given the location of the defect, shape of the defect, the proximity to free margins and the presence of a standing cone deformity a Burow's skin graft was deemed most appropriate. The standing cone was removed and this tissue was then trimmed to the shape of the primary defect. The adipose tissue was also removed until only dermis and epidermis were left.  The skin margins of the secondary defect were undermined to an appropriate distance in all directions utilizing iris scissors.  The secondary defect was closed with interrupted buried subcutaneous sutures.  The skin edges were then re-apposed with running  sutures.  The skin graft was then placed in the primary defect and oriented appropriately.
Plastic Surgeon Procedure Text (F): After obtaining clear surgical margins the patient was sent to plastics for surgical repair.  The patient understands they will receive post-surgical care and follow-up from the referring physician's office.
Retention Suture Text: Retention sutures were placed to support the closure and prevent dehiscence.
Consent (Temporal Branch)/Introductory Paragraph: The rationale for Mohs was explained to the patient and consent was obtained. The risks, benefits and alternatives to therapy were discussed in detail. Specifically, the risks of damage to the temporal branch of the facial nerve, infection, scarring, bleeding, prolonged wound healing, incomplete removal, allergy to anesthesia, and recurrence were addressed. Prior to the procedure, the treatment site was clearly identified and confirmed by the patient. All components of Universal Protocol/PAUSE Rule completed.
Cheek-To-Nose Interpolation Flap Text: A decision was made to reconstruct the defect utilizing an interpolation axial flap and a staged reconstruction.  A telfa template was made of the defect.  This telfa template was then used to outline the Cheek-To-Nose Interpolation flap.  The donor area for the pedicle flap was then injected with anesthesia.  The flap was excised through the skin and subcutaneous tissue down to the layer of the underlying musculature.  The interpolation flap was carefully excised within this deep plane to maintain its blood supply.  The edges of the donor site were undermined.   The donor site was closed in a primary fashion.  The pedicle was then rotated into position and sutured.  Once the tube was sutured into place, adequate blood supply was confirmed with blanching and refill.  The pedicle was then wrapped with xeroform gauze and dressed appropriately with a telfa and gauze bandage to ensure continued blood supply and protect the attached pedicle.
Staged Advancement Flap Text: The defect edges were debeveled with a #15 scalpel blade.  Given the location of the defect, shape of the defect and the proximity to free margins a staged advancement flap was deemed most appropriate.  Using a sterile surgical marker, an appropriate advancement flap was drawn incorporating the defect and placing the expected incisions within the relaxed skin tension lines where possible. The area thus outlined was incised deep to adipose tissue with a #15 scalpel blade.  The skin margins were undermined to an appropriate distance in all directions utilizing iris scissors.
Surgeon/Pathologist Verbiage (Will Incorporate Name Of Surgeon From Intro If Not Blank): operated in two distinct and integrated capacities as the surgeon and pathologist.
Hatchet Flap Text: The defect edges were debeveled with a #15 scalpel blade.  Given the location of the defect, shape of the defect and the proximity to free margins a hatchet flap was deemed most appropriate.  Using a sterile surgical marker, an appropriate hatchet flap was drawn incorporating the defect and placing the expected incisions within the relaxed skin tension lines where possible.    The area thus outlined was incised deep to adipose tissue with a #15 scalpel blade.  The skin margins were undermined to an appropriate distance in all directions utilizing iris scissors.
Bi-Rhombic Flap Text: The defect edges were debeveled with a #15 scalpel blade.  Given the location of the defect and the proximity to free margins a bi-rhombic flap was deemed most appropriate.  Using a sterile surgical marker, an appropriate rhombic flap was drawn incorporating the defect. The area thus outlined was incised deep to adipose tissue with a #15 scalpel blade.  The skin margins were undermined to an appropriate distance in all directions utilizing iris scissors.
Deep Sutures: 5-0 Maxon
Oculoplastic Surgeon Procedure Text (E): After obtaining clear surgical margins the patient was sent to oculoplastics for surgical repair.  The patient understands they will receive post-surgical care and follow-up from the referring physician's office.
Intermediate Repair And Graft Additional Text (Will Appearing After The Standard Complex Repair Text): The intermediate repair was not sufficient to completely close the primary defect. The remaining additional defect was repaired with the graft mentioned below.
Tarsorrhaphy Text: A tarsorrhaphy was performed using Frost sutures.
Manual Repair Warning Statement: We plan on removing the manually selected variable below in favor of our much easier automatic structured text blocks found in the previous tab. We decided to do this to help make the flow better and give you the full power of structured data. Manual selection is never going to be ideal in our platform and I would encourage you to avoid using manual selection from this point on, especially since I will be sunsetting this feature. It is important that you do one of two things with the customized text below. First, you can save all of the text in a word file so you can have it for future reference. Second, transfer the text to the appropriate area in the Library tab. Lastly, if there is a flap or graft type which we do not have you need to let us know right away so I can add it in before the variable is hidden. No need to panic, we plan to give you roughly 6 months to make the change.
Dermal Autograft Text: The defect edges were debeveled with a #15 scalpel blade.  Given the location of the defect, shape of the defect and the proximity to free margins a dermal autograft was deemed most appropriate.  Using a sterile surgical marker, the primary defect shape was transferred to the donor site. The area thus outlined was incised deep to adipose tissue with a #15 scalpel blade.  The harvested graft was then trimmed of adipose and epidermal tissue until only dermis was left.  The skin graft was then placed in the primary defect and oriented appropriately.
Hemigard Retention Suture: 0-0 Nylon
V-Y Plasty Text: The defect edges were debeveled with a #15 scalpel blade.  Given the location of the defect, shape of the defect and the proximity to free margins an V-Y advancement flap was deemed most appropriate.  Using a sterile surgical marker, an appropriate advancement flap was drawn incorporating the defect and placing the expected incisions within the relaxed skin tension lines where possible.    The area thus outlined was incised deep to adipose tissue with a #15 scalpel blade.  The skin margins were undermined to an appropriate distance in all directions utilizing iris scissors.
Ftsg Text: The defect edges were debeveled with a #15 scalpel blade.  Given the location of the defect, shape of the defect and the proximity to free margins a full thickness skin graft was deemed most appropriate.  Using a sterile surgical marker, the primary defect shape was transferred to the donor site. The area thus outlined was incised deep to adipose tissue with a #15 scalpel blade.  The harvested graft was then trimmed of adipose tissue until only dermis and epidermis was left.  The skin margins of the secondary defect were undermined to an appropriate distance in all directions utilizing iris scissors.  The secondary defect was closed with interrupted buried subcutaneous sutures.  The skin edges were then re-apposed with running  sutures.  The skin graft was then placed in the primary defect and oriented appropriately.
Cheek Interpolation Flap Text: A decision was made to reconstruct the defect utilizing an interpolation axial flap and a staged reconstruction.  A telfa template was made of the defect.  This telfa template was then used to outline the Cheek Interpolation flap.  The donor area for the pedicle flap was then injected with anesthesia.  The flap was excised through the skin and subcutaneous tissue down to the layer of the underlying musculature.  The interpolation flap was carefully excised within this deep plane to maintain its blood supply.  The edges of the donor site were undermined.   The donor site was closed in a primary fashion.  The pedicle was then rotated into position and sutured.  Once the tube was sutured into place, adequate blood supply was confirmed with blanching and refill.  The pedicle was then wrapped with xeroform gauze and dressed appropriately with a telfa and gauze bandage to ensure continued blood supply and protect the attached pedicle.
Provider Procedure Text (E): After obtaining clear surgical margins the defect was repaired by another provider.
Suture Removal: 7 days
Double O-Z Flap Text: The defect edges were debeveled with a #15 scalpel blade.  Given the location of the defect, shape of the defect and the proximity to free margins a Double O-Z flap was deemed most appropriate.  Using a sterile surgical marker, an appropriate transposition flap was drawn incorporating the defect and placing the expected incisions within the relaxed skin tension lines where possible. The area thus outlined was incised deep to adipose tissue with a #15 scalpel blade.  The skin margins were undermined to an appropriate distance in all directions utilizing iris scissors.
Epidermal Sutures: 6-0 Surgipro
Mercedes Flap Text: The defect edges were debeveled with a #15 scalpel blade.  Given the location of the defect, shape of the defect and the proximity to free margins a Mercedes flap was deemed most appropriate.  Using a sterile surgical marker, an appropriate advancement flap was drawn incorporating the defect and placing the expected incisions within the relaxed skin tension lines where possible. The area thus outlined was incised deep to adipose tissue with a #15 scalpel blade.  The skin margins were undermined to an appropriate distance in all directions utilizing iris scissors.
Information: Selecting Yes will display possible errors in your note based on the variables you have selected. This validation is only offered as a suggestion for you. PLEASE NOTE THAT THE VALIDATION TEXT WILL BE REMOVED WHEN YOU FINALIZE YOUR NOTE. IF YOU WANT TO FAX A PRELIMINARY NOTE YOU WILL NEED TO TOGGLE THIS TO 'NO' IF YOU DO NOT WANT IT IN YOUR FAXED NOTE.
Graft Donor Site Epidermal Sutures (Optional): 5-0 Surgipro
Double O-Z Plasty Text: The defect edges were debeveled with a #15 scalpel blade.  Given the location of the defect, shape of the defect and the proximity to free margins a Double O-Z plasty (double transposition flap) was deemed most appropriate.  Using a sterile surgical marker, the appropriate transposition flaps were drawn incorporating the defect and placing the expected incisions within the relaxed skin tension lines where possible. The area thus outlined was incised deep to adipose tissue with a #15 scalpel blade.  The skin margins were undermined to an appropriate distance in all directions utilizing iris scissors.  Hemostasis was achieved with electrocautery.  The flaps were then transposed into place, one clockwise and the other counterclockwise, and anchored with interrupted buried subcutaneous sutures.
Mastoid Interpolation Flap Text: A decision was made to reconstruct the defect utilizing an interpolation axial flap and a staged reconstruction.  A telfa template was made of the defect.  This telfa template was then used to outline the mastoid interpolation flap.  The donor area for the pedicle flap was then injected with anesthesia.  The flap was excised through the skin and subcutaneous tissue down to the layer of the underlying musculature.  The pedicle flap was carefully excised within this deep plane to maintain its blood supply.  The edges of the donor site were undermined.   The donor site was closed in a primary fashion.  The pedicle was then rotated into position and sutured.  Once the tube was sutured into place, adequate blood supply was confirmed with blanching and refill.  The pedicle was then wrapped with xeroform gauze and dressed appropriately with a telfa and gauze bandage to ensure continued blood supply and protect the attached pedicle.
Home Suture Removal Text: Patient was provided instructions on removing sutures and will remove their sutures at home.  If they have any questions or difficulties they will call the office.
Abbe Flap (Upper To Lower Lip) Text: The defect of the lower lip was assessed and measured.  Given the location and size of the defect, an Abbe flap was deemed most appropriate.  Using a sterile surgical marker, an appropriate Abbe flap was measured and drawn on the upper lip. Local anesthesia was then infiltrated.  A scalpel was then used to incise the upper lip through and through the skin, vermilion, muscle and mucosa, leaving the flap pedicled on the opposite side.  The flap was then rotated and transferred to the lower lip defect.  The flap was then sutured into place with a three layer technique, closing the orbicularis oris muscle layer with subcutaneous buried sutures, followed by a mucosal layer and an epidermal layer.
Burow's Advancement Flap Text: The defect edges were debeveled with a #15 scalpel blade.  Given the location of the defect and the proximity to free margins a Burow's advancement flap was deemed most appropriate.  Using a sterile surgical marker, the appropriate advancement flap was drawn incorporating the defect and placing the expected incisions within the relaxed skin tension lines where possible.    The area thus outlined was incised deep to adipose tissue with a #15 scalpel blade.  The skin margins were undermined to an appropriate distance in all directions utilizing iris scissors.
Transposition Flap Text: The defect edges were debeveled with a #15 scalpel blade.  Given the location of the defect and the proximity to free margins a transposition flap was deemed most appropriate.  Using a sterile surgical marker, an appropriate transposition flap was drawn incorporating the defect.    The area thus outlined was incised deep to adipose tissue with a #15 scalpel blade.  The skin margins were undermined to an appropriate distance in all directions utilizing iris scissors.
Star Wedge Flap Text: The defect edges were debeveled with a #15 scalpel blade.  Given the location of the defect, shape of the defect and the proximity to free margins a star wedge flap was deemed most appropriate.  Using a sterile surgical marker, an appropriate rotation flap was drawn incorporating the defect and placing the expected incisions within the relaxed skin tension lines where possible. The area thus outlined was incised deep to adipose tissue with a #15 scalpel blade.  The skin margins were undermined to an appropriate distance in all directions utilizing iris scissors.
Skin Substitute Text: The defect edges were debeveled with a #15 scalpel blade.  Given the location of the defect, shape of the defect and the proximity to free margins a skin substitute graft was deemed most appropriate.  The graft material was trimmed to fit the size of the defect. The graft was then placed in the primary defect and oriented appropriately.
Consent (Nose)/Introductory Paragraph: The rationale for Mohs was explained to the patient and consent was obtained. The risks, benefits and alternatives to therapy were discussed in detail. Specifically, the risks of nasal deformity, changes in the flow of air through the nose, infection, scarring, bleeding, prolonged wound healing, incomplete removal, allergy to anesthesia, nerve injury and recurrence were addressed. Prior to the procedure, the treatment site was clearly identified and confirmed by the patient. All components of Universal Protocol/PAUSE Rule completed.
O-Z Plasty Text: The defect edges were debeveled with a #15 scalpel blade.  Given the location of the defect, shape of the defect and the proximity to free margins an O-Z plasty (double transposition flap) was deemed most appropriate.  Using a sterile surgical marker, the appropriate transposition flaps were drawn incorporating the defect and placing the expected incisions within the relaxed skin tension lines where possible.    The area thus outlined was incised deep to adipose tissue with a #15 scalpel blade.  The skin margins were undermined to an appropriate distance in all directions utilizing iris scissors.  Hemostasis was achieved with electrocautery.  The flaps were then transposed into place, one clockwise and the other counterclockwise, and anchored with interrupted buried subcutaneous sutures.
O-L Flap Text: The defect edges were debeveled with a #15 scalpel blade.  Given the location of the defect, shape of the defect and the proximity to free margins an O-L flap was deemed most appropriate.  Using a sterile surgical marker, an appropriate advancement flap was drawn incorporating the defect and placing the expected incisions within the relaxed skin tension lines where possible.    The area thus outlined was incised deep to adipose tissue with a #15 scalpel blade.  The skin margins were undermined to an appropriate distance in all directions utilizing iris scissors.
Lazy S Intermediate Repair Preamble Text (Leave Blank If You Do Not Want): Undermining was performed with blunt dissection.
Consent (Near Eyelid Margin)/Introductory Paragraph: The rationale for Mohs was explained to the patient and consent was obtained. The risks, benefits and alternatives to therapy were discussed in detail. Specifically, the risks of ectropion or eyelid deformity, infection, scarring, bleeding, prolonged wound healing, incomplete removal, allergy to anesthesia, nerve injury and recurrence were addressed. Prior to the procedure, the treatment site was clearly identified and confirmed by the patient. All components of Universal Protocol/PAUSE Rule completed.
Stage 1: Number Of Blocks?: 1
Anesthesia Volume In Cc: 3
Non-Graft Cartilage Fenestration Text: The cartilage was fenestrated with a 2mm punch biopsy to help facilitate healing.
Graft Donor Site Dermal Sutures (Optional): 5-0 Polysorb
Mid-Level Procedure Text (A): After obtaining clear surgical margins the patient was sent to a mid-level provider for surgical repair.  The patient understands they will receive post-surgical care and follow-up from the mid-level provider.
W Plasty Text: The lesion was extirpated to the level of the fat with a #15 scalpel blade.  Given the location of the defect, shape of the defect and the proximity to free margins a W-plasty was deemed most appropriate for repair.  Using a sterile surgical marker, the appropriate transposition arms of the W-plasty were drawn incorporating the defect and placing the expected incisions within the relaxed skin tension lines where possible.    The area thus outlined was incised deep to adipose tissue with a #15 scalpel blade.  The skin margins were undermined to an appropriate distance in all directions utilizing iris scissors.  The opposing transposition arms were then transposed into place in opposite direction and anchored with interrupted buried subcutaneous sutures.
Special Stains Stage 15 - Results: Base On Clearance Noted Above
Undermining Type: Entire Wound
Date Of Previous Biopsy (Optional): 3/13/23
Helical Rim Advancement Flap Text: The defect edges were debeveled with a #15 blade scalpel.  Given the location of the defect and the proximity to free margins (helical rim) a double helical rim advancement flap was deemed most appropriate.  Using a sterile surgical marker, the appropriate advancement flaps were drawn incorporating the defect and placing the expected incisions between the helical rim and antihelix where possible.  The area thus outlined was incised through and through with a #15 scalpel blade.  With a skin hook and iris scissors, the flaps were gently and sharply undermined and freed up.
Was The Patient On Physician Recommended Anticoagulation Therapy?: Please Select the Appropriate Response
H Plasty Text: Given the location of the defect, shape of the defect and the proximity to free margins a H-plasty was deemed most appropriate for repair.  Using a sterile surgical marker, the appropriate advancement arms of the H-plasty were drawn incorporating the defect and placing the expected incisions within the relaxed skin tension lines where possible. The area thus outlined was incised deep to adipose tissue with a #15 scalpel blade. The skin margins were undermined to an appropriate distance in all directions utilizing iris scissors.  The opposing advancement arms were then advanced into place in opposite direction and anchored with interrupted buried subcutaneous sutures.
Complex Repair And Flap Additional Text (Will Appearing After The Standard Complex Repair Text): The complex repair was not sufficient to completely close the primary defect. The remaining additional defect was repaired with the flap mentioned below.
Mucosal Advancement Flap Text: Given the location of the defect, shape of the defect and the proximity to free margins a mucosal advancement flap was deemed most appropriate. Incisions were made with a 15 blade scalpel in the appropriate fashion along the cutaneous vermilion border and the mucosal lip. The remaining actinically damaged mucosal tissue was excised.  The mucosal advancement flap was then elevated to the gingival sulcus with care taken to preserve the neurovascular structures and advanced into the primary defect. Care was taken to ensure that precise realignment of the vermilion border was achieved.
Estlander Flap (Upper To Lower Lip) Text: The defect of the lower lip was assessed and measured.  Given the location and size of the defect, an Estlander flap was deemed most appropriate.  Using a sterile surgical marker, an appropriate Estlander flap was measured and drawn on the upper lip. Local anesthesia was then infiltrated. A scalpel was then used to incise the lateral aspect of the flap, through skin, muscle and mucosa, leaving the flap pedicled medially.  The flap was then rotated and positioned to fill the lower lip defect.  The flap was then sutured into place with a three layer technique, closing the orbicularis oris muscle layer with subcutaneous buried sutures, followed by a mucosal layer and an epidermal layer.
Spiral Flap Text: The defect edges were debeveled with a #15 scalpel blade.  Given the location of the defect, shape of the defect and the proximity to free margins a spiral flap was deemed most appropriate.  Using a sterile surgical marker, an appropriate rotation flap was drawn incorporating the defect and placing the expected incisions within the relaxed skin tension lines where possible. The area thus outlined was incised deep to adipose tissue with a #15 scalpel blade.  The skin margins were undermined to an appropriate distance in all directions utilizing iris scissors.
Presence Of Scar Tissue (For Histology): absent
Where Do You Want The Question To Include Opioid Counseling Located?: Case Summary Tab
Adjacent Tissue Transfer Text: The defect edges were debeveled with a #15 scalpel blade.  Given the location of the defect and the proximity to free margins an adjacent tissue transfer was deemed most appropriate.  Using a sterile surgical marker, an appropriate flap was drawn incorporating the defect and placing the expected incisions within the relaxed skin tension lines where possible.    The area thus outlined was incised deep to adipose tissue with a #15 scalpel blade.  The skin margins were undermined to an appropriate distance in all directions utilizing iris scissors.
Advancement Flap (Double) Text: The defect edges were debeveled with a #15 scalpel blade.  Given the location of the defect and the proximity to free margins a double advancement flap was deemed most appropriate.  Using a sterile surgical marker, the appropriate advancement flaps were drawn incorporating the defect and placing the expected incisions within the relaxed skin tension lines where possible.    The area thus outlined was incised deep to adipose tissue with a #15 scalpel blade.  The skin margins were undermined to an appropriate distance in all directions utilizing iris scissors.
Mauc Instructions: By selecting yes to the question below the MAUC number will be added into the note.  This will be calculated automatically based on the diagnosis chosen, the size entered, the body zone selected (H,M,L) and the specific indications you chose. You will also have the option to override the Mohs AUC if you disagree with the automatically calculated number and this option is found in the Case Summary tab.
Zygomaticofacial Flap Text: Given the location of the defect, shape of the defect and the proximity to free margins a zygomaticofacial flap was deemed most appropriate for repair.  Using a sterile surgical marker, the appropriate flap was drawn incorporating the defect and placing the expected incisions within the relaxed skin tension lines where possible. The area thus outlined was incised deep to adipose tissue with a #15 scalpel blade with preservation of a vascular pedicle.  The skin margins were undermined to an appropriate distance in all directions utilizing iris scissors.  The flap was then placed into the defect and anchored with interrupted buried subcutaneous sutures.
Area M Indication Text: Tumors in this location are included in Area M (cheek, forehead, scalp, neck, jawline and pretibial skin).  Mohs surgery is indicated for tumors in these anatomic locations.
Primary Defect Width In Cm (Final Defect Size - Required For Flaps/Grafts): 1.1
Crescentic Advancement Flap Text: The defect edges were debeveled with a #15 scalpel blade.  Given the location of the defect and the proximity to free margins a crescentic advancement flap was deemed most appropriate.  Using a sterile surgical marker, the appropriate advancement flap was drawn incorporating the defect and placing the expected incisions within the relaxed skin tension lines where possible.    The area thus outlined was incised deep to adipose tissue with a #15 scalpel blade.  The skin margins were undermined to an appropriate distance in all directions utilizing iris scissors.
Cartilage Graft Text: The defect edges were debeveled with a #15 scalpel blade.  Given the location of the defect, shape of the defect, the fact the defect involved a full thickness cartilage defect a cartilage graft was deemed most appropriate.  An appropriate donor site was identified, cleansed, and anesthetized. The cartilage graft was then harvested and transferred to the recipient site, oriented appropriately and then sutured into place.  The secondary defect was then repaired using a primary closure.
Graft Cartilage Fenestration Text: The cartilage was fenestrated with a 2mm punch biopsy to help facilitate graft survival and healing.
Melolabial Interpolation Flap Text: A decision was made to reconstruct the defect utilizing an interpolation axial flap and a staged reconstruction.  A telfa template was made of the defect.  This telfa template was then used to outline the melolabial interpolation flap.  The donor area for the pedicle flap was then injected with anesthesia.  The flap was excised through the skin and subcutaneous tissue down to the layer of the underlying musculature.  The pedicle flap was carefully excised within this deep plane to maintain its blood supply.  The edges of the donor site were undermined.   The donor site was closed in a primary fashion.  The pedicle was then rotated into position and sutured.  Once the tube was sutured into place, adequate blood supply was confirmed with blanching and refill.  The pedicle was then wrapped with xeroform gauze and dressed appropriately with a telfa and gauze bandage to ensure continued blood supply and protect the attached pedicle.
Otolaryngologist Procedure Text (D): After obtaining clear surgical margins the patient was sent to otolaryngology for surgical repair.  The patient understands they will receive post-surgical care and follow-up from the referring physician's office.
Mohs Rapid Report Verbiage: The area of clinically evident tumor was marked with skin marking ink and appropriately hatched.  The initial incision was made following the Mohs approach through the skin.  The specimen was taken to the lab, divided into the necessary number of pieces, chromacoded and processed according to the Mohs protocol.  This was repeated in successive stages until a tumor free defect was achieved.
Donor Site Anesthesia Type: same as repair anesthesia
Unna Boot Text: An Unna boot was placed to help immobilize the limb and facilitate more rapid healing.
Debridement Text: The wound edges were debrided prior to proceeding with the closure to facilitate wound healing.
Peng Advancement Flap Text: The defect edges were debeveled with a #15 scalpel blade.  Given the location of the defect, shape of the defect and the proximity to free margins a Peng advancement flap was deemed most appropriate.  Using a sterile surgical marker, an appropriate advancement flap was drawn incorporating the defect and placing the expected incisions within the relaxed skin tension lines where possible. The area thus outlined was incised deep to adipose tissue with a #15 scalpel blade.  The skin margins were undermined to an appropriate distance in all directions utilizing iris scissors.
Primary Defect Length In Cm (Final Defect Size - Required For Flaps/Grafts): 1.3
Hemostasis: Electrocautery
Simple / Intermediate / Complex Repair - Final Wound Length In Cm: 4.2
Modified Advancement Flap Text: The defect edges were debeveled with a #15 scalpel blade.  Given the location of the defect, shape of the defect and the proximity to free margins a modified advancement flap was deemed most appropriate.  Using a sterile surgical marker, an appropriate advancement flap was drawn incorporating the defect and placing the expected incisions within the relaxed skin tension lines where possible.    The area thus outlined was incised deep to adipose tissue with a #15 scalpel blade.  The skin margins were undermined to an appropriate distance in all directions utilizing iris scissors.
Mart-1 - Negative Histology Text: MART-1 staining demonstrates a normal density and pattern of melanocytes along the dermal-epidermal junction. The surgical margins are negative for tumor cells.
Closure 4 Information: This tab is for additional flaps and grafts above and beyond our usual structured repairs.  Please note if you enter information here it will not currently bill and you will need to add the billing information manually.
Location Indication Override (Is Already Calculated Based On Selected Body Location): Area M
Closure 2 Information: This tab is for additional flaps and grafts, including complex repair and grafts and complex repair and flaps. You can also specify a different location for the additional defect, if the location is the same you do not need to select a new one. We will insert the automated text for the repair you select below just as we do for solitary flaps and grafts. Please note that at this time if you select a location with a different insurance zone you will need to override the ICD10 and CPT if appropriate.
O-Z Flap Text: The defect edges were debeveled with a #15 scalpel blade.  Given the location of the defect, shape of the defect and the proximity to free margins an O-Z flap was deemed most appropriate.  Using a sterile surgical marker, an appropriate transposition flap was drawn incorporating the defect and placing the expected incisions within the relaxed skin tension lines where possible. The area thus outlined was incised deep to adipose tissue with a #15 scalpel blade.  The skin margins were undermined to an appropriate distance in all directions utilizing iris scissors.
Nostril Rim Text: The closure involved the nostril rim.
Bilobed Flap Text: The defect edges were debeveled with a #15 scalpel blade.  Given the location of the defect and the proximity to free margins a bilobe flap was deemed most appropriate.  Using a sterile surgical marker, an appropriate bilobe flap drawn around the defect.    The area thus outlined was incised deep to adipose tissue with a #15 scalpel blade.  The skin margins were undermined to an appropriate distance in all directions utilizing iris scissors.
Abbe Flap (Lower To Upper Lip) Text: The defect of the upper lip was assessed and measured.  Given the location and size of the defect, an Abbe flap was deemed most appropriate.  Using a sterile surgical marker, an appropriate Abbe flap was measured and drawn on the lower lip. Local anesthesia was then infiltrated. A scalpel was then used to incise the upper lip through and through the skin, vermilion, muscle and mucosa, leaving the flap pedicled on the opposite side.  The flap was then rotated and transferred to the lower lip defect.  The flap was then sutured into place with a three layer technique, closing the orbicularis oris muscle layer with subcutaneous buried sutures, followed by a mucosal layer and an epidermal layer.
Consent (Spinal Accessory)/Introductory Paragraph: The rationale for Mohs was explained to the patient and consent was obtained. The risks, benefits and alternatives to therapy were discussed in detail. Specifically, the risks of damage to the spinal accessory nerve, infection, scarring, bleeding, prolonged wound healing, incomplete removal, allergy to anesthesia, and recurrence were addressed. Prior to the procedure, the treatment site was clearly identified and confirmed by the patient. All components of Universal Protocol/PAUSE Rule completed.
Rotation Flap Text: The defect edges were debeveled with a #15 scalpel blade.  Given the location of the defect, shape of the defect and the proximity to free margins a rotation flap was deemed most appropriate.  Using a sterile surgical marker, an appropriate rotation flap was drawn incorporating the defect and placing the expected incisions within the relaxed skin tension lines where possible.    The area thus outlined was incised deep to adipose tissue with a #15 scalpel blade.  The skin margins were undermined to an appropriate distance in all directions utilizing iris scissors.
X Size Of Lesion In Cm (Optional): 0.8
Retention Suture Bite Size: 1 mm
Helical Rim Text: The closure involved the helical rim.
Xenograft Text: The defect edges were debeveled with a #15 scalpel blade.  Given the location of the defect, shape of the defect and the proximity to free margins a xenograft was deemed most appropriate.  The graft was then trimmed to fit the size of the defect.  The graft was then placed in the primary defect and oriented appropriately.
Cheiloplasty (Complex) Text: A decision was made to reconstruct the defect with a  cheiloplasty.  The defect was undermined extensively.  Additional obicularis oris muscle was excised with a 15 blade scalpel.  The defect was converted into a full thickness wedge to facilite a better cosmetic result.  Small vessels were then tied off with 5-0 monocyrl. The obicularis oris, superficial fascia, adipose and dermis were then reapproximated.  After the deeper layers were approximated the epidermis was reapproximated with particular care given to realign the vermilion border.
Graft Donor Site Bandage (Optional-Leave Blank If You Don't Want In Note): Aquaplast was fitted to the graft site and sewn into place. A pressure bandage were applied to the donor site and over the aquaplast bolster.
V-Y Flap Text: The defect edges were debeveled with a #15 scalpel blade.  Given the location of the defect, shape of the defect and the proximity to free margins a V-Y flap was deemed most appropriate.  Using a sterile surgical marker, an appropriate advancement flap was drawn incorporating the defect and placing the expected incisions within the relaxed skin tension lines where possible.    The area thus outlined was incised deep to adipose tissue with a #15 scalpel blade.  The skin margins were undermined to an appropriate distance in all directions utilizing iris scissors.
Partial Purse String (Intermediate) Text: Given the location of the defect and the characteristics of the surrounding skin an intermediate purse string closure was deemed most appropriate.  Undermining was performed circumfirentially around the surgical defect.  A purse string suture was then placed and tightened. Wound tension only allowed a partial closure of the circular defect.
Referring Physician (Optional): Teresa Chi NP
Consent Type: Consent 1 (Standard)
Repair Hemostasis (Optional): Pinpoint electrocautery
Consent 3/Introductory Paragraph: I gave the patient a chance to ask questions they had about the procedure.  Following this I explained the Mohs procedure and consent was obtained. The risks, benefits and alternatives to therapy were discussed in detail. Specifically, the risks of infection, scarring, bleeding, prolonged wound healing, incomplete removal, allergy to anesthesia, nerve injury and recurrence were addressed. Prior to the procedure, the treatment site was clearly identified and confirmed by the patient. All components of Universal Protocol/PAUSE Rule completed.
Keystone Flap Text: The defect edges were debeveled with a #15 scalpel blade.  Given the location of the defect, shape of the defect a keystone flap was deemed most appropriate.  Using a sterile surgical marker, an appropriate keystone flap was drawn incorporating the defect, outlining the appropriate donor tissue and placing the expected incisions within the relaxed skin tension lines where possible. The area thus outlined was incised deep to adipose tissue with a #15 scalpel blade.  The skin margins were undermined to an appropriate distance in all directions around the primary defect and laterally outward around the flap utilizing iris scissors.
Bilateral Helical Rim Advancement Flap Text: The defect edges were debeveled with a #15 blade scalpel.  Given the location of the defect and the proximity to free margins (helical rim) a bilateral helical rim advancement flap was deemed most appropriate.  Using a sterile surgical marker, the appropriate advancement flaps were drawn incorporating the defect and placing the expected incisions between the helical rim and antihelix where possible.  The area thus outlined was incised through and through with a #15 scalpel blade.  With a skin hook and iris scissors, the flaps were gently and sharply undermined and freed up.
Repair Type: Complex Repair
Post-Care Instructions: I reviewed with the patient in detail post-care instructions. Patient is not to engage in any heavy lifting, exercise, or swimming for the next 14 days. Should the patient develop any fevers, chills, bleeding, severe pain patient will contact the office immediately.
Mart-1 - Positive Histology Text: MART-1 staining demonstrates areas of higher density and clustering of melanocytes with Pagetoid spread upwards within the epidermis. The surgical margins are positive for tumor cells.
Posterior Auricular Interpolation Flap Text: A decision was made to reconstruct the defect utilizing an interpolation axial flap and a staged reconstruction.  A telfa template was made of the defect.  This telfa template was then used to outline the posterior auricular interpolation flap.  The donor area for the pedicle flap was then injected with anesthesia.  The flap was excised through the skin and subcutaneous tissue down to the layer of the underlying musculature.  The pedicle flap was carefully excised within this deep plane to maintain its blood supply.  The edges of the donor site were undermined.   The donor site was closed in a primary fashion.  The pedicle was then rotated into position and sutured.  Once the tube was sutured into place, adequate blood supply was confirmed with blanching and refill.  The pedicle was then wrapped with xeroform gauze and dressed appropriately with a telfa and gauze bandage to ensure continued blood supply and protect the attached pedicle.
Chonodrocutaneous Helical Advancement Flap Text: The defect edges were debeveled with a #15 scalpel blade.  Given the location of the defect and the proximity to free margins a chondrocutaneous helical advancement flap was deemed most appropriate.  Using a sterile surgical marker, the appropriate advancement flap was drawn incorporating the defect and placing the expected incisions within the relaxed skin tension lines where possible.    The area thus outlined was incised deep to adipose tissue with a #15 scalpel blade.  The skin margins were undermined to an appropriate distance in all directions utilizing iris scissors.
Suturegard Body: The suture ends were repeatedly re-tightened and re-clamped to achieve the desired tissue expansion.
Complex Repair And Graft Additional Text (Will Appearing After The Standard Complex Repair Text): The complex repair was not sufficient to completely close the primary defect. The remaining additional defect was repaired with the graft mentioned below.
Localized Dermabrasion With Wire Brush Text: The patient was draped in routine manner.  Localized dermabrasion using 3 x 17 mm wire brush was performed in routine manner to papillary dermis. This spot dermabrasion is being performed to complete skin cancer reconstruction. It also will eliminate the other sun damaged precancerous cells that are known to be part of the regional effect of a lifetime's worth of sun exposure. This localized dermabrasion is therapeutic and should not be considered cosmetic in any regard.
Melolabial Transposition Flap Text: The defect edges were debeveled with a #15 scalpel blade.  Given the location of the defect and the proximity to free margins a melolabial flap was deemed most appropriate.  Using a sterile surgical marker, an appropriate melolabial transposition flap was drawn incorporating the defect.    The area thus outlined was incised deep to adipose tissue with a #15 scalpel blade.  The skin margins were undermined to an appropriate distance in all directions utilizing iris scissors.
Z Plasty Text: The lesion was extirpated to the level of the fat with a #15 scalpel blade.  Given the location of the defect, shape of the defect and the proximity to free margins a Z-plasty was deemed most appropriate for repair.  Using a sterile surgical marker, the appropriate transposition arms of the Z-plasty were drawn incorporating the defect and placing the expected incisions within the relaxed skin tension lines where possible.    The area thus outlined was incised deep to adipose tissue with a #15 scalpel blade.  The skin margins were undermined to an appropriate distance in all directions utilizing iris scissors.  The opposing transposition arms were then transposed into place in opposite direction and anchored with interrupted buried subcutaneous sutures.
Mohs Histo Method Verbiage: Each section was then chromacoded and processed in the Mohs lab using the Mohs protocol and submitted for frozen section.
Staging Info: By selecting yes to the question above you will include information on AJCC 8 tumor staging in your Mohs note. Information on tumor staging will be automatically added for SCCs on the head and neck. AJCC 8 includes tumor size, tumor depth, perineural involvement and bone invasion.
Hemigard Intro: Due to skin fragility and wound tension, it was decided to use HEMIGARD adhesive retention suture devices to permit a linear closure. The skin was cleaned and dried for a 6cm distance away from the wound. Excessive hair, if present, was removed to allow for adhesion.
Split-Thickness Skin Graft Text: The defect edges were debeveled with a #15 scalpel blade.  Given the location of the defect, shape of the defect and the proximity to free margins a split thickness skin graft was deemed most appropriate.  Using a sterile surgical marker, the primary defect shape was transferred to the donor site. The split thickness graft was then harvested.  The skin graft was then placed in the primary defect and oriented appropriately.
S Plasty Text: Given the location and shape of the defect, and the orientation of relaxed skin tension lines, an S-plasty was deemed most appropriate for repair.  Using a sterile surgical marker, the appropriate outline of the S-plasty was drawn, incorporating the defect and placing the expected incisions within the relaxed skin tension lines where possible.  The area thus outlined was incised deep to adipose tissue with a #15 scalpel blade.  The skin margins were undermined to an appropriate distance in all directions utilizing iris scissors. The skin flaps were advanced over the defect.  The opposing margins were then approximated with interrupted buried subcutaneous sutures.
Mohs Case Number: [id number]
No Repair - Repaired With Adjacent Surgical Defect Text (Leave Blank If You Do Not Want): After obtaining clear surgical margins the defect was repaired concurrently with another surgical defect which was in close approximation.
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
Muscle Hinge Flap Text: The defect edges were debeveled with a #15 scalpel blade.  Given the size, depth and location of the defect and the proximity to free margins a muscle hinge flap was deemed most appropriate.  Using a sterile surgical marker, an appropriate hinge flap was drawn incorporating the defect. The area thus outlined was incised with a #15 scalpel blade.  The skin margins were undermined to an appropriate distance in all directions utilizing iris scissors.
Advancement-Rotation Flap Text: The defect edges were debeveled with a #15 scalpel blade.  Given the location of the defect, shape of the defect and the proximity to free margins an advancement-rotation flap was deemed most appropriate.  Using a sterile surgical marker, an appropriate flap was drawn incorporating the defect and placing the expected incisions within the relaxed skin tension lines where possible. The area thus outlined was incised deep to adipose tissue with a #15 scalpel blade.  The skin margins were undermined to an appropriate distance in all directions utilizing iris scissors.
Dressing: pressure dressing with telfa
Purse String (Simple) Text: Given the location of the defect and the characteristics of the surrounding skin a purse string closure was deemed most appropriate.  Undermining was performed circumfirentially around the surgical defect.  A purse string suture was then placed and tightened.
Depth Of Tumor Invasion (For Histology): dermis
Mustarde Flap Text: The defect edges were debeveled with a #15 scalpel blade.  Given the size, depth and location of the defect and the proximity to free margins a Mustarde flap was deemed most appropriate.  Using a sterile surgical marker, an appropriate flap was drawn incorporating the defect. The area thus outlined was incised with a #15 scalpel blade.  The skin margins were undermined to an appropriate distance in all directions utilizing iris scissors.
Eye Protection Verbiage: Before proceeding with the stage, a plastic scleral shield was inserted. The globe was anesthetized with a few drops of 1% lidocaine with 1:100,000 epinephrine. Then, an appropriate sized scleral shield was chosen and coated with lacrilube ointment. The shield was gently inserted and left in place for the duration of each stage. After the stage was completed, the shield was gently removed.
Ear Star Wedge Flap Text: The defect edges were debeveled with a #15 blade scalpel.  Given the location of the defect and the proximity to free margins (helical rim) an ear star wedge flap was deemed most appropriate.  Using a sterile surgical marker, the appropriate flap was drawn incorporating the defect and placing the expected incisions between the helical rim and antihelix where possible.  The area thus outlined was incised through and through with a #15 scalpel blade.
Inflammation Suggestive Of Cancer Camouflage Histology Text: There was a dense lymphocytic infiltrate which prevented adequate histologic evaluation of adjacent structures.
Surgeon: Nadine Allen MD
O-T Advancement Flap Text: The defect edges were debeveled with a #15 scalpel blade.  Given the location of the defect, shape of the defect and the proximity to free margins an O-T advancement flap was deemed most appropriate.  Using a sterile surgical marker, an appropriate advancement flap was drawn incorporating the defect and placing the expected incisions within the relaxed skin tension lines where possible.    The area thus outlined was incised deep to adipose tissue with a #15 scalpel blade.  The skin margins were undermined to an appropriate distance in all directions utilizing iris scissors.
Suturegard Intro: Intraoperative tissue expansion was performed, utilizing the SUTUREGARD device, in order to reduce wound tension.
Trilobed Flap Text: The defect edges were debeveled with a #15 scalpel blade.  Given the location of the defect and the proximity to free margins a trilobed flap was deemed most appropriate.  Using a sterile surgical marker, an appropriate trilobed flap drawn around the defect.    The area thus outlined was incised deep to adipose tissue with a #15 scalpel blade.  The skin margins were undermined to an appropriate distance in all directions utilizing iris scissors.
Intermediate Repair And Flap Additional Text (Will Appearing After The Standard Complex Repair Text): The intermediate repair was not sufficient to completely close the primary defect. The remaining additional defect was repaired with the flap mentioned below.
Previous Accession (Optional): [id number]
Repair Anesthesia Method: local infiltration
Wound Care: Vaseline
Same Histology In Subsequent Stages Text: The pattern and morphology of the tumor is as described in the first stage.
Wound Care (No Sutures): Petrolatum
Island Pedicle Flap With Canthal Suspension Text: The defect edges were debeveled with a #15 scalpel blade.  Given the location of the defect, shape of the defect and the proximity to free margins an island pedicle advancement flap was deemed most appropriate.  Using a sterile surgical marker, an appropriate advancement flap was drawn incorporating the defect, outlining the appropriate donor tissue and placing the expected incisions within the relaxed skin tension lines where possible. The area thus outlined was incised deep to adipose tissue with a #15 scalpel blade.  The skin margins were undermined to an appropriate distance in all directions around the primary defect and laterally outward around the island pedicle utilizing iris scissors.  There was minimal undermining beneath the pedicle flap. A suspension suture was placed in the canthal tendon to prevent tension and prevent ectropion.
Banner Transposition Flap Text: The defect edges were debeveled with a #15 scalpel blade.  Given the location of the defect and the proximity to free margins a Banner transposition flap was deemed most appropriate.  Using a sterile surgical marker, an appropriate flap drawn around the defect. The area thus outlined was incised deep to adipose tissue with a #15 scalpel blade.  The skin margins were undermined to an appropriate distance in all directions utilizing iris scissors.
Full Thickness Lip Wedge Repair (Flap) Text: Given the location of the defect and the proximity to free margins a full thickness wedge repair was deemed most appropriate.  Using a sterile surgical marker, the appropriate repair was drawn incorporating the defect and placing the expected incisions perpendicular to the vermilion border.  The vermilion border was also meticulously outlined to ensure appropriate reapproximation during the repair.  The area thus outlined was incised through and through with a #15 scalpel blade.  The muscularis and dermis were reaproximated with deep sutures following hemostasis. Care was taken to realign the vermilion border before proceeding with the superficial closure.  Once the vermilion was realigned the superfical and mucosal closure was finished.
Consent 2/Introductory Paragraph: Mohs surgery was explained to the patient and consent was obtained. The risks, benefits and alternatives to therapy were discussed in detail. Specifically, the risks of infection, scarring, bleeding, prolonged wound healing, incomplete removal, allergy to anesthesia, nerve injury and recurrence were addressed. Prior to the procedure, the treatment site was clearly identified and confirmed by the patient. All components of Universal Protocol/PAUSE Rule completed.
Tumor Depth: Less than 6mm from granular layer and no invasion beyond the subcutaneous fat
Secondary Intention Text (Leave Blank If You Do Not Want): The defect will heal with secondary intention.
Orbicularis Oris Muscle Flap Text: The defect edges were debeveled with a #15 scalpel blade.  Given that the defect affected the competency of the oral sphincter an orbicularis oris muscle flap was deemed most appropriate to restore this competency and normal muscle function.  Using a sterile surgical marker, an appropriate flap was drawn incorporating the defect. The area thus outlined was incised with a #15 scalpel blade.
Alar Island Pedicle Flap Text: The defect edges were debeveled with a #15 scalpel blade.  Given the location of the defect, shape of the defect and the proximity to the alar rim an island pedicle advancement flap was deemed most appropriate.  Using a sterile surgical marker, an appropriate advancement flap was drawn incorporating the defect, outlining the appropriate donor tissue and placing the expected incisions within the nasal ala running parallel to the alar rim. The area thus outlined was incised with a #15 scalpel blade.  The skin margins were undermined minimally to an appropriate distance in all directions around the primary defect and laterally outward around the island pedicle utilizing iris scissors.  There was minimal undermining beneath the pedicle flap.
Purse String (Intermediate) Text: Given the location of the defect and the characteristics of the surrounding skin a purse string intermediate closure was deemed most appropriate.  Undermining was performed circumfirentially around the surgical defect.  A purse string suture was then placed and tightened.
Estimated Blood Loss (Cc): minimal
Area L Indication Text: Tumors in this location are included in Area L (trunk and extremities).  Mohs surgery is indicated for larger tumors, or tumors with aggressive histologic features, in these anatomic locations.
Nasal Turnover Hinge Flap Text: The defect edges were debeveled with a #15 scalpel blade.  Given the size, depth, location of the defect and the defect being full thickness a nasal turnover hinge flap was deemed most appropriate.  Using a sterile surgical marker, an appropriate hinge flap was drawn incorporating the defect. The area thus outlined was incised with a #15 scalpel blade. The flap was designed to recreate the nasal mucosal lining and the alar rim. The skin margins were undermined to an appropriate distance in all directions utilizing iris scissors.
Consent (Ear)/Introductory Paragraph: The rationale for Mohs was explained to the patient and consent was obtained. The risks, benefits and alternatives to therapy were discussed in detail. Specifically, the risks of ear deformity, infection, scarring, bleeding, prolonged wound healing, incomplete removal, allergy to anesthesia, nerve injury and recurrence were addressed. Prior to the procedure, the treatment site was clearly identified and confirmed by the patient. All components of Universal Protocol/PAUSE Rule completed.
Subsequent Stages Histo Method Verbiage: Using a similar technique to that described above, a thin layer of tissue was removed from all areas where tumor was visible on the previous stage.  The tissue was again oriented, mapped, dyed, and processed as above.
Partial Purse String (Simple) Text: Given the location of the defect and the characteristics of the surrounding skin a simple purse string closure was deemed most appropriate.  Undermining was performed circumfirentially around the surgical defect.  A purse string suture was then placed and tightened. Wound tension only allowed a partial closure of the circular defect.
Wound Check: 6 weeks
Rhomboid Transposition Flap Text: The defect edges were debeveled with a #15 scalpel blade.  Given the location of the defect and the proximity to free margins a rhomboid transposition flap was deemed most appropriate.  Using a sterile surgical marker, an appropriate rhomboid flap was drawn incorporating the defect.    The area thus outlined was incised deep to adipose tissue with a #15 scalpel blade.  The skin margins were undermined to an appropriate distance in all directions utilizing iris scissors.
No Residual Tumor Seen Histology Text: There were no malignant cells seen in the sections examined.
Vermilion Border Text: The closure involved the vermilion border.
Tumor Debulked?: curette
Epidermal Autograft Text: The defect edges were debeveled with a #15 scalpel blade.  Given the location of the defect, shape of the defect and the proximity to free margins an epidermal autograft was deemed most appropriate.  Using a sterile surgical marker, the primary defect shape was transferred to the donor site. The epidermal graft was then harvested.  The skin graft was then placed in the primary defect and oriented appropriately.
Detail Level: Detailed
Island Pedicle Flap Text: The defect edges were debeveled with a #15 scalpel blade.  Given the location of the defect, shape of the defect and the proximity to free margins an island pedicle advancement flap was deemed most appropriate.  Using a sterile surgical marker, an appropriate advancement flap was drawn incorporating the defect, outlining the appropriate donor tissue and placing the expected incisions within the relaxed skin tension lines where possible.    The area thus outlined was incised deep to adipose tissue with a #15 scalpel blade.  The skin margins were undermined to an appropriate distance in all directions around the primary defect and laterally outward around the island pedicle utilizing iris scissors.  There was minimal undermining beneath the pedicle flap.
Nasalis-Muscle-Based Myocutaneous Island Pedicle Flap Text: Using a #15 blade, an incision was made around the donor flap to the level of the nasalis muscle. Wide lateral undermining was then performed in both the subcutaneous plane above the nasalis muscle, and in a submuscular plane just above periosteum. This allowed the formation of a free nasalis muscle axial pedicle (based on the angular artery) which was still attached to the actual cutaneous flap, increasing its mobility and vascular viability. Hemostasis was obtained with pinpoint electrocoagulation. The flap was mobilized into position and the pivotal anchor points positioned and stabilized with buried interrupted sutures. Subcutaneous and dermal tissues were closed in a multilayered fashion with sutures. Tissue redundancies were excised, and the epidermal edges were apposed without significant tension and sutured with sutures.
Dorsal Nasal Flap Text: The defect edges were debeveled with a #15 scalpel blade.  Given the location of the defect and the proximity to free margins a dorsal nasal flap was deemed most appropriate.  Using a sterile surgical marker, an appropriate dorsal nasal flap was drawn around the defect.    The area thus outlined was incised deep to adipose tissue with a #15 scalpel blade.  The skin margins were undermined to an appropriate distance in all directions utilizing iris scissors.
Additional Anesthesia Volume In Cc: 6
Bcc Histology Text: There were numerous aggregates of basaloid cells.
Consent 1/Introductory Paragraph: The rationale for Mohs was explained to the patient and consent was obtained. The risks, benefits and alternatives to therapy were discussed in detail. Specifically, the risks of infection, scarring, bleeding, prolonged wound healing, incomplete removal, allergy to anesthesia, nerve injury and recurrence were addressed. Prior to the procedure, the treatment site was clearly identified and confirmed by the patient. All components of Universal Protocol/PAUSE Rule completed.
Pain Refusal Text: I offered to prescribe pain medication but the patient refused to take this medication.
Epidermal Closure Graft Donor Site (Optional): running
Medical Necessity Statement: Based on my medical judgement, Mohs surgery is the most appropriate treatment for this cancer compared to other treatments.
Advancement Flap (Single) Text: The defect edges were debeveled with a #15 scalpel blade.  Given the location of the defect and the proximity to free margins a single advancement flap was deemed most appropriate.  Using a sterile surgical marker, an appropriate advancement flap was drawn incorporating the defect and placing the expected incisions within the relaxed skin tension lines where possible.    The area thus outlined was incised deep to adipose tissue with a #15 scalpel blade.  The skin margins were undermined to an appropriate distance in all directions utilizing iris scissors.
Bilobed Transposition Flap Text: The defect edges were debeveled with a #15 scalpel blade.  Given the location of the defect and the proximity to free margins a bilobed transposition flap was deemed most appropriate.  Using a sterile surgical marker, an appropriate bilobe flap drawn around the defect.    The area thus outlined was incised deep to adipose tissue with a #15 scalpel blade.  The skin margins were undermined to an appropriate distance in all directions utilizing iris scissors.
Area H Indication Text: Tumors in this location are included in Area H (eyelids, eyebrows, nose, lips, chin, ear, pre-auricular, post-auricular, temple, genitalia, hands, feet, ankles and areola).  Tissue conservation is critical in these anatomic locations.
Undermining Location (Optional): in the superficial subcutaneous fat
Postop Diagnosis: same
Mohs Method Verbiage: An incision at a 45 degree angle following the standard Mohs approach was done and the specimen was harvested as a microscopic controlled layer.
Double Island Pedicle Flap Text: The defect edges were debeveled with a #15 scalpel blade.  Given the location of the defect, shape of the defect and the proximity to free margins a double island pedicle advancement flap was deemed most appropriate.  Using a sterile surgical marker, an appropriate advancement flap was drawn incorporating the defect, outlining the appropriate donor tissue and placing the expected incisions within the relaxed skin tension lines where possible.    The area thus outlined was incised deep to adipose tissue with a #15 scalpel blade.  The skin margins were undermined to an appropriate distance in all directions around the primary defect and laterally outward around the island pedicle utilizing iris scissors.  There was minimal undermining beneath the pedicle flap.
Island Pedicle Flap-Requiring Vessel Identification Text: The defect edges were debeveled with a #15 scalpel blade.  Given the location of the defect, shape of the defect and the proximity to free margins an island pedicle advancement flap was deemed most appropriate.  Using a sterile surgical marker, an appropriate advancement flap was drawn, based on the axial vessel mentioned above, incorporating the defect, outlining the appropriate donor tissue and placing the expected incisions within the relaxed skin tension lines where possible.    The area thus outlined was incised deep to adipose tissue with a #15 scalpel blade.  The skin margins were undermined to an appropriate distance in all directions around the primary defect and laterally outward around the island pedicle utilizing iris scissors.  There was minimal undermining beneath the pedicle flap.
O-T Plasty Text: The defect edges were debeveled with a #15 scalpel blade.  Given the location of the defect, shape of the defect and the proximity to free margins an O-T plasty was deemed most appropriate.  Using a sterile surgical marker, an appropriate O-T plasty was drawn incorporating the defect and placing the expected incisions within the relaxed skin tension lines where possible.    The area thus outlined was incised deep to adipose tissue with a #15 scalpel blade.  The skin margins were undermined to an appropriate distance in all directions utilizing iris scissors.
Interpolation Flap Text: A decision was made to reconstruct the defect utilizing an interpolation axial flap and a staged reconstruction.  A telfa template was made of the defect.  This telfa template was then used to outline the interpolation flap.  The donor area for the pedicle flap was then injected with anesthesia.  The flap was excised through the skin and subcutaneous tissue down to the layer of the underlying musculature.  The interpolation flap was carefully excised within this deep plane to maintain its blood supply.  The edges of the donor site were undermined.   The donor site was closed in a primary fashion.  The pedicle was then rotated into position and sutured.  Once the tube was sutured into place, adequate blood supply was confirmed with blanching and refill.  The pedicle was then wrapped with xeroform gauze and dressed appropriately with a telfa and gauze bandage to ensure continued blood supply and protect the attached pedicle.
Brow Lift Text: A midfrontal incision was made medially to the defect to allow access to the tissues just superior to the left eyebrow. Following careful dissection inferiorly in a supraperiosteal plane to the level of the left eyebrow, several 3-0 monocryl sutures were used to resuspend the eyebrow orbicularis oculi muscular unit to the superior frontal bone periosteum. This resulted in an appropriate reapproximation of static eyebrow symmetry and correction of the left brow ptosis.
Cheiloplasty (Less Than 50%) Text: A decision was made to reconstruct the defect with a  cheiloplasty.  The defect was undermined extensively.  Additional obicularis oris muscle was excised with a 15 blade scalpel.  The defect was converted into a full thickness wedge, of less than 50% of the vertical height of the lip, to facilite a better cosmetic result.  Small vessels were then tied off with 5-0 monocyrl. The obicularis oris, superficial fascia, adipose and dermis were then reapproximated.  After the deeper layers were approximated the epidermis was reapproximated with particular care given to realign the vermilion border.
Tissue Cultured Epidermal Autograft Text: The defect edges were debeveled with a #15 scalpel blade.  Given the location of the defect, shape of the defect and the proximity to free margins a tissue cultured epidermal autograft was deemed most appropriate.  The graft was then trimmed to fit the size of the defect.  The graft was then placed in the primary defect and oriented appropriately.
Hemigard Postcare Instructions: The HEMIGARD strips are to remain completely dry for at least 5-7 days.
A-T Advancement Flap Text: The defect edges were debeveled with a #15 scalpel blade.  Given the location of the defect, shape of the defect and the proximity to free margins an A-T advancement flap was deemed most appropriate.  Using a sterile surgical marker, an appropriate advancement flap was drawn incorporating the defect and placing the expected incisions within the relaxed skin tension lines where possible.    The area thus outlined was incised deep to adipose tissue with a #15 scalpel blade.  The skin margins were undermined to an appropriate distance in all directions utilizing iris scissors.
Consent (Marginal Mandibular)/Introductory Paragraph: The rationale for Mohs was explained to the patient and consent was obtained. The risks, benefits and alternatives to therapy were discussed in detail. Specifically, the risks of damage to the marginal mandibular branch of the facial nerve, infection, scarring, bleeding, prolonged wound healing, incomplete removal, allergy to anesthesia, and recurrence were addressed. Prior to the procedure, the treatment site was clearly identified and confirmed by the patient. All components of Universal Protocol/PAUSE Rule completed.
Rhombic Flap Text: The defect edges were debeveled with a #15 scalpel blade.  Given the location of the defect and the proximity to free margins a rhombic flap was deemed most appropriate.  Using a sterile surgical marker, an appropriate rhombic flap was drawn incorporating the defect.    The area thus outlined was incised deep to adipose tissue with a #15 scalpel blade.  The skin margins were undermined to an appropriate distance in all directions utilizing iris scissors.
Paramedian Forehead Flap Text: A decision was made to reconstruct the defect utilizing an interpolation axial flap and a staged reconstruction.  A telfa template was made of the defect.  This telfa template was then used to outline the paramedian forehead pedicle flap.  The donor area for the pedicle flap was then injected with anesthesia.  The flap was excised through the skin and subcutaneous tissue down to the layer of the underlying musculature.  The pedicle flap was carefully excised within this deep plane to maintain its blood supply.  The edges of the donor site were undermined.   The donor site was closed in a primary fashion.  The pedicle was then rotated into position and sutured.  Once the tube was sutured into place, adequate blood supply was confirmed with blanching and refill.  The pedicle was then wrapped with xeroform gauze and dressed appropriately with a telfa and gauze bandage to ensure continued blood supply and protect the attached pedicle.
Ear Wedge Repair Text: A wedge excision was completed by carrying down an excision through the full thickness of the ear and cartilage with an inward facing Burow's triangle. The wound was then closed in a layered fashion.
Epidermal Closure: running cuticular
Consent (Scalp)/Introductory Paragraph: The rationale for Mohs was explained to the patient and consent was obtained. The risks, benefits and alternatives to therapy were discussed in detail. Specifically, the risks of changes in hair growth pattern secondary to repair, infection, scarring, bleeding, prolonged wound healing, incomplete removal, allergy to anesthesia, nerve injury and recurrence were addressed. Prior to the procedure, the treatment site was clearly identified and confirmed by the patient. All components of Universal Protocol/PAUSE Rule completed.
Bilateral Rotation Flap Text: The defect edges were debeveled with a #15 scalpel blade. Given the location of the defect, shape of the defect and the proximity to free margins a bilateral rotation flap was deemed most appropriate. Using a sterile surgical marker, an appropriate rotation flap was drawn incorporating the defect and placing the expected incisions within the relaxed skin tension lines where possible. The area thus outlined was incised deep to adipose tissue with a #15 scalpel blade. The skin margins were undermined to an appropriate distance in all directions utilizing iris scissors. Following this, the designed flap was carried over into the primary defect and sutured into place.
Double Z Plasty Text: The lesion was extirpated to the level of the fat with a #15 scalpel blade. Given the location of the defect, shape of the defect and the proximity to free margins a double Z-plasty was deemed most appropriate for repair. Using a sterile surgical marker, the appropriate transposition arms of the double Z-plasty were drawn incorporating the defect and placing the expected incisions within the relaxed skin tension lines where possible. The area thus outlined was incised deep to adipose tissue with a #15 scalpel blade. The skin margins were undermined to an appropriate distance in all directions utilizing iris scissors. The opposing transposition arms were then transposed and carried over into place in opposite direction and anchored with interrupted buried subcutaneous sutures.

## 2023-05-01 ENCOUNTER — APPOINTMENT (RX ONLY)
Dept: URBAN - METROPOLITAN AREA CLINIC 36 | Facility: CLINIC | Age: 83
Setting detail: DERMATOLOGY
End: 2023-05-01

## 2023-05-01 DIAGNOSIS — Z48.02 ENCOUNTER FOR REMOVAL OF SUTURES: ICD-10-CM

## 2023-05-01 PROCEDURE — ? SUTURE REMOVAL (GLOBAL PERIOD)

## 2023-05-01 ASSESSMENT — LOCATION SIMPLE DESCRIPTION DERM: LOCATION SIMPLE: RIGHT CHEEK

## 2023-05-01 ASSESSMENT — LOCATION ZONE DERM: LOCATION ZONE: FACE

## 2023-05-01 ASSESSMENT — LOCATION DETAILED DESCRIPTION DERM: LOCATION DETAILED: RIGHT INFERIOR CENTRAL MALAR CHEEK

## 2023-05-01 NOTE — PROCEDURE: SUTURE REMOVAL (GLOBAL PERIOD)
Detail Level: Detailed
Add 93817 Cpt? (Important Note: In 2017 The Use Of 28968 Is Being Tracked By Cms To Determine Future Global Period Reimbursement For Global Periods): no

## 2023-05-17 ENCOUNTER — APPOINTMENT (RX ONLY)
Dept: URBAN - METROPOLITAN AREA CLINIC 36 | Facility: CLINIC | Age: 83
Setting detail: DERMATOLOGY
End: 2023-05-17

## 2023-05-17 DIAGNOSIS — Z41.9 ENCOUNTER FOR PROCEDURE FOR PURPOSES OTHER THAN REMEDYING HEALTH STATE, UNSPECIFIED: ICD-10-CM

## 2023-05-17 PROCEDURE — ? FRAXEL

## 2023-05-17 ASSESSMENT — LOCATION DETAILED DESCRIPTION DERM: LOCATION DETAILED: RIGHT INFERIOR CENTRAL MALAR CHEEK

## 2023-05-17 ASSESSMENT — LOCATION ZONE DERM: LOCATION ZONE: FACE

## 2023-05-17 ASSESSMENT — LOCATION SIMPLE DESCRIPTION DERM: LOCATION SIMPLE: RIGHT CHEEK

## 2023-05-17 NOTE — PROCEDURE: FRAXEL
Number Of Passes: 1
Medium Metal Eye Shield Text: The ocular mucosa was anesthetized with tetracaine. Once adequate anesthesia was optained, medium metal eye shields were inserted and remained in place until the procedure was completed.
Large Plastic Eye Shield Text: The ocular mucosa was anesthetized with tetracaine. Once adequate anesthesia was optained, large plastic eye shields were inserted and remained in place until the procedure was completed.
Location: forehead
Location: left cheek
Large Metal Eye Shield Text: The ocular mucosa was anesthetized with tetracaine. Once adequate anesthesia was optained, large metal eye shields were inserted and remained in place until the procedure was completed.
External Cooling Fan Speed: 5
Energy(Mj/Cm2): 70
Small Plastic Eye Shield Text: The ocular mucosa was anesthetized with tetracaine. Once adequate anesthesia was optained, small plastic eye shields were inserted and remained in place until the procedure was completed.
Detail Level: Zone
Was An Eye Shield Used?: No
Consent: Written consent obtained, risks reviewed including but not limited to pain and incomplete improvement.
Number Of Passes: 4
Medium Plastic Eye Shield Text: The ocular mucosa was anesthetized with tetracaine. Once adequate anesthesia was optained, medium plastic eye shields were inserted and remained in place until the procedure was completed.
Small Metal Eye Shield Text: The ocular mucosa was anesthetized with tetracaine. Once adequate anesthesia was optained, small metal eye shields were inserted and remained in place until the procedure was completed.
Total Coverage: 14%
Wavelength: 1550nm
Indication: surgical scars
Post-Care Instructions: I reviewed with the patient in detail post-care instructions. Patient should avoid sun until area fully healed.

## 2023-05-18 ENCOUNTER — APPOINTMENT (OUTPATIENT)
Dept: ADMISSIONS | Facility: MEDICAL CENTER | Age: 83
End: 2023-05-18
Attending: INTERNAL MEDICINE
Payer: MEDICARE

## 2023-05-19 ENCOUNTER — HOSPITAL ENCOUNTER (OUTPATIENT)
Facility: MEDICAL CENTER | Age: 83
End: 2023-05-19
Attending: INTERNAL MEDICINE | Admitting: INTERNAL MEDICINE
Payer: MEDICARE

## 2023-05-19 ENCOUNTER — APPOINTMENT (OUTPATIENT)
Dept: CARDIOLOGY | Facility: MEDICAL CENTER | Age: 83
End: 2023-05-19
Attending: INTERNAL MEDICINE
Payer: MEDICARE

## 2023-05-19 VITALS
SYSTOLIC BLOOD PRESSURE: 143 MMHG | BODY MASS INDEX: 26.84 KG/M2 | WEIGHT: 151.46 LBS | TEMPERATURE: 97.7 F | HEART RATE: 74 BPM | DIASTOLIC BLOOD PRESSURE: 67 MMHG | OXYGEN SATURATION: 97 % | HEIGHT: 63 IN | RESPIRATION RATE: 16 BRPM

## 2023-05-19 DIAGNOSIS — I48.0 PAROXYSMAL ATRIAL FIBRILLATION (HCC): ICD-10-CM

## 2023-05-19 PROCEDURE — C1764 EVENT RECORDER, CARDIAC: HCPCS

## 2023-05-19 PROCEDURE — 160002 HCHG RECOVERY MINUTES (STAT)

## 2023-05-19 PROCEDURE — 160046 HCHG PACU - 1ST 60 MINS PHASE II

## 2023-05-19 RX ORDER — LIDOCAINE HCL/EPINEPHRINE/PF 2%-1:200K
VIAL (ML) INJECTION
Status: DISCONTINUED
Start: 2023-05-19 | End: 2023-05-19 | Stop reason: HOSPADM

## 2023-05-19 ASSESSMENT — FIBROSIS 4 INDEX: FIB4 SCORE: 1.3

## 2023-05-19 NOTE — OR NURSING
1312  Pt arrived from PACU, report received from RN. VSS, Dressing Clean, dry intact to  mid upper chest wall with tegaderm and steristrips.                     Pt ambulated to chair with minimal SBA. Tolerating Solids, liquids. Denies pain, nausea at present. Will monitor.    1332  Discharge Instructions reviewed with patient and family. Understanding verbalized .   Adequate pain control no active nausea in post op period.   1333  Left via WC with belongings.

## 2023-05-19 NOTE — PROGRESS NOTES
Pharmacy Medication Reconciliation      ~Medication reconciliation updated and complete per patient at bedside   ~Allergies have been verified   ~No oral ABX within the last 30 days  ~Patient home pharmacy :  [address]

## 2023-05-19 NOTE — OP REPORT
Procedure:  Implantable loop recorder insertion    Indication:  CVA    Narrative:  The patient was brought to the Cath Lab in the fasting state.  Informed consent was obtained.  He was prepped and draped in usual sterile fashion.  We did not use conscious sedation.  1% lidocaine was used for local anesthesia.  The Medtronic implantable loop recorder was inserted using standard technique.  Appropriate sensing was confirmed.  The wound was closed with Dermabond and Steri-Strips.    Conclusions:  Successful implantation of Medtronic implantable loop recorder

## 2023-05-19 NOTE — DISCHARGE INSTRUCTIONS
Keep site clean and dry for 7 days  No shower  No submersion  Follow up with Provider as directed  If any questions arise, call your provider.    If your provider is not available, please feel free to call the [facility] at [phone].    MEDICATIONS: Resume taking daily medication.  Take prescribed pain medication with food.  If no medication is prescribed, you may take non-aspirin pain medication if needed.  PAIN MEDICATION CAN BE VERY CONSTIPATING.  Take a stool softener or laxative such as senokot, pericolace, or milk of magnesia if needed.

## 2023-06-19 ENCOUNTER — NON-PROVIDER VISIT (OUTPATIENT)
Dept: CARDIOLOGY | Facility: MEDICAL CENTER | Age: 83
End: 2023-06-19
Payer: MEDICARE

## 2023-06-19 PROCEDURE — 93298 REM INTERROG DEV EVAL SCRMS: CPT | Performed by: INTERNAL MEDICINE

## 2023-06-20 ENCOUNTER — APPOINTMENT (RX ONLY)
Dept: URBAN - METROPOLITAN AREA CLINIC 36 | Facility: CLINIC | Age: 83
Setting detail: DERMATOLOGY
End: 2023-06-20

## 2023-06-20 DIAGNOSIS — Z41.9 ENCOUNTER FOR PROCEDURE FOR PURPOSES OTHER THAN REMEDYING HEALTH STATE, UNSPECIFIED: ICD-10-CM

## 2023-06-20 PROCEDURE — ? FRAXEL

## 2023-06-20 ASSESSMENT — LOCATION ZONE DERM: LOCATION ZONE: FACE

## 2023-06-20 ASSESSMENT — LOCATION DETAILED DESCRIPTION DERM: LOCATION DETAILED: RIGHT INFERIOR CENTRAL MALAR CHEEK

## 2023-06-20 ASSESSMENT — LOCATION SIMPLE DESCRIPTION DERM: LOCATION SIMPLE: RIGHT CHEEK

## 2023-06-20 NOTE — CARDIAC REMOTE MONITOR - SCAN
Device transmission reviewed. Device demonstrated appropriate function.       Electronically Signed by: Thom Bennett M.D.    6/21/2023  12:28 PM

## 2023-06-20 NOTE — PROCEDURE: FRAXEL
Energy(Mj/Cm2): 1
Treatment Number: 2
Medium Metal Eye Shield Text: The ocular mucosa was anesthetized with tetracaine. Once adequate anesthesia was optained, medium metal eye shields were inserted and remained in place until the procedure was completed.
Large Plastic Eye Shield Text: The ocular mucosa was anesthetized with tetracaine. Once adequate anesthesia was optained, large plastic eye shields were inserted and remained in place until the procedure was completed.
Post-Care Instructions: I reviewed with the patient in detail post-care instructions. Patient should avoid sun until area fully healed.
Location: Use Location Override
Energy(Mj/Cm2): 70
Detail Level: Zone
Large Metal Eye Shield Text: The ocular mucosa was anesthetized with tetracaine. Once adequate anesthesia was optained, large metal eye shields were inserted and remained in place until the procedure was completed.
External Cooling Fan Speed: 5
Number Of Passes: 4
Small Plastic Eye Shield Text: The ocular mucosa was anesthetized with tetracaine. Once adequate anesthesia was optained, small plastic eye shields were inserted and remained in place until the procedure was completed.
Indication: surgical scars
Was An Eye Shield Used?: No
Consent: Written consent obtained, risks reviewed including but not limited to pain and incomplete improvement.
Wavelength: 1550nm
Small Metal Eye Shield Text: The ocular mucosa was anesthetized with tetracaine. Once adequate anesthesia was optained, small metal eye shields were inserted and remained in place until the procedure was completed.
Medium Plastic Eye Shield Text: The ocular mucosa was anesthetized with tetracaine. Once adequate anesthesia was optained, medium plastic eye shields were inserted and remained in place until the procedure was completed.
Total Coverage: 14%

## 2023-06-28 ENCOUNTER — HOSPITAL ENCOUNTER (OUTPATIENT)
Dept: LAB | Facility: MEDICAL CENTER | Age: 83
End: 2023-06-28
Attending: INTERNAL MEDICINE
Payer: MEDICARE

## 2023-06-28 LAB
ALBUMIN SERPL BCP-MCNC: 4.4 G/DL (ref 3.2–4.9)
ALBUMIN/GLOB SERPL: 1.7 G/DL
ALP SERPL-CCNC: 96 U/L (ref 30–99)
ALT SERPL-CCNC: 36 U/L (ref 2–50)
ANION GAP SERPL CALC-SCNC: 10 MMOL/L (ref 7–16)
AST SERPL-CCNC: 26 U/L (ref 12–45)
BASOPHILS # BLD AUTO: 0.7 % (ref 0–1.8)
BASOPHILS # BLD: 0.04 K/UL (ref 0–0.12)
BILIRUB SERPL-MCNC: 0.6 MG/DL (ref 0.1–1.5)
BUN SERPL-MCNC: 12 MG/DL (ref 8–22)
CALCIUM ALBUM COR SERPL-MCNC: 9.7 MG/DL (ref 8.5–10.5)
CALCIUM SERPL-MCNC: 10 MG/DL (ref 8.5–10.5)
CHLORIDE SERPL-SCNC: 102 MMOL/L (ref 96–112)
CHOLEST SERPL-MCNC: 121 MG/DL (ref 100–199)
CO2 SERPL-SCNC: 27 MMOL/L (ref 20–33)
CREAT SERPL-MCNC: 0.94 MG/DL (ref 0.5–1.4)
EOSINOPHIL # BLD AUTO: 0.1 K/UL (ref 0–0.51)
EOSINOPHIL NFR BLD: 1.8 % (ref 0–6.9)
ERYTHROCYTE [DISTWIDTH] IN BLOOD BY AUTOMATED COUNT: 44.1 FL (ref 35.9–50)
EST. AVERAGE GLUCOSE BLD GHB EST-MCNC: 108 MG/DL
FASTING STATUS PATIENT QL REPORTED: NORMAL
GFR SERPLBLD CREATININE-BSD FMLA CKD-EPI: 81 ML/MIN/1.73 M 2
GLOBULIN SER CALC-MCNC: 2.6 G/DL (ref 1.9–3.5)
GLUCOSE SERPL-MCNC: 99 MG/DL (ref 65–99)
HBA1C MFR BLD: 5.4 % (ref 4–5.6)
HCT VFR BLD AUTO: 40.5 % (ref 42–52)
HDLC SERPL-MCNC: 45 MG/DL
HGB BLD-MCNC: 13.4 G/DL (ref 14–18)
IMM GRANULOCYTES # BLD AUTO: 0.02 K/UL (ref 0–0.11)
IMM GRANULOCYTES NFR BLD AUTO: 0.4 % (ref 0–0.9)
LDLC SERPL CALC-MCNC: 62 MG/DL
LYMPHOCYTES # BLD AUTO: 1.35 K/UL (ref 1–4.8)
LYMPHOCYTES NFR BLD: 24.7 % (ref 22–41)
MCH RBC QN AUTO: 31.1 PG (ref 27–33)
MCHC RBC AUTO-ENTMCNC: 33.1 G/DL (ref 32.3–36.5)
MCV RBC AUTO: 94 FL (ref 81.4–97.8)
MONOCYTES # BLD AUTO: 0.56 K/UL (ref 0–0.85)
MONOCYTES NFR BLD AUTO: 10.2 % (ref 0–13.4)
NEUTROPHILS # BLD AUTO: 3.4 K/UL (ref 1.82–7.42)
NEUTROPHILS NFR BLD: 62.2 % (ref 44–72)
NRBC # BLD AUTO: 0 K/UL
NRBC BLD-RTO: 0 /100 WBC (ref 0–0.2)
PLATELET # BLD AUTO: 229 K/UL (ref 164–446)
PMV BLD AUTO: 9.5 FL (ref 9–12.9)
POTASSIUM SERPL-SCNC: 4.6 MMOL/L (ref 3.6–5.5)
PROT SERPL-MCNC: 7 G/DL (ref 6–8.2)
RBC # BLD AUTO: 4.31 M/UL (ref 4.7–6.1)
SODIUM SERPL-SCNC: 139 MMOL/L (ref 135–145)
TRIGL SERPL-MCNC: 69 MG/DL (ref 0–149)
WBC # BLD AUTO: 5.5 K/UL (ref 4.8–10.8)

## 2023-06-28 PROCEDURE — 80061 LIPID PANEL: CPT

## 2023-06-28 PROCEDURE — 36415 COLL VENOUS BLD VENIPUNCTURE: CPT

## 2023-06-28 PROCEDURE — 85025 COMPLETE CBC W/AUTO DIFF WBC: CPT

## 2023-06-28 PROCEDURE — 83036 HEMOGLOBIN GLYCOSYLATED A1C: CPT

## 2023-06-28 PROCEDURE — 80053 COMPREHEN METABOLIC PANEL: CPT

## 2023-08-15 ENCOUNTER — TELEPHONE (OUTPATIENT)
Dept: HEALTH INFORMATION MANAGEMENT | Facility: OTHER | Age: 83
End: 2023-08-15
Payer: MEDICARE

## 2023-10-12 ENCOUNTER — HOSPITAL ENCOUNTER (OUTPATIENT)
Dept: LAB | Facility: MEDICAL CENTER | Age: 83
End: 2023-10-12
Attending: FAMILY MEDICINE
Payer: MEDICARE

## 2023-10-12 LAB
ALBUMIN SERPL BCP-MCNC: 4.3 G/DL (ref 3.2–4.9)
ALBUMIN/GLOB SERPL: 1.6 G/DL
ALP SERPL-CCNC: 86 U/L (ref 30–99)
ALT SERPL-CCNC: 32 U/L (ref 2–50)
ANION GAP SERPL CALC-SCNC: 11 MMOL/L (ref 7–16)
AST SERPL-CCNC: 18 U/L (ref 12–45)
BASOPHILS # BLD AUTO: 0.5 % (ref 0–1.8)
BASOPHILS # BLD: 0.03 K/UL (ref 0–0.12)
BILIRUB SERPL-MCNC: 0.8 MG/DL (ref 0.1–1.5)
BUN SERPL-MCNC: 11 MG/DL (ref 8–22)
CALCIUM ALBUM COR SERPL-MCNC: 9.3 MG/DL (ref 8.5–10.5)
CALCIUM SERPL-MCNC: 9.5 MG/DL (ref 8.5–10.5)
CHLORIDE SERPL-SCNC: 101 MMOL/L (ref 96–112)
CHOLEST SERPL-MCNC: 127 MG/DL (ref 100–199)
CO2 SERPL-SCNC: 26 MMOL/L (ref 20–33)
CREAT SERPL-MCNC: 1 MG/DL (ref 0.5–1.4)
EOSINOPHIL # BLD AUTO: 0.08 K/UL (ref 0–0.51)
EOSINOPHIL NFR BLD: 1.3 % (ref 0–6.9)
ERYTHROCYTE [DISTWIDTH] IN BLOOD BY AUTOMATED COUNT: 48.7 FL (ref 35.9–50)
EST. AVERAGE GLUCOSE BLD GHB EST-MCNC: 123 MG/DL
FASTING STATUS PATIENT QL REPORTED: NORMAL
GFR SERPLBLD CREATININE-BSD FMLA CKD-EPI: 75 ML/MIN/1.73 M 2
GLOBULIN SER CALC-MCNC: 2.7 G/DL (ref 1.9–3.5)
GLUCOSE SERPL-MCNC: 104 MG/DL (ref 65–99)
HBA1C MFR BLD: 5.9 % (ref 4–5.6)
HCT VFR BLD AUTO: 41.8 % (ref 42–52)
HDLC SERPL-MCNC: 49 MG/DL
HGB BLD-MCNC: 14.1 G/DL (ref 14–18)
IMM GRANULOCYTES # BLD AUTO: 0.03 K/UL (ref 0–0.11)
IMM GRANULOCYTES NFR BLD AUTO: 0.5 % (ref 0–0.9)
LDLC SERPL CALC-MCNC: 62 MG/DL
LYMPHOCYTES # BLD AUTO: 1.52 K/UL (ref 1–4.8)
LYMPHOCYTES NFR BLD: 24.9 % (ref 22–41)
MCH RBC QN AUTO: 31.3 PG (ref 27–33)
MCHC RBC AUTO-ENTMCNC: 33.7 G/DL (ref 32.3–36.5)
MCV RBC AUTO: 92.7 FL (ref 81.4–97.8)
MONOCYTES # BLD AUTO: 0.61 K/UL (ref 0–0.85)
MONOCYTES NFR BLD AUTO: 10 % (ref 0–13.4)
NEUTROPHILS # BLD AUTO: 3.84 K/UL (ref 1.82–7.42)
NEUTROPHILS NFR BLD: 62.8 % (ref 44–72)
NRBC # BLD AUTO: 0 K/UL
NRBC BLD-RTO: 0 /100 WBC (ref 0–0.2)
PLATELET # BLD AUTO: 253 K/UL (ref 164–446)
PMV BLD AUTO: 9.2 FL (ref 9–12.9)
POTASSIUM SERPL-SCNC: 4.1 MMOL/L (ref 3.6–5.5)
PROT SERPL-MCNC: 7 G/DL (ref 6–8.2)
RBC # BLD AUTO: 4.51 M/UL (ref 4.7–6.1)
SODIUM SERPL-SCNC: 138 MMOL/L (ref 135–145)
TRIGL SERPL-MCNC: 80 MG/DL (ref 0–149)
TSH SERPL DL<=0.005 MIU/L-ACNC: 1.28 UIU/ML (ref 0.38–5.33)
URATE SERPL-MCNC: 6.9 MG/DL (ref 2.5–8.3)
WBC # BLD AUTO: 6.1 K/UL (ref 4.8–10.8)

## 2023-10-12 PROCEDURE — 84443 ASSAY THYROID STIM HORMONE: CPT

## 2023-10-12 PROCEDURE — 85025 COMPLETE CBC W/AUTO DIFF WBC: CPT

## 2023-10-12 PROCEDURE — 36415 COLL VENOUS BLD VENIPUNCTURE: CPT

## 2023-10-12 PROCEDURE — 80061 LIPID PANEL: CPT

## 2023-10-12 PROCEDURE — 84550 ASSAY OF BLOOD/URIC ACID: CPT

## 2023-10-12 PROCEDURE — 83036 HEMOGLOBIN GLYCOSYLATED A1C: CPT

## 2023-10-12 PROCEDURE — 80053 COMPREHEN METABOLIC PANEL: CPT

## 2024-01-09 ENCOUNTER — HOSPITAL ENCOUNTER (OUTPATIENT)
Dept: LAB | Facility: MEDICAL CENTER | Age: 84
End: 2024-01-09
Attending: FAMILY MEDICINE
Payer: MEDICARE

## 2024-01-09 LAB
ALBUMIN SERPL BCP-MCNC: 4.4 G/DL (ref 3.2–4.9)
ALBUMIN/GLOB SERPL: 1.6 G/DL
ALP SERPL-CCNC: 89 U/L (ref 30–99)
ALT SERPL-CCNC: 28 U/L (ref 2–50)
ANION GAP SERPL CALC-SCNC: 10 MMOL/L (ref 7–16)
AST SERPL-CCNC: 18 U/L (ref 12–45)
BASOPHILS # BLD AUTO: 0.8 % (ref 0–1.8)
BASOPHILS # BLD: 0.04 K/UL (ref 0–0.12)
BILIRUB SERPL-MCNC: 0.8 MG/DL (ref 0.1–1.5)
BUN SERPL-MCNC: 10 MG/DL (ref 8–22)
CALCIUM ALBUM COR SERPL-MCNC: 9.3 MG/DL (ref 8.5–10.5)
CALCIUM SERPL-MCNC: 9.6 MG/DL (ref 8.5–10.5)
CHLORIDE SERPL-SCNC: 96 MMOL/L (ref 96–112)
CHOLEST SERPL-MCNC: 131 MG/DL (ref 100–199)
CO2 SERPL-SCNC: 26 MMOL/L (ref 20–33)
CREAT SERPL-MCNC: 0.88 MG/DL (ref 0.5–1.4)
CREAT UR-MCNC: 94.93 MG/DL
EOSINOPHIL # BLD AUTO: 0.13 K/UL (ref 0–0.51)
EOSINOPHIL NFR BLD: 2.7 % (ref 0–6.9)
ERYTHROCYTE [DISTWIDTH] IN BLOOD BY AUTOMATED COUNT: 42.3 FL (ref 35.9–50)
EST. AVERAGE GLUCOSE BLD GHB EST-MCNC: 117 MG/DL
FASTING STATUS PATIENT QL REPORTED: NORMAL
GFR SERPLBLD CREATININE-BSD FMLA CKD-EPI: 85 ML/MIN/1.73 M 2
GLOBULIN SER CALC-MCNC: 2.7 G/DL (ref 1.9–3.5)
GLUCOSE SERPL-MCNC: 103 MG/DL (ref 65–99)
HBA1C MFR BLD: 5.7 % (ref 4–5.6)
HCT VFR BLD AUTO: 39.6 % (ref 42–52)
HDLC SERPL-MCNC: 60 MG/DL
HGB BLD-MCNC: 13.7 G/DL (ref 14–18)
IMM GRANULOCYTES # BLD AUTO: 0.02 K/UL (ref 0–0.11)
IMM GRANULOCYTES NFR BLD AUTO: 0.4 % (ref 0–0.9)
LDLC SERPL CALC-MCNC: 62 MG/DL
LYMPHOCYTES # BLD AUTO: 1.2 K/UL (ref 1–4.8)
LYMPHOCYTES NFR BLD: 25 % (ref 22–41)
MCH RBC QN AUTO: 31.4 PG (ref 27–33)
MCHC RBC AUTO-ENTMCNC: 34.6 G/DL (ref 32.3–36.5)
MCV RBC AUTO: 90.6 FL (ref 81.4–97.8)
MICROALBUMIN UR-MCNC: <1.2 MG/DL
MICROALBUMIN/CREAT UR: NORMAL MG/G (ref 0–30)
MONOCYTES # BLD AUTO: 0.46 K/UL (ref 0–0.85)
MONOCYTES NFR BLD AUTO: 9.6 % (ref 0–13.4)
NEUTROPHILS # BLD AUTO: 2.95 K/UL (ref 1.82–7.42)
NEUTROPHILS NFR BLD: 61.5 % (ref 44–72)
NRBC # BLD AUTO: 0 K/UL
NRBC BLD-RTO: 0 /100 WBC (ref 0–0.2)
PLATELET # BLD AUTO: 220 K/UL (ref 164–446)
PMV BLD AUTO: 9.1 FL (ref 9–12.9)
POTASSIUM SERPL-SCNC: 4.3 MMOL/L (ref 3.6–5.5)
PROT SERPL-MCNC: 7.1 G/DL (ref 6–8.2)
RBC # BLD AUTO: 4.37 M/UL (ref 4.7–6.1)
SODIUM SERPL-SCNC: 132 MMOL/L (ref 135–145)
TRIGL SERPL-MCNC: 45 MG/DL (ref 0–149)
TSH SERPL DL<=0.005 MIU/L-ACNC: 1.67 UIU/ML (ref 0.38–5.33)
URATE SERPL-MCNC: 5.8 MG/DL (ref 2.5–8.3)
WBC # BLD AUTO: 4.8 K/UL (ref 4.8–10.8)

## 2024-01-09 PROCEDURE — 84550 ASSAY OF BLOOD/URIC ACID: CPT

## 2024-01-09 PROCEDURE — 82570 ASSAY OF URINE CREATININE: CPT

## 2024-01-09 PROCEDURE — 80053 COMPREHEN METABOLIC PANEL: CPT

## 2024-01-09 PROCEDURE — 85025 COMPLETE CBC W/AUTO DIFF WBC: CPT

## 2024-01-09 PROCEDURE — 83036 HEMOGLOBIN GLYCOSYLATED A1C: CPT

## 2024-01-09 PROCEDURE — 80061 LIPID PANEL: CPT

## 2024-01-09 PROCEDURE — 82043 UR ALBUMIN QUANTITATIVE: CPT

## 2024-01-09 PROCEDURE — 84443 ASSAY THYROID STIM HORMONE: CPT

## 2024-01-09 PROCEDURE — 36415 COLL VENOUS BLD VENIPUNCTURE: CPT

## 2024-02-20 ENCOUNTER — HOSPITAL ENCOUNTER (OUTPATIENT)
Dept: LAB | Facility: MEDICAL CENTER | Age: 84
End: 2024-02-20
Attending: FAMILY MEDICINE
Payer: MEDICARE

## 2024-02-20 LAB
ALBUMIN SERPL BCP-MCNC: 4 G/DL (ref 3.2–4.9)
ALBUMIN/GLOB SERPL: 1.4 G/DL
ALP SERPL-CCNC: 92 U/L (ref 30–99)
ALT SERPL-CCNC: 28 U/L (ref 2–50)
ANION GAP SERPL CALC-SCNC: 12 MMOL/L (ref 7–16)
AST SERPL-CCNC: 24 U/L (ref 12–45)
BILIRUB SERPL-MCNC: 0.4 MG/DL (ref 0.1–1.5)
BUN SERPL-MCNC: 16 MG/DL (ref 8–22)
CALCIUM ALBUM COR SERPL-MCNC: 9.3 MG/DL (ref 8.5–10.5)
CALCIUM SERPL-MCNC: 9.3 MG/DL (ref 8.5–10.5)
CHLORIDE SERPL-SCNC: 107 MMOL/L (ref 96–112)
CO2 SERPL-SCNC: 23 MMOL/L (ref 20–33)
CREAT SERPL-MCNC: 0.96 MG/DL (ref 0.5–1.4)
GFR SERPLBLD CREATININE-BSD FMLA CKD-EPI: 78 ML/MIN/1.73 M 2
GLOBULIN SER CALC-MCNC: 2.9 G/DL (ref 1.9–3.5)
GLUCOSE SERPL-MCNC: 105 MG/DL (ref 65–99)
POTASSIUM SERPL-SCNC: 4.6 MMOL/L (ref 3.6–5.5)
PROT SERPL-MCNC: 6.9 G/DL (ref 6–8.2)
SODIUM SERPL-SCNC: 142 MMOL/L (ref 135–145)

## 2024-02-20 PROCEDURE — 80053 COMPREHEN METABOLIC PANEL: CPT

## 2024-02-20 PROCEDURE — 36415 COLL VENOUS BLD VENIPUNCTURE: CPT

## 2024-03-07 ENCOUNTER — APPOINTMENT (RX ONLY)
Dept: URBAN - METROPOLITAN AREA CLINIC 22 | Facility: CLINIC | Age: 84
Setting detail: DERMATOLOGY
End: 2024-03-07

## 2024-03-07 DIAGNOSIS — L57.0 ACTINIC KERATOSIS: ICD-10-CM

## 2024-03-07 DIAGNOSIS — D22 MELANOCYTIC NEVI: ICD-10-CM

## 2024-03-07 DIAGNOSIS — L82.1 OTHER SEBORRHEIC KERATOSIS: ICD-10-CM

## 2024-03-07 DIAGNOSIS — Z85.828 PERSONAL HISTORY OF OTHER MALIGNANT NEOPLASM OF SKIN: ICD-10-CM

## 2024-03-07 DIAGNOSIS — Z71.89 OTHER SPECIFIED COUNSELING: ICD-10-CM

## 2024-03-07 DIAGNOSIS — L81.4 OTHER MELANIN HYPERPIGMENTATION: ICD-10-CM

## 2024-03-07 PROBLEM — D48.5 NEOPLASM OF UNCERTAIN BEHAVIOR OF SKIN: Status: ACTIVE | Noted: 2024-03-07

## 2024-03-07 PROCEDURE — ? LIQUID NITROGEN

## 2024-03-07 PROCEDURE — ? COUNSELING

## 2024-03-07 PROCEDURE — 17003 DESTRUCT PREMALG LES 2-14: CPT

## 2024-03-07 PROCEDURE — 17000 DESTRUCT PREMALG LESION: CPT | Mod: 59

## 2024-03-07 PROCEDURE — ? SUNSCREEN RECOMMENDATIONS

## 2024-03-07 PROCEDURE — 11103 TANGNTL BX SKIN EA SEP/ADDL: CPT

## 2024-03-07 PROCEDURE — 99213 OFFICE O/P EST LOW 20 MIN: CPT | Mod: 25

## 2024-03-07 PROCEDURE — ? BIOPSY BY SHAVE METHOD

## 2024-03-07 PROCEDURE — 11102 TANGNTL BX SKIN SINGLE LES: CPT

## 2024-03-07 ASSESSMENT — LOCATION DETAILED DESCRIPTION DERM
LOCATION DETAILED: LEFT INFERIOR LATERAL MIDBACK
LOCATION DETAILED: LEFT INFERIOR LATERAL FOREHEAD
LOCATION DETAILED: LEFT LATERAL FOREHEAD
LOCATION DETAILED: LEFT SUPERIOR LATERAL MIDBACK
LOCATION DETAILED: RIGHT CENTRAL PARIETAL SCALP
LOCATION DETAILED: INFERIOR THORACIC SPINE
LOCATION DETAILED: LEFT SUPERIOR MEDIAL BUCCAL CHEEK
LOCATION DETAILED: RIGHT INFERIOR CENTRAL MALAR CHEEK
LOCATION DETAILED: LEFT INFERIOR MEDIAL UPPER BACK
LOCATION DETAILED: RIGHT INFERIOR LATERAL MALAR CHEEK
LOCATION DETAILED: RIGHT LATERAL EYEBROW

## 2024-03-07 ASSESSMENT — LOCATION SIMPLE DESCRIPTION DERM
LOCATION SIMPLE: RIGHT EYEBROW
LOCATION SIMPLE: UPPER BACK
LOCATION SIMPLE: LEFT CHEEK
LOCATION SIMPLE: LEFT LOWER BACK
LOCATION SIMPLE: LEFT FOREHEAD
LOCATION SIMPLE: RIGHT CHEEK
LOCATION SIMPLE: SCALP
LOCATION SIMPLE: LEFT UPPER BACK

## 2024-03-07 ASSESSMENT — LOCATION ZONE DERM
LOCATION ZONE: SCALP
LOCATION ZONE: FACE
LOCATION ZONE: TRUNK

## 2024-03-07 NOTE — PROCEDURE: LIQUID NITROGEN
Number Of Freeze-Thaw Cycles: 2 freeze-thaw cycles
Render Post-Care Instructions In Note?: no
Detail Level: Detailed
Show Aperture Variable?: Yes
Consent: The patient's consent was obtained including but not limited to risks of crusting, scabbing, blistering, scarring, darker or lighter pigmentary change, recurrence, incomplete removal and infection.
Application Tool (Optional): Liquid Nitrogen Sprayer
Duration Of Freeze Thaw-Cycle (Seconds): 3
Post-Care Instructions: I reviewed with the patient in detail post-care instructions. Patient is to wear sunprotection, and avoid picking at any of the treated lesions. Pt may apply Vaseline to crusted or scabbing areas.

## 2024-03-25 ENCOUNTER — APPOINTMENT (RX ONLY)
Dept: URBAN - METROPOLITAN AREA CLINIC 36 | Facility: CLINIC | Age: 84
Setting detail: DERMATOLOGY
End: 2024-03-25

## 2024-03-25 PROBLEM — D48.5 NEOPLASM OF UNCERTAIN BEHAVIOR OF SKIN: Status: ACTIVE | Noted: 2024-03-25

## 2024-03-25 PROBLEM — C44.42 SQUAMOUS CELL CARCINOMA OF SKIN OF SCALP AND NECK: Status: ACTIVE | Noted: 2024-03-25

## 2024-03-25 PROCEDURE — 11102 TANGNTL BX SKIN SINGLE LES: CPT | Mod: 59

## 2024-03-25 PROCEDURE — 88331 PATH CONSLTJ SURG 1 BLK 1SPC: CPT | Mod: 59

## 2024-03-25 PROCEDURE — ? BIOPSY BY SHAVE METHOD WITH FROZEN SECTION

## 2024-03-25 PROCEDURE — 17311 MOHS 1 STAGE H/N/HF/G: CPT

## 2024-03-25 PROCEDURE — ? MOHS SURGERY

## 2024-03-25 PROCEDURE — 13132 CMPLX RPR F/C/C/M/N/AX/G/H/F: CPT | Mod: 59

## 2024-03-25 NOTE — PROCEDURE: BIOPSY BY SHAVE METHOD WITH FROZEN SECTION
Detail Level: Detailed
Depth Of Biopsy: dermis
Biopsy Type: Frozen Section
Biopsy Method: 15 blade
Anesthesia Type: 1% lidocaine with epinephrine
Anesthesia Volume In Cc: 0.5
Additional Anesthesia Volume In Cc (Will Not Render If 0): 0
Hemostasis: Drysol
Wound Care: Petrolatum
Lab: [id number]
Lab Facility: 
Use Enhanced Frozen Section Features: Yes
Enhanced Features Information: The enhanced features will allow you to make use of the built in histology library. In this enhanced mode you will not have to select a frozen section diagnosis as this will automatically be based on the chosen impression. The parent diagnosis will also be overridden if you select a different microscopic description. The enhanced features will allow you develop a small library of gross descriptions to use as well. If you prefer the old method please leave the Use Enhanced Frozen Section Features question set to No.
Number Of Blocks: 1
Processing Note: The specimen was frozen in the cryostat, sectioned and stained.
Gross Description: Keratotic papule
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
Consent: Written consent was obtained and risks were reviewed including but not limited to scarring, infection, bleeding, scabbing, incomplete removal, nerve damage and allergy to anesthesia.
Render Post-Care Instructions In Note?: no
Post-Care Instructions: I reviewed with the patient in detail post-care instructions. Patient is to keep the biopsy site dry overnight, and then apply bacitracin twice daily until healed. Patient may apply hydrogen peroxide soaks to remove any crusting.
Notification Instructions: Patient will be notified of biopsy results. However, patient instructed to call the office if not contacted within 2 weeks.
Additional Anticipated Plans: If malignant consider Mohs surgery
Bcc Histology Text: There were numerous aggregates of basaloid cells.
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
Bcc Micronodular Histology Text: The histologic sections demonstrate small nests of basaloid cells extending into the dermis.  A fibromyxoid stroma is present.
Bcc  Morpheaform/Sclerosing Histology Text: The histologic sections demonstrate spiky nests of basaloid cells extending into the dermis.  A fibromyxoid stroma is present.
Bcc  Nodular Histology Text: The histologic sections demonstrate nests of basaloid cells extending into the dermis.  A fibromyxoid stroma is present.
Scc Histology Text: The histologic sections demonstrate nests of atypical squamous cells extending into the dermis.
Scc Ka Subtype Histology Text: The lesion is composed of glassy islands of atypical squamous epithelium.  Neutrophilic microabscesses, eosinophils and elastic trapping are noted.
Scc In Situ Histology Text: The histologic sections demonstrate compact hyperkeratosis, hypergranulosis, koilocytosis and areas of full thickness atypia.
Billing Type: Third-Party Bill

## 2024-03-25 NOTE — PROCEDURE: MOHS SURGERY
Ear Wedge Repair Text: A wedge excision was completed by carrying down an excision through the full thickness of the ear and cartilage with an inward facing Burow's triangle. The wound was then closed in a layered fashion.
Ear Star Wedge Flap Text: The defect edges were debeveled with a #15 blade scalpel.  Given the location of the defect and the proximity to free margins (helical rim) an ear star wedge flap was deemed most appropriate.  Using a sterile surgical marker, the appropriate flap was drawn incorporating the defect and placing the expected incisions between the helical rim and antihelix where possible.  The area thus outlined was incised through and through with a #15 scalpel blade.
Mid-Level Procedure Text (A): After obtaining clear surgical margins the patient was sent to a mid-level provider for surgical repair.  The patient understands they will receive post-surgical care and follow-up from the mid-level provider.
Stage 5: Additional Anesthesia Type: 1% lidocaine with epinephrine
Stage 12: Additional Anesthesia Volume In Cc: 0
Post-Care Instructions: I reviewed with the patient in detail post-care instructions. Patient is not to engage in any heavy lifting, exercise, or swimming for the next 14 days. Should the patient develop any fevers, chills, bleeding, severe pain patient will contact the office immediately.
Rotation Flap Text: The defect edges were debeveled with a #15 scalpel blade.  Given the location of the defect, shape of the defect and the proximity to free margins a rotation flap was deemed most appropriate.  Using a sterile surgical marker, an appropriate rotation flap was drawn incorporating the defect and placing the expected incisions within the relaxed skin tension lines where possible.    The area thus outlined was incised deep to adipose tissue with a #15 scalpel blade.  The skin margins were undermined to an appropriate distance in all directions utilizing iris scissors.
Full Thickness Lip Wedge Repair (Flap) Text: Given the location of the defect and the proximity to free margins a full thickness wedge repair was deemed most appropriate.  Using a sterile surgical marker, the appropriate repair was drawn incorporating the defect and placing the expected incisions perpendicular to the vermilion border.  The vermilion border was also meticulously outlined to ensure appropriate reapproximation during the repair.  The area thus outlined was incised through and through with a #15 scalpel blade.  The muscularis and dermis were reaproximated with deep sutures following hemostasis. Care was taken to realign the vermilion border before proceeding with the superficial closure.  Once the vermilion was realigned the superfical and mucosal closure was finished.
Wound Check: 6 weeks
Special Stains Stage 4 - Results: Base On Clearance Noted Above
Add Additional Information To The Post-Op Diagnosis: No
Graft Donor Site Bandage (Optional-Leave Blank If You Don't Want In Note): Aquaplast was fitted to the graft site and sewn into place. A pressure bandage were applied to the donor site and over the aquaplast bolster.
Show Mohs Stages In Case Summary (If You Hide Here Stages Will Be Calculated By Your Documentation In The Stages Tab)?: Yes
Oculoplastic Surgeon Procedure Text (F): After obtaining clear surgical margins the patient was sent to oculoplastics for surgical repair.  The patient understands they will receive post-surgical care and follow-up from the referring physician's office.
Hatchet Flap Text: The defect edges were debeveled with a #15 scalpel blade.  Given the location of the defect, shape of the defect and the proximity to free margins a hatchet flap was deemed most appropriate.  Using a sterile surgical marker, an appropriate hatchet flap was drawn incorporating the defect and placing the expected incisions within the relaxed skin tension lines where possible.    The area thus outlined was incised deep to adipose tissue with a #15 scalpel blade.  The skin margins were undermined to an appropriate distance in all directions utilizing iris scissors.
Dressing (No Sutures): pressure dressing with telfa
Mustarde Flap Text: The defect edges were debeveled with a #15 scalpel blade.  Given the size, depth and location of the defect and the proximity to free margins a Mustarde flap was deemed most appropriate.  Using a sterile surgical marker, an appropriate flap was drawn incorporating the defect. The area thus outlined was incised with a #15 scalpel blade.  The skin margins were undermined to an appropriate distance in all directions utilizing iris scissors.
Provider Procedure Text (E): After obtaining clear surgical margins the defect was repaired by another provider.
Double Z Plasty Text: The lesion was extirpated to the level of the fat with a #15 scalpel blade. Given the location of the defect, shape of the defect and the proximity to free margins a double Z-plasty was deemed most appropriate for repair. Using a sterile surgical marker, the appropriate transposition arms of the double Z-plasty were drawn incorporating the defect and placing the expected incisions within the relaxed skin tension lines where possible. The area thus outlined was incised deep to adipose tissue with a #15 scalpel blade. The skin margins were undermined to an appropriate distance in all directions utilizing iris scissors. The opposing transposition arms were then transposed and carried over into place in opposite direction and anchored with interrupted buried subcutaneous sutures.
Nasalis-Muscle-Based Myocutaneous Island Pedicle Flap Text: Using a #15 blade, an incision was made around the donor flap to the level of the nasalis muscle. Wide lateral undermining was then performed in both the subcutaneous plane above the nasalis muscle, and in a submuscular plane just above periosteum. This allowed the formation of a free nasalis muscle axial pedicle (based on the angular artery) which was still attached to the actual cutaneous flap, increasing its mobility and vascular viability. Hemostasis was obtained with pinpoint electrocoagulation. The flap was mobilized into position and the pivotal anchor points positioned and stabilized with buried interrupted sutures. Subcutaneous and dermal tissues were closed in a multilayered fashion with sutures. Tissue redundancies were excised, and the epidermal edges were apposed without significant tension and sutured with sutures.
V-Y Flap Text: The defect edges were debeveled with a #15 scalpel blade.  Given the location of the defect, shape of the defect and the proximity to free margins a V-Y flap was deemed most appropriate.  Using a sterile surgical marker, an appropriate advancement flap was drawn incorporating the defect and placing the expected incisions within the relaxed skin tension lines where possible.    The area thus outlined was incised deep to adipose tissue with a #15 scalpel blade.  The skin margins were undermined to an appropriate distance in all directions utilizing iris scissors.
H Plasty Text: Given the location of the defect, shape of the defect and the proximity to free margins a H-plasty was deemed most appropriate for repair.  Using a sterile surgical marker, the appropriate advancement arms of the H-plasty were drawn incorporating the defect and placing the expected incisions within the relaxed skin tension lines where possible. The area thus outlined was incised deep to adipose tissue with a #15 scalpel blade. The skin margins were undermined to an appropriate distance in all directions utilizing iris scissors.  The opposing advancement arms were then advanced into place in opposite direction and anchored with interrupted buried subcutaneous sutures.
Consent (Nose)/Introductory Paragraph: The rationale for Mohs was explained to the patient and consent was obtained. The risks, benefits and alternatives to therapy were discussed in detail. Specifically, the risks of nasal deformity, changes in the flow of air through the nose, infection, scarring, bleeding, prolonged wound healing, incomplete removal, allergy to anesthesia, nerve injury and recurrence were addressed. Prior to the procedure, the treatment site was clearly identified and confirmed by the patient. All components of Universal Protocol/PAUSE Rule completed.
Wound Care: Vaseline
Skin Substitute Text: The defect edges were debeveled with a #15 scalpel blade.  Given the location of the defect, shape of the defect and the proximity to free margins a skin substitute graft was deemed most appropriate.  The graft material was trimmed to fit the size of the defect. The graft was then placed in the primary defect and oriented appropriately.
Graft Donor Site Dermal Sutures (Optional): 5-0 Polysorb
Eye Protection Verbiage: Before proceeding with the stage, a plastic scleral shield was inserted. The globe was anesthetized with a few drops of 1% lidocaine with 1:100,000 epinephrine. Then, an appropriate sized scleral shield was chosen and coated with lacrilube ointment. The shield was gently inserted and left in place for the duration of each stage. After the stage was completed, the shield was gently removed.
O-T Plasty Text: The defect edges were debeveled with a #15 scalpel blade.  Given the location of the defect, shape of the defect and the proximity to free margins an O-T plasty was deemed most appropriate.  Using a sterile surgical marker, an appropriate O-T plasty was drawn incorporating the defect and placing the expected incisions within the relaxed skin tension lines where possible.    The area thus outlined was incised deep to adipose tissue with a #15 scalpel blade.  The skin margins were undermined to an appropriate distance in all directions utilizing iris scissors.
Plastic Surgeon Procedure Text (A): After obtaining clear surgical margins the patient was sent to plastics for surgical repair.  The patient understands they will receive post-surgical care and follow-up from the referring physician's office.
Donor Site Anesthesia Type: same as repair anesthesia
Ftsg Text: The defect edges were debeveled with a #15 scalpel blade.  Given the location of the defect, shape of the defect and the proximity to free margins a full thickness skin graft was deemed most appropriate.  Using a sterile surgical marker, the primary defect shape was transferred to the donor site. The area thus outlined was incised deep to adipose tissue with a #15 scalpel blade.  The harvested graft was then trimmed of adipose tissue until only dermis and epidermis was left.  The skin margins of the secondary defect were undermined to an appropriate distance in all directions utilizing iris scissors.  The secondary defect was closed with interrupted buried subcutaneous sutures.  The skin edges were then re-apposed with running  sutures.  The skin graft was then placed in the primary defect and oriented appropriately.
Hemigard Postcare Instructions: The HEMIGARD strips are to remain completely dry for at least 5-7 days.
Anesthesia Volume In Cc: 6
Surgical Defect Length In Cm (Optional): 2.0
Consent 1/Introductory Paragraph: The rationale for Mohs was explained to the patient and consent was obtained. The risks, benefits and alternatives to therapy were discussed in detail. Specifically, the risks of infection, scarring, bleeding, prolonged wound healing, incomplete removal, allergy to anesthesia, nerve injury and recurrence were addressed. Prior to the procedure, the treatment site was clearly identified and confirmed by the patient. All components of Universal Protocol/PAUSE Rule completed.
Otolaryngologist Procedure Text (A): After obtaining clear surgical margins the patient was sent to otolaryngology for surgical repair.  The patient understands they will receive post-surgical care and follow-up from the referring physician's office.
Abbe Flap (Lower To Upper Lip) Text: The defect of the upper lip was assessed and measured.  Given the location and size of the defect, an Abbe flap was deemed most appropriate.  Using a sterile surgical marker, an appropriate Abbe flap was measured and drawn on the lower lip. Local anesthesia was then infiltrated. A scalpel was then used to incise the upper lip through and through the skin, vermilion, muscle and mucosa, leaving the flap pedicled on the opposite side.  The flap was then rotated and transferred to the lower lip defect.  The flap was then sutured into place with a three layer technique, closing the orbicularis oris muscle layer with subcutaneous buried sutures, followed by a mucosal layer and an epidermal layer.
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
Epidermal Sutures: 4-0 Surgipro
Pinch Graft Text: The defect edges were debeveled with a #15 scalpel blade. Given the location of the defect, shape of the defect and the proximity to free margins a pinch graft was deemed most appropriate. Using a sterile surgical marker, the primary defect shape was transferred to the donor site. The area thus outlined was incised deep to adipose tissue with a #15 scalpel blade.  The harvested graft was then trimmed of adipose tissue until only dermis and epidermis was left. The skin margins of the secondary defect were undermined to an appropriate distance in all directions utilizing iris scissors.  The secondary defect was closed with interrupted buried subcutaneous sutures.  The skin edges were then re-apposed with running  sutures.  The skin graft was then placed in the primary defect and oriented appropriately.
Consent (Near Eyelid Margin)/Introductory Paragraph: The rationale for Mohs was explained to the patient and consent was obtained. The risks, benefits and alternatives to therapy were discussed in detail. Specifically, the risks of ectropion or eyelid deformity, infection, scarring, bleeding, prolonged wound healing, incomplete removal, allergy to anesthesia, nerve injury and recurrence were addressed. Prior to the procedure, the treatment site was clearly identified and confirmed by the patient. All components of Universal Protocol/PAUSE Rule completed.
Melolabial Interpolation Flap Text: A decision was made to reconstruct the defect utilizing an interpolation axial flap and a staged reconstruction.  A telfa template was made of the defect.  This telfa template was then used to outline the melolabial interpolation flap.  The donor area for the pedicle flap was then injected with anesthesia.  The flap was excised through the skin and subcutaneous tissue down to the layer of the underlying musculature.  The pedicle flap was carefully excised within this deep plane to maintain its blood supply.  The edges of the donor site were undermined.   The donor site was closed in a primary fashion.  The pedicle was then rotated into position and sutured.  Once the tube was sutured into place, adequate blood supply was confirmed with blanching and refill.  The pedicle was then wrapped with xeroform gauze and dressed appropriately with a telfa and gauze bandage to ensure continued blood supply and protect the attached pedicle.
Mastoid Interpolation Flap Text: A decision was made to reconstruct the defect utilizing an interpolation axial flap and a staged reconstruction.  A telfa template was made of the defect.  This telfa template was then used to outline the mastoid interpolation flap.  The donor area for the pedicle flap was then injected with anesthesia.  The flap was excised through the skin and subcutaneous tissue down to the layer of the underlying musculature.  The pedicle flap was carefully excised within this deep plane to maintain its blood supply.  The edges of the donor site were undermined.   The donor site was closed in a primary fashion.  The pedicle was then rotated into position and sutured.  Once the tube was sutured into place, adequate blood supply was confirmed with blanching and refill.  The pedicle was then wrapped with xeroform gauze and dressed appropriately with a telfa and gauze bandage to ensure continued blood supply and protect the attached pedicle.
Paramedian Forehead Flap Text: A decision was made to reconstruct the defect utilizing an interpolation axial flap and a staged reconstruction.  A telfa template was made of the defect.  This telfa template was then used to outline the paramedian forehead pedicle flap.  The donor area for the pedicle flap was then injected with anesthesia.  The flap was excised through the skin and subcutaneous tissue down to the layer of the underlying musculature.  The pedicle flap was carefully excised within this deep plane to maintain its blood supply.  The edges of the donor site were undermined.   The donor site was closed in a primary fashion.  The pedicle was then rotated into position and sutured.  Once the tube was sutured into place, adequate blood supply was confirmed with blanching and refill.  The pedicle was then wrapped with xeroform gauze and dressed appropriately with a telfa and gauze bandage to ensure continued blood supply and protect the attached pedicle.
Consent (Marginal Mandibular)/Introductory Paragraph: The rationale for Mohs was explained to the patient and consent was obtained. The risks, benefits and alternatives to therapy were discussed in detail. Specifically, the risks of damage to the marginal mandibular branch of the facial nerve, infection, scarring, bleeding, prolonged wound healing, incomplete removal, allergy to anesthesia, and recurrence were addressed. Prior to the procedure, the treatment site was clearly identified and confirmed by the patient. All components of Universal Protocol/PAUSE Rule completed.
Manual Repair Warning Statement: We plan on removing the manually selected variable below in favor of our much easier automatic structured text blocks found in the previous tab. We decided to do this to help make the flow better and give you the full power of structured data. Manual selection is never going to be ideal in our platform and I would encourage you to avoid using manual selection from this point on, especially since I will be sunsetting this feature. It is important that you do one of two things with the customized text below. First, you can save all of the text in a word file so you can have it for future reference. Second, transfer the text to the appropriate area in the Library tab. Lastly, if there is a flap or graft type which we do not have you need to let us know right away so I can add it in before the variable is hidden. No need to panic, we plan to give you roughly 6 months to make the change.
Rhomboid Transposition Flap Text: The defect edges were debeveled with a #15 scalpel blade.  Given the location of the defect and the proximity to free margins a rhomboid transposition flap was deemed most appropriate.  Using a sterile surgical marker, an appropriate rhomboid flap was drawn incorporating the defect.    The area thus outlined was incised deep to adipose tissue with a #15 scalpel blade.  The skin margins were undermined to an appropriate distance in all directions utilizing iris scissors.
Tissue Cultured Epidermal Autograft Text: The defect edges were debeveled with a #15 scalpel blade.  Given the location of the defect, shape of the defect and the proximity to free margins a tissue cultured epidermal autograft was deemed most appropriate.  The graft was then trimmed to fit the size of the defect.  The graft was then placed in the primary defect and oriented appropriately.
A-T Advancement Flap Text: The defect edges were debeveled with a #15 scalpel blade.  Given the location of the defect, shape of the defect and the proximity to free margins an A-T advancement flap was deemed most appropriate.  Using a sterile surgical marker, an appropriate advancement flap was drawn incorporating the defect and placing the expected incisions within the relaxed skin tension lines where possible.    The area thus outlined was incised deep to adipose tissue with a #15 scalpel blade.  The skin margins were undermined to an appropriate distance in all directions utilizing iris scissors.
Area H Indication Text: Tumors in this location are included in Area H (eyelids, eyebrows, nose, lips, chin, ear, pre-auricular, post-auricular, temple, genitalia, hands, feet, ankles and areola).  Tissue conservation is critical in these anatomic locations.
Double M-Plasty Complex Repair Preamble Text (Leave Blank If You Do Not Want): Extensive wide undermining was performed.
Purse String (Intermediate) Text: Given the location of the defect and the characteristics of the surrounding skin a purse string intermediate closure was deemed most appropriate.  Undermining was performed circumfirentially around the surgical defect.  A purse string suture was then placed and tightened.
Spiral Flap Text: The defect edges were debeveled with a #15 scalpel blade.  Given the location of the defect, shape of the defect and the proximity to free margins a spiral flap was deemed most appropriate.  Using a sterile surgical marker, an appropriate rotation flap was drawn incorporating the defect and placing the expected incisions within the relaxed skin tension lines where possible. The area thus outlined was incised deep to adipose tissue with a #15 scalpel blade.  The skin margins were undermined to an appropriate distance in all directions utilizing iris scissors.
Brow Lift Text: A midfrontal incision was made medially to the defect to allow access to the tissues just superior to the left eyebrow. Following careful dissection inferiorly in a supraperiosteal plane to the level of the left eyebrow, several 3-0 monocryl sutures were used to resuspend the eyebrow orbicularis oculi muscular unit to the superior frontal bone periosteum. This resulted in an appropriate reapproximation of static eyebrow symmetry and correction of the left brow ptosis.
Epidermal Closure Graft Donor Site (Optional): running
Estlander Flap (Lower To Upper Lip) Text: The defect of the lower lip was assessed and measured.  Given the location and size of the defect, an Estlander flap was deemed most appropriate.  Using a sterile surgical marker, an appropriate Estlander flap was measured and drawn on the upper lip. Local anesthesia was then infiltrated. A scalpel was then used to incise the lateral aspect of the flap, through skin, muscle and mucosa, leaving the flap pedicled medially.  The flap was then rotated and positioned to fill the lower lip defect.  The flap was then sutured into place with a three layer technique, closing the orbicularis oris muscle layer with subcutaneous buried sutures, followed by a mucosal layer and an epidermal layer.
Asc Procedure Text (A): After obtaining clear surgical margins the patient was sent to an ASC for surgical repair.  The patient understands they will receive post-surgical care and follow-up from the ASC physician.
Is There Documentation In The Chart Showing Discussion Of Changes With Another Physician?: Please Select the Appropriate Response
Referring Physician (Optional): Dillon Garza
Complex Repair And Graft Additional Text (Will Appearing After The Standard Complex Repair Text): The complex repair was not sufficient to completely close the primary defect. The remaining additional defect was repaired with the graft mentioned below.
Suturegard Body: The suture ends were repeatedly re-tightened and re-clamped to achieve the desired tissue expansion.
Tarsorrhaphy Text: A tarsorrhaphy was performed using Frost sutures.
Double M-Plasty Intermediate Repair Preamble Text (Leave Blank If You Do Not Want): Undermining was performed with blunt dissection.
Posterior Auricular Interpolation Flap Text: A decision was made to reconstruct the defect utilizing an interpolation axial flap and a staged reconstruction.  A telfa template was made of the defect.  This telfa template was then used to outline the posterior auricular interpolation flap.  The donor area for the pedicle flap was then injected with anesthesia.  The flap was excised through the skin and subcutaneous tissue down to the layer of the underlying musculature.  The pedicle flap was carefully excised within this deep plane to maintain its blood supply.  The edges of the donor site were undermined.   The donor site was closed in a primary fashion.  The pedicle was then rotated into position and sutured.  Once the tube was sutured into place, adequate blood supply was confirmed with blanching and refill.  The pedicle was then wrapped with xeroform gauze and dressed appropriately with a telfa and gauze bandage to ensure continued blood supply and protect the attached pedicle.
Helical Rim Text: The closure involved the helical rim.
Interpolation Flap Text: A decision was made to reconstruct the defect utilizing an interpolation axial flap and a staged reconstruction.  A telfa template was made of the defect.  This telfa template was then used to outline the interpolation flap.  The donor area for the pedicle flap was then injected with anesthesia.  The flap was excised through the skin and subcutaneous tissue down to the layer of the underlying musculature.  The interpolation flap was carefully excised within this deep plane to maintain its blood supply.  The edges of the donor site were undermined.   The donor site was closed in a primary fashion.  The pedicle was then rotated into position and sutured.  Once the tube was sutured into place, adequate blood supply was confirmed with blanching and refill.  The pedicle was then wrapped with xeroform gauze and dressed appropriately with a telfa and gauze bandage to ensure continued blood supply and protect the attached pedicle.
Area M Indication Text: Tumors in this location are included in Area M (cheek, forehead, scalp, neck, jawline and pretibial skin).  Mohs surgery is indicated for tumors in these anatomic locations.
Zygomaticofacial Flap Text: Given the location of the defect, shape of the defect and the proximity to free margins a zygomaticofacial flap was deemed most appropriate for repair.  Using a sterile surgical marker, the appropriate flap was drawn incorporating the defect and placing the expected incisions within the relaxed skin tension lines where possible. The area thus outlined was incised deep to adipose tissue with a #15 scalpel blade with preservation of a vascular pedicle.  The skin margins were undermined to an appropriate distance in all directions utilizing iris scissors.  The flap was then placed into the defect and anchored with interrupted buried subcutaneous sutures.
Nasal Turnover Hinge Flap Text: The defect edges were debeveled with a #15 scalpel blade.  Given the size, depth, location of the defect and the defect being full thickness a nasal turnover hinge flap was deemed most appropriate.  Using a sterile surgical marker, an appropriate hinge flap was drawn incorporating the defect. The area thus outlined was incised with a #15 scalpel blade. The flap was designed to recreate the nasal mucosal lining and the alar rim. The skin margins were undermined to an appropriate distance in all directions utilizing iris scissors.
Eyelid Full Thickness Repair - 77209: The eyelid defect was full thickness which required a wedge repair of the eyelid. Special care was taken to ensure that the eyelid margin was realligned when placing sutures.
Mart-1 - Negative Histology Text: MART-1 staining demonstrates a normal density and pattern of melanocytes along the dermal-epidermal junction. The surgical margins are negative for tumor cells.
Mohs Case Number: [id number]
Suturegard Intro: Intraoperative tissue expansion was performed, utilizing the SUTUREGARD device, in order to reduce wound tension.
Information: Selecting Yes will display possible errors in your note based on the variables you have selected. This validation is only offered as a suggestion for you. PLEASE NOTE THAT THE VALIDATION TEXT WILL BE REMOVED WHEN YOU FINALIZE YOUR NOTE. IF YOU WANT TO FAX A PRELIMINARY NOTE YOU WILL NEED TO TOGGLE THIS TO 'NO' IF YOU DO NOT WANT IT IN YOUR FAXED NOTE.
Surgeon Performing Repair (Optional): Nadine Allen MD
Orbicularis Oris Muscle Flap Text: The defect edges were debeveled with a #15 scalpel blade.  Given that the defect affected the competency of the oral sphincter an orbicularis oris muscle flap was deemed most appropriate to restore this competency and normal muscle function.  Using a sterile surgical marker, an appropriate flap was drawn incorporating the defect. The area thus outlined was incised with a #15 scalpel blade.
Closure 3 Information: This tab is for additional flaps and grafts above and beyond our usual structured repairs.  Please note if you enter information here it will not currently bill and you will need to add the billing information manually.
Double O-Z Plasty Text: The defect edges were debeveled with a #15 scalpel blade.  Given the location of the defect, shape of the defect and the proximity to free margins a Double O-Z plasty (double transposition flap) was deemed most appropriate.  Using a sterile surgical marker, the appropriate transposition flaps were drawn incorporating the defect and placing the expected incisions within the relaxed skin tension lines where possible. The area thus outlined was incised deep to adipose tissue with a #15 scalpel blade.  The skin margins were undermined to an appropriate distance in all directions utilizing iris scissors.  Hemostasis was achieved with electrocautery.  The flaps were then transposed into place, one clockwise and the other counterclockwise, and anchored with interrupted buried subcutaneous sutures.
Retention Suture Bite Size: 1 mm
Island Pedicle Flap Text: The defect edges were debeveled with a #15 scalpel blade.  Given the location of the defect, shape of the defect and the proximity to free margins an island pedicle advancement flap was deemed most appropriate.  Using a sterile surgical marker, an appropriate advancement flap was drawn incorporating the defect, outlining the appropriate donor tissue and placing the expected incisions within the relaxed skin tension lines where possible.    The area thus outlined was incised deep to adipose tissue with a #15 scalpel blade.  The skin margins were undermined to an appropriate distance in all directions around the primary defect and laterally outward around the island pedicle utilizing iris scissors.  There was minimal undermining beneath the pedicle flap.
Mauc Instructions: By selecting yes to the question below the MAUC number will be added into the note.  This will be calculated automatically based on the diagnosis chosen, the size entered, the body zone selected (H,M,L) and the specific indications you chose. You will also have the option to override the Mohs AUC if you disagree with the automatically calculated number and this option is found in the Case Summary tab.
Helical Rim Advancement Flap Text: The defect edges were debeveled with a #15 blade scalpel.  Given the location of the defect and the proximity to free margins (helical rim) a double helical rim advancement flap was deemed most appropriate.  Using a sterile surgical marker, the appropriate advancement flaps were drawn incorporating the defect and placing the expected incisions between the helical rim and antihelix where possible.  The area thus outlined was incised through and through with a #15 scalpel blade.  With a skin hook and iris scissors, the flaps were gently and sharply undermined and freed up.
Complex Repair And Flap Additional Text (Will Appearing After The Standard Complex Repair Text): The complex repair was not sufficient to completely close the primary defect. The remaining additional defect was repaired with the flap mentioned below.
Consent 2/Introductory Paragraph: Mohs surgery was explained to the patient and consent was obtained. The risks, benefits and alternatives to therapy were discussed in detail. Specifically, the risks of infection, scarring, bleeding, prolonged wound healing, incomplete removal, allergy to anesthesia, nerve injury and recurrence were addressed. Prior to the procedure, the treatment site was clearly identified and confirmed by the patient. All components of Universal Protocol/PAUSE Rule completed.
Graft Cartilage Fenestration Text: The cartilage was fenestrated with a 2mm punch biopsy to help facilitate graft survival and healing.
Cheek-To-Nose Interpolation Flap Text: A decision was made to reconstruct the defect utilizing an interpolation axial flap and a staged reconstruction.  A telfa template was made of the defect.  This telfa template was then used to outline the Cheek-To-Nose Interpolation flap.  The donor area for the pedicle flap was then injected with anesthesia.  The flap was excised through the skin and subcutaneous tissue down to the layer of the underlying musculature.  The interpolation flap was carefully excised within this deep plane to maintain its blood supply.  The edges of the donor site were undermined.   The donor site was closed in a primary fashion.  The pedicle was then rotated into position and sutured.  Once the tube was sutured into place, adequate blood supply was confirmed with blanching and refill.  The pedicle was then wrapped with xeroform gauze and dressed appropriately with a telfa and gauze bandage to ensure continued blood supply and protect the attached pedicle.
Undermining Type: Entire Wound
Abbe Flap (Upper To Lower Lip) Text: The defect of the lower lip was assessed and measured.  Given the location and size of the defect, an Abbe flap was deemed most appropriate.  Using a sterile surgical marker, an appropriate Abbe flap was measured and drawn on the upper lip. Local anesthesia was then infiltrated.  A scalpel was then used to incise the upper lip through and through the skin, vermilion, muscle and mucosa, leaving the flap pedicled on the opposite side.  The flap was then rotated and transferred to the lower lip defect.  The flap was then sutured into place with a three layer technique, closing the orbicularis oris muscle layer with subcutaneous buried sutures, followed by a mucosal layer and an epidermal layer.
Histology Selection Override (Optional- Will Default To Parent Diagnosis If N/A): Moderately Differentiated Squamous Cell Carcinoma
Date Of Previous Biopsy (Optional): 3/8/24
Perineural Invasion (For Histology - Be Specific If Possible): absent
Composite Graft Text: The defect edges were debeveled with a #15 scalpel blade.  Given the location of the defect, shape of the defect, the proximity to free margins and the fact the defect was full thickness a composite graft was deemed most appropriate.  The defect was outline and then transferred to the donor site.  A full thickness graft was then excised from the donor site. The graft was then placed in the primary defect, oriented appropriately and then sutured into place.  The secondary defect was then repaired using a primary closure.
Double Island Pedicle Flap Text: The defect edges were debeveled with a #15 scalpel blade.  Given the location of the defect, shape of the defect and the proximity to free margins a double island pedicle advancement flap was deemed most appropriate.  Using a sterile surgical marker, an appropriate advancement flap was drawn incorporating the defect, outlining the appropriate donor tissue and placing the expected incisions within the relaxed skin tension lines where possible.    The area thus outlined was incised deep to adipose tissue with a #15 scalpel blade.  The skin margins were undermined to an appropriate distance in all directions around the primary defect and laterally outward around the island pedicle utilizing iris scissors.  There was minimal undermining beneath the pedicle flap.
Transposition Flap Text: The defect edges were debeveled with a #15 scalpel blade.  Given the location of the defect and the proximity to free margins a transposition flap was deemed most appropriate.  Using a sterile surgical marker, an appropriate transposition flap was drawn incorporating the defect.    The area thus outlined was incised deep to adipose tissue with a #15 scalpel blade.  The skin margins were undermined to an appropriate distance in all directions utilizing iris scissors.
Consent (Lip)/Introductory Paragraph: The rationale for Mohs was explained to the patient and consent was obtained. The risks, benefits and alternatives to therapy were discussed in detail. Specifically, the risks of lip deformity, changes in the oral aperture, infection, scarring, bleeding, prolonged wound healing, incomplete removal, allergy to anesthesia, nerve injury and recurrence were addressed. Prior to the procedure, the treatment site was clearly identified and confirmed by the patient. All components of Universal Protocol/PAUSE Rule completed.
Bilateral Helical Rim Advancement Flap Text: The defect edges were debeveled with a #15 blade scalpel.  Given the location of the defect and the proximity to free margins (helical rim) a bilateral helical rim advancement flap was deemed most appropriate.  Using a sterile surgical marker, the appropriate advancement flaps were drawn incorporating the defect and placing the expected incisions between the helical rim and antihelix where possible.  The area thus outlined was incised through and through with a #15 scalpel blade.  With a skin hook and iris scissors, the flaps were gently and sharply undermined and freed up.
Alar Island Pedicle Flap Text: The defect edges were debeveled with a #15 scalpel blade.  Given the location of the defect, shape of the defect and the proximity to the alar rim an island pedicle advancement flap was deemed most appropriate.  Using a sterile surgical marker, an appropriate advancement flap was drawn incorporating the defect, outlining the appropriate donor tissue and placing the expected incisions within the nasal ala running parallel to the alar rim. The area thus outlined was incised with a #15 scalpel blade.  The skin margins were undermined minimally to an appropriate distance in all directions around the primary defect and laterally outward around the island pedicle utilizing iris scissors.  There was minimal undermining beneath the pedicle flap.
Chonodrocutaneous Helical Advancement Flap Text: The defect edges were debeveled with a #15 scalpel blade.  Given the location of the defect and the proximity to free margins a chondrocutaneous helical advancement flap was deemed most appropriate.  Using a sterile surgical marker, the appropriate advancement flap was drawn incorporating the defect and placing the expected incisions within the relaxed skin tension lines where possible.    The area thus outlined was incised deep to adipose tissue with a #15 scalpel blade.  The skin margins were undermined to an appropriate distance in all directions utilizing iris scissors.
Number Of Stages: 1
Alternatives Discussed Intro (Do Not Add Period): I discussed alternative treatments to Mohs surgery and specifically discussed the risks and benefits of
Island Pedicle Flap-Requiring Vessel Identification Text: The defect edges were debeveled with a #15 scalpel blade.  Given the location of the defect, shape of the defect and the proximity to free margins an island pedicle advancement flap was deemed most appropriate.  Using a sterile surgical marker, an appropriate advancement flap was drawn, based on the axial vessel mentioned above, incorporating the defect, outlining the appropriate donor tissue and placing the expected incisions within the relaxed skin tension lines where possible.    The area thus outlined was incised deep to adipose tissue with a #15 scalpel blade.  The skin margins were undermined to an appropriate distance in all directions around the primary defect and laterally outward around the island pedicle utilizing iris scissors.  There was minimal undermining beneath the pedicle flap.
Depth Of Tumor Invasion (For Histology): dermis
Graft Donor Site Epidermal Sutures (Optional): 5-0 Surgipro
Hemigard Intro: Due to skin fragility and wound tension, it was decided to use HEMIGARD adhesive retention suture devices to permit a linear closure. The skin was cleaned and dried for a 6cm distance away from the wound. Excessive hair, if present, was removed to allow for adhesion.
Non-Graft Cartilage Fenestration Text: The cartilage was fenestrated with a 2mm punch biopsy to help facilitate healing.
Medical Necessity Statement: Based on my medical judgement, Mohs surgery is the most appropriate treatment for this cancer compared to other treatments.
Distance Of Undermining In Cm (Required): 2.2
Epidermal Autograft Text: The defect edges were debeveled with a #15 scalpel blade.  Given the location of the defect, shape of the defect and the proximity to free margins an epidermal autograft was deemed most appropriate.  Using a sterile surgical marker, the primary defect shape was transferred to the donor site. The epidermal graft was then harvested.  The skin graft was then placed in the primary defect and oriented appropriately.
Retention Suture Text: Retention sutures were placed to support the closure and prevent dehiscence.
Consent Type: Consent 1 (Standard)
Repair Type: Complex Repair
Bi-Rhombic Flap Text: The defect edges were debeveled with a #15 scalpel blade.  Given the location of the defect and the proximity to free margins a bi-rhombic flap was deemed most appropriate.  Using a sterile surgical marker, an appropriate rhombic flap was drawn incorporating the defect. The area thus outlined was incised deep to adipose tissue with a #15 scalpel blade.  The skin margins were undermined to an appropriate distance in all directions utilizing iris scissors.
Mucosal Advancement Flap Text: Given the location of the defect, shape of the defect and the proximity to free margins a mucosal advancement flap was deemed most appropriate. Incisions were made with a 15 blade scalpel in the appropriate fashion along the cutaneous vermilion border and the mucosal lip. The remaining actinically damaged mucosal tissue was excised.  The mucosal advancement flap was then elevated to the gingival sulcus with care taken to preserve the neurovascular structures and advanced into the primary defect. Care was taken to ensure that precise realignment of the vermilion border was achieved.
Dermal Autograft Text: The defect edges were debeveled with a #15 scalpel blade.  Given the location of the defect, shape of the defect and the proximity to free margins a dermal autograft was deemed most appropriate.  Using a sterile surgical marker, the primary defect shape was transferred to the donor site. The area thus outlined was incised deep to adipose tissue with a #15 scalpel blade.  The harvested graft was then trimmed of adipose and epidermal tissue until only dermis was left.  The skin graft was then placed in the primary defect and oriented appropriately.
Keystone Flap Text: The defect edges were debeveled with a #15 scalpel blade.  Given the location of the defect, shape of the defect a keystone flap was deemed most appropriate.  Using a sterile surgical marker, an appropriate keystone flap was drawn incorporating the defect, outlining the appropriate donor tissue and placing the expected incisions within the relaxed skin tension lines where possible. The area thus outlined was incised deep to adipose tissue with a #15 scalpel blade.  The skin margins were undermined to an appropriate distance in all directions around the primary defect and laterally outward around the flap utilizing iris scissors.
No Repair - Repaired With Adjacent Surgical Defect Text (Leave Blank If You Do Not Want): After obtaining clear surgical margins the defect was repaired concurrently with another surgical defect which was in close approximation.
Surgeon/Pathologist Verbiage (Will Incorporate Name Of Surgeon From Intro If Not Blank): operated in two distinct and integrated capacities as the surgeon and pathologist.
Staged Advancement Flap Text: The defect edges were debeveled with a #15 scalpel blade.  Given the location of the defect, shape of the defect and the proximity to free margins a staged advancement flap was deemed most appropriate.  Using a sterile surgical marker, an appropriate advancement flap was drawn incorporating the defect and placing the expected incisions within the relaxed skin tension lines where possible. The area thus outlined was incised deep to adipose tissue with a #15 scalpel blade.  The skin margins were undermined to an appropriate distance in all directions utilizing iris scissors.
Double O-Z Flap Text: The defect edges were debeveled with a #15 scalpel blade.  Given the location of the defect, shape of the defect and the proximity to free margins a Double O-Z flap was deemed most appropriate.  Using a sterile surgical marker, an appropriate transposition flap was drawn incorporating the defect and placing the expected incisions within the relaxed skin tension lines where possible. The area thus outlined was incised deep to adipose tissue with a #15 scalpel blade.  The skin margins were undermined to an appropriate distance in all directions utilizing iris scissors.
Hemigard Retention Suture: 0-0 Nylon
Intermediate Repair And Flap Additional Text (Will Appearing After The Standard Complex Repair Text): The intermediate repair was not sufficient to completely close the primary defect. The remaining additional defect was repaired with the flap mentioned below.
Undermining Location (Optional): in the superficial subcutaneous fat
Advancement-Rotation Flap Text: The defect edges were debeveled with a #15 scalpel blade.  Given the location of the defect, shape of the defect and the proximity to free margins an advancement-rotation flap was deemed most appropriate.  Using a sterile surgical marker, an appropriate flap was drawn incorporating the defect and placing the expected incisions within the relaxed skin tension lines where possible. The area thus outlined was incised deep to adipose tissue with a #15 scalpel blade.  The skin margins were undermined to an appropriate distance in all directions utilizing iris scissors.
Partial Purse String (Simple) Text: Given the location of the defect and the characteristics of the surrounding skin a simple purse string closure was deemed most appropriate.  Undermining was performed circumfirentially around the surgical defect.  A purse string suture was then placed and tightened. Wound tension only allowed a partial closure of the circular defect.
Where Do You Want The Question To Include Opioid Counseling Located?: Case Summary Tab
Mercedes Flap Text: The defect edges were debeveled with a #15 scalpel blade.  Given the location of the defect, shape of the defect and the proximity to free margins a Mercedes flap was deemed most appropriate.  Using a sterile surgical marker, an appropriate advancement flap was drawn incorporating the defect and placing the expected incisions within the relaxed skin tension lines where possible. The area thus outlined was incised deep to adipose tissue with a #15 scalpel blade.  The skin margins were undermined to an appropriate distance in all directions utilizing iris scissors.
Secondary Intention Text (Leave Blank If You Do Not Want): The defect will heal with secondary intention.
Consent 3/Introductory Paragraph: I gave the patient a chance to ask questions they had about the procedure.  Following this I explained the Mohs procedure and consent was obtained. The risks, benefits and alternatives to therapy were discussed in detail. Specifically, the risks of infection, scarring, bleeding, prolonged wound healing, incomplete removal, allergy to anesthesia, nerve injury and recurrence were addressed. Prior to the procedure, the treatment site was clearly identified and confirmed by the patient. All components of Universal Protocol/PAUSE Rule completed.
Closure 2 Information: This tab is for additional flaps and grafts, including complex repair and grafts and complex repair and flaps. You can also specify a different location for the additional defect, if the location is the same you do not need to select a new one. We will insert the automated text for the repair you select below just as we do for solitary flaps and grafts. Please note that at this time if you select a location with a different insurance zone you will need to override the ICD10 and CPT if appropriate.
Melolabial Transposition Flap Text: The defect edges were debeveled with a #15 scalpel blade.  Given the location of the defect and the proximity to free margins a melolabial flap was deemed most appropriate.  Using a sterile surgical marker, an appropriate melolabial transposition flap was drawn incorporating the defect.    The area thus outlined was incised deep to adipose tissue with a #15 scalpel blade.  The skin margins were undermined to an appropriate distance in all directions utilizing iris scissors.
Wound Care (No Sutures): Petrolatum
Bilateral Rotation Flap Text: The defect edges were debeveled with a #15 scalpel blade. Given the location of the defect, shape of the defect and the proximity to free margins a bilateral rotation flap was deemed most appropriate. Using a sterile surgical marker, an appropriate rotation flap was drawn incorporating the defect and placing the expected incisions within the relaxed skin tension lines where possible. The area thus outlined was incised deep to adipose tissue with a #15 scalpel blade. The skin margins were undermined to an appropriate distance in all directions utilizing iris scissors. Following this, the designed flap was carried over into the primary defect and sutured into place.
Pain Refusal Text: I offered to prescribe pain medication but the patient refused to take this medication.
Previous Accession (Optional): [id number]
Consent (Scalp)/Introductory Paragraph: The rationale for Mohs was explained to the patient and consent was obtained. The risks, benefits and alternatives to therapy were discussed in detail. Specifically, the risks of changes in hair growth pattern secondary to repair, infection, scarring, bleeding, prolonged wound healing, incomplete removal, allergy to anesthesia, nerve injury and recurrence were addressed. Prior to the procedure, the treatment site was clearly identified and confirmed by the patient. All components of Universal Protocol/PAUSE Rule completed.
Initial Size Of Lesion: 1.2
Mohs Rapid Report Verbiage: The area of clinically evident tumor was marked with skin marking ink and appropriately hatched.  The initial incision was made following the Mohs approach through the skin.  The specimen was taken to the lab, divided into the necessary number of pieces, chromacoded and processed according to the Mohs protocol.  This was repeated in successive stages until a tumor free defect was achieved.
V-Y Plasty Text: The defect edges were debeveled with a #15 scalpel blade.  Given the location of the defect, shape of the defect and the proximity to free margins an V-Y advancement flap was deemed most appropriate.  Using a sterile surgical marker, an appropriate advancement flap was drawn incorporating the defect and placing the expected incisions within the relaxed skin tension lines where possible.    The area thus outlined was incised deep to adipose tissue with a #15 scalpel blade.  The skin margins were undermined to an appropriate distance in all directions utilizing iris scissors.
Rhombic Flap Text: The defect edges were debeveled with a #15 scalpel blade.  Given the location of the defect and the proximity to free margins a rhombic flap was deemed most appropriate.  Using a sterile surgical marker, an appropriate rhombic flap was drawn incorporating the defect.    The area thus outlined was incised deep to adipose tissue with a #15 scalpel blade.  The skin margins were undermined to an appropriate distance in all directions utilizing iris scissors.
Island Pedicle Flap With Canthal Suspension Text: The defect edges were debeveled with a #15 scalpel blade.  Given the location of the defect, shape of the defect and the proximity to free margins an island pedicle advancement flap was deemed most appropriate.  Using a sterile surgical marker, an appropriate advancement flap was drawn incorporating the defect, outlining the appropriate donor tissue and placing the expected incisions within the relaxed skin tension lines where possible. The area thus outlined was incised deep to adipose tissue with a #15 scalpel blade.  The skin margins were undermined to an appropriate distance in all directions around the primary defect and laterally outward around the island pedicle utilizing iris scissors.  There was minimal undermining beneath the pedicle flap. A suspension suture was placed in the canthal tendon to prevent tension and prevent ectropion.
Hemostasis: Electrocautery
Staging Info: By selecting yes to the question above you will include information on AJCC 8 tumor staging in your Mohs note. Information on tumor staging will be automatically added for SCCs on the head and neck. AJCC 8 includes tumor size, tumor depth, perineural involvement and bone invasion.
Localized Dermabrasion With Wire Brush Text: The patient was draped in routine manner.  Localized dermabrasion using 3 x 17 mm wire brush was performed in routine manner to papillary dermis. This spot dermabrasion is being performed to complete skin cancer reconstruction. It also will eliminate the other sun damaged precancerous cells that are known to be part of the regional effect of a lifetime's worth of sun exposure. This localized dermabrasion is therapeutic and should not be considered cosmetic in any regard.
Consent (Ear)/Introductory Paragraph: The rationale for Mohs was explained to the patient and consent was obtained. The risks, benefits and alternatives to therapy were discussed in detail. Specifically, the risks of ear deformity, infection, scarring, bleeding, prolonged wound healing, incomplete removal, allergy to anesthesia, nerve injury and recurrence were addressed. Prior to the procedure, the treatment site was clearly identified and confirmed by the patient. All components of Universal Protocol/PAUSE Rule completed.
Modified Advancement Flap Text: The defect edges were debeveled with a #15 scalpel blade.  Given the location of the defect, shape of the defect and the proximity to free margins a modified advancement flap was deemed most appropriate.  Using a sterile surgical marker, an appropriate advancement flap was drawn incorporating the defect and placing the expected incisions within the relaxed skin tension lines where possible.    The area thus outlined was incised deep to adipose tissue with a #15 scalpel blade.  The skin margins were undermined to an appropriate distance in all directions utilizing iris scissors.
Tumor Debulked?: curette
Anesthesia Volume In Cc: 3
Tumor Depth: Less than 6mm from granular layer and no invasion beyond the subcutaneous fat
Detail Level: Detailed
Unna Boot Text: An Unna boot was placed to help immobilize the limb and facilitate more rapid healing.
No Residual Tumor Seen Histology Text: There were no malignant cells seen in the sections examined.
Same Histology In Subsequent Stages Text: The pattern and morphology of the tumor is as described in the first stage.
Banner Transposition Flap Text: The defect edges were debeveled with a #15 scalpel blade.  Given the location of the defect and the proximity to free margins a Banner transposition flap was deemed most appropriate.  Using a sterile surgical marker, an appropriate flap drawn around the defect. The area thus outlined was incised deep to adipose tissue with a #15 scalpel blade.  The skin margins were undermined to an appropriate distance in all directions utilizing iris scissors.
Mohs Histo Method Verbiage: Each section was then chromacoded and processed in the Mohs lab using the Mohs protocol and submitted for frozen section.
W Plasty Text: The lesion was extirpated to the level of the fat with a #15 scalpel blade.  Given the location of the defect, shape of the defect and the proximity to free margins a W-plasty was deemed most appropriate for repair.  Using a sterile surgical marker, the appropriate transposition arms of the W-plasty were drawn incorporating the defect and placing the expected incisions within the relaxed skin tension lines where possible.    The area thus outlined was incised deep to adipose tissue with a #15 scalpel blade.  The skin margins were undermined to an appropriate distance in all directions utilizing iris scissors.  The opposing transposition arms were then transposed into place in opposite direction and anchored with interrupted buried subcutaneous sutures.
Cheiloplasty (Complex) Text: A decision was made to reconstruct the defect with a  cheiloplasty.  The defect was undermined extensively.  Additional obicularis oris muscle was excised with a 15 blade scalpel.  The defect was converted into a full thickness wedge to facilite a better cosmetic result.  Small vessels were then tied off with 5-0 monocyrl. The obicularis oris, superficial fascia, adipose and dermis were then reapproximated.  After the deeper layers were approximated the epidermis was reapproximated with particular care given to realign the vermilion border.
Crescentic Advancement Flap Text: The defect edges were debeveled with a #15 scalpel blade.  Given the location of the defect and the proximity to free margins a crescentic advancement flap was deemed most appropriate.  Using a sterile surgical marker, the appropriate advancement flap was drawn incorporating the defect and placing the expected incisions within the relaxed skin tension lines where possible.    The area thus outlined was incised deep to adipose tissue with a #15 scalpel blade.  The skin margins were undermined to an appropriate distance in all directions utilizing iris scissors.
Repair Hemostasis (Optional): Pinpoint electrocautery
Consent (Spinal Accessory)/Introductory Paragraph: The rationale for Mohs was explained to the patient and consent was obtained. The risks, benefits and alternatives to therapy were discussed in detail. Specifically, the risks of damage to the spinal accessory nerve, infection, scarring, bleeding, prolonged wound healing, incomplete removal, allergy to anesthesia, and recurrence were addressed. Prior to the procedure, the treatment site was clearly identified and confirmed by the patient. All components of Universal Protocol/PAUSE Rule completed.
Intermediate Repair And Graft Additional Text (Will Appearing After The Standard Complex Repair Text): The intermediate repair was not sufficient to completely close the primary defect. The remaining additional defect was repaired with the graft mentioned below.
Debridement Text: The wound edges were debrided prior to proceeding with the closure to facilitate wound healing.
Cheek Interpolation Flap Text: A decision was made to reconstruct the defect utilizing an interpolation axial flap and a staged reconstruction.  A telfa template was made of the defect.  This telfa template was then used to outline the Cheek Interpolation flap.  The donor area for the pedicle flap was then injected with anesthesia.  The flap was excised through the skin and subcutaneous tissue down to the layer of the underlying musculature.  The interpolation flap was carefully excised within this deep plane to maintain its blood supply.  The edges of the donor site were undermined.   The donor site was closed in a primary fashion.  The pedicle was then rotated into position and sutured.  Once the tube was sutured into place, adequate blood supply was confirmed with blanching and refill.  The pedicle was then wrapped with xeroform gauze and dressed appropriately with a telfa and gauze bandage to ensure continued blood supply and protect the attached pedicle.
Home Suture Removal Text: Patient was provided instructions on removing sutures and will remove their sutures at home.  If they have any questions or difficulties they will call the office.
O-L Flap Text: The defect edges were debeveled with a #15 scalpel blade.  Given the location of the defect, shape of the defect and the proximity to free margins an O-L flap was deemed most appropriate.  Using a sterile surgical marker, an appropriate advancement flap was drawn incorporating the defect and placing the expected incisions within the relaxed skin tension lines where possible.    The area thus outlined was incised deep to adipose tissue with a #15 scalpel blade.  The skin margins were undermined to an appropriate distance in all directions utilizing iris scissors.
Cartilage Graft Text: The defect edges were debeveled with a #15 scalpel blade.  Given the location of the defect, shape of the defect, the fact the defect involved a full thickness cartilage defect a cartilage graft was deemed most appropriate.  An appropriate donor site was identified, cleansed, and anesthetized. The cartilage graft was then harvested and transferred to the recipient site, oriented appropriately and then sutured into place.  The secondary defect was then repaired using a primary closure.
Mohs Method Verbiage: An incision at a 45 degree angle following the standard Mohs approach was done and the specimen was harvested as a microscopic controlled layer.
Bilobed Transposition Flap Text: The defect edges were debeveled with a #15 scalpel blade.  Given the location of the defect and the proximity to free margins a bilobed transposition flap was deemed most appropriate.  Using a sterile surgical marker, an appropriate bilobe flap drawn around the defect.    The area thus outlined was incised deep to adipose tissue with a #15 scalpel blade.  The skin margins were undermined to an appropriate distance in all directions utilizing iris scissors.
Xenograft Text: The defect edges were debeveled with a #15 scalpel blade.  Given the location of the defect, shape of the defect and the proximity to free margins a xenograft was deemed most appropriate.  The graft was then trimmed to fit the size of the defect.  The graft was then placed in the primary defect and oriented appropriately.
Suture Removal: 12 days
Advancement Flap (Single) Text: The defect edges were debeveled with a #15 scalpel blade.  Given the location of the defect and the proximity to free margins a single advancement flap was deemed most appropriate.  Using a sterile surgical marker, an appropriate advancement flap was drawn incorporating the defect and placing the expected incisions within the relaxed skin tension lines where possible.    The area thus outlined was incised deep to adipose tissue with a #15 scalpel blade.  The skin margins were undermined to an appropriate distance in all directions utilizing iris scissors.
Simple / Intermediate / Complex Repair - Final Wound Length In Cm: 4.7
Peng Advancement Flap Text: The defect edges were debeveled with a #15 scalpel blade.  Given the location of the defect, shape of the defect and the proximity to free margins a Peng advancement flap was deemed most appropriate.  Using a sterile surgical marker, an appropriate advancement flap was drawn incorporating the defect and placing the expected incisions within the relaxed skin tension lines where possible. The area thus outlined was incised deep to adipose tissue with a #15 scalpel blade.  The skin margins were undermined to an appropriate distance in all directions utilizing iris scissors.
Bcc Histology Text: There were numerous aggregates of basaloid cells.
O-Z Plasty Text: The defect edges were debeveled with a #15 scalpel blade.  Given the location of the defect, shape of the defect and the proximity to free margins an O-Z plasty (double transposition flap) was deemed most appropriate.  Using a sterile surgical marker, the appropriate transposition flaps were drawn incorporating the defect and placing the expected incisions within the relaxed skin tension lines where possible.    The area thus outlined was incised deep to adipose tissue with a #15 scalpel blade.  The skin margins were undermined to an appropriate distance in all directions utilizing iris scissors.  Hemostasis was achieved with electrocautery.  The flaps were then transposed into place, one clockwise and the other counterclockwise, and anchored with interrupted buried subcutaneous sutures.
Location Indication Override (Is Already Calculated Based On Selected Body Location): Area M
Cheiloplasty (Less Than 50%) Text: A decision was made to reconstruct the defect with a  cheiloplasty.  The defect was undermined extensively.  Additional obicularis oris muscle was excised with a 15 blade scalpel.  The defect was converted into a full thickness wedge, of less than 50% of the vertical height of the lip, to facilite a better cosmetic result.  Small vessels were then tied off with 5-0 monocyrl. The obicularis oris, superficial fascia, adipose and dermis were then reapproximated.  After the deeper layers were approximated the epidermis was reapproximated with particular care given to realign the vermilion border.
Consent (Temporal Branch)/Introductory Paragraph: The rationale for Mohs was explained to the patient and consent was obtained. The risks, benefits and alternatives to therapy were discussed in detail. Specifically, the risks of damage to the temporal branch of the facial nerve, infection, scarring, bleeding, prolonged wound healing, incomplete removal, allergy to anesthesia, and recurrence were addressed. Prior to the procedure, the treatment site was clearly identified and confirmed by the patient. All components of Universal Protocol/PAUSE Rule completed.
Mart-1 - Positive Histology Text: MART-1 staining demonstrates areas of higher density and clustering of melanocytes with Pagetoid spread upwards within the epidermis. The surgical margins are positive for tumor cells.
Purse String (Simple) Text: Given the location of the defect and the characteristics of the surrounding skin a purse string closure was deemed most appropriate.  Undermining was performed circumfirentially around the surgical defect.  A purse string suture was then placed and tightened.
Estimated Blood Loss (Cc): minimal
Vermilion Border Text: The closure involved the vermilion border.
Deep Sutures: 4-0 Maxon
Muscle Hinge Flap Text: The defect edges were debeveled with a #15 scalpel blade.  Given the size, depth and location of the defect and the proximity to free margins a muscle hinge flap was deemed most appropriate.  Using a sterile surgical marker, an appropriate hinge flap was drawn incorporating the defect. The area thus outlined was incised with a #15 scalpel blade.  The skin margins were undermined to an appropriate distance in all directions utilizing iris scissors.
Star Wedge Flap Text: The defect edges were debeveled with a #15 scalpel blade.  Given the location of the defect, shape of the defect and the proximity to free margins a star wedge flap was deemed most appropriate.  Using a sterile surgical marker, an appropriate rotation flap was drawn incorporating the defect and placing the expected incisions within the relaxed skin tension lines where possible. The area thus outlined was incised deep to adipose tissue with a #15 scalpel blade.  The skin margins were undermined to an appropriate distance in all directions utilizing iris scissors.
Z Plasty Text: The lesion was extirpated to the level of the fat with a #15 scalpel blade.  Given the location of the defect, shape of the defect and the proximity to free margins a Z-plasty was deemed most appropriate for repair.  Using a sterile surgical marker, the appropriate transposition arms of the Z-plasty were drawn incorporating the defect and placing the expected incisions within the relaxed skin tension lines where possible.    The area thus outlined was incised deep to adipose tissue with a #15 scalpel blade.  The skin margins were undermined to an appropriate distance in all directions utilizing iris scissors.  The opposing transposition arms were then transposed into place in opposite direction and anchored with interrupted buried subcutaneous sutures.
Width Of Defect Perpendicular To Closure In Cm (Required): 2
Advancement Flap (Double) Text: The defect edges were debeveled with a #15 scalpel blade.  Given the location of the defect and the proximity to free margins a double advancement flap was deemed most appropriate.  Using a sterile surgical marker, the appropriate advancement flaps were drawn incorporating the defect and placing the expected incisions within the relaxed skin tension lines where possible.    The area thus outlined was incised deep to adipose tissue with a #15 scalpel blade.  The skin margins were undermined to an appropriate distance in all directions utilizing iris scissors.
Epidermal Closure: running cuticular
Dorsal Nasal Flap Text: The defect edges were debeveled with a #15 scalpel blade.  Given the location of the defect and the proximity to free margins a dorsal nasal flap was deemed most appropriate.  Using a sterile surgical marker, an appropriate dorsal nasal flap was drawn around the defect.    The area thus outlined was incised deep to adipose tissue with a #15 scalpel blade.  The skin margins were undermined to an appropriate distance in all directions utilizing iris scissors.
Subsequent Stages Histo Method Verbiage: Using a similar technique to that described above, a thin layer of tissue was removed from all areas where tumor was visible on the previous stage.  The tissue was again oriented, mapped, dyed, and processed as above.
Trilobed Flap Text: The defect edges were debeveled with a #15 scalpel blade.  Given the location of the defect and the proximity to free margins a trilobed flap was deemed most appropriate.  Using a sterile surgical marker, an appropriate trilobed flap drawn around the defect.    The area thus outlined was incised deep to adipose tissue with a #15 scalpel blade.  The skin margins were undermined to an appropriate distance in all directions utilizing iris scissors.
Repair Anesthesia Method: local infiltration
Postop Diagnosis: same
Nostril Rim Text: The closure involved the nostril rim.
Which Eyelid Repair Cpt Are You Using?: 49536
Area L Indication Text: Tumors in this location are included in Area L (trunk and extremities).  Mohs surgery is indicated for larger tumors, or tumors with aggressive histologic features, in these anatomic locations.
Inflammation Suggestive Of Cancer Camouflage Histology Text: There was a dense lymphocytic infiltrate which prevented adequate histologic evaluation of adjacent structures.
Burow's Advancement Flap Text: The defect edges were debeveled with a #15 scalpel blade.  Given the location of the defect and the proximity to free margins a Burow's advancement flap was deemed most appropriate.  Using a sterile surgical marker, the appropriate advancement flap was drawn incorporating the defect and placing the expected incisions within the relaxed skin tension lines where possible.    The area thus outlined was incised deep to adipose tissue with a #15 scalpel blade.  The skin margins were undermined to an appropriate distance in all directions utilizing iris scissors.
O-Z Flap Text: The defect edges were debeveled with a #15 scalpel blade.  Given the location of the defect, shape of the defect and the proximity to free margins an O-Z flap was deemed most appropriate.  Using a sterile surgical marker, an appropriate transposition flap was drawn incorporating the defect and placing the expected incisions within the relaxed skin tension lines where possible. The area thus outlined was incised deep to adipose tissue with a #15 scalpel blade.  The skin margins were undermined to an appropriate distance in all directions utilizing iris scissors.
Split-Thickness Skin Graft Text: The defect edges were debeveled with a #15 scalpel blade.  Given the location of the defect, shape of the defect and the proximity to free margins a split thickness skin graft was deemed most appropriate.  Using a sterile surgical marker, the primary defect shape was transferred to the donor site. The split thickness graft was then harvested.  The skin graft was then placed in the primary defect and oriented appropriately.
Bilobed Flap Text: The defect edges were debeveled with a #15 scalpel blade.  Given the location of the defect and the proximity to free margins a bilobe flap was deemed most appropriate.  Using a sterile surgical marker, an appropriate bilobe flap drawn around the defect.    The area thus outlined was incised deep to adipose tissue with a #15 scalpel blade.  The skin margins were undermined to an appropriate distance in all directions utilizing iris scissors.
Burow's Graft Text: The defect edges were debeveled with a #15 scalpel blade.  Given the location of the defect, shape of the defect, the proximity to free margins and the presence of a standing cone deformity a Burow's skin graft was deemed most appropriate. The standing cone was removed and this tissue was then trimmed to the shape of the primary defect. The adipose tissue was also removed until only dermis and epidermis were left.  The skin margins of the secondary defect were undermined to an appropriate distance in all directions utilizing iris scissors.  The secondary defect was closed with interrupted buried subcutaneous sutures.  The skin edges were then re-apposed with running  sutures.  The skin graft was then placed in the primary defect and oriented appropriately.
Adjacent Tissue Transfer Text: The defect edges were debeveled with a #15 scalpel blade.  Given the location of the defect and the proximity to free margins an adjacent tissue transfer was deemed most appropriate.  Using a sterile surgical marker, an appropriate flap was drawn incorporating the defect and placing the expected incisions within the relaxed skin tension lines where possible.    The area thus outlined was incised deep to adipose tissue with a #15 scalpel blade.  The skin margins were undermined to an appropriate distance in all directions utilizing iris scissors.
Presence Of Scar Tissue (For Histology): present
Partial Purse String (Intermediate) Text: Given the location of the defect and the characteristics of the surrounding skin an intermediate purse string closure was deemed most appropriate.  Undermining was performed circumfirentially around the surgical defect.  A purse string suture was then placed and tightened. Wound tension only allowed a partial closure of the circular defect.
O-T Advancement Flap Text: The defect edges were debeveled with a #15 scalpel blade.  Given the location of the defect, shape of the defect and the proximity to free margins an O-T advancement flap was deemed most appropriate.  Using a sterile surgical marker, an appropriate advancement flap was drawn incorporating the defect and placing the expected incisions within the relaxed skin tension lines where possible.    The area thus outlined was incised deep to adipose tissue with a #15 scalpel blade.  The skin margins were undermined to an appropriate distance in all directions utilizing iris scissors.
Eyelid Partial Thickness Repair - 22059: The eyelid defect was partial thickness which required a wedge repair of the eyelid. Special care was taken to ensure that the eyelid margin was realligned when placing sutures.
Which Instrument Did You Use For Dermabrasion?: Wire Brush
Nasalis Myocutaneous Flap Text: Using a #15 blade, an incision was made around the donor flap to the level of the nasalis muscle. Wide lateral undermining was then performed in both the subcutaneous plane above the nasalis muscle, and in a submuscular plane just above periosteum. This allowed the formation of a free nasalis muscle axial pedicle which was still attached to the actual cutaneous flap, increasing its mobility and vascular viability. Hemostasis was obtained with pinpoint electrocoagulation. The flap was mobilized into position and the pivotal anchor points positioned and stabilized with buried interrupted sutures. Subcutaneous and dermal tissues were closed in a multilayered fashion with sutures. Tissue redundancies were excised, and the epidermal edges were apposed without significant tension and sutured with sutures.
Rectangular Flap Text: The defect edges were debeveled with a #15 scalpel blade. Given the location of the defect and the proximity to free margins a rectangular flap was deemed most appropriate. Using a sterile surgical marker, an appropriate rectangular flap was drawn incorporating the defect. The area thus outlined was incised deep to adipose tissue with a #15 scalpel blade. The skin margins were undermined to an appropriate distance in all directions utilizing iris scissors. Following this, the designed flap was carried over into the primary defect and sutured into place.
Localized Dermabrasion With Sand Papertext: The patient was draped in routine manner.  Localized dermabrasion using sterile sand paper was performed in routine manner to papillary dermis. This spot dermabrasion is being performed to complete skin cancer reconstruction. It also will eliminate the other sun damaged precancerous cells that are known to be part of the regional effect of a lifetime's worth of sun exposure. This localized dermabrasion is therapeutic and should not be considered cosmetic in any regard.
Localized Dermabrasion With 15 Blade Text: The patient was draped in routine manner.  Localized dermabrasion using a 15 blade was performed in routine manner to papillary dermis. This spot dermabrasion is being performed to complete skin cancer reconstruction. It also will eliminate the other sun damaged precancerous cells that are known to be part of the regional effect of a lifetime's worth of sun exposure. This localized dermabrasion is therapeutic and should not be considered cosmetic in any regard.

## 2024-04-05 ENCOUNTER — APPOINTMENT (RX ONLY)
Dept: URBAN - METROPOLITAN AREA CLINIC 36 | Facility: CLINIC | Age: 84
Setting detail: DERMATOLOGY
End: 2024-04-05

## 2024-04-05 DIAGNOSIS — Z48.02 ENCOUNTER FOR REMOVAL OF SUTURES: ICD-10-CM

## 2024-04-05 PROCEDURE — 99211 OFF/OP EST MAY X REQ PHY/QHP: CPT

## 2024-04-05 PROCEDURE — ? SUTURE REMOVAL (NO GLOBAL PERIOD)

## 2024-04-05 ASSESSMENT — LOCATION ZONE DERM: LOCATION ZONE: NECK

## 2024-04-05 ASSESSMENT — LOCATION DETAILED DESCRIPTION DERM: LOCATION DETAILED: LEFT SUPERIOR LATERAL NECK

## 2024-04-05 ASSESSMENT — LOCATION SIMPLE DESCRIPTION DERM: LOCATION SIMPLE: POSTERIOR NECK

## 2024-04-05 NOTE — PROCEDURE: SUTURE REMOVAL (NO GLOBAL PERIOD)
Detail Level: Detailed
Add 1585x Cpt? (Do Not Bill If You Billed For The Procedure Placing The Sutures. This Is An Add-On Code That Must Be Billed With An E/M Visit Code): No
Suture Removal Completed By (Optional): Johana Chan MA

## 2024-04-25 ENCOUNTER — HOSPITAL ENCOUNTER (OUTPATIENT)
Dept: LAB | Facility: MEDICAL CENTER | Age: 84
End: 2024-04-25
Attending: FAMILY MEDICINE
Payer: MEDICARE

## 2024-04-25 LAB
ALBUMIN SERPL BCP-MCNC: 4.3 G/DL (ref 3.2–4.9)
ALBUMIN/GLOB SERPL: 1.5 G/DL
ALP SERPL-CCNC: 78 U/L (ref 30–99)
ALT SERPL-CCNC: 28 U/L (ref 2–50)
ANION GAP SERPL CALC-SCNC: 10 MMOL/L (ref 7–16)
AST SERPL-CCNC: 21 U/L (ref 12–45)
BASOPHILS # BLD AUTO: 0.5 % (ref 0–1.8)
BASOPHILS # BLD: 0.02 K/UL (ref 0–0.12)
BILIRUB SERPL-MCNC: 0.7 MG/DL (ref 0.1–1.5)
BUN SERPL-MCNC: 15 MG/DL (ref 8–22)
CALCIUM ALBUM COR SERPL-MCNC: 9.3 MG/DL (ref 8.5–10.5)
CALCIUM SERPL-MCNC: 9.5 MG/DL (ref 8.5–10.5)
CHLORIDE SERPL-SCNC: 104 MMOL/L (ref 96–112)
CHOLEST SERPL-MCNC: 131 MG/DL (ref 100–199)
CO2 SERPL-SCNC: 24 MMOL/L (ref 20–33)
CREAT SERPL-MCNC: 0.93 MG/DL (ref 0.5–1.4)
EOSINOPHIL # BLD AUTO: 0.1 K/UL (ref 0–0.51)
EOSINOPHIL NFR BLD: 2.4 % (ref 0–6.9)
ERYTHROCYTE [DISTWIDTH] IN BLOOD BY AUTOMATED COUNT: 44.1 FL (ref 35.9–50)
EST. AVERAGE GLUCOSE BLD GHB EST-MCNC: 123 MG/DL
FASTING STATUS PATIENT QL REPORTED: NORMAL
GFR SERPLBLD CREATININE-BSD FMLA CKD-EPI: 81 ML/MIN/1.73 M 2
GLOBULIN SER CALC-MCNC: 2.8 G/DL (ref 1.9–3.5)
GLUCOSE SERPL-MCNC: 97 MG/DL (ref 65–99)
HBA1C MFR BLD: 5.9 % (ref 4–5.6)
HCT VFR BLD AUTO: 39.9 % (ref 42–52)
HDLC SERPL-MCNC: 60 MG/DL
HGB BLD-MCNC: 13.6 G/DL (ref 14–18)
IMM GRANULOCYTES # BLD AUTO: 0.01 K/UL (ref 0–0.11)
IMM GRANULOCYTES NFR BLD AUTO: 0.2 % (ref 0–0.9)
LDLC SERPL CALC-MCNC: 62 MG/DL
LYMPHOCYTES # BLD AUTO: 1.26 K/UL (ref 1–4.8)
LYMPHOCYTES NFR BLD: 29.8 % (ref 22–41)
MCH RBC QN AUTO: 30.8 PG (ref 27–33)
MCHC RBC AUTO-ENTMCNC: 34.1 G/DL (ref 32.3–36.5)
MCV RBC AUTO: 90.5 FL (ref 81.4–97.8)
MONOCYTES # BLD AUTO: 0.49 K/UL (ref 0–0.85)
MONOCYTES NFR BLD AUTO: 11.6 % (ref 0–13.4)
NEUTROPHILS # BLD AUTO: 2.35 K/UL (ref 1.82–7.42)
NEUTROPHILS NFR BLD: 55.5 % (ref 44–72)
NRBC # BLD AUTO: 0 K/UL
NRBC BLD-RTO: 0 /100 WBC (ref 0–0.2)
PLATELET # BLD AUTO: 233 K/UL (ref 164–446)
PMV BLD AUTO: 9.5 FL (ref 9–12.9)
POTASSIUM SERPL-SCNC: 4.3 MMOL/L (ref 3.6–5.5)
PROT SERPL-MCNC: 7.1 G/DL (ref 6–8.2)
RBC # BLD AUTO: 4.41 M/UL (ref 4.7–6.1)
SODIUM SERPL-SCNC: 138 MMOL/L (ref 135–145)
TRIGL SERPL-MCNC: 47 MG/DL (ref 0–149)
WBC # BLD AUTO: 4.2 K/UL (ref 4.8–10.8)

## 2024-04-25 PROCEDURE — 85025 COMPLETE CBC W/AUTO DIFF WBC: CPT

## 2024-04-25 PROCEDURE — 80053 COMPREHEN METABOLIC PANEL: CPT

## 2024-04-25 PROCEDURE — 83036 HEMOGLOBIN GLYCOSYLATED A1C: CPT

## 2024-04-25 PROCEDURE — 80061 LIPID PANEL: CPT

## 2024-04-25 PROCEDURE — 36415 COLL VENOUS BLD VENIPUNCTURE: CPT

## 2024-05-06 ENCOUNTER — APPOINTMENT (RX ONLY)
Dept: URBAN - METROPOLITAN AREA CLINIC 36 | Facility: CLINIC | Age: 84
Setting detail: DERMATOLOGY
End: 2024-05-06

## 2024-05-06 PROBLEM — C44.399 OTHER SPECIFIED MALIGNANT NEOPLASM OF SKIN OF OTHER PARTS OF FACE: Status: ACTIVE | Noted: 2024-05-06

## 2024-05-06 PROCEDURE — 17311 MOHS 1 STAGE H/N/HF/G: CPT

## 2024-05-06 PROCEDURE — 13132 CMPLX RPR F/C/C/M/N/AX/G/H/F: CPT

## 2024-05-06 PROCEDURE — 17312 MOHS ADDL STAGE: CPT

## 2024-05-06 PROCEDURE — ? MOHS SURGERY

## 2024-05-06 NOTE — PROCEDURE: MOHS SURGERY
Show Biopsy Photo Reviewed Variable: Yes
Double M-Plasty Intermediate Repair Preamble Text (Leave Blank If You Do Not Want): Undermining was performed with blunt dissection.
Closure 3 Information: This tab is for additional flaps and grafts above and beyond our usual structured repairs.  Please note if you enter information here it will not currently bill and you will need to add the billing information manually.
Suturegard Body: The suture ends were repeatedly re-tightened and re-clamped to achieve the desired tissue expansion.
Wound Care (No Sutures): Petrolatum
Split-Thickness Skin Graft Text: The defect edges were debeveled with a #15 scalpel blade.  Given the location of the defect, shape of the defect and the proximity to free margins a split thickness skin graft was deemed most appropriate.  Using a sterile surgical marker, the primary defect shape was transferred to the donor site. The split thickness graft was then harvested.  The skin graft was then placed in the primary defect and oriented appropriately.
Dorsal Nasal Flap Text: The defect edges were debeveled with a #15 scalpel blade.  Given the location of the defect and the proximity to free margins a dorsal nasal flap was deemed most appropriate.  Using a sterile surgical marker, an appropriate dorsal nasal flap was drawn around the defect.    The area thus outlined was incised deep to adipose tissue with a #15 scalpel blade.  The skin margins were undermined to an appropriate distance in all directions utilizing iris scissors.
Anesthesia Type: 1% lidocaine with epinephrine
Repair Hemostasis (Optional): Pinpoint electrocautery
Mid-Level Procedure Text (E): After obtaining clear surgical margins the patient was sent to a mid-level provider for surgical repair.  The patient understands they will receive post-surgical care and follow-up from the mid-level provider.
Referred To Plastics For Closure Text (Leave Blank If You Do Not Want): After obtaining clear surgical margins the patient was sent to plastics for surgical repair.  The patient understands they will receive post-surgical care and follow-up from the referring physician's office.
Dressing (No Sutures): pressure dressing with telfa
Cheiloplasty (Complex) Text: A decision was made to reconstruct the defect with a  cheiloplasty.  The defect was undermined extensively.  Additional obicularis oris muscle was excised with a 15 blade scalpel.  The defect was converted into a full thickness wedge to facilite a better cosmetic result.  Small vessels were then tied off with 5-0 monocyrl. The obicularis oris, superficial fascia, adipose and dermis were then reapproximated.  After the deeper layers were approximated the epidermis was reapproximated with particular care given to realign the vermilion border.
Complex Repair Preamble Text (Leave Blank If You Do Not Want): Extensive wide undermining was performed.
A-T Advancement Flap Text: The defect edges were debeveled with a #15 scalpel blade.  Given the location of the defect, shape of the defect and the proximity to free margins an A-T advancement flap was deemed most appropriate.  Using a sterile surgical marker, an appropriate advancement flap was drawn incorporating the defect and placing the expected incisions within the relaxed skin tension lines where possible.    The area thus outlined was incised deep to adipose tissue with a #15 scalpel blade.  The skin margins were undermined to an appropriate distance in all directions utilizing iris scissors.
Validate Previous Accession (Can Hide Previous Accession In Settings Tab): No
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
Postop Diagnosis: same
Special Stains Stage 14 - Results: Base On Clearance Noted Above
Unna Boot Text: An Unna boot was placed to help immobilize the limb and facilitate more rapid healing.
Mohs Case Number: [id number]
Localized Dermabrasion With Wire Brush Text: The patient was draped in routine manner.  Localized dermabrasion using 3 x 17 mm wire brush was performed in routine manner to papillary dermis. This spot dermabrasion is being performed to complete skin cancer reconstruction. It also will eliminate the other sun damaged precancerous cells that are known to be part of the regional effect of a lifetime's worth of sun exposure. This localized dermabrasion is therapeutic and should not be considered cosmetic in any regard.
Posterior Auricular Interpolation Flap Text: A decision was made to reconstruct the defect utilizing an interpolation axial flap and a staged reconstruction.  A telfa template was made of the defect.  This telfa template was then used to outline the posterior auricular interpolation flap.  The donor area for the pedicle flap was then injected with anesthesia.  The flap was excised through the skin and subcutaneous tissue down to the layer of the underlying musculature.  The pedicle flap was carefully excised within this deep plane to maintain its blood supply.  The edges of the donor site were undermined.   The donor site was closed in a primary fashion.  The pedicle was then rotated into position and sutured.  Once the tube was sutured into place, adequate blood supply was confirmed with blanching and refill.  The pedicle was then wrapped with xeroform gauze and dressed appropriately with a telfa and gauze bandage to ensure continued blood supply and protect the attached pedicle.
O-L Flap Text: The defect edges were debeveled with a #15 scalpel blade.  Given the location of the defect, shape of the defect and the proximity to free margins an O-L flap was deemed most appropriate.  Using a sterile surgical marker, an appropriate advancement flap was drawn incorporating the defect and placing the expected incisions within the relaxed skin tension lines where possible.    The area thus outlined was incised deep to adipose tissue with a #15 scalpel blade.  The skin margins were undermined to an appropriate distance in all directions utilizing iris scissors.
Nostril Rim Text: The closure involved the nostril rim.
Suturegard Retention Suture: 0-0 Nylon
Nasalis-Muscle-Based Myocutaneous Island Pedicle Flap Text: Using a #15 blade, an incision was made around the donor flap to the level of the nasalis muscle. Wide lateral undermining was then performed in both the subcutaneous plane above the nasalis muscle, and in a submuscular plane just above periosteum. This allowed the formation of a free nasalis muscle axial pedicle (based on the angular artery) which was still attached to the actual cutaneous flap, increasing its mobility and vascular viability. Hemostasis was obtained with pinpoint electrocoagulation. The flap was mobilized into position and the pivotal anchor points positioned and stabilized with buried interrupted sutures. Subcutaneous and dermal tissues were closed in a multilayered fashion with sutures. Tissue redundancies were excised, and the epidermal edges were apposed without significant tension and sutured with sutures.
Stage 5: Number Of Blocks?: 0
Eyelid Full Thickness Repair - 05474: The eyelid defect was full thickness which required a wedge repair of the eyelid. Special care was taken to ensure that the eyelid margin was realligned when placing sutures.
Rotation Flap Text: The defect edges were debeveled with a #15 scalpel blade.  Given the location of the defect, shape of the defect and the proximity to free margins a rotation flap was deemed most appropriate.  Using a sterile surgical marker, an appropriate rotation flap was drawn incorporating the defect and placing the expected incisions within the relaxed skin tension lines where possible.    The area thus outlined was incised deep to adipose tissue with a #15 scalpel blade.  The skin margins were undermined to an appropriate distance in all directions utilizing iris scissors.
Repair Anesthesia Method: local infiltration
Number Of Hemigard Strips Per Side: 1
O-Z Flap Text: The defect edges were debeveled with a #15 scalpel blade.  Given the location of the defect, shape of the defect and the proximity to free margins an O-Z flap was deemed most appropriate.  Using a sterile surgical marker, an appropriate transposition flap was drawn incorporating the defect and placing the expected incisions within the relaxed skin tension lines where possible. The area thus outlined was incised deep to adipose tissue with a #15 scalpel blade.  The skin margins were undermined to an appropriate distance in all directions utilizing iris scissors.
Advancement Flap (Double) Text: The defect edges were debeveled with a #15 scalpel blade.  Given the location of the defect and the proximity to free margins a double advancement flap was deemed most appropriate.  Using a sterile surgical marker, the appropriate advancement flaps were drawn incorporating the defect and placing the expected incisions within the relaxed skin tension lines where possible.    The area thus outlined was incised deep to adipose tissue with a #15 scalpel blade.  The skin margins were undermined to an appropriate distance in all directions utilizing iris scissors.
Otolaryngologist Procedure Text (C): After obtaining clear surgical margins the patient was sent to otolaryngology for surgical repair.  The patient understands they will receive post-surgical care and follow-up from the referring physician's office.
Modified Advancement Flap Text: The defect edges were debeveled with a #15 scalpel blade.  Given the location of the defect, shape of the defect and the proximity to free margins a modified advancement flap was deemed most appropriate.  Using a sterile surgical marker, an appropriate advancement flap was drawn incorporating the defect and placing the expected incisions within the relaxed skin tension lines where possible.    The area thus outlined was incised deep to adipose tissue with a #15 scalpel blade.  The skin margins were undermined to an appropriate distance in all directions utilizing iris scissors.
Island Pedicle Flap With Canthal Suspension Text: The defect edges were debeveled with a #15 scalpel blade.  Given the location of the defect, shape of the defect and the proximity to free margins an island pedicle advancement flap was deemed most appropriate.  Using a sterile surgical marker, an appropriate advancement flap was drawn incorporating the defect, outlining the appropriate donor tissue and placing the expected incisions within the relaxed skin tension lines where possible. The area thus outlined was incised deep to adipose tissue with a #15 scalpel blade.  The skin margins were undermined to an appropriate distance in all directions around the primary defect and laterally outward around the island pedicle utilizing iris scissors.  There was minimal undermining beneath the pedicle flap. A suspension suture was placed in the canthal tendon to prevent tension and prevent ectropion.
Melolabial Transposition Flap Text: The defect edges were debeveled with a #15 scalpel blade.  Given the location of the defect and the proximity to free margins a melolabial flap was deemed most appropriate.  Using a sterile surgical marker, an appropriate melolabial transposition flap was drawn incorporating the defect.    The area thus outlined was incised deep to adipose tissue with a #15 scalpel blade.  The skin margins were undermined to an appropriate distance in all directions utilizing iris scissors.
Consent (Lip)/Introductory Paragraph: The rationale for Mohs was explained to the patient and consent was obtained. The risks, benefits and alternatives to therapy were discussed in detail. Specifically, the risks of lip deformity, changes in the oral aperture, infection, scarring, bleeding, prolonged wound healing, incomplete removal, allergy to anesthesia, nerve injury and recurrence were addressed. Prior to the procedure, the treatment site was clearly identified and confirmed by the patient. All components of Universal Protocol/PAUSE Rule completed.
Xenograft Text: The defect edges were debeveled with a #15 scalpel blade.  Given the location of the defect, shape of the defect and the proximity to free margins a xenograft was deemed most appropriate.  The graft was then trimmed to fit the size of the defect.  The graft was then placed in the primary defect and oriented appropriately.
Pinch Graft Text: The defect edges were debeveled with a #15 scalpel blade. Given the location of the defect, shape of the defect and the proximity to free margins a pinch graft was deemed most appropriate. Using a sterile surgical marker, the primary defect shape was transferred to the donor site. The area thus outlined was incised deep to adipose tissue with a #15 scalpel blade.  The harvested graft was then trimmed of adipose tissue until only dermis and epidermis was left. The skin margins of the secondary defect were undermined to an appropriate distance in all directions utilizing iris scissors.  The secondary defect was closed with interrupted buried subcutaneous sutures.  The skin edges were then re-apposed with running  sutures.  The skin graft was then placed in the primary defect and oriented appropriately.
Ftsg Text: The defect edges were debeveled with a #15 scalpel blade.  Given the location of the defect, shape of the defect and the proximity to free margins a full thickness skin graft was deemed most appropriate.  Using a sterile surgical marker, the primary defect shape was transferred to the donor site. The area thus outlined was incised deep to adipose tissue with a #15 scalpel blade.  The harvested graft was then trimmed of adipose tissue until only dermis and epidermis was left.  The skin margins of the secondary defect were undermined to an appropriate distance in all directions utilizing iris scissors.  The secondary defect was closed with interrupted buried subcutaneous sutures.  The skin edges were then re-apposed with running  sutures.  The skin graft was then placed in the primary defect and oriented appropriately.
Provider Procedure Text (A): After obtaining clear surgical margins the defect was repaired by another provider.
Estimated Blood Loss (Cc): minimal
Suturegard Intro: Intraoperative tissue expansion was performed, utilizing the SUTUREGARD device, in order to reduce wound tension.
Mercedes Flap Text: The defect edges were debeveled with a #15 scalpel blade.  Given the location of the defect, shape of the defect and the proximity to free margins a Mercedes flap was deemed most appropriate.  Using a sterile surgical marker, an appropriate advancement flap was drawn incorporating the defect and placing the expected incisions within the relaxed skin tension lines where possible. The area thus outlined was incised deep to adipose tissue with a #15 scalpel blade.  The skin margins were undermined to an appropriate distance in all directions utilizing iris scissors.
Advancement Flap (Single) Text: The defect edges were debeveled with a #15 scalpel blade.  Given the location of the defect and the proximity to free margins a single advancement flap was deemed most appropriate.  Using a sterile surgical marker, an appropriate advancement flap was drawn incorporating the defect and placing the expected incisions within the relaxed skin tension lines where possible.    The area thus outlined was incised deep to adipose tissue with a #15 scalpel blade.  The skin margins were undermined to an appropriate distance in all directions utilizing iris scissors.
Referred To Oculoplastics For Closure Text (Leave Blank If You Do Not Want): After obtaining clear surgical margins the patient was sent to oculoplastics for surgical repair.  The patient understands they will receive post-surgical care and follow-up from the referring physician's office.
Advancement-Rotation Flap Text: The defect edges were debeveled with a #15 scalpel blade.  Given the location of the defect, shape of the defect and the proximity to free margins an advancement-rotation flap was deemed most appropriate.  Using a sterile surgical marker, an appropriate flap was drawn incorporating the defect and placing the expected incisions within the relaxed skin tension lines where possible. The area thus outlined was incised deep to adipose tissue with a #15 scalpel blade.  The skin margins were undermined to an appropriate distance in all directions utilizing iris scissors.
O-Z Plasty Text: The defect edges were debeveled with a #15 scalpel blade.  Given the location of the defect, shape of the defect and the proximity to free margins an O-Z plasty (double transposition flap) was deemed most appropriate.  Using a sterile surgical marker, the appropriate transposition flaps were drawn incorporating the defect and placing the expected incisions within the relaxed skin tension lines where possible.    The area thus outlined was incised deep to adipose tissue with a #15 scalpel blade.  The skin margins were undermined to an appropriate distance in all directions utilizing iris scissors.  Hemostasis was achieved with electrocautery.  The flaps were then transposed into place, one clockwise and the other counterclockwise, and anchored with interrupted buried subcutaneous sutures.
Bilobed Flap Text: The defect edges were debeveled with a #15 scalpel blade.  Given the location of the defect and the proximity to free margins a bilobe flap was deemed most appropriate.  Using a sterile surgical marker, an appropriate bilobe flap drawn around the defect.    The area thus outlined was incised deep to adipose tissue with a #15 scalpel blade.  The skin margins were undermined to an appropriate distance in all directions utilizing iris scissors.
No Residual Tumor Seen Histology Text: There were no malignant cells seen in the sections examined.
Partial Purse String (Intermediate) Text: Given the location of the defect and the characteristics of the surrounding skin an intermediate purse string closure was deemed most appropriate.  Undermining was performed circumfirentially around the surgical defect.  A purse string suture was then placed and tightened. Wound tension only allowed a partial closure of the circular defect.
V-Y Plasty Text: The defect edges were debeveled with a #15 scalpel blade.  Given the location of the defect, shape of the defect and the proximity to free margins an V-Y advancement flap was deemed most appropriate.  Using a sterile surgical marker, an appropriate advancement flap was drawn incorporating the defect and placing the expected incisions within the relaxed skin tension lines where possible.    The area thus outlined was incised deep to adipose tissue with a #15 scalpel blade.  The skin margins were undermined to an appropriate distance in all directions utilizing iris scissors.
Complex Repair And Flap Additional Text (Will Appearing After The Standard Complex Repair Text): The complex repair was not sufficient to completely close the primary defect. The remaining additional defect was repaired with the flap mentioned below.
Tissue Cultured Epidermal Autograft Text: The defect edges were debeveled with a #15 scalpel blade.  Given the location of the defect, shape of the defect and the proximity to free margins a tissue cultured epidermal autograft was deemed most appropriate.  The graft was then trimmed to fit the size of the defect.  The graft was then placed in the primary defect and oriented appropriately.
Crescentic Advancement Flap Text: The defect edges were debeveled with a #15 scalpel blade.  Given the location of the defect and the proximity to free margins a crescentic advancement flap was deemed most appropriate.  Using a sterile surgical marker, the appropriate advancement flap was drawn incorporating the defect and placing the expected incisions within the relaxed skin tension lines where possible.    The area thus outlined was incised deep to adipose tissue with a #15 scalpel blade.  The skin margins were undermined to an appropriate distance in all directions utilizing iris scissors.
Consent 3/Introductory Paragraph: I gave the patient a chance to ask questions they had about the procedure.  Following this I explained the Mohs procedure and consent was obtained. The risks, benefits and alternatives to therapy were discussed in detail. Specifically, the risks of infection, scarring, bleeding, prolonged wound healing, incomplete removal, allergy to anesthesia, nerve injury and recurrence were addressed. Prior to the procedure, the treatment site was clearly identified and confirmed by the patient. All components of Universal Protocol/PAUSE Rule completed.
Hemigard Postcare Instructions: The HEMIGARD strips are to remain completely dry for at least 5-7 days.
Simple / Intermediate / Complex Repair - Final Wound Length In Cm: 5.2
Nasal Turnover Hinge Flap Text: The defect edges were debeveled with a #15 scalpel blade.  Given the size, depth, location of the defect and the defect being full thickness a nasal turnover hinge flap was deemed most appropriate.  Using a sterile surgical marker, an appropriate hinge flap was drawn incorporating the defect. The area thus outlined was incised with a #15 scalpel blade. The flap was designed to recreate the nasal mucosal lining and the alar rim. The skin margins were undermined to an appropriate distance in all directions utilizing iris scissors.
Intermediate Repair And Flap Additional Text (Will Appearing After The Standard Complex Repair Text): The intermediate repair was not sufficient to completely close the primary defect. The remaining additional defect was repaired with the flap mentioned below.
Anesthesia Volume In Cc: 5
Surgical Defect Length In Cm (Optional): 2.4
Burow's Graft Text: The defect edges were debeveled with a #15 scalpel blade.  Given the location of the defect, shape of the defect, the proximity to free margins and the presence of a standing cone deformity a Burow's skin graft was deemed most appropriate. The standing cone was removed and this tissue was then trimmed to the shape of the primary defect. The adipose tissue was also removed until only dermis and epidermis were left.  The skin margins of the secondary defect were undermined to an appropriate distance in all directions utilizing iris scissors.  The secondary defect was closed with interrupted buried subcutaneous sutures.  The skin edges were then re-apposed with running  sutures.  The skin graft was then placed in the primary defect and oriented appropriately.
Consent (Marginal Mandibular)/Introductory Paragraph: The rationale for Mohs was explained to the patient and consent was obtained. The risks, benefits and alternatives to therapy were discussed in detail. Specifically, the risks of damage to the marginal mandibular branch of the facial nerve, infection, scarring, bleeding, prolonged wound healing, incomplete removal, allergy to anesthesia, and recurrence were addressed. Prior to the procedure, the treatment site was clearly identified and confirmed by the patient. All components of Universal Protocol/PAUSE Rule completed.
Ear Star Wedge Flap Text: The defect edges were debeveled with a #15 blade scalpel.  Given the location of the defect and the proximity to free margins (helical rim) an ear star wedge flap was deemed most appropriate.  Using a sterile surgical marker, the appropriate flap was drawn incorporating the defect and placing the expected incisions between the helical rim and antihelix where possible.  The area thus outlined was incised through and through with a #15 scalpel blade.
Rhomboid Transposition Flap Text: The defect edges were debeveled with a #15 scalpel blade.  Given the location of the defect and the proximity to free margins a rhomboid transposition flap was deemed most appropriate.  Using a sterile surgical marker, an appropriate rhomboid flap was drawn incorporating the defect.    The area thus outlined was incised deep to adipose tissue with a #15 scalpel blade.  The skin margins were undermined to an appropriate distance in all directions utilizing iris scissors.
O-T Advancement Flap Text: The defect edges were debeveled with a #15 scalpel blade.  Given the location of the defect, shape of the defect and the proximity to free margins an O-T advancement flap was deemed most appropriate.  Using a sterile surgical marker, an appropriate advancement flap was drawn incorporating the defect and placing the expected incisions within the relaxed skin tension lines where possible.    The area thus outlined was incised deep to adipose tissue with a #15 scalpel blade.  The skin margins were undermined to an appropriate distance in all directions utilizing iris scissors.
Bcc Histology Text: There were numerous aggregates of basaloid cells.
Surgeon Performing Repair (Optional): Nadine Allen MD
Which Instrument Did You Use For Dermabrasion?: Wire Brush
Consent (Ear)/Introductory Paragraph: The rationale for Mohs was explained to the patient and consent was obtained. The risks, benefits and alternatives to therapy were discussed in detail. Specifically, the risks of ear deformity, infection, scarring, bleeding, prolonged wound healing, incomplete removal, allergy to anesthesia, nerve injury and recurrence were addressed. Prior to the procedure, the treatment site was clearly identified and confirmed by the patient. All components of Universal Protocol/PAUSE Rule completed.
No Repair - Repaired With Adjacent Surgical Defect Text (Leave Blank If You Do Not Want): After obtaining clear surgical margins the defect was repaired concurrently with another surgical defect which was in close approximation.
Medical Necessity Statement: Based on my medical judgement, Mohs surgery is the most appropriate treatment for this cancer compared to other treatments.
Consent (Spinal Accessory)/Introductory Paragraph: The rationale for Mohs was explained to the patient and consent was obtained. The risks, benefits and alternatives to therapy were discussed in detail. Specifically, the risks of damage to the spinal accessory nerve, infection, scarring, bleeding, prolonged wound healing, incomplete removal, allergy to anesthesia, and recurrence were addressed. Prior to the procedure, the treatment site was clearly identified and confirmed by the patient. All components of Universal Protocol/PAUSE Rule completed.
Referred To Asc For Closure Text (Leave Blank If You Do Not Want): After obtaining clear surgical margins the patient was sent to an ASC for surgical repair.  The patient understands they will receive post-surgical care and follow-up from the ASC physician.
Cheek-To-Nose Interpolation Flap Text: A decision was made to reconstruct the defect utilizing an interpolation axial flap and a staged reconstruction.  A telfa template was made of the defect.  This telfa template was then used to outline the Cheek-To-Nose Interpolation flap.  The donor area for the pedicle flap was then injected with anesthesia.  The flap was excised through the skin and subcutaneous tissue down to the layer of the underlying musculature.  The interpolation flap was carefully excised within this deep plane to maintain its blood supply.  The edges of the donor site were undermined.   The donor site was closed in a primary fashion.  The pedicle was then rotated into position and sutured.  Once the tube was sutured into place, adequate blood supply was confirmed with blanching and refill.  The pedicle was then wrapped with xeroform gauze and dressed appropriately with a telfa and gauze bandage to ensure continued blood supply and protect the attached pedicle.
Helical Rim Advancement Flap Text: The defect edges were debeveled with a #15 blade scalpel.  Given the location of the defect and the proximity to free margins (helical rim) a double helical rim advancement flap was deemed most appropriate.  Using a sterile surgical marker, the appropriate advancement flaps were drawn incorporating the defect and placing the expected incisions between the helical rim and antihelix where possible.  The area thus outlined was incised through and through with a #15 scalpel blade.  With a skin hook and iris scissors, the flaps were gently and sharply undermined and freed up.
Mart-1 - Positive Histology Text: MART-1 staining demonstrates areas of higher density and clustering of melanocytes with Pagetoid spread upwards within the epidermis. The surgical margins are positive for tumor cells.
Area H Indication Text: Tumors in this location are included in Area H (eyelids, eyebrows, nose, lips, chin, ear, pre-auricular, post-auricular, temple, genitalia, hands, feet, ankles and areola).  Tissue conservation is critical in these anatomic locations.
Eye Protection Verbiage: Before proceeding with the stage, a plastic scleral shield was inserted. The globe was anesthetized with a few drops of 1% lidocaine with 1:100,000 epinephrine. Then, an appropriate sized scleral shield was chosen and coated with lacrilube ointment. The shield was gently inserted and left in place for the duration of each stage. After the stage was completed, the shield was gently removed.
Bilateral Rotation Flap Text: The defect edges were debeveled with a #15 scalpel blade. Given the location of the defect, shape of the defect and the proximity to free margins a bilateral rotation flap was deemed most appropriate. Using a sterile surgical marker, an appropriate rotation flap was drawn incorporating the defect and placing the expected incisions within the relaxed skin tension lines where possible. The area thus outlined was incised deep to adipose tissue with a #15 scalpel blade. The skin margins were undermined to an appropriate distance in all directions utilizing iris scissors. Following this, the designed flap was carried over into the primary defect and sutured into place.
Purse String (Intermediate) Text: Given the location of the defect and the characteristics of the surrounding skin a purse string intermediate closure was deemed most appropriate.  Undermining was performed circumfirentially around the surgical defect.  A purse string suture was then placed and tightened.
Brow Lift Text: A midfrontal incision was made medially to the defect to allow access to the tissues just superior to the left eyebrow. Following careful dissection inferiorly in a supraperiosteal plane to the level of the left eyebrow, several 3-0 monocryl sutures were used to resuspend the eyebrow orbicularis oculi muscular unit to the superior frontal bone periosteum. This resulted in an appropriate reapproximation of static eyebrow symmetry and correction of the left brow ptosis.
Surgical Defect Width In Cm (Optional): 1.3
Epidermal Sutures: 5-0 Surgipro
Partial Purse String (Simple) Text: Given the location of the defect and the characteristics of the surrounding skin a simple purse string closure was deemed most appropriate.  Undermining was performed circumfirentially around the surgical defect.  A purse string suture was then placed and tightened. Wound tension only allowed a partial closure of the circular defect.
H Plasty Text: Given the location of the defect, shape of the defect and the proximity to free margins a H-plasty was deemed most appropriate for repair.  Using a sterile surgical marker, the appropriate advancement arms of the H-plasty were drawn incorporating the defect and placing the expected incisions within the relaxed skin tension lines where possible. The area thus outlined was incised deep to adipose tissue with a #15 scalpel blade. The skin margins were undermined to an appropriate distance in all directions utilizing iris scissors.  The opposing advancement arms were then advanced into place in opposite direction and anchored with interrupted buried subcutaneous sutures.
Chonodrocutaneous Helical Advancement Flap Text: The defect edges were debeveled with a #15 scalpel blade.  Given the location of the defect and the proximity to free margins a chondrocutaneous helical advancement flap was deemed most appropriate.  Using a sterile surgical marker, the appropriate advancement flap was drawn incorporating the defect and placing the expected incisions within the relaxed skin tension lines where possible.    The area thus outlined was incised deep to adipose tissue with a #15 scalpel blade.  The skin margins were undermined to an appropriate distance in all directions utilizing iris scissors.
Bilateral Helical Rim Advancement Flap Text: The defect edges were debeveled with a #15 blade scalpel.  Given the location of the defect and the proximity to free margins (helical rim) a bilateral helical rim advancement flap was deemed most appropriate.  Using a sterile surgical marker, the appropriate advancement flaps were drawn incorporating the defect and placing the expected incisions between the helical rim and antihelix where possible.  The area thus outlined was incised through and through with a #15 scalpel blade.  With a skin hook and iris scissors, the flaps were gently and sharply undermined and freed up.
Trilobed Flap Text: The defect edges were debeveled with a #15 scalpel blade.  Given the location of the defect and the proximity to free margins a trilobed flap was deemed most appropriate.  Using a sterile surgical marker, an appropriate trilobed flap drawn around the defect.    The area thus outlined was incised deep to adipose tissue with a #15 scalpel blade.  The skin margins were undermined to an appropriate distance in all directions utilizing iris scissors.
Localized Dermabrasion With 15 Blade Text: The patient was draped in routine manner.  Localized dermabrasion using a 15 blade was performed in routine manner to papillary dermis. This spot dermabrasion is being performed to complete skin cancer reconstruction. It also will eliminate the other sun damaged precancerous cells that are known to be part of the regional effect of a lifetime's worth of sun exposure. This localized dermabrasion is therapeutic and should not be considered cosmetic in any regard.
Non-Graft Cartilage Fenestration Text: The cartilage was fenestrated with a 2mm punch biopsy to help facilitate healing.
Consent 2/Introductory Paragraph: Mohs surgery was explained to the patient and consent was obtained. The risks, benefits and alternatives to therapy were discussed in detail. Specifically, the risks of infection, scarring, bleeding, prolonged wound healing, incomplete removal, allergy to anesthesia, nerve injury and recurrence were addressed. Prior to the procedure, the treatment site was clearly identified and confirmed by the patient. All components of Universal Protocol/PAUSE Rule completed.
Epidermal Closure: running cuticular
Which Eyelid Repair Cpt Are You Using?: 36449
Double Island Pedicle Flap Text: The defect edges were debeveled with a #15 scalpel blade.  Given the location of the defect, shape of the defect and the proximity to free margins a double island pedicle advancement flap was deemed most appropriate.  Using a sterile surgical marker, an appropriate advancement flap was drawn incorporating the defect, outlining the appropriate donor tissue and placing the expected incisions within the relaxed skin tension lines where possible.    The area thus outlined was incised deep to adipose tissue with a #15 scalpel blade.  The skin margins were undermined to an appropriate distance in all directions around the primary defect and laterally outward around the island pedicle utilizing iris scissors.  There was minimal undermining beneath the pedicle flap.
Pain Refusal Text: I offered to prescribe pain medication but the patient refused to take this medication.
Intermediate Repair And Graft Additional Text (Will Appearing After The Standard Complex Repair Text): The intermediate repair was not sufficient to completely close the primary defect. The remaining additional defect was repaired with the graft mentioned below.
Staged Advancement Flap Text: The defect edges were debeveled with a #15 scalpel blade.  Given the location of the defect, shape of the defect and the proximity to free margins a staged advancement flap was deemed most appropriate.  Using a sterile surgical marker, an appropriate advancement flap was drawn incorporating the defect and placing the expected incisions within the relaxed skin tension lines where possible. The area thus outlined was incised deep to adipose tissue with a #15 scalpel blade.  The skin margins were undermined to an appropriate distance in all directions utilizing iris scissors.
Mucosal Advancement Flap Text: Given the location of the defect, shape of the defect and the proximity to free margins a mucosal advancement flap was deemed most appropriate. Incisions were made with a 15 blade scalpel in the appropriate fashion along the cutaneous vermilion border and the mucosal lip. The remaining actinically damaged mucosal tissue was excised.  The mucosal advancement flap was then elevated to the gingival sulcus with care taken to preserve the neurovascular structures and advanced into the primary defect. Care was taken to ensure that precise realignment of the vermilion border was achieved.
Surgical Defect Length In Cm (Optional): 1.9
Number Of Stages: 2
Inflammation Suggestive Of Cancer Camouflage Histology Text: There was a dense lymphocytic infiltrate which prevented adequate histologic evaluation of adjacent structures.
Alar Island Pedicle Flap Text: The defect edges were debeveled with a #15 scalpel blade.  Given the location of the defect, shape of the defect and the proximity to the alar rim an island pedicle advancement flap was deemed most appropriate.  Using a sterile surgical marker, an appropriate advancement flap was drawn incorporating the defect, outlining the appropriate donor tissue and placing the expected incisions within the nasal ala running parallel to the alar rim. The area thus outlined was incised with a #15 scalpel blade.  The skin margins were undermined minimally to an appropriate distance in all directions around the primary defect and laterally outward around the island pedicle utilizing iris scissors.  There was minimal undermining beneath the pedicle flap.
Suture Removal: 9 days
Was The Patient On Physician Recommended Anticoagulation Therapy?: Please Select the Appropriate Response
Graft Donor Site Dermal Sutures (Optional): 5-0 Polysorb
Vermilion Border Text: The closure involved the vermilion border.
Keystone Flap Text: The defect edges were debeveled with a #15 scalpel blade.  Given the location of the defect, shape of the defect a keystone flap was deemed most appropriate.  Using a sterile surgical marker, an appropriate keystone flap was drawn incorporating the defect, outlining the appropriate donor tissue and placing the expected incisions within the relaxed skin tension lines where possible. The area thus outlined was incised deep to adipose tissue with a #15 scalpel blade.  The skin margins were undermined to an appropriate distance in all directions around the primary defect and laterally outward around the flap utilizing iris scissors.
Dermal Autograft Text: The defect edges were debeveled with a #15 scalpel blade.  Given the location of the defect, shape of the defect and the proximity to free margins a dermal autograft was deemed most appropriate.  Using a sterile surgical marker, the primary defect shape was transferred to the donor site. The area thus outlined was incised deep to adipose tissue with a #15 scalpel blade.  The harvested graft was then trimmed of adipose and epidermal tissue until only dermis was left.  The skin graft was then placed in the primary defect and oriented appropriately.
Island Pedicle Flap Text: The defect edges were debeveled with a #15 scalpel blade.  Given the location of the defect, shape of the defect and the proximity to free margins an island pedicle advancement flap was deemed most appropriate.  Using a sterile surgical marker, an appropriate advancement flap was drawn incorporating the defect, outlining the appropriate donor tissue and placing the expected incisions within the relaxed skin tension lines where possible.    The area thus outlined was incised deep to adipose tissue with a #15 scalpel blade.  The skin margins were undermined to an appropriate distance in all directions around the primary defect and laterally outward around the island pedicle utilizing iris scissors.  There was minimal undermining beneath the pedicle flap.
Cheiloplasty (Less Than 50%) Text: A decision was made to reconstruct the defect with a  cheiloplasty.  The defect was undermined extensively.  Additional obicularis oris muscle was excised with a 15 blade scalpel.  The defect was converted into a full thickness wedge, of less than 50% of the vertical height of the lip, to facilite a better cosmetic result.  Small vessels were then tied off with 5-0 monocyrl. The obicularis oris, superficial fascia, adipose and dermis were then reapproximated.  After the deeper layers were approximated the epidermis was reapproximated with particular care given to realign the vermilion border.
Post-Care Instructions: I reviewed with the patient in detail post-care instructions. Patient is not to engage in any heavy lifting, exercise, or swimming for the next 14 days. Should the patient develop any fevers, chills, bleeding, severe pain patient will contact the office immediately.
Presence Of Scar Tissue (For Histology): absent
Hatchet Flap Text: The defect edges were debeveled with a #15 scalpel blade.  Given the location of the defect, shape of the defect and the proximity to free margins a hatchet flap was deemed most appropriate.  Using a sterile surgical marker, an appropriate hatchet flap was drawn incorporating the defect and placing the expected incisions within the relaxed skin tension lines where possible.    The area thus outlined was incised deep to adipose tissue with a #15 scalpel blade.  The skin margins were undermined to an appropriate distance in all directions utilizing iris scissors.
Previous Accession (Optional): [id number]
Detail Level: Detailed
Abbe Flap (Upper To Lower Lip) Text: The defect of the lower lip was assessed and measured.  Given the location and size of the defect, an Abbe flap was deemed most appropriate.  Using a sterile surgical marker, an appropriate Abbe flap was measured and drawn on the upper lip. Local anesthesia was then infiltrated.  A scalpel was then used to incise the upper lip through and through the skin, vermilion, muscle and mucosa, leaving the flap pedicled on the opposite side.  The flap was then rotated and transferred to the lower lip defect.  The flap was then sutured into place with a three layer technique, closing the orbicularis oris muscle layer with subcutaneous buried sutures, followed by a mucosal layer and an epidermal layer.
Consent (Temporal Branch)/Introductory Paragraph: The rationale for Mohs was explained to the patient and consent was obtained. The risks, benefits and alternatives to therapy were discussed in detail. Specifically, the risks of damage to the temporal branch of the facial nerve, infection, scarring, bleeding, prolonged wound healing, incomplete removal, allergy to anesthesia, and recurrence were addressed. Prior to the procedure, the treatment site was clearly identified and confirmed by the patient. All components of Universal Protocol/PAUSE Rule completed.
Date Of Previous Biopsy (Optional): 3/25/24
Consent 1/Introductory Paragraph: The rationale for Mohs was explained to the patient and consent was obtained. The risks, benefits and alternatives to therapy were discussed in detail. Specifically, the risks of infection, scarring, bleeding, prolonged wound healing, incomplete removal, allergy to anesthesia, nerve injury and recurrence were addressed. Prior to the procedure, the treatment site was clearly identified and confirmed by the patient. All components of Universal Protocol/PAUSE Rule completed.
Peng Advancement Flap Text: The defect edges were debeveled with a #15 scalpel blade.  Given the location of the defect, shape of the defect and the proximity to free margins a Peng advancement flap was deemed most appropriate.  Using a sterile surgical marker, an appropriate advancement flap was drawn incorporating the defect and placing the expected incisions within the relaxed skin tension lines where possible. The area thus outlined was incised deep to adipose tissue with a #15 scalpel blade.  The skin margins were undermined to an appropriate distance in all directions utilizing iris scissors.
Epidermal Autograft Text: The defect edges were debeveled with a #15 scalpel blade.  Given the location of the defect, shape of the defect and the proximity to free margins an epidermal autograft was deemed most appropriate.  Using a sterile surgical marker, the primary defect shape was transferred to the donor site. The epidermal graft was then harvested.  The skin graft was then placed in the primary defect and oriented appropriately.
Subsequent Stages Histo Method Verbiage: Using a similar technique to that described above, a thin layer of tissue was removed from all areas where tumor was visible on the previous stage.  The tissue was again oriented, mapped, dyed, and processed as above.
Information: Selecting Yes will display possible errors in your note based on the variables you have selected. This validation is only offered as a suggestion for you. PLEASE NOTE THAT THE VALIDATION TEXT WILL BE REMOVED WHEN YOU FINALIZE YOUR NOTE. IF YOU WANT TO FAX A PRELIMINARY NOTE YOU WILL NEED TO TOGGLE THIS TO 'NO' IF YOU DO NOT WANT IT IN YOUR FAXED NOTE.
Full Thickness Lip Wedge Repair (Flap) Text: Given the location of the defect and the proximity to free margins a full thickness wedge repair was deemed most appropriate.  Using a sterile surgical marker, the appropriate repair was drawn incorporating the defect and placing the expected incisions perpendicular to the vermilion border.  The vermilion border was also meticulously outlined to ensure appropriate reapproximation during the repair.  The area thus outlined was incised through and through with a #15 scalpel blade.  The muscularis and dermis were reaproximated with deep sutures following hemostasis. Care was taken to realign the vermilion border before proceeding with the superficial closure.  Once the vermilion was realigned the superfical and mucosal closure was finished.
Spiral Flap Text: The defect edges were debeveled with a #15 scalpel blade.  Given the location of the defect, shape of the defect and the proximity to free margins a spiral flap was deemed most appropriate.  Using a sterile surgical marker, an appropriate rotation flap was drawn incorporating the defect and placing the expected incisions within the relaxed skin tension lines where possible. The area thus outlined was incised deep to adipose tissue with a #15 scalpel blade.  The skin margins were undermined to an appropriate distance in all directions utilizing iris scissors.
Referring Physician (Optional): Nadine Allen MD
Mohs Method Verbiage: An incision at a 45 degree angle following the standard Mohs approach was done and the specimen was harvested as a microscopic controlled layer.
Estlander Flap (Upper To Lower Lip) Text: The defect of the lower lip was assessed and measured.  Given the location and size of the defect, an Estlander flap was deemed most appropriate.  Using a sterile surgical marker, an appropriate Estlander flap was measured and drawn on the upper lip. Local anesthesia was then infiltrated. A scalpel was then used to incise the lateral aspect of the flap, through skin, muscle and mucosa, leaving the flap pedicled medially.  The flap was then rotated and positioned to fill the lower lip defect.  The flap was then sutured into place with a three layer technique, closing the orbicularis oris muscle layer with subcutaneous buried sutures, followed by a mucosal layer and an epidermal layer.
Double O-Z Flap Text: The defect edges were debeveled with a #15 scalpel blade.  Given the location of the defect, shape of the defect and the proximity to free margins a Double O-Z flap was deemed most appropriate.  Using a sterile surgical marker, an appropriate transposition flap was drawn incorporating the defect and placing the expected incisions within the relaxed skin tension lines where possible. The area thus outlined was incised deep to adipose tissue with a #15 scalpel blade.  The skin margins were undermined to an appropriate distance in all directions utilizing iris scissors.
Complex Repair And Graft Additional Text (Will Appearing After The Standard Complex Repair Text): The complex repair was not sufficient to completely close the primary defect. The remaining additional defect was repaired with the graft mentioned below.
Consent Type: Consent 1 (Standard)
Double O-Z Plasty Text: The defect edges were debeveled with a #15 scalpel blade.  Given the location of the defect, shape of the defect and the proximity to free margins a Double O-Z plasty (double transposition flap) was deemed most appropriate.  Using a sterile surgical marker, the appropriate transposition flaps were drawn incorporating the defect and placing the expected incisions within the relaxed skin tension lines where possible. The area thus outlined was incised deep to adipose tissue with a #15 scalpel blade.  The skin margins were undermined to an appropriate distance in all directions utilizing iris scissors.  Hemostasis was achieved with electrocautery.  The flaps were then transposed into place, one clockwise and the other counterclockwise, and anchored with interrupted buried subcutaneous sutures.
Adjacent Tissue Transfer Text: The defect edges were debeveled with a #15 scalpel blade.  Given the location of the defect and the proximity to free margins an adjacent tissue transfer was deemed most appropriate.  Using a sterile surgical marker, an appropriate flap was drawn incorporating the defect and placing the expected incisions within the relaxed skin tension lines where possible.    The area thus outlined was incised deep to adipose tissue with a #15 scalpel blade.  The skin margins were undermined to an appropriate distance in all directions utilizing iris scissors.
Surgeon/Pathologist Verbiage (Will Incorporate Name Of Surgeon From Intro If Not Blank): operated in two distinct and integrated capacities as the surgeon and pathologist.
Wound Check: 6 weeks
Paramedian Forehead Flap Text: A decision was made to reconstruct the defect utilizing an interpolation axial flap and a staged reconstruction.  A telfa template was made of the defect.  This telfa template was then used to outline the paramedian forehead pedicle flap.  The donor area for the pedicle flap was then injected with anesthesia.  The flap was excised through the skin and subcutaneous tissue down to the layer of the underlying musculature.  The pedicle flap was carefully excised within this deep plane to maintain its blood supply.  The edges of the donor site were undermined.   The donor site was closed in a primary fashion.  The pedicle was then rotated into position and sutured.  Once the tube was sutured into place, adequate blood supply was confirmed with blanching and refill.  The pedicle was then wrapped with xeroform gauze and dressed appropriately with a telfa and gauze bandage to ensure continued blood supply and protect the attached pedicle.
V-Y Flap Text: The defect edges were debeveled with a #15 scalpel blade.  Given the location of the defect, shape of the defect and the proximity to free margins a V-Y flap was deemed most appropriate.  Using a sterile surgical marker, an appropriate advancement flap was drawn incorporating the defect and placing the expected incisions within the relaxed skin tension lines where possible.    The area thus outlined was incised deep to adipose tissue with a #15 scalpel blade.  The skin margins were undermined to an appropriate distance in all directions utilizing iris scissors.
Localized Dermabrasion With Sand Papertext: The patient was draped in routine manner.  Localized dermabrasion using sterile sand paper was performed in routine manner to papillary dermis. This spot dermabrasion is being performed to complete skin cancer reconstruction. It also will eliminate the other sun damaged precancerous cells that are known to be part of the regional effect of a lifetime's worth of sun exposure. This localized dermabrasion is therapeutic and should not be considered cosmetic in any regard.
Burow's Advancement Flap Text: The defect edges were debeveled with a #15 scalpel blade.  Given the location of the defect and the proximity to free margins a Burow's advancement flap was deemed most appropriate.  Using a sterile surgical marker, the appropriate advancement flap was drawn incorporating the defect and placing the expected incisions within the relaxed skin tension lines where possible.    The area thus outlined was incised deep to adipose tissue with a #15 scalpel blade.  The skin margins were undermined to an appropriate distance in all directions utilizing iris scissors.
Cheek Interpolation Flap Text: A decision was made to reconstruct the defect utilizing an interpolation axial flap and a staged reconstruction.  A telfa template was made of the defect.  This telfa template was then used to outline the Cheek Interpolation flap.  The donor area for the pedicle flap was then injected with anesthesia.  The flap was excised through the skin and subcutaneous tissue down to the layer of the underlying musculature.  The interpolation flap was carefully excised within this deep plane to maintain its blood supply.  The edges of the donor site were undermined.   The donor site was closed in a primary fashion.  The pedicle was then rotated into position and sutured.  Once the tube was sutured into place, adequate blood supply was confirmed with blanching and refill.  The pedicle was then wrapped with xeroform gauze and dressed appropriately with a telfa and gauze bandage to ensure continued blood supply and protect the attached pedicle.
Manual Repair Warning Statement: We plan on removing the manually selected variable below in favor of our much easier automatic structured text blocks found in the previous tab. We decided to do this to help make the flow better and give you the full power of structured data. Manual selection is never going to be ideal in our platform and I would encourage you to avoid using manual selection from this point on, especially since I will be sunsetting this feature. It is important that you do one of two things with the customized text below. First, you can save all of the text in a word file so you can have it for future reference. Second, transfer the text to the appropriate area in the Library tab. Lastly, if there is a flap or graft type which we do not have you need to let us know right away so I can add it in before the variable is hidden. No need to panic, we plan to give you roughly 6 months to make the change.
Island Pedicle Flap-Requiring Vessel Identification Text: The defect edges were debeveled with a #15 scalpel blade.  Given the location of the defect, shape of the defect and the proximity to free margins an island pedicle advancement flap was deemed most appropriate.  Using a sterile surgical marker, an appropriate advancement flap was drawn, based on the axial vessel mentioned above, incorporating the defect, outlining the appropriate donor tissue and placing the expected incisions within the relaxed skin tension lines where possible.    The area thus outlined was incised deep to adipose tissue with a #15 scalpel blade.  The skin margins were undermined to an appropriate distance in all directions around the primary defect and laterally outward around the island pedicle utilizing iris scissors.  There was minimal undermining beneath the pedicle flap.
Star Wedge Flap Text: The defect edges were debeveled with a #15 scalpel blade.  Given the location of the defect, shape of the defect and the proximity to free margins a star wedge flap was deemed most appropriate.  Using a sterile surgical marker, an appropriate rotation flap was drawn incorporating the defect and placing the expected incisions within the relaxed skin tension lines where possible. The area thus outlined was incised deep to adipose tissue with a #15 scalpel blade.  The skin margins were undermined to an appropriate distance in all directions utilizing iris scissors.
Initial Size Of Lesion: 1.5
Composite Graft Text: The defect edges were debeveled with a #15 scalpel blade.  Given the location of the defect, shape of the defect, the proximity to free margins and the fact the defect was full thickness a composite graft was deemed most appropriate.  The defect was outline and then transferred to the donor site.  A full thickness graft was then excised from the donor site. The graft was then placed in the primary defect, oriented appropriately and then sutured into place.  The secondary defect was then repaired using a primary closure.
Zygomaticofacial Flap Text: Given the location of the defect, shape of the defect and the proximity to free margins a zygomaticofacial flap was deemed most appropriate for repair.  Using a sterile surgical marker, the appropriate flap was drawn incorporating the defect and placing the expected incisions within the relaxed skin tension lines where possible. The area thus outlined was incised deep to adipose tissue with a #15 scalpel blade with preservation of a vascular pedicle.  The skin margins were undermined to an appropriate distance in all directions utilizing iris scissors.  The flap was then placed into the defect and anchored with interrupted buried subcutaneous sutures.
Melolabial Interpolation Flap Text: A decision was made to reconstruct the defect utilizing an interpolation axial flap and a staged reconstruction.  A telfa template was made of the defect.  This telfa template was then used to outline the melolabial interpolation flap.  The donor area for the pedicle flap was then injected with anesthesia.  The flap was excised through the skin and subcutaneous tissue down to the layer of the underlying musculature.  The pedicle flap was carefully excised within this deep plane to maintain its blood supply.  The edges of the donor site were undermined.   The donor site was closed in a primary fashion.  The pedicle was then rotated into position and sutured.  Once the tube was sutured into place, adequate blood supply was confirmed with blanching and refill.  The pedicle was then wrapped with xeroform gauze and dressed appropriately with a telfa and gauze bandage to ensure continued blood supply and protect the attached pedicle.
Debridement Text: The wound edges were debrided prior to proceeding with the closure to facilitate wound healing.
Consent (Nose)/Introductory Paragraph: The rationale for Mohs was explained to the patient and consent was obtained. The risks, benefits and alternatives to therapy were discussed in detail. Specifically, the risks of nasal deformity, changes in the flow of air through the nose, infection, scarring, bleeding, prolonged wound healing, incomplete removal, allergy to anesthesia, nerve injury and recurrence were addressed. Prior to the procedure, the treatment site was clearly identified and confirmed by the patient. All components of Universal Protocol/PAUSE Rule completed.
Alternatives Discussed Intro (Do Not Add Period): I discussed alternative treatments to Mohs surgery and specifically discussed the risks and benefits of
Secondary Intention Text (Leave Blank If You Do Not Want): The defect will heal with secondary intention.
Purse String (Simple) Text: Given the location of the defect and the characteristics of the surrounding skin a purse string closure was deemed most appropriate.  Undermining was performed circumfirentially around the surgical defect.  A purse string suture was then placed and tightened.
Rectangular Flap Text: The defect edges were debeveled with a #15 scalpel blade. Given the location of the defect and the proximity to free margins a rectangular flap was deemed most appropriate. Using a sterile surgical marker, an appropriate rectangular flap was drawn incorporating the defect. The area thus outlined was incised deep to adipose tissue with a #15 scalpel blade. The skin margins were undermined to an appropriate distance in all directions utilizing iris scissors. Following this, the designed flap was carried over into the primary defect and sutured into place.
Location Indication Override (Is Already Calculated Based On Selected Body Location): Area M
Anesthesia Volume In Cc: 9
Mohs Rapid Report Verbiage: The area of clinically evident tumor was marked with skin marking ink and appropriately hatched.  The initial incision was made following the Mohs approach through the skin.  The specimen was taken to the lab, divided into the necessary number of pieces, chromacoded and processed according to the Mohs protocol.  This was repeated in successive stages until a tumor free defect was achieved.
Orbicularis Oris Muscle Flap Text: The defect edges were debeveled with a #15 scalpel blade.  Given that the defect affected the competency of the oral sphincter an orbicularis oris muscle flap was deemed most appropriate to restore this competency and normal muscle function.  Using a sterile surgical marker, an appropriate flap was drawn incorporating the defect. The area thus outlined was incised with a #15 scalpel blade.
O-T Plasty Text: The defect edges were debeveled with a #15 scalpel blade.  Given the location of the defect, shape of the defect and the proximity to free margins an O-T plasty was deemed most appropriate.  Using a sterile surgical marker, an appropriate O-T plasty was drawn incorporating the defect and placing the expected incisions within the relaxed skin tension lines where possible.    The area thus outlined was incised deep to adipose tissue with a #15 scalpel blade.  The skin margins were undermined to an appropriate distance in all directions utilizing iris scissors.
Cartilage Graft Text: The defect edges were debeveled with a #15 scalpel blade.  Given the location of the defect, shape of the defect, the fact the defect involved a full thickness cartilage defect a cartilage graft was deemed most appropriate.  An appropriate donor site was identified, cleansed, and anesthetized. The cartilage graft was then harvested and transferred to the recipient site, oriented appropriately and then sutured into place.  The secondary defect was then repaired using a primary closure.
Undermining Type: Entire Wound
Primary Defect Width In Cm (Final Defect Size - Required For Flaps/Grafts): 1.6
Repair Type: Complex Repair
Abbe Flap (Lower To Upper Lip) Text: The defect of the upper lip was assessed and measured.  Given the location and size of the defect, an Abbe flap was deemed most appropriate.  Using a sterile surgical marker, an appropriate Abbe flap was measured and drawn on the lower lip. Local anesthesia was then infiltrated. A scalpel was then used to incise the upper lip through and through the skin, vermilion, muscle and mucosa, leaving the flap pedicled on the opposite side.  The flap was then rotated and transferred to the lower lip defect.  The flap was then sutured into place with a three layer technique, closing the orbicularis oris muscle layer with subcutaneous buried sutures, followed by a mucosal layer and an epidermal layer.
Mohs Histo Method Verbiage: Each section was then chromacoded and processed in the Mohs lab using the Mohs protocol and submitted for frozen section.
Hemostasis: Electrocautery
Same Histology In Subsequent Stages Text: The pattern and morphology of the tumor is as described in the first stage.
Staging Info: By selecting yes to the question above you will include information on AJCC 8 tumor staging in your Mohs note. Information on tumor staging will be automatically added for SCCs on the head and neck. AJCC 8 includes tumor size, tumor depth, perineural involvement and bone invasion.
Nasalis Myocutaneous Flap Text: Using a #15 blade, an incision was made around the donor flap to the level of the nasalis muscle. Wide lateral undermining was then performed in both the subcutaneous plane above the nasalis muscle, and in a submuscular plane just above periosteum. This allowed the formation of a free nasalis muscle axial pedicle which was still attached to the actual cutaneous flap, increasing its mobility and vascular viability. Hemostasis was obtained with pinpoint electrocoagulation. The flap was mobilized into position and the pivotal anchor points positioned and stabilized with buried interrupted sutures. Subcutaneous and dermal tissues were closed in a multilayered fashion with sutures. Tissue redundancies were excised, and the epidermal edges were apposed without significant tension and sutured with sutures.
Area M Indication Text: Tumors in this location are included in Area M (cheek, forehead, scalp, neck, jawline and pretibial skin).  Mohs surgery is indicated for tumors in these anatomic locations.
Helical Rim Text: The closure involved the helical rim.
Donor Site Anesthesia Type: same as repair anesthesia
Mastoid Interpolation Flap Text: A decision was made to reconstruct the defect utilizing an interpolation axial flap and a staged reconstruction.  A telfa template was made of the defect.  This telfa template was then used to outline the mastoid interpolation flap.  The donor area for the pedicle flap was then injected with anesthesia.  The flap was excised through the skin and subcutaneous tissue down to the layer of the underlying musculature.  The pedicle flap was carefully excised within this deep plane to maintain its blood supply.  The edges of the donor site were undermined.   The donor site was closed in a primary fashion.  The pedicle was then rotated into position and sutured.  Once the tube was sutured into place, adequate blood supply was confirmed with blanching and refill.  The pedicle was then wrapped with xeroform gauze and dressed appropriately with a telfa and gauze bandage to ensure continued blood supply and protect the attached pedicle.
Hemigard Intro: Due to skin fragility and wound tension, it was decided to use HEMIGARD adhesive retention suture devices to permit a linear closure. The skin was cleaned and dried for a 6cm distance away from the wound. Excessive hair, if present, was removed to allow for adhesion.
Tarsorrhaphy Text: A tarsorrhaphy was performed using Frost sutures.
Retention Suture Text: Retention sutures were placed to support the closure and prevent dehiscence.
Interpolation Flap Text: A decision was made to reconstruct the defect utilizing an interpolation axial flap and a staged reconstruction.  A telfa template was made of the defect.  This telfa template was then used to outline the interpolation flap.  The donor area for the pedicle flap was then injected with anesthesia.  The flap was excised through the skin and subcutaneous tissue down to the layer of the underlying musculature.  The interpolation flap was carefully excised within this deep plane to maintain its blood supply.  The edges of the donor site were undermined.   The donor site was closed in a primary fashion.  The pedicle was then rotated into position and sutured.  Once the tube was sutured into place, adequate blood supply was confirmed with blanching and refill.  The pedicle was then wrapped with xeroform gauze and dressed appropriately with a telfa and gauze bandage to ensure continued blood supply and protect the attached pedicle.
Home Suture Removal Text: Patient was provided instructions on removing sutures and will remove their sutures at home.  If they have any questions or difficulties they will call the office.
Consent (Near Eyelid Margin)/Introductory Paragraph: The rationale for Mohs was explained to the patient and consent was obtained. The risks, benefits and alternatives to therapy were discussed in detail. Specifically, the risks of ectropion or eyelid deformity, infection, scarring, bleeding, prolonged wound healing, incomplete removal, allergy to anesthesia, nerve injury and recurrence were addressed. Prior to the procedure, the treatment site was clearly identified and confirmed by the patient. All components of Universal Protocol/PAUSE Rule completed.
Area L Indication Text: Tumors in this location are included in Area L (trunk and extremities).  Mohs surgery is indicated for larger tumors, or tumors with aggressive histologic features, in these anatomic locations.
Additional Anesthesia Volume In Cc: 6
Epidermal Closure Graft Donor Site (Optional): running
Double Z Plasty Text: The lesion was extirpated to the level of the fat with a #15 scalpel blade. Given the location of the defect, shape of the defect and the proximity to free margins a double Z-plasty was deemed most appropriate for repair. Using a sterile surgical marker, the appropriate transposition arms of the double Z-plasty were drawn incorporating the defect and placing the expected incisions within the relaxed skin tension lines where possible. The area thus outlined was incised deep to adipose tissue with a #15 scalpel blade. The skin margins were undermined to an appropriate distance in all directions utilizing iris scissors. The opposing transposition arms were then transposed and carried over into place in opposite direction and anchored with interrupted buried subcutaneous sutures.
Wound Care: Vaseline
W Plasty Text: The lesion was extirpated to the level of the fat with a #15 scalpel blade.  Given the location of the defect, shape of the defect and the proximity to free margins a W-plasty was deemed most appropriate for repair.  Using a sterile surgical marker, the appropriate transposition arms of the W-plasty were drawn incorporating the defect and placing the expected incisions within the relaxed skin tension lines where possible.    The area thus outlined was incised deep to adipose tissue with a #15 scalpel blade.  The skin margins were undermined to an appropriate distance in all directions utilizing iris scissors.  The opposing transposition arms were then transposed into place in opposite direction and anchored with interrupted buried subcutaneous sutures.
Deep Sutures: 4-0 Maxon
Consent (Scalp)/Introductory Paragraph: The rationale for Mohs was explained to the patient and consent was obtained. The risks, benefits and alternatives to therapy were discussed in detail. Specifically, the risks of changes in hair growth pattern secondary to repair, infection, scarring, bleeding, prolonged wound healing, incomplete removal, allergy to anesthesia, nerve injury and recurrence were addressed. Prior to the procedure, the treatment site was clearly identified and confirmed by the patient. All components of Universal Protocol/PAUSE Rule completed.
Graft Cartilage Fenestration Text: The cartilage was fenestrated with a 2mm punch biopsy to help facilitate graft survival and healing.
Tumor Depth: Less than 6mm from granular layer and no invasion beyond the subcutaneous fat
Bilobed Transposition Flap Text: The defect edges were debeveled with a #15 scalpel blade.  Given the location of the defect and the proximity to free margins a bilobed transposition flap was deemed most appropriate.  Using a sterile surgical marker, an appropriate bilobe flap drawn around the defect.    The area thus outlined was incised deep to adipose tissue with a #15 scalpel blade.  The skin margins were undermined to an appropriate distance in all directions utilizing iris scissors.
Muscle Hinge Flap Text: The defect edges were debeveled with a #15 scalpel blade.  Given the size, depth and location of the defect and the proximity to free margins a muscle hinge flap was deemed most appropriate.  Using a sterile surgical marker, an appropriate hinge flap was drawn incorporating the defect. The area thus outlined was incised with a #15 scalpel blade.  The skin margins were undermined to an appropriate distance in all directions utilizing iris scissors.
Mart-1 - Negative Histology Text: MART-1 staining demonstrates a normal density and pattern of melanocytes along the dermal-epidermal junction. The surgical margins are negative for tumor cells.
Eyelid Partial Thickness Repair - 66762: The eyelid defect was partial thickness which required a wedge repair of the eyelid. Special care was taken to ensure that the eyelid margin was realligned when placing sutures.
Skin Substitute Text: The defect edges were debeveled with a #15 scalpel blade.  Given the location of the defect, shape of the defect and the proximity to free margins a skin substitute graft was deemed most appropriate.  The graft material was trimmed to fit the size of the defect. The graft was then placed in the primary defect and oriented appropriately.
Banner Transposition Flap Text: The defect edges were debeveled with a #15 scalpel blade.  Given the location of the defect and the proximity to free margins a Banner transposition flap was deemed most appropriate.  Using a sterile surgical marker, an appropriate flap drawn around the defect. The area thus outlined was incised deep to adipose tissue with a #15 scalpel blade.  The skin margins were undermined to an appropriate distance in all directions utilizing iris scissors.
Depth Of Tumor Invasion (For Histology): dermis
Ear Wedge Repair Text: A wedge excision was completed by carrying down an excision through the full thickness of the ear and cartilage with an inward facing Burow's triangle. The wound was then closed in a layered fashion.
Bi-Rhombic Flap Text: The defect edges were debeveled with a #15 scalpel blade.  Given the location of the defect and the proximity to free margins a bi-rhombic flap was deemed most appropriate.  Using a sterile surgical marker, an appropriate rhombic flap was drawn incorporating the defect. The area thus outlined was incised deep to adipose tissue with a #15 scalpel blade.  The skin margins were undermined to an appropriate distance in all directions utilizing iris scissors.
Undermining Location (Optional): in the superficial subcutaneous fat
Transposition Flap Text: The defect edges were debeveled with a #15 scalpel blade.  Given the location of the defect and the proximity to free margins a transposition flap was deemed most appropriate.  Using a sterile surgical marker, an appropriate transposition flap was drawn incorporating the defect.    The area thus outlined was incised deep to adipose tissue with a #15 scalpel blade.  The skin margins were undermined to an appropriate distance in all directions utilizing iris scissors.
Z Plasty Text: The lesion was extirpated to the level of the fat with a #15 scalpel blade.  Given the location of the defect, shape of the defect and the proximity to free margins a Z-plasty was deemed most appropriate for repair.  Using a sterile surgical marker, the appropriate transposition arms of the Z-plasty were drawn incorporating the defect and placing the expected incisions within the relaxed skin tension lines where possible.    The area thus outlined was incised deep to adipose tissue with a #15 scalpel blade.  The skin margins were undermined to an appropriate distance in all directions utilizing iris scissors.  The opposing transposition arms were then transposed into place in opposite direction and anchored with interrupted buried subcutaneous sutures.
Where Do You Want The Question To Include Opioid Counseling Located?: Case Summary Tab
Tumor Debulked?: curette
Retention Suture Bite Size: 1 mm
Rhombic Flap Text: The defect edges were debeveled with a #15 scalpel blade.  Given the location of the defect and the proximity to free margins a rhombic flap was deemed most appropriate.  Using a sterile surgical marker, an appropriate rhombic flap was drawn incorporating the defect.    The area thus outlined was incised deep to adipose tissue with a #15 scalpel blade.  The skin margins were undermined to an appropriate distance in all directions utilizing iris scissors.
Mustarde Flap Text: The defect edges were debeveled with a #15 scalpel blade.  Given the size, depth and location of the defect and the proximity to free margins a Mustarde flap was deemed most appropriate.  Using a sterile surgical marker, an appropriate flap was drawn incorporating the defect. The area thus outlined was incised with a #15 scalpel blade.  The skin margins were undermined to an appropriate distance in all directions utilizing iris scissors.
Closure 2 Information: This tab is for additional flaps and grafts, including complex repair and grafts and complex repair and flaps. You can also specify a different location for the additional defect, if the location is the same you do not need to select a new one. We will insert the automated text for the repair you select below just as we do for solitary flaps and grafts. Please note that at this time if you select a location with a different insurance zone you will need to override the ICD10 and CPT if appropriate.
Graft Donor Site Bandage (Optional-Leave Blank If You Don't Want In Note): Aquaplast was fitted to the graft site and sewn into place. A pressure bandage were applied to the donor site and over the aquaplast bolster.
Mauc Instructions: By selecting yes to the question below the MAUC number will be added into the note.  This will be calculated automatically based on the diagnosis chosen, the size entered, the body zone selected (H,M,L) and the specific indications you chose. You will also have the option to override the Mohs AUC if you disagree with the automatically calculated number and this option is found in the Case Summary tab.
Bill 59 Modifier?: No - Continue to Bill 79 Modifier

## 2024-05-15 ENCOUNTER — APPOINTMENT (RX ONLY)
Dept: URBAN - METROPOLITAN AREA CLINIC 36 | Facility: CLINIC | Age: 84
Setting detail: DERMATOLOGY
End: 2024-05-15

## 2024-05-15 DIAGNOSIS — Z48.02 ENCOUNTER FOR REMOVAL OF SUTURES: ICD-10-CM

## 2024-05-15 PROCEDURE — ? SUTURE REMOVAL (GLOBAL PERIOD)

## 2024-05-15 ASSESSMENT — LOCATION ZONE DERM: LOCATION ZONE: FACE

## 2024-05-15 ASSESSMENT — LOCATION SIMPLE DESCRIPTION DERM: LOCATION SIMPLE: RIGHT FOREHEAD

## 2024-05-15 ASSESSMENT — LOCATION DETAILED DESCRIPTION DERM: LOCATION DETAILED: RIGHT SUPERIOR FOREHEAD

## 2024-05-15 NOTE — PROCEDURE: SUTURE REMOVAL (GLOBAL PERIOD)
Body Location Override (Optional - Billing Will Still Be Based On Selected Body Map Location If Applicable): right forehead
Detail Level: Detailed
Add 72913 Cpt? (Important Note: In 2017 The Use Of 39084 Is Being Tracked By Cms To Determine Future Global Period Reimbursement For Global Periods): no

## 2024-05-21 ENCOUNTER — APPOINTMENT (RX ONLY)
Dept: URBAN - METROPOLITAN AREA CLINIC 22 | Facility: CLINIC | Age: 84
Setting detail: DERMATOLOGY
End: 2024-05-21

## 2024-05-21 DIAGNOSIS — L82.1 OTHER SEBORRHEIC KERATOSIS: ICD-10-CM

## 2024-05-21 DIAGNOSIS — Z85.828 PERSONAL HISTORY OF OTHER MALIGNANT NEOPLASM OF SKIN: ICD-10-CM

## 2024-05-21 DIAGNOSIS — D18.0 HEMANGIOMA: ICD-10-CM

## 2024-05-21 DIAGNOSIS — D22 MELANOCYTIC NEVI: ICD-10-CM

## 2024-05-21 DIAGNOSIS — Z87.2 PERSONAL HISTORY OF DISEASES OF THE SKIN AND SUBCUTANEOUS TISSUE: ICD-10-CM

## 2024-05-21 DIAGNOSIS — L81.4 OTHER MELANIN HYPERPIGMENTATION: ICD-10-CM

## 2024-05-21 DIAGNOSIS — Z71.89 OTHER SPECIFIED COUNSELING: ICD-10-CM

## 2024-05-21 DIAGNOSIS — D69.2 OTHER NONTHROMBOCYTOPENIC PURPURA: ICD-10-CM

## 2024-05-21 PROBLEM — D22.5 MELANOCYTIC NEVI OF TRUNK: Status: ACTIVE | Noted: 2024-05-21

## 2024-05-21 PROBLEM — D18.01 HEMANGIOMA OF SKIN AND SUBCUTANEOUS TISSUE: Status: ACTIVE | Noted: 2024-05-21

## 2024-05-21 PROBLEM — D48.5 NEOPLASM OF UNCERTAIN BEHAVIOR OF SKIN: Status: ACTIVE | Noted: 2024-05-21

## 2024-05-21 PROCEDURE — 11102 TANGNTL BX SKIN SINGLE LES: CPT

## 2024-05-21 PROCEDURE — 11103 TANGNTL BX SKIN EA SEP/ADDL: CPT

## 2024-05-21 PROCEDURE — ? BIOPSY BY SHAVE METHOD

## 2024-05-21 PROCEDURE — 99213 OFFICE O/P EST LOW 20 MIN: CPT | Mod: 25

## 2024-05-21 PROCEDURE — ? COUNSELING

## 2024-05-21 PROCEDURE — ? ADDITIONAL NOTES

## 2024-05-21 ASSESSMENT — LOCATION SIMPLE DESCRIPTION DERM
LOCATION SIMPLE: LEFT LOWER BACK
LOCATION SIMPLE: POSTERIOR NECK
LOCATION SIMPLE: RIGHT UPPER BACK
LOCATION SIMPLE: LEFT FOREARM
LOCATION SIMPLE: RIGHT UPPER ARM
LOCATION SIMPLE: RIGHT LOWER BACK
LOCATION SIMPLE: ABDOMEN
LOCATION SIMPLE: RIGHT FOREHEAD
LOCATION SIMPLE: RIGHT FOREARM
LOCATION SIMPLE: LOWER BACK

## 2024-05-21 ASSESSMENT — LOCATION DETAILED DESCRIPTION DERM
LOCATION DETAILED: LEFT SUPERIOR LATERAL NECK
LOCATION DETAILED: LEFT SUPERIOR LATERAL MIDBACK
LOCATION DETAILED: RIGHT INFERIOR MEDIAL MIDBACK
LOCATION DETAILED: RIGHT VENTRAL DISTAL FOREARM
LOCATION DETAILED: RIGHT SUPERIOR UPPER BACK
LOCATION DETAILED: SUPERIOR LUMBAR SPINE
LOCATION DETAILED: RIGHT ANTERIOR DISTAL UPPER ARM
LOCATION DETAILED: EPIGASTRIC SKIN
LOCATION DETAILED: RIGHT FOREHEAD
LOCATION DETAILED: LEFT DISTAL DORSAL FOREARM
LOCATION DETAILED: RIGHT VENTRAL PROXIMAL FOREARM
LOCATION DETAILED: RIGHT MEDIAL UPPER BACK
LOCATION DETAILED: RIGHT PROXIMAL DORSAL FOREARM

## 2024-05-21 ASSESSMENT — LOCATION ZONE DERM
LOCATION ZONE: FACE
LOCATION ZONE: NECK
LOCATION ZONE: TRUNK
LOCATION ZONE: ARM

## 2024-05-21 NOTE — PROCEDURE: BIOPSY BY SHAVE METHOD
Detail Level: Detailed
Depth Of Biopsy: dermis
Was A Bandage Applied: Yes
Size Of Lesion In Cm: 0
Biopsy Type: H and E
Biopsy Method: Personna blade
Anesthesia Type: 1% lidocaine with 1:100,000 epinephrine and a 1:3 solution of 8.4% sodium bicarbonate
Anesthesia Volume In Cc: 1
Hemostasis: Drysol
Wound Care: Vaseline
Dressing: bandage
Destruction After The Procedure: No
Type Of Destruction Used: Curettage
Curettage Text: The wound bed was treated with curettage after the biopsy was performed.
Cryotherapy Text: The wound bed was treated with cryotherapy after the biopsy was performed.
Electrodesiccation Text: The wound bed was treated with electrodesiccation after the biopsy was performed.
Electrodesiccation And Curettage Text: The wound bed was treated with electrodesiccation and curettage after the biopsy was performed.
Silver Nitrate Text: The wound bed was treated with silver nitrate after the biopsy was performed.
Lab: [id number]
Lab Facility: 
Consent: Written consent was obtained and risks were reviewed including but not limited to scarring, infection, bleeding, scabbing, incomplete removal, nerve damage and allergy to anesthesia.
Post-Care Instructions: I reviewed with the patient in detail post-care instructions. Patient is to keep the biopsy site dry overnight, and then apply bacitracin twice daily until healed. Patient may apply hydrogen peroxide soaks to remove any crusting.
Notification Instructions: Patient will be notified of biopsy results. However, patient instructed to call the office if not contacted within 2 weeks.
Billing Type: Third-Party Bill
Information: Selecting Yes will display possible errors in your note based on the variables you have selected. This validation is only offered as a suggestion for you. PLEASE NOTE THAT THE VALIDATION TEXT WILL BE REMOVED WHEN YOU FINALIZE YOUR NOTE. IF YOU WANT TO FAX A PRELIMINARY NOTE YOU WILL NEED TO TOGGLE THIS TO 'NO' IF YOU DO NOT WANT IT IN YOUR FAXED NOTE.
Anesthesia Type: 0.5% lidocaine without epinephrine

## 2024-05-21 NOTE — HPI: SKIN LESION
Is This A New Presentation, Or A Follow-Up?: Skin Lesions
What Type Of Note Output Would You Prefer (Optional)?: Standard Output
How Severe Is Your Skin Lesion?: moderate
Has Your Skin Lesion Been Treated?: not been treated
Is This A New Presentation, Or A Follow-Up?: Moles
How Severe Is Your Skin Lesion?: mild

## 2024-05-27 ENCOUNTER — HOSPITAL ENCOUNTER (OUTPATIENT)
Facility: MEDICAL CENTER | Age: 84
End: 2024-05-28
Attending: EMERGENCY MEDICINE | Admitting: HOSPITALIST
Payer: MEDICARE

## 2024-05-27 ENCOUNTER — APPOINTMENT (OUTPATIENT)
Dept: RADIOLOGY | Facility: MEDICAL CENTER | Age: 84
End: 2024-05-27
Attending: EMERGENCY MEDICINE
Payer: MEDICARE

## 2024-05-27 DIAGNOSIS — R00.2 PALPITATIONS: ICD-10-CM

## 2024-05-27 DIAGNOSIS — R07.9 CHEST PAIN, UNSPECIFIED TYPE: ICD-10-CM

## 2024-05-27 PROBLEM — R73.03 PREDIABETES: Status: ACTIVE | Noted: 2024-05-27

## 2024-05-27 PROBLEM — E78.2 MIXED HYPERLIPIDEMIA: Status: ACTIVE | Noted: 2024-05-27

## 2024-05-27 PROBLEM — R94.31 ABNORMAL EKG: Status: ACTIVE | Noted: 2024-05-27

## 2024-05-27 PROBLEM — Z71.89 ACP (ADVANCE CARE PLANNING): Status: ACTIVE | Noted: 2024-05-27

## 2024-05-27 PROBLEM — D64.9 ANEMIA: Status: ACTIVE | Noted: 2024-05-27

## 2024-05-27 PROBLEM — I10 PRIMARY HYPERTENSION: Status: ACTIVE | Noted: 2024-05-27

## 2024-05-27 LAB
ALBUMIN SERPL BCP-MCNC: 4.2 G/DL (ref 3.2–4.9)
ALBUMIN/GLOB SERPL: 1.7 G/DL
ALP SERPL-CCNC: 79 U/L (ref 30–99)
ALT SERPL-CCNC: 27 U/L (ref 2–50)
ANION GAP SERPL CALC-SCNC: 12 MMOL/L (ref 7–16)
AST SERPL-CCNC: 21 U/L (ref 12–45)
BASOPHILS # BLD AUTO: 0.5 % (ref 0–1.8)
BASOPHILS # BLD: 0.03 K/UL (ref 0–0.12)
BILIRUB SERPL-MCNC: 0.6 MG/DL (ref 0.1–1.5)
BUN SERPL-MCNC: 25 MG/DL (ref 8–22)
CALCIUM ALBUM COR SERPL-MCNC: 8.7 MG/DL (ref 8.5–10.5)
CALCIUM SERPL-MCNC: 8.9 MG/DL (ref 8.4–10.2)
CHLORIDE SERPL-SCNC: 104 MMOL/L (ref 96–112)
CO2 SERPL-SCNC: 20 MMOL/L (ref 20–33)
CREAT SERPL-MCNC: 1.02 MG/DL (ref 0.5–1.4)
EKG IMPRESSION: NORMAL
EOSINOPHIL # BLD AUTO: 0.09 K/UL (ref 0–0.51)
EOSINOPHIL NFR BLD: 1.4 % (ref 0–6.9)
ERYTHROCYTE [DISTWIDTH] IN BLOOD BY AUTOMATED COUNT: 44.2 FL (ref 35.9–50)
GFR SERPLBLD CREATININE-BSD FMLA CKD-EPI: 73 ML/MIN/1.73 M 2
GLOBULIN SER CALC-MCNC: 2.5 G/DL (ref 1.9–3.5)
GLUCOSE BLD STRIP.AUTO-MCNC: 186 MG/DL (ref 65–99)
GLUCOSE SERPL-MCNC: 146 MG/DL (ref 65–99)
HCT VFR BLD AUTO: 36.6 % (ref 42–52)
HGB BLD-MCNC: 12.3 G/DL (ref 14–18)
IMM GRANULOCYTES # BLD AUTO: 0.03 K/UL (ref 0–0.11)
IMM GRANULOCYTES NFR BLD AUTO: 0.5 % (ref 0–0.9)
LYMPHOCYTES # BLD AUTO: 1.32 K/UL (ref 1–4.8)
LYMPHOCYTES NFR BLD: 21 % (ref 22–41)
MCH RBC QN AUTO: 30.8 PG (ref 27–33)
MCHC RBC AUTO-ENTMCNC: 33.6 G/DL (ref 32.3–36.5)
MCV RBC AUTO: 91.5 FL (ref 81.4–97.8)
MONOCYTES # BLD AUTO: 0.45 K/UL (ref 0–0.85)
MONOCYTES NFR BLD AUTO: 7.2 % (ref 0–13.4)
NEUTROPHILS # BLD AUTO: 4.36 K/UL (ref 1.82–7.42)
NEUTROPHILS NFR BLD: 69.4 % (ref 44–72)
NRBC # BLD AUTO: 0 K/UL
NRBC BLD-RTO: 0 /100 WBC (ref 0–0.2)
PLATELET # BLD AUTO: 231 K/UL (ref 164–446)
PMV BLD AUTO: 8.7 FL (ref 9–12.9)
POTASSIUM SERPL-SCNC: 4.2 MMOL/L (ref 3.6–5.5)
PROT SERPL-MCNC: 6.7 G/DL (ref 6–8.2)
RBC # BLD AUTO: 4 M/UL (ref 4.7–6.1)
SODIUM SERPL-SCNC: 136 MMOL/L (ref 135–145)
TROPONIN T SERPL-MCNC: 15 NG/L (ref 6–19)
TROPONIN T SERPL-MCNC: 16 NG/L (ref 6–19)
TROPONIN T SERPL-MCNC: 18 NG/L (ref 6–19)
WBC # BLD AUTO: 6.3 K/UL (ref 4.8–10.8)

## 2024-05-27 PROCEDURE — 93005 ELECTROCARDIOGRAM TRACING: CPT

## 2024-05-27 PROCEDURE — 99223 1ST HOSP IP/OBS HIGH 75: CPT | Mod: 25 | Performed by: HOSPITALIST

## 2024-05-27 PROCEDURE — 80053 COMPREHEN METABOLIC PANEL: CPT

## 2024-05-27 PROCEDURE — A9270 NON-COVERED ITEM OR SERVICE: HCPCS | Performed by: HOSPITALIST

## 2024-05-27 PROCEDURE — 99497 ADVNCD CARE PLAN 30 MIN: CPT | Performed by: HOSPITALIST

## 2024-05-27 PROCEDURE — G0378 HOSPITAL OBSERVATION PER HR: HCPCS

## 2024-05-27 PROCEDURE — 99285 EMERGENCY DEPT VISIT HI MDM: CPT

## 2024-05-27 PROCEDURE — 82962 GLUCOSE BLOOD TEST: CPT

## 2024-05-27 PROCEDURE — 93005 ELECTROCARDIOGRAM TRACING: CPT | Performed by: EMERGENCY MEDICINE

## 2024-05-27 PROCEDURE — 71045 X-RAY EXAM CHEST 1 VIEW: CPT

## 2024-05-27 PROCEDURE — 84484 ASSAY OF TROPONIN QUANT: CPT

## 2024-05-27 PROCEDURE — 36415 COLL VENOUS BLD VENIPUNCTURE: CPT

## 2024-05-27 PROCEDURE — 85025 COMPLETE CBC W/AUTO DIFF WBC: CPT

## 2024-05-27 PROCEDURE — 700102 HCHG RX REV CODE 250 W/ 637 OVERRIDE(OP): Performed by: HOSPITALIST

## 2024-05-27 PROCEDURE — A9270 NON-COVERED ITEM OR SERVICE: HCPCS | Performed by: EMERGENCY MEDICINE

## 2024-05-27 PROCEDURE — 700102 HCHG RX REV CODE 250 W/ 637 OVERRIDE(OP): Performed by: EMERGENCY MEDICINE

## 2024-05-27 RX ORDER — ROSUVASTATIN CALCIUM 10 MG/1
20 TABLET, COATED ORAL EVERY EVENING
Status: DISCONTINUED | OUTPATIENT
Start: 2024-05-27 | End: 2024-05-28 | Stop reason: HOSPADM

## 2024-05-27 RX ORDER — ASPIRIN 81 MG/1
81 TABLET ORAL DAILY
COMMUNITY

## 2024-05-27 RX ORDER — NITROGLYCERIN 0.4 MG/1
0.4 TABLET SUBLINGUAL
Status: DISCONTINUED | OUTPATIENT
Start: 2024-05-27 | End: 2024-05-28 | Stop reason: HOSPADM

## 2024-05-27 RX ORDER — ROSUVASTATIN CALCIUM 20 MG/1
20 TABLET, COATED ORAL EVERY EVENING
COMMUNITY

## 2024-05-27 RX ORDER — AMOXICILLIN 250 MG
2 CAPSULE ORAL 2 TIMES DAILY
Status: DISCONTINUED | OUTPATIENT
Start: 2024-05-27 | End: 2024-05-28 | Stop reason: HOSPADM

## 2024-05-27 RX ORDER — ALUMINA, MAGNESIA, AND SIMETHICONE 2400; 2400; 240 MG/30ML; MG/30ML; MG/30ML
30 SUSPENSION ORAL EVERY 4 HOURS PRN
Status: ACTIVE | OUTPATIENT
Start: 2024-05-27 | End: 2024-05-28

## 2024-05-27 RX ORDER — ASPIRIN 81 MG/1
243 TABLET, CHEWABLE ORAL ONCE
Status: COMPLETED | OUTPATIENT
Start: 2024-05-27 | End: 2024-05-27

## 2024-05-27 RX ORDER — EZETIMIBE 10 MG/1
10 TABLET ORAL EVERY EVENING
Status: DISCONTINUED | OUTPATIENT
Start: 2024-05-27 | End: 2024-05-28 | Stop reason: HOSPADM

## 2024-05-27 RX ORDER — DILTIAZEM HYDROCHLORIDE 120 MG/1
120 CAPSULE, COATED, EXTENDED RELEASE ORAL DAILY
Status: DISCONTINUED | OUTPATIENT
Start: 2024-05-28 | End: 2024-05-28 | Stop reason: HOSPADM

## 2024-05-27 RX ORDER — ASPIRIN 81 MG/1
81 TABLET ORAL DAILY
Status: DISCONTINUED | OUTPATIENT
Start: 2024-05-28 | End: 2024-05-27

## 2024-05-27 RX ORDER — HYDROMORPHONE HYDROCHLORIDE 1 MG/ML
0.25 INJECTION, SOLUTION INTRAMUSCULAR; INTRAVENOUS; SUBCUTANEOUS
Status: DISCONTINUED | OUTPATIENT
Start: 2024-05-27 | End: 2024-05-28 | Stop reason: HOSPADM

## 2024-05-27 RX ORDER — OXYCODONE HYDROCHLORIDE 5 MG/1
5 TABLET ORAL
Status: DISCONTINUED | OUTPATIENT
Start: 2024-05-27 | End: 2024-05-28 | Stop reason: HOSPADM

## 2024-05-27 RX ORDER — OXYCODONE HYDROCHLORIDE 5 MG/1
2.5 TABLET ORAL
Status: DISCONTINUED | OUTPATIENT
Start: 2024-05-27 | End: 2024-05-28 | Stop reason: HOSPADM

## 2024-05-27 RX ORDER — POLYETHYLENE GLYCOL 3350 17 G/17G
1 POWDER, FOR SOLUTION ORAL
Status: DISCONTINUED | OUTPATIENT
Start: 2024-05-27 | End: 2024-05-28 | Stop reason: HOSPADM

## 2024-05-27 RX ORDER — ACETAMINOPHEN 500 MG
500 TABLET ORAL EVERY 6 HOURS PRN
COMMUNITY

## 2024-05-27 RX ORDER — ACETAMINOPHEN 325 MG/1
650 TABLET ORAL EVERY 6 HOURS PRN
Status: DISCONTINUED | OUTPATIENT
Start: 2024-05-27 | End: 2024-05-28 | Stop reason: HOSPADM

## 2024-05-27 RX ORDER — DILTIAZEM HYDROCHLORIDE 120 MG/1
120 CAPSULE, COATED, EXTENDED RELEASE ORAL DAILY
COMMUNITY
Start: 2024-03-14

## 2024-05-27 RX ORDER — ASPIRIN 81 MG/1
81 TABLET ORAL DAILY
Status: DISCONTINUED | OUTPATIENT
Start: 2024-05-28 | End: 2024-05-28 | Stop reason: HOSPADM

## 2024-05-27 RX ORDER — EMPAGLIFLOZIN 10 MG/1
10 TABLET, FILM COATED ORAL DAILY
COMMUNITY

## 2024-05-27 RX ADMIN — ROSUVASTATIN 20 MG: 10 TABLET, FILM COATED ORAL at 18:48

## 2024-05-27 RX ADMIN — EZETIMIBE 10 MG: 10 TABLET ORAL at 18:48

## 2024-05-27 RX ADMIN — ASPIRIN 243 MG: 81 TABLET, CHEWABLE ORAL at 16:47

## 2024-05-27 RX ADMIN — SENNOSIDES AND DOCUSATE SODIUM 2 TABLET: 50; 8.6 TABLET ORAL at 18:15

## 2024-05-27 SDOH — ECONOMIC STABILITY: TRANSPORTATION INSECURITY
IN THE PAST 12 MONTHS, HAS THE LACK OF TRANSPORTATION KEPT YOU FROM MEDICAL APPOINTMENTS OR FROM GETTING MEDICATIONS?: NO

## 2024-05-27 SDOH — ECONOMIC STABILITY: TRANSPORTATION INSECURITY
IN THE PAST 12 MONTHS, HAS LACK OF RELIABLE TRANSPORTATION KEPT YOU FROM MEDICAL APPOINTMENTS, MEETINGS, WORK OR FROM GETTING THINGS NEEDED FOR DAILY LIVING?: NO

## 2024-05-27 ASSESSMENT — LIFESTYLE VARIABLES
AVERAGE NUMBER OF DAYS PER WEEK YOU HAVE A DRINK CONTAINING ALCOHOL: 0
HAVE YOU EVER FELT YOU SHOULD CUT DOWN ON YOUR DRINKING: NO
TOTAL SCORE: 0
EVER FELT BAD OR GUILTY ABOUT YOUR DRINKING: NO
ALCOHOL_USE: NO
TOTAL SCORE: 0
CONSUMPTION TOTAL: NEGATIVE
TOTAL SCORE: 0
HAVE PEOPLE ANNOYED YOU BY CRITICIZING YOUR DRINKING: NO
HOW MANY TIMES IN THE PAST YEAR HAVE YOU HAD 5 OR MORE DRINKS IN A DAY: 0
EVER HAD A DRINK FIRST THING IN THE MORNING TO STEADY YOUR NERVES TO GET RID OF A HANGOVER: NO
ON A TYPICAL DAY WHEN YOU DRINK ALCOHOL HOW MANY DRINKS DO YOU HAVE: 0

## 2024-05-27 ASSESSMENT — SOCIAL DETERMINANTS OF HEALTH (SDOH)
WITHIN THE LAST YEAR, HAVE YOU BEEN AFRAID OF YOUR PARTNER OR EX-PARTNER?: NO
IN THE PAST 12 MONTHS, HAS THE ELECTRIC, GAS, OIL, OR WATER COMPANY THREATENED TO SHUT OFF SERVICE IN YOUR HOME?: NO
WITHIN THE LAST YEAR, HAVE TO BEEN RAPED OR FORCED TO HAVE ANY KIND OF SEXUAL ACTIVITY BY YOUR PARTNER OR EX-PARTNER?: NO
WITHIN THE PAST 12 MONTHS, THE FOOD YOU BOUGHT JUST DIDN'T LAST AND YOU DIDN'T HAVE MONEY TO GET MORE: NEVER TRUE
WITHIN THE LAST YEAR, HAVE YOU BEEN HUMILIATED OR EMOTIONALLY ABUSED IN OTHER WAYS BY YOUR PARTNER OR EX-PARTNER?: NO
WITHIN THE PAST 12 MONTHS, YOU WORRIED THAT YOUR FOOD WOULD RUN OUT BEFORE YOU GOT THE MONEY TO BUY MORE: NEVER TRUE
WITHIN THE LAST YEAR, HAVE YOU BEEN KICKED, HIT, SLAPPED, OR OTHERWISE PHYSICALLY HURT BY YOUR PARTNER OR EX-PARTNER?: NO

## 2024-05-27 ASSESSMENT — COGNITIVE AND FUNCTIONAL STATUS - GENERAL
SUGGESTED CMS G CODE MODIFIER DAILY ACTIVITY: CH
SUGGESTED CMS G CODE MODIFIER MOBILITY: CI
DAILY ACTIVITIY SCORE: 24
CLIMB 3 TO 5 STEPS WITH RAILING: A LITTLE
MOBILITY SCORE: 23

## 2024-05-27 ASSESSMENT — ENCOUNTER SYMPTOMS
EYE REDNESS: 0
COUGH: 0
SHORTNESS OF BREATH: 0
STRIDOR: 0
BRUISES/BLEEDS EASILY: 0
MYALGIAS: 0
FLANK PAIN: 0
ABDOMINAL PAIN: 0
NERVOUS/ANXIOUS: 0
VOMITING: 0
FOCAL WEAKNESS: 0
CHILLS: 0
FEVER: 0
EYE DISCHARGE: 0

## 2024-05-27 ASSESSMENT — FIBROSIS 4 INDEX
FIB4 SCORE: 1.41
FIB4 SCORE: 1.45

## 2024-05-27 ASSESSMENT — PATIENT HEALTH QUESTIONNAIRE - PHQ9
SUM OF ALL RESPONSES TO PHQ9 QUESTIONS 1 AND 2: 0
2. FEELING DOWN, DEPRESSED, IRRITABLE, OR HOPELESS: NOT AT ALL
1. LITTLE INTEREST OR PLEASURE IN DOING THINGS: NOT AT ALL

## 2024-05-27 ASSESSMENT — PAIN DESCRIPTION - PAIN TYPE: TYPE: ACUTE PAIN

## 2024-05-27 NOTE — ED PROVIDER NOTES
ED Provider Note    CHIEF COMPLAINT  Chief Complaint   Patient presents with    Chest Pain     Was working in the yard this morning and now has some chest discomfort. Denies arm jaw or back pain. Pt has hx of MI 2015, 3x stents and loop recorder. Pt endorses feeling some skipped beats.        EXTERNAL RECORDS REVIEWED  Outpatient Notes clinic note with Dr Mcclendon on 5/2    He has history of NSTEMI s/p 3 JOSHUA to RCA, OM, and diagonal 7/2015. He also has history of hypertension and dyslipidemia. He has had PAF. He underwent LINQ to monitor for atrial fibrillation. of eliquis.     Loop interrogation 5/2/24 Normal function  PVC under 1% 2/1/24 Normal function False AT for ectopy 4% PVC burden     Last stress test jan 2023 - no reversible abnormality    HPI/ROS  LIMITATION TO HISTORY   Select: : None  OUTSIDE HISTORIAN(S):  mei Zhao is a 83 y.o. male who presents to the emergency room chief complaint of chest discomfort.  The patient states that he was out there gardening today in the heat and started to feel a discomfort in his chest rating up into the right side of his neck.  He was not short of breath or nausea or vomiting.  But it felt very similar to when he had his NSTEMI and stents were placed in 2015.  He states he just felt unwell like something was not right.  He then started to feel palpitations and his heart skipping some beats.  He has noted to have some PVCs here.  Currently his chest pain is improved he is no longer taking Eliquis he takes a baby aspirin a day which she took this morning.  Before today he was not having any chest pain or dyspnea on exertion    PAST MEDICAL HISTORY   has a past medical history of Environmental allergies, Hypertension, and MI (myocardial infarction) (HCC).    SURGICAL HISTORY   has a past surgical history that includes abdominal exploration; appendectomy; and knee arthroscopy.    FAMILY HISTORY  Family History   Problem Relation Age of Onset     "Hypertension Father     Heart Disease Father         mitral valve replacement       SOCIAL HISTORY  Social History     Tobacco Use    Smoking status: Never    Smokeless tobacco: Never   Vaping Use    Vaping status: Never Used   Substance and Sexual Activity    Alcohol use: Not Currently    Drug use: Never    Sexual activity: Not on file     Comment: food and beverage management       CURRENT MEDICATIONS  Home Medications       Reviewed by Ahsan Bright R.N. (Registered Nurse) on 05/27/24 at 1418  Med List Status: Not Addressed     Medication Last Dose Status   apixaban (ELIQUIS) 5mg Tab  Active   ezetimibe (ZETIA) 10 MG Tab  Active                  Audit from Redirected Encounters    **Home medications have not yet been reviewed for this encounter**         ALLERGIES  No Known Allergies    PHYSICAL EXAM  VITAL SIGNS: /64   Pulse 74   Temp 36.5 °C (97.7 °F) (Temporal)   Resp 18   Ht 1.6 m (5' 3\")   Wt 64.7 kg (142 lb 10.2 oz)   SpO2 97%   BMI 25.27 kg/m²    Pulse OX: Pulse Oxygen level is within normal limits  Constitutional: Alert in no apparent distress.  HENT: Normocephalic, Atraumatic, MMM  Eyes: PERound. Conjunctiva normal, non-icteric.   Heart: Regular rate and rhythm, intact distal pulses no murmurs rubs or gallops  Lungs: Symmetrical movement, no resp distress clear to auscultation bilaterally  Abdomen: Non-tender, non-distended, normal bowel sounds  EXT/Back no edema  Skin: Warm, Dry, No erythema, No rash.   Neurologic: Alert and oriented, Grossly non-focal.       EKG/LABS  Labs Reviewed   CBC WITH DIFFERENTIAL - Abnormal; Notable for the following components:       Result Value    RBC 4.00 (*)     Hemoglobin 12.3 (*)     Hematocrit 36.6 (*)     MPV 8.7 (*)     Lymphocytes 21.00 (*)     All other components within normal limits   COMP METABOLIC PANEL - Abnormal; Notable for the following components:    Glucose 146 (*)     Bun 25 (*)     All other components within normal limits   TROPONIN "   TROPONIN   ESTIMATED GFR   TROPONIN     Results for orders placed or performed during the hospital encounter of 24   EKG   Result Value Ref Range    Report       [facility]    Test Date:  2024  Pt Name:    EVELYN WEAVER             Department: [facility]  MRN:        [id number]                      Room:  Gender:     Male                         Technician: [id number]  :        [id number]                   Requested By:ER TRIAGE PROTOCOL  Order #:    [id number]                    Reading MD: Meagan Dong MD    Measurements  Intervals                                Axis  Rate:       72                           P:          70  MN:         181                          QRS:        42  QRSD:       87                           T:          60  QT:         381  QTc:        417    Interpretive Statements  Sinus rhythm at a rate of 72 no ST elevations or ST depressions no abnormal T  wave inversions or Q waves normal intervals normal axis  Compared to ECG 2023 13:58:50  no sig change  Electronically Signed On 2024 15:34:55 PDT by Meagan Dong MD         I have independently interpreted this EKG    RADIOLOGY/PROCEDURES   I have independently interpreted the diagnostic imaging associated with this visit and am waiting the final reading from the radiologist.   My preliminary interpretation is as follows: Chest x-ray without pneumonia or effusion or cardiomegaly    Radiologist interpretation:  DX-CHEST-PORTABLE (1 VIEW)   Final Result      No acute cardiopulmonary disease.      EC-ECHOCARDIOGRAM COMPLETE W/O CONT    (Results Pending)       COURSE & MEDICAL DECISION MAKING    ASSESSMENT, COURSE AND PLAN  Care Narrative:     Patient is an 83-year-old male who presents to the emergency department with chest discomfort similar to his prior MI in .  Fortunately he is chest pain-free at this time and EKG is unchanged from prior.  First opponent is unremarkable, he  will be given another 273 mg of aspirin to make a full aspirin.  Concern for ACS below concern for pulmonary embolism.  Likely exertional in nature.  He is having some frequent PVCs but no runs that would be indicated for V. tach.    DISPOSITION AND DISCUSSIONS  5:36 PM  Patient's repeat troponin is unremarkable I spoke with Dr. Mora who is on-call for cardiology today due to the patient being chest pain from the 2 troponins I felt comfortable keeping him here for a stress test he agrees at this time especially with his high risk and chest pain similar to prior and feels comfortable given the patient at [facility] for stress test and possible transfer if needed if abnormal    5:41 PM  Spoke w Dr Roth on-call for cardiology and is accepted the patient.    Patient and family understand the plan for hospitalization observation in the setting of chest pain is similar to his prior NSTEMI leading to stent placement and plan for echo and stress test.    Patient's heart score is 5      I have discussed management of the patient with the following physicians and OK's:  Gonzalez Mora    Discussion of management with other [facility] or appropriate source(s): None       FINAL DIAGNOSIS  1. Chest pain, unspecified type    2. Palpitations           Electronically signed by: Meagan Dong M.D., 5/27/2024 3:33 PM

## 2024-05-27 NOTE — ED TRIAGE NOTES
"BIB spouse for following complaints.     Chief Complaint   Patient presents with    Chest Pain     Was working in the yard this morning and now has some chest discomfort. Denies arm jaw or back pain. Pt has hx of MI 2015, 3x stents and loop recorder. Pt endorses feeling some skipped beats.      /64   Pulse 74   Temp 36.5 °C (97.7 °F) (Temporal)   Resp 18   Ht 1.6 m (5' 3\")   Wt 64.7 kg (142 lb 10.2 oz)   SpO2 97%   BMI 25.27 kg/m²     "

## 2024-05-27 NOTE — ED NOTES
Med Rec complete per pt and pt's wife  Allergies Reviewed    Pt reports not taking anticoagulant in the last 14 days

## 2024-05-28 ENCOUNTER — APPOINTMENT (OUTPATIENT)
Dept: CARDIOLOGY | Facility: MEDICAL CENTER | Age: 84
End: 2024-05-28
Attending: HOSPITALIST
Payer: MEDICARE

## 2024-05-28 ENCOUNTER — APPOINTMENT (OUTPATIENT)
Dept: RADIOLOGY | Facility: MEDICAL CENTER | Age: 84
End: 2024-05-28
Attending: HOSPITALIST
Payer: MEDICARE

## 2024-05-28 VITALS
TEMPERATURE: 97.9 F | HEART RATE: 65 BPM | HEIGHT: 66 IN | OXYGEN SATURATION: 98 % | BODY MASS INDEX: 22.75 KG/M2 | SYSTOLIC BLOOD PRESSURE: 130 MMHG | RESPIRATION RATE: 16 BRPM | WEIGHT: 141.54 LBS | DIASTOLIC BLOOD PRESSURE: 66 MMHG

## 2024-05-28 PROBLEM — R94.31 ABNORMAL EKG: Status: RESOLVED | Noted: 2024-05-27 | Resolved: 2024-05-28

## 2024-05-28 PROBLEM — R07.9 CHEST PAIN: Status: RESOLVED | Noted: 2024-05-27 | Resolved: 2024-05-28

## 2024-05-28 LAB
ALBUMIN SERPL BCP-MCNC: 4 G/DL (ref 3.2–4.9)
ALBUMIN/GLOB SERPL: 1.6 G/DL
ALP SERPL-CCNC: 71 U/L (ref 30–99)
ALT SERPL-CCNC: 25 U/L (ref 2–50)
ANION GAP SERPL CALC-SCNC: 10 MMOL/L (ref 7–16)
AST SERPL-CCNC: 22 U/L (ref 12–45)
BILIRUB SERPL-MCNC: 0.5 MG/DL (ref 0.1–1.5)
BUN SERPL-MCNC: 18 MG/DL (ref 8–22)
CALCIUM ALBUM COR SERPL-MCNC: 9 MG/DL (ref 8.5–10.5)
CALCIUM SERPL-MCNC: 9 MG/DL (ref 8.4–10.2)
CHLORIDE SERPL-SCNC: 107 MMOL/L (ref 96–112)
CHOLEST SERPL-MCNC: 112 MG/DL (ref 100–199)
CO2 SERPL-SCNC: 22 MMOL/L (ref 20–33)
CREAT SERPL-MCNC: 0.85 MG/DL (ref 0.5–1.4)
ERYTHROCYTE [DISTWIDTH] IN BLOOD BY AUTOMATED COUNT: 44.1 FL (ref 35.9–50)
GFR SERPLBLD CREATININE-BSD FMLA CKD-EPI: 86 ML/MIN/1.73 M 2
GLOBULIN SER CALC-MCNC: 2.5 G/DL (ref 1.9–3.5)
GLUCOSE BLD STRIP.AUTO-MCNC: 93 MG/DL (ref 65–99)
GLUCOSE SERPL-MCNC: 97 MG/DL (ref 65–99)
HCT VFR BLD AUTO: 35.2 % (ref 42–52)
HDLC SERPL-MCNC: 50 MG/DL
HGB BLD-MCNC: 12 G/DL (ref 14–18)
LDLC SERPL CALC-MCNC: 49 MG/DL
LV EJECT FRACT  99904: 60
LV EJECT FRACT MOD 2C 99903: 61.01
LV EJECT FRACT MOD 4C 99902: 68.28
LV EJECT FRACT MOD BP 99901: 63.91
MAGNESIUM SERPL-MCNC: 2.1 MG/DL (ref 1.5–2.5)
MCH RBC QN AUTO: 31 PG (ref 27–33)
MCHC RBC AUTO-ENTMCNC: 34.1 G/DL (ref 32.3–36.5)
MCV RBC AUTO: 91 FL (ref 81.4–97.8)
PLATELET # BLD AUTO: 212 K/UL (ref 164–446)
PMV BLD AUTO: 9.2 FL (ref 9–12.9)
POTASSIUM SERPL-SCNC: 4.3 MMOL/L (ref 3.6–5.5)
PROT SERPL-MCNC: 6.5 G/DL (ref 6–8.2)
RBC # BLD AUTO: 3.87 M/UL (ref 4.7–6.1)
SODIUM SERPL-SCNC: 139 MMOL/L (ref 135–145)
TRIGL SERPL-MCNC: 63 MG/DL (ref 0–149)
WBC # BLD AUTO: 5.3 K/UL (ref 4.8–10.8)

## 2024-05-28 PROCEDURE — 700102 HCHG RX REV CODE 250 W/ 637 OVERRIDE(OP): Performed by: HOSPITALIST

## 2024-05-28 PROCEDURE — 82962 GLUCOSE BLOOD TEST: CPT

## 2024-05-28 PROCEDURE — 700111 HCHG RX REV CODE 636 W/ 250 OVERRIDE (IP)

## 2024-05-28 PROCEDURE — 80061 LIPID PANEL: CPT

## 2024-05-28 PROCEDURE — 93306 TTE W/DOPPLER COMPLETE: CPT | Mod: 26 | Performed by: INTERNAL MEDICINE

## 2024-05-28 PROCEDURE — 85027 COMPLETE CBC AUTOMATED: CPT

## 2024-05-28 PROCEDURE — 78452 HT MUSCLE IMAGE SPECT MULT: CPT

## 2024-05-28 PROCEDURE — 80053 COMPREHEN METABOLIC PANEL: CPT

## 2024-05-28 PROCEDURE — 99239 HOSP IP/OBS DSCHRG MGMT >30: CPT | Performed by: HOSPITALIST

## 2024-05-28 PROCEDURE — 93018 CV STRESS TEST I&R ONLY: CPT | Performed by: INTERNAL MEDICINE

## 2024-05-28 PROCEDURE — 94760 N-INVAS EAR/PLS OXIMETRY 1: CPT

## 2024-05-28 PROCEDURE — 78452 HT MUSCLE IMAGE SPECT MULT: CPT | Mod: 26 | Performed by: INTERNAL MEDICINE

## 2024-05-28 PROCEDURE — A9270 NON-COVERED ITEM OR SERVICE: HCPCS | Performed by: HOSPITALIST

## 2024-05-28 PROCEDURE — G0378 HOSPITAL OBSERVATION PER HR: HCPCS

## 2024-05-28 PROCEDURE — 93306 TTE W/DOPPLER COMPLETE: CPT

## 2024-05-28 PROCEDURE — 83735 ASSAY OF MAGNESIUM: CPT

## 2024-05-28 RX ORDER — REGADENOSON 0.08 MG/ML
INJECTION, SOLUTION INTRAVENOUS
Status: COMPLETED
Start: 2024-05-28 | End: 2024-05-28

## 2024-05-28 RX ADMIN — DILTIAZEM HYDROCHLORIDE 120 MG: 120 CAPSULE, COATED, EXTENDED RELEASE ORAL at 06:19

## 2024-05-28 RX ADMIN — SENNOSIDES AND DOCUSATE SODIUM 2 TABLET: 50; 8.6 TABLET ORAL at 06:19

## 2024-05-28 RX ADMIN — REGADENOSON 0.4 MG: 0.08 INJECTION, SOLUTION INTRAVENOUS at 09:23

## 2024-05-28 RX ADMIN — ASPIRIN 81 MG: 81 TABLET, COATED ORAL at 06:23

## 2024-05-28 ASSESSMENT — FIBROSIS 4 INDEX: FIB4 SCORE: 1.72

## 2024-05-28 NOTE — CARE PLAN
The patient is Stable - Low risk of patient condition declining or worsening    Shift Goals  Clinical Goals: Stress test and echo  Patient Goals: procedures  Family Goals: comfort, updates    Progress made toward(s) clinical / shift goals:    Problem: Knowledge Deficit - Standard  Goal: Patient and family/care givers will demonstrate understanding of plan of care, disease process/condition, diagnostic tests and medications  Description: Target End Date:  1-3 days or as soon as patient condition allows    Document in Patient Education    1.  Patient and family/caregiver oriented to unit, equipment, visitation policy and means for communicating concern  2.  Complete/review Learning Assessment  3.  Assess knowledge level of disease process/condition, treatment plan, diagnostic tests and medications  4.  Explain disease process/condition, treatment plan, diagnostic tests and medications  Outcome: Progressing  Note: Educated on unit and procedures     Problem: Pain - Standard  Goal: Alleviation of pain or a reduction in pain to the patient’s comfort goal  Description: Target End Date:  Prior to discharge or change in level of care    Document on Vitals flowsheet    1.  Document pain using the appropriate pain scale per order or unit policy  2.  Educate and implement non-pharmacologic comfort measures (i.e. relaxation, distraction, massage, cold/heat therapy, etc.)  3.  Pain management medications as ordered  4.  Reassess pain after pain med administration per policy  5.  If opiods administered assess patient's response to pain medication is appropriate per POSS sedation scale  6.  Follow pain management plan developed in collaboration with patient and interdisciplinary team (including palliative care or pain specialists if applicable)  Outcome: Progressing  Note: Pt currently declines pain, aware to inform RN of breakthrough pain       Patient is not progressing towards the following goals:

## 2024-05-28 NOTE — DISCHARGE PLANNING
Met with pt and his wife. Pt said he is very active in gardening. He is independent with ADLs but needs assistance with IADLs. He no longer drives so wife takes him to appts. He help with cooking and laundry. He was actually born in [place] and raised in [place].    PCP is Dr. Meagan Mcneil.  Goes to [facility].    Care Transition Team Assessment    Information Source  Orientation Level: Oriented X4  Information Given By: Patient  Who is responsible for making decisions for patient? : Patient    Readmission Evaluation  Is this a readmission?: No    Elopement Risk  Legal Hold: No  Ambulatory or Self Mobile in Wheelchair: No-Not an Elopement Risk    Interdisciplinary Discharge Planning  Does Admitting Nurse Feel This Could be a Complex Discharge?: No  Primary Care Physician: Dr Meagan Lawrence  Lives with - Patient's Self Care Capacity: Spouse  Support Systems: Spouse / Significant Other  Housing / Facility: [address] [place] [address]  Do You Take your Prescribed Medications Regularly: Yes  Able to Return to Previous ADL's: Yes  Mobility Issues: No  Prior Services: Home-Independent  Patient Prefers to be Discharged to:: Home  Assistance Needed: Yes  Durable Medical Equipment: Not Applicable    Discharge Preparedness  What is your plan after discharge?: Home with help  What are your discharge supports?: Spouse  Prior Functional Level: Ambulatory, Independent with Activities of Daily Living, Independent with Medication Management  Difficulity with ADLs: None  Difficulity with IADLs: Driving    Functional Assesment  Prior Functional Level: Ambulatory, Independent with Activities of Daily Living, Independent with Medication Management    Finances  Financial Barriers to Discharge: No  Prescription Coverage: Yes    Vision / Hearing Impairment  Vision Impairment : Yes  Hearing Impairment : No (he has hearing aides but dont like them)    Values / Beliefs / Concerns  Values / Beliefs Concerns : No    Advance  Directive  Advance Directive?: Living Will    Domestic Abuse  Have you ever been the victim of abuse or violence?: No    Psychological Assessment  History of Substance Abuse: None    Discharge Risks or Barriers  Discharge risks or barriers?: Post-acute placement / services  Patient risk factors: Cognitive / sensory / physical deficit    Anticipated Discharge Information  Discharge Disposition: Discharged to home/self care (01)

## 2024-05-28 NOTE — ASSESSMENT & PLAN NOTE
I had a prolonged discussion with the patient and family [wife present at bedside] regarding goals of care, diagnoses, prognosis, and CODE STATUS. We discussed his prognosis and comorbidities. The patient has advanced age of 83, and has a number of medical problems including CAD, PreDM, HTN, DLD and is presenting with chest pain. He has a very good cognitive and functional capacity. At this point the patient wants to proceed with a full code. He is open to all forms of invasive and non invasive medical interventions including cardiac cath, stenting or CABG if needed.

## 2024-05-28 NOTE — H&P
Hospital Medicine History & Physical Note    Date of Service  5/27/2024    Primary Care Physician  Meagan Mcneil M.D.    Consultants  Cardiology, Dr. Mora     Code Status  Full Code    Chief Complaint  Chief Complaint   Patient presents with    Chest Pain     Was working in the yard this morning and now has some chest discomfort. Denies arm jaw or back pain. Pt has hx of MI 2015, 3x stents and loop recorder. Pt endorses feeling some skipped beats.      History of Presenting Illness  Sandip Zhao is a 83 y.o. male with a past medical history of coronary artery disease status post 3 stent placements who presented 5/27/2024 with retrosternal chest discomfort after walking in the yard this morning.  Pain resolves with rest.  There is no nausea or vomiting.  There is no lower extremity pain redness or swelling.  There is no fever or chills.      Cards Dr Mora > stress test    I discussed the plan of care with emergency physician, patient family present at bedside, the patient.    Review of Systems  Review of Systems   Constitutional:  Negative for chills and fever.   Eyes:  Negative for discharge and redness.   Respiratory:  Negative for cough, shortness of breath and stridor.    Cardiovascular:  Positive for chest pain. Negative for leg swelling.   Gastrointestinal:  Negative for abdominal pain and vomiting.   Genitourinary:  Negative for flank pain.   Musculoskeletal:  Negative for myalgias.   Skin: Negative.    Neurological:  Negative for focal weakness.   Endo/Heme/Allergies:  Does not bruise/bleed easily.   Psychiatric/Behavioral:  The patient is not nervous/anxious.      Past Medical History   has a past medical history of Environmental allergies, Hypertension, and MI (myocardial infarction) (HCC).    Surgical History   has a past surgical history that includes abdominal exploration; appendectomy; and knee arthroscopy.     Family History  family history includes Heart Disease in his father;  Hypertension in his father.      Social History   reports that he has never smoked. He has never used smokeless tobacco. He reports that he does not currently use alcohol. He reports that he does not use drugs.    Allergies  No Known Allergies    Medications  Prior to Admission Medications   Prescriptions Last Dose Informant Patient Reported? Taking?   DILTIAZem CD (CARDIZEM CD) 120 MG CAPSULE SR 24 HR 5/27/2024 at AM Family Member, Patient Yes Yes   Sig: Take 120 mg by mouth every day.   Empagliflozin (JARDIANCE) 10 MG Tab tablet 2 WEEKS at STOPPED Family Member, Patient Yes No   Sig: Take 10 mg by mouth every day.   acetaminophen (TYLENOL) 500 MG Tab 5/27/2024 at AM Family Member, Patient Yes Yes   Sig: Take 500 mg by mouth every 6 hours as needed for Mild Pain.   aspirin 81 MG EC tablet 5/27/2024 at AM Family Member, Patient Yes Yes   Sig: Take 81 mg by mouth every day.   ezetimibe (ZETIA) 10 MG Tab 5/26/2024 at PM Patient, Family Member Yes Yes   Sig: Take 10 mg by mouth every evening.   rosuvastatin (CRESTOR) 20 MG Tab 5/26/2024 at PM Family Member, Patient Yes Yes   Sig: Take 20 mg by mouth every evening.      Facility-Administered Medications: None     Physical Exam  Temp:  [36.4 °C (97.5 °F)-36.5 °C (97.7 °F)] (P) 36.4 °C (97.5 °F)  Pulse:  [60-74] (P) 64  Resp:  [12-19] (P) 16  BP: (117-155)/(59-77) (P) 133/74  SpO2:  [95 %-98 %] (P) 97 %  Blood Pressure : 117/64   Temperature: 36.5 °C (97.7 °F)   Pulse: 64   Respiration: 15   Pulse Oximetry: 97 %     Physical Exam  Constitutional:       General: He is not in acute distress.     Appearance: He is not ill-appearing or diaphoretic.   HENT:      Head: Atraumatic.      Right Ear: External ear normal.      Left Ear: External ear normal.      Nose: No congestion or rhinorrhea.      Mouth/Throat:      Mouth: Mucous membranes are moist.   Eyes:      General: No scleral icterus.        Right eye: No discharge.         Left eye: No discharge.      Pupils: Pupils are  "equal, round, and reactive to light.   Cardiovascular:      Rate and Rhythm: Normal rate and regular rhythm.   Pulmonary:      Comments: Saturating well on room air.  Is able to speak in full sentences  Abdominal:      General: There is no distension.   Musculoskeletal:      Cervical back: Neck supple. No rigidity. No muscular tenderness.      Right lower leg: No edema.      Left lower leg: No edema.   Skin:     Coloration: Skin is not jaundiced or pale.   Neurological:      Mental Status: He is alert and oriented to person, place, and time.      Coordination: Coordination normal.   Psychiatric:         Mood and Affect: Mood normal.         Behavior: Behavior normal.       Laboratory:  Recent Labs     05/27/24  1445   WBC 6.3   RBC 4.00*   HEMOGLOBIN 12.3*   HEMATOCRIT 36.6*   MCV 91.5   MCH 30.8   MCHC 33.6   RDW 44.2   PLATELETCT 231   MPV 8.7*     Recent Labs     05/27/24  1445   SODIUM 136   POTASSIUM 4.2   CHLORIDE 104   CO2 20   GLUCOSE 146*   BUN 25*   CREATININE 1.02   CALCIUM 8.9     Recent Labs     05/27/24  1445   ALTSGPT 27   ASTSGOT 21   ALKPHOSPHAT 79   TBILIRUBIN 0.6   GLUCOSE 146*         No results for input(s): \"NTPROBNP\" in the last 72 hours.      Recent Labs     05/27/24  1445 05/27/24  1648   TROPONINT 18 15     Imaging:  DX-CHEST-PORTABLE (1 VIEW)   Final Result      No acute cardiopulmonary disease.      EC-ECHOCARDIOGRAM COMPLETE W/O CONT    (Results Pending)     I personally reviewed patient EKG shows sinus rhythm with a rate of 72.  There is no ST elevation.  There is flattening of T waves in lead aVL.  QTc is 417.    Assessment/Plan:  Justification for Admission Status  I anticipate this patient is appropriate for observation status at this time because likely discharge after 1 midnight.    Patient will need a Telemetry bed on MEDICAL service.     * Chest pain- (present on admission)  Assessment & Plan  EKG shows sinus rhythm with a rate of 72.  There is no ST elevation.  There is " flattening of T waves in lead aVL.  QTc is 417.  Initial troponin is within normal limits, I will trend  I ordered Stat EKG and troponin for recurrence of chest pain.   I will place on continuous cardiac monitoring.  Patient condition was discussed with cardiology on-call, Dr. Kohli who recommended a stress test.  Plan for stress test in the morning [ordered] as long as there are no significant EKG changes or significant troponin elevation    Anemia- (present on admission)  Assessment & Plan  No evidence of gross bleeding.  Monitor hemoglobin. I will order a follow-up CBC.  Transfuse for a hemoglobin of less than or equal to 7     Primary hypertension- (present on admission)  Assessment & Plan  I will start diltiazem with hold parameters.    Prediabetes- (present on admission)  Assessment & Plan  With no significant hyperglycemia  I will start short acting insulin for now  I will order Accu-Checks, hypoglycemia protocol  Adjust according to blood sugars trend.     Abnormal EKG- (present on admission)  Assessment & Plan  EKG shows sinus rhythm with a rate of 72.  There is no ST elevation.  There is flattening of T waves in lead aVL.  QTc is 417.    I will place on continuous cardiac monitoring  I will check and trend troponins  I will check echocardiography       ACP (advance care planning)- (present on admission)  Assessment & Plan  I had a prolonged discussion with the patient and family [wife present at bedside] regarding goals of care, diagnoses, prognosis, and CODE STATUS. We discussed his prognosis and comorbidities. The patient has advanced age of 83, and has a number of medical problems including CAD, PreDM, HTN, DLD and is presenting with chest pain. He has a very good cognitive and functional capacity. At this point the patient wants to proceed with a full code. He is open to all forms of invasive and non invasive medical interventions including cardiac cath, stenting or CABG if needed.     Mixed  hyperlipidemia- (present on admission)  Assessment & Plan  I will start rosuvastatin and Zetia.  Cardiac diet      VTE prophylaxis: SCDs/TEDs and Xarelto 10 mg daily as prophylaxis    I had a prolonged discussion with the patient and family [wife present at bedside] regarding goals of care, diagnoses, prognosis, and CODE STATUS. We discussed his prognosis and comorbidities. The patient has advanced age of 83, and has a number of medical problems including CAD, PreDM, HTN, DLD and is presenting with chest pain. He has a very good cognitive and functional capacity. At this point the patient wants to proceed with a full code. He is open to all forms of invasive and non invasive medical interventions including cardiac cath, stenting or CABG if needed.  I spent 16 minutes on advanced care planning

## 2024-05-28 NOTE — CARE PLAN
The patient is Stable - Low risk of patient condition declining or worsening    Shift Goals  Clinical Goals: Stress test/Echo  Patient Goals: Discharge  Family Goals:     Progress made toward(s) clinical / shift goals:    Problem: Knowledge Deficit - Standard  Goal: Patient and family/care givers will demonstrate understanding of plan of care, disease process/condition, diagnostic tests and medications  Outcome: Progressing  Note: Patient's knowledge of plan of care, diagnostics, and medications regularly assessed. RN answers patient questions appropriately, education provided. Patient verbalizes better understanding of their condition.       Problem: Pain - Standard  Goal: Alleviation of pain or a reduction in pain to the patient’s comfort goal  Outcome: Progressing  Flowsheets (Taken 5/28/2024 0750)  Pain Rating Scale (NPRS): 0       Patient is not progressing towards the following goals:

## 2024-05-28 NOTE — PROGRESS NOTES
Telemetry Shift Summary     Rhythm: SB/SR  HR: 59-70  Ectopy: rPVC, rPAC, fBig  Measurements: 0.20/0.08/0.36       Normal Values  Rhythm: SR  HR:   Measurements: 0.12-0.20 / 0.04-0.10 / 0.30-0.52

## 2024-05-28 NOTE — ASSESSMENT & PLAN NOTE
No evidence of gross bleeding.  Monitor hemoglobin. I will order a follow-up CBC.  Transfuse for a hemoglobin of less than or equal to 7

## 2024-05-28 NOTE — ASSESSMENT & PLAN NOTE
EKG shows sinus rhythm with a rate of 72.  There is no ST elevation.  There is flattening of T waves in lead aVL.  QTc is 417.    I will place on continuous cardiac monitoring  I will check and trend troponins  I will check echocardiography

## 2024-05-28 NOTE — ASSESSMENT & PLAN NOTE
With no significant hyperglycemia  I will start short acting insulin for now  I will order Accu-Checks, hypoglycemia protocol  Adjust according to blood sugars trend.

## 2024-05-28 NOTE — PROGRESS NOTES
4 Eyes Skin Assessment Completed by UZAIR bridges and UZAIR Watson.    Head Scar   Ears WDL  Nose WDL  Mouth WDL  Neck WDL  Breast/Chest Scar  Shoulder Blades WDL  Spine WDL  (R) Arm/Elbow/Hand Scab  (L) Arm/Elbow/Hand Scab  Abdomen WDL  Groin WDL  Scrotum/Coccyx/Buttocks WDL  (R) Leg Scab and Abrasion  (L) Leg Scab and Abrasion  (R) Heel/Foot/Toe WDL  (L) Heel/Foot/Toe WDL          Devices In Places Tele Box      Interventions In Place N/A    Possible Skin Injury No    Pictures Uploaded Into Epic N/A  Wound Consult Placed N/A  RN Wound Prevention Protocol Ordered No

## 2024-05-28 NOTE — ASSESSMENT & PLAN NOTE
EKG shows sinus rhythm with a rate of 72.  There is no ST elevation.  There is flattening of T waves in lead aVL.  QTc is 417.  Initial troponin is within normal limits, I will trend  I ordered Stat EKG and troponin for recurrence of chest pain.   I will place on continuous cardiac monitoring.  Patient condition was discussed with cardiology on-call, Dr. Kohli who recommended a stress test.  Plan for stress test in the morning [ordered] as long as there are no significant EKG changes or significant troponin elevation

## 2024-05-29 NOTE — DISCHARGE SUMMARY
Discharge Summary    CHIEF COMPLAINT ON ADMISSION  Chief Complaint   Patient presents with    Chest Pain     Was working in the yard this morning and now has some chest discomfort. Denies arm jaw or back pain. Pt has hx of MI 2015, 3x stents and loop recorder. Pt endorses feeling some skipped beats.        Reason for Admission  Chest Pains, Arm Pain     Admission Date  5/27/2024    CODE STATUS  Full code.     HPI & HOSPITAL COURSE    Please see original H&P for specific information, patient was admitted with chest pain, initial troponins were negative, patient went for stress test and echocardiogram, stress test did not show any signs of acute ischemic changes, echocardiogram normal ejection fraction, patient is alert oriented follows commands he is tolerating diet, he denies any chest pain nausea vomiting no focal weakness no numbness or tingling, patient feels back to his baseline, patient is going to be discharged home today in stable condition, he is recommended to follow-up with primary care doctor, keep good hydration, patient expressed understanding of his discharge plan and agree with it request have been answered.          Therefore, he is discharged in good and stable condition to home with close outpatient follow-up.        Discharge Date  5/28/2024    FOLLOW UP ITEMS POST DISCHARGE  Primary care physician    DISCHARGE DIAGNOSES  Principal Problem (Resolved):    Chest pain (POA: Yes)  Active Problems:    Prediabetes (POA: Yes)    Primary hypertension (POA: Yes)    Mixed hyperlipidemia (POA: Yes)    Anemia (POA: Yes)    ACP (advance care planning) (POA: Yes)  Resolved Problems:    Abnormal EKG (POA: Yes)      FOLLOW UP  No future appointments.  Meagan Mcneil M.D.  [address] [id number] [phone]    Follow up in 1 week(s)      Wilner Perdue M.D.  [address] [id number] [phone]            MEDICATIONS ON DISCHARGE     Medication List         CONTINUE taking these medications        Instructions   acetaminophen 500 MG Tabs  Commonly known as: Tylenol   Take 500 mg by mouth every 6 hours as needed for Mild Pain.  Dose: 500 mg     aspirin 81 MG EC tablet   Take 81 mg by mouth every day.  Dose: 81 mg     DILTIAZem  MG Cp24  Commonly known as: Cardizem CD   Take 120 mg by mouth every day.  Dose: 120 mg     ezetimibe 10 MG Tabs  Commonly known as: Zetia   Take 10 mg by mouth every evening.  Dose: 10 mg     Jardiance 10 MG Tabs tablet  Generic drug: Empagliflozin   Take 10 mg by mouth every day.  Dose: 10 mg     rosuvastatin 20 MG Tabs  Commonly known as: Crestor   Take 20 mg by mouth every evening.  Dose: 20 mg              Allergies  No Known Allergies    DIET  No orders of the defined types were placed in this encounter.      ACTIVITY  As tolerated.  Weight bearing as tolerated    CONSULTATIONS  None    PROCEDURES  None    LABORATORY  Lab Results   Component Value Date    SODIUM 139 05/28/2024    POTASSIUM 4.3 05/28/2024    CHLORIDE 107 05/28/2024    CO2 22 05/28/2024    GLUCOSE 97 05/28/2024    BUN 18 05/28/2024    CREATININE 0.85 05/28/2024        Lab Results   Component Value Date    WBC 5.3 05/28/2024    HEMOGLOBIN 12.0 (L) 05/28/2024    HEMATOCRIT 35.2 (L) 05/28/2024    PLATELETCT 212 05/28/2024        Total time of the discharge process exceeds 32 minutes.

## 2024-06-08 ENCOUNTER — HOSPITAL ENCOUNTER (OUTPATIENT)
Facility: MEDICAL CENTER | Age: 84
End: 2024-06-10
Attending: EMERGENCY MEDICINE | Admitting: HOSPITALIST
Payer: MEDICARE

## 2024-06-08 ENCOUNTER — APPOINTMENT (OUTPATIENT)
Dept: RADIOLOGY | Facility: MEDICAL CENTER | Age: 84
End: 2024-06-08
Attending: EMERGENCY MEDICINE
Payer: MEDICARE

## 2024-06-08 DIAGNOSIS — R19.7 DIARRHEA, UNSPECIFIED TYPE: ICD-10-CM

## 2024-06-08 DIAGNOSIS — K52.9 COLITIS: ICD-10-CM

## 2024-06-08 DIAGNOSIS — R00.2 PALPITATIONS: ICD-10-CM

## 2024-06-08 DIAGNOSIS — K40.90 UNILATERAL INGUINAL HERNIA WITHOUT OBSTRUCTION OR GANGRENE, RECURRENCE NOT SPECIFIED: ICD-10-CM

## 2024-06-08 PROBLEM — E86.0 DEHYDRATION: Status: ACTIVE | Noted: 2024-06-08

## 2024-06-08 PROBLEM — E87.20 METABOLIC ACIDOSIS: Status: ACTIVE | Noted: 2024-06-08

## 2024-06-08 LAB
ALBUMIN SERPL BCP-MCNC: 4 G/DL (ref 3.2–4.9)
ALBUMIN/GLOB SERPL: 1.5 G/DL
ALP SERPL-CCNC: 87 U/L (ref 30–99)
ALT SERPL-CCNC: 25 U/L (ref 2–50)
ANION GAP SERPL CALC-SCNC: 14 MMOL/L (ref 7–16)
APPEARANCE UR: CLEAR
AST SERPL-CCNC: 22 U/L (ref 12–45)
BASOPHILS # BLD AUTO: 0.2 % (ref 0–1.8)
BASOPHILS # BLD: 0.01 K/UL (ref 0–0.12)
BILIRUB SERPL-MCNC: 0.8 MG/DL (ref 0.1–1.5)
BILIRUB UR QL STRIP.AUTO: NEGATIVE
BUN SERPL-MCNC: 16 MG/DL (ref 8–22)
CALCIUM ALBUM COR SERPL-MCNC: 8.7 MG/DL (ref 8.5–10.5)
CALCIUM SERPL-MCNC: 8.7 MG/DL (ref 8.4–10.2)
CHLORIDE SERPL-SCNC: 99 MMOL/L (ref 96–112)
CO2 SERPL-SCNC: 19 MMOL/L (ref 20–33)
COLOR UR: YELLOW
CREAT SERPL-MCNC: 0.78 MG/DL (ref 0.5–1.4)
D DIMER PPP IA.FEU-MCNC: 0.66 UG/ML (FEU) (ref 0–0.5)
EOSINOPHIL # BLD AUTO: 0.04 K/UL (ref 0–0.51)
EOSINOPHIL NFR BLD: 0.8 % (ref 0–6.9)
ERYTHROCYTE [DISTWIDTH] IN BLOOD BY AUTOMATED COUNT: 43.8 FL (ref 35.9–50)
FLUAV RNA SPEC QL NAA+PROBE: NEGATIVE
FLUBV RNA SPEC QL NAA+PROBE: NEGATIVE
GFR SERPLBLD CREATININE-BSD FMLA CKD-EPI: 88 ML/MIN/1.73 M 2
GLOBULIN SER CALC-MCNC: 2.7 G/DL (ref 1.9–3.5)
GLUCOSE SERPL-MCNC: 97 MG/DL (ref 65–99)
GLUCOSE UR STRIP.AUTO-MCNC: NEGATIVE MG/DL
HCT VFR BLD AUTO: 38.1 % (ref 42–52)
HGB BLD-MCNC: 13.1 G/DL (ref 14–18)
IMM GRANULOCYTES # BLD AUTO: 0.02 K/UL (ref 0–0.11)
IMM GRANULOCYTES NFR BLD AUTO: 0.4 % (ref 0–0.9)
KETONES UR STRIP.AUTO-MCNC: ABNORMAL MG/DL
LACTATE SERPL-SCNC: 0.9 MMOL/L (ref 0.5–2)
LEUKOCYTE ESTERASE UR QL STRIP.AUTO: NEGATIVE
LYMPHOCYTES # BLD AUTO: 0.42 K/UL (ref 1–4.8)
LYMPHOCYTES NFR BLD: 8.4 % (ref 22–41)
MCH RBC QN AUTO: 31 PG (ref 27–33)
MCHC RBC AUTO-ENTMCNC: 34.4 G/DL (ref 32.3–36.5)
MCV RBC AUTO: 90.1 FL (ref 81.4–97.8)
MICRO URNS: ABNORMAL
MONOCYTES # BLD AUTO: 0.42 K/UL (ref 0–0.85)
MONOCYTES NFR BLD AUTO: 8.4 % (ref 0–13.4)
MUCOUS THREADS #/AREA URNS HPF: ABNORMAL /HPF
NEUTROPHILS # BLD AUTO: 4.1 K/UL (ref 1.82–7.42)
NEUTROPHILS NFR BLD: 81.8 % (ref 44–72)
NITRITE UR QL STRIP.AUTO: NEGATIVE
NRBC # BLD AUTO: 0 K/UL
NRBC BLD-RTO: 0 /100 WBC (ref 0–0.2)
NT-PROBNP SERPL IA-MCNC: 121 PG/ML (ref 0–125)
PH UR STRIP.AUTO: 5.5 [PH] (ref 5–8)
PLATELET # BLD AUTO: 181 K/UL (ref 164–446)
PMV BLD AUTO: 9.2 FL (ref 9–12.9)
POTASSIUM SERPL-SCNC: 3.7 MMOL/L (ref 3.6–5.5)
PROT SERPL-MCNC: 6.7 G/DL (ref 6–8.2)
PROT UR QL STRIP: NEGATIVE MG/DL
RBC # BLD AUTO: 4.23 M/UL (ref 4.7–6.1)
RBC # URNS HPF: ABNORMAL /HPF
RBC UR QL AUTO: ABNORMAL
RSV RNA SPEC QL NAA+PROBE: NEGATIVE
SARS-COV-2 RNA RESP QL NAA+PROBE: NOTDETECTED
SODIUM SERPL-SCNC: 132 MMOL/L (ref 135–145)
SP GR UR STRIP.AUTO: 1.01
SPECIMEN SOURCE: NORMAL
TROPONIN T SERPL-MCNC: 15 NG/L (ref 6–19)
TROPONIN T SERPL-MCNC: 16 NG/L (ref 6–19)
TSH SERPL DL<=0.005 MIU/L-ACNC: 1.05 UIU/ML (ref 0.38–5.33)
WBC # BLD AUTO: 5 K/UL (ref 4.8–10.8)
WBC #/AREA URNS HPF: ABNORMAL /HPF

## 2024-06-08 PROCEDURE — 700102 HCHG RX REV CODE 250 W/ 637 OVERRIDE(OP): Performed by: HOSPITALIST

## 2024-06-08 PROCEDURE — 700105 HCHG RX REV CODE 258: Performed by: EMERGENCY MEDICINE

## 2024-06-08 PROCEDURE — G0378 HOSPITAL OBSERVATION PER HR: HCPCS

## 2024-06-08 PROCEDURE — 87177 OVA AND PARASITES SMEARS: CPT

## 2024-06-08 PROCEDURE — 94760 N-INVAS EAR/PLS OXIMETRY 1: CPT

## 2024-06-08 PROCEDURE — 87425 ROTAVIRUS AG IA: CPT

## 2024-06-08 PROCEDURE — A9270 NON-COVERED ITEM OR SERVICE: HCPCS | Performed by: HOSPITALIST

## 2024-06-08 PROCEDURE — 700117 HCHG RX CONTRAST REV CODE 255: Performed by: EMERGENCY MEDICINE

## 2024-06-08 PROCEDURE — 83605 ASSAY OF LACTIC ACID: CPT

## 2024-06-08 PROCEDURE — 87328 CRYPTOSPORIDIUM AG IA: CPT

## 2024-06-08 PROCEDURE — 84484 ASSAY OF TROPONIN QUANT: CPT

## 2024-06-08 PROCEDURE — 99285 EMERGENCY DEPT VISIT HI MDM: CPT

## 2024-06-08 PROCEDURE — 87493 C DIFF AMPLIFIED PROBE: CPT

## 2024-06-08 PROCEDURE — 87077 CULTURE AEROBIC IDENTIFY: CPT

## 2024-06-08 PROCEDURE — 99223 1ST HOSP IP/OBS HIGH 75: CPT | Mod: AI | Performed by: HOSPITALIST

## 2024-06-08 PROCEDURE — 71045 X-RAY EXAM CHEST 1 VIEW: CPT

## 2024-06-08 PROCEDURE — 96365 THER/PROPH/DIAG IV INF INIT: CPT

## 2024-06-08 PROCEDURE — 85379 FIBRIN DEGRADATION QUANT: CPT

## 2024-06-08 PROCEDURE — 0241U HCHG SARS-COV-2 COVID-19 NFCT DS RESP RNA 4 TRGT MIC: CPT

## 2024-06-08 PROCEDURE — 87329 GIARDIA AG IA: CPT

## 2024-06-08 PROCEDURE — 85025 COMPLETE CBC W/AUTO DIFF WBC: CPT

## 2024-06-08 PROCEDURE — 81001 URINALYSIS AUTO W/SCOPE: CPT

## 2024-06-08 PROCEDURE — 74177 CT ABD & PELVIS W/CONTRAST: CPT

## 2024-06-08 PROCEDURE — 84443 ASSAY THYROID STIM HORMONE: CPT

## 2024-06-08 PROCEDURE — 36415 COLL VENOUS BLD VENIPUNCTURE: CPT

## 2024-06-08 PROCEDURE — 700105 HCHG RX REV CODE 258: Performed by: HOSPITALIST

## 2024-06-08 PROCEDURE — 87150 DNA/RNA AMPLIFIED PROBE: CPT

## 2024-06-08 PROCEDURE — 87209 SMEAR COMPLEX STAIN: CPT

## 2024-06-08 PROCEDURE — 700111 HCHG RX REV CODE 636 W/ 250 OVERRIDE (IP): Mod: JZ | Performed by: EMERGENCY MEDICINE

## 2024-06-08 PROCEDURE — 80053 COMPREHEN METABOLIC PANEL: CPT

## 2024-06-08 PROCEDURE — 87040 BLOOD CULTURE FOR BACTERIA: CPT | Mod: 91

## 2024-06-08 PROCEDURE — 83880 ASSAY OF NATRIURETIC PEPTIDE: CPT

## 2024-06-08 RX ORDER — POLYETHYLENE GLYCOL 3350 17 G/17G
1 POWDER, FOR SOLUTION ORAL
Status: DISCONTINUED | OUTPATIENT
Start: 2024-06-08 | End: 2024-06-08

## 2024-06-08 RX ORDER — ENOXAPARIN SODIUM 100 MG/ML
40 INJECTION SUBCUTANEOUS DAILY
Status: DISCONTINUED | OUTPATIENT
Start: 2024-06-09 | End: 2024-06-10 | Stop reason: HOSPADM

## 2024-06-08 RX ORDER — ACETAMINOPHEN 325 MG/1
650 TABLET ORAL EVERY 6 HOURS PRN
Status: DISCONTINUED | OUTPATIENT
Start: 2024-06-08 | End: 2024-06-10 | Stop reason: HOSPADM

## 2024-06-08 RX ORDER — METRONIDAZOLE 500 MG/100ML
500 INJECTION, SOLUTION INTRAVENOUS ONCE
Status: DISCONTINUED | OUTPATIENT
Start: 2024-06-08 | End: 2024-06-08

## 2024-06-08 RX ORDER — NITROGLYCERIN 0.4 MG/1
0.4 TABLET SUBLINGUAL
Status: DISCONTINUED | OUTPATIENT
Start: 2024-06-08 | End: 2024-06-10 | Stop reason: HOSPADM

## 2024-06-08 RX ORDER — SODIUM CHLORIDE, SODIUM LACTATE, POTASSIUM CHLORIDE, CALCIUM CHLORIDE 600; 310; 30; 20 MG/100ML; MG/100ML; MG/100ML; MG/100ML
INJECTION, SOLUTION INTRAVENOUS CONTINUOUS
Status: ACTIVE | OUTPATIENT
Start: 2024-06-08 | End: 2024-06-09

## 2024-06-08 RX ORDER — SODIUM CHLORIDE, SODIUM LACTATE, POTASSIUM CHLORIDE, CALCIUM CHLORIDE 600; 310; 30; 20 MG/100ML; MG/100ML; MG/100ML; MG/100ML
1000 INJECTION, SOLUTION INTRAVENOUS ONCE
Status: COMPLETED | OUTPATIENT
Start: 2024-06-08 | End: 2024-06-08

## 2024-06-08 RX ORDER — CEFTRIAXONE 2 G/1
2000 INJECTION, POWDER, FOR SOLUTION INTRAMUSCULAR; INTRAVENOUS ONCE
Status: DISCONTINUED | OUTPATIENT
Start: 2024-06-08 | End: 2024-06-08

## 2024-06-08 RX ORDER — AMOXICILLIN 250 MG
2 CAPSULE ORAL 2 TIMES DAILY
Status: DISCONTINUED | OUTPATIENT
Start: 2024-06-08 | End: 2024-06-08

## 2024-06-08 RX ORDER — ASPIRIN 81 MG/1
81 TABLET ORAL EVERY MORNING
Status: DISCONTINUED | OUTPATIENT
Start: 2024-06-09 | End: 2024-06-10 | Stop reason: HOSPADM

## 2024-06-08 RX ORDER — UREA 10 %
5 LOTION (ML) TOPICAL NIGHTLY
Status: DISCONTINUED | OUTPATIENT
Start: 2024-06-08 | End: 2024-06-10 | Stop reason: HOSPADM

## 2024-06-08 RX ORDER — HYDROMORPHONE HYDROCHLORIDE 1 MG/ML
0.25 INJECTION, SOLUTION INTRAMUSCULAR; INTRAVENOUS; SUBCUTANEOUS
Status: DISCONTINUED | OUTPATIENT
Start: 2024-06-08 | End: 2024-06-10 | Stop reason: HOSPADM

## 2024-06-08 RX ORDER — OXYCODONE HYDROCHLORIDE 5 MG/1
5 TABLET ORAL
Status: DISCONTINUED | OUTPATIENT
Start: 2024-06-08 | End: 2024-06-10 | Stop reason: HOSPADM

## 2024-06-08 RX ORDER — ROSUVASTATIN CALCIUM 10 MG/1
20 TABLET, COATED ORAL EVERY EVENING
Status: DISCONTINUED | OUTPATIENT
Start: 2024-06-08 | End: 2024-06-10 | Stop reason: HOSPADM

## 2024-06-08 RX ORDER — EZETIMIBE 10 MG/1
10 TABLET ORAL EVERY EVENING
Status: DISCONTINUED | OUTPATIENT
Start: 2024-06-08 | End: 2024-06-10 | Stop reason: HOSPADM

## 2024-06-08 RX ORDER — DILTIAZEM HYDROCHLORIDE 120 MG/1
120 CAPSULE, COATED, EXTENDED RELEASE ORAL EVERY MORNING
Status: DISCONTINUED | OUTPATIENT
Start: 2024-06-09 | End: 2024-06-10 | Stop reason: HOSPADM

## 2024-06-08 RX ORDER — OXYCODONE HYDROCHLORIDE 5 MG/1
2.5 TABLET ORAL
Status: DISCONTINUED | OUTPATIENT
Start: 2024-06-08 | End: 2024-06-10 | Stop reason: HOSPADM

## 2024-06-08 RX ADMIN — PIPERACILLIN AND TAZOBACTAM 3.38 G: 3; .375 INJECTION, POWDER, FOR SOLUTION INTRAVENOUS at 19:39

## 2024-06-08 RX ADMIN — EZETIMIBE 10 MG: 10 TABLET ORAL at 20:23

## 2024-06-08 RX ADMIN — IOHEXOL 100 ML: 350 INJECTION, SOLUTION INTRAVENOUS at 17:12

## 2024-06-08 RX ADMIN — ROSUVASTATIN 20 MG: 10 TABLET, FILM COATED ORAL at 20:23

## 2024-06-08 RX ADMIN — SODIUM CHLORIDE, POTASSIUM CHLORIDE, SODIUM LACTATE AND CALCIUM CHLORIDE 1000 ML: 600; 310; 30; 20 INJECTION, SOLUTION INTRAVENOUS at 15:50

## 2024-06-08 RX ADMIN — SODIUM CHLORIDE, POTASSIUM CHLORIDE, SODIUM LACTATE AND CALCIUM CHLORIDE: 600; 310; 30; 20 INJECTION, SOLUTION INTRAVENOUS at 20:22

## 2024-06-08 ASSESSMENT — COGNITIVE AND FUNCTIONAL STATUS - GENERAL
MOBILITY SCORE: 24
DAILY ACTIVITIY SCORE: 24
SUGGESTED CMS G CODE MODIFIER MOBILITY: CH
SUGGESTED CMS G CODE MODIFIER DAILY ACTIVITY: CH

## 2024-06-08 ASSESSMENT — ENCOUNTER SYMPTOMS
MYALGIAS: 0
STRIDOR: 0
BRUISES/BLEEDS EASILY: 0
FOCAL WEAKNESS: 0
CHILLS: 0
DIARRHEA: 1
FLANK PAIN: 0
SHORTNESS OF BREATH: 0
EYE DISCHARGE: 0
COUGH: 0
FEVER: 0
ABDOMINAL PAIN: 0
EYE REDNESS: 0
VOMITING: 0
NERVOUS/ANXIOUS: 0

## 2024-06-08 ASSESSMENT — LIFESTYLE VARIABLES
EVER FELT BAD OR GUILTY ABOUT YOUR DRINKING: NO
ON A TYPICAL DAY WHEN YOU DRINK ALCOHOL HOW MANY DRINKS DO YOU HAVE: 0
ALCOHOL_USE: NO
AVERAGE NUMBER OF DAYS PER WEEK YOU HAVE A DRINK CONTAINING ALCOHOL: 0
HOW MANY TIMES IN THE PAST YEAR HAVE YOU HAD 5 OR MORE DRINKS IN A DAY: 0
CONSUMPTION TOTAL: NEGATIVE
EVER HAD A DRINK FIRST THING IN THE MORNING TO STEADY YOUR NERVES TO GET RID OF A HANGOVER: NO
HAVE YOU EVER FELT YOU SHOULD CUT DOWN ON YOUR DRINKING: NO
HAVE PEOPLE ANNOYED YOU BY CRITICIZING YOUR DRINKING: NO
TOTAL SCORE: 0

## 2024-06-08 ASSESSMENT — SOCIAL DETERMINANTS OF HEALTH (SDOH)
WITHIN THE LAST YEAR, HAVE YOU BEEN KICKED, HIT, SLAPPED, OR OTHERWISE PHYSICALLY HURT BY YOUR PARTNER OR EX-PARTNER?: NO
WITHIN THE PAST 12 MONTHS, THE FOOD YOU BOUGHT JUST DIDN'T LAST AND YOU DIDN'T HAVE MONEY TO GET MORE: NEVER TRUE
WITHIN THE LAST YEAR, HAVE YOU BEEN HUMILIATED OR EMOTIONALLY ABUSED IN OTHER WAYS BY YOUR PARTNER OR EX-PARTNER?: NO
WITHIN THE PAST 12 MONTHS, YOU WORRIED THAT YOUR FOOD WOULD RUN OUT BEFORE YOU GOT THE MONEY TO BUY MORE: NEVER TRUE
IN THE PAST 12 MONTHS, HAS THE ELECTRIC, GAS, OIL, OR WATER COMPANY THREATENED TO SHUT OFF SERVICE IN YOUR HOME?: NO
WITHIN THE LAST YEAR, HAVE YOU BEEN AFRAID OF YOUR PARTNER OR EX-PARTNER?: NO
WITHIN THE LAST YEAR, HAVE TO BEEN RAPED OR FORCED TO HAVE ANY KIND OF SEXUAL ACTIVITY BY YOUR PARTNER OR EX-PARTNER?: NO
WITHIN THE PAST 12 MONTHS, THE FOOD YOU BOUGHT JUST DIDN'T LAST AND YOU DIDN'T HAVE MONEY TO GET MORE: NEVER TRUE
WITHIN THE PAST 12 MONTHS, YOU WORRIED THAT YOUR FOOD WOULD RUN OUT BEFORE YOU GOT THE MONEY TO BUY MORE: NEVER TRUE
IN THE PAST 12 MONTHS, HAS THE ELECTRIC, GAS, OIL, OR WATER COMPANY THREATENED TO SHUT OFF SERVICE IN YOUR HOME?: NO

## 2024-06-08 ASSESSMENT — PATIENT HEALTH QUESTIONNAIRE - PHQ9
2. FEELING DOWN, DEPRESSED, IRRITABLE, OR HOPELESS: NOT AT ALL
1. LITTLE INTEREST OR PLEASURE IN DOING THINGS: NOT AT ALL
SUM OF ALL RESPONSES TO PHQ9 QUESTIONS 1 AND 2: 0

## 2024-06-08 ASSESSMENT — PAIN DESCRIPTION - PAIN TYPE
TYPE: ACUTE PAIN
TYPE: ACUTE PAIN

## 2024-06-08 ASSESSMENT — FIBROSIS 4 INDEX
FIB4 SCORE: 2.02
FIB4 SCORE: 1.72

## 2024-06-08 NOTE — ED PROVIDER NOTES
ER Provider Note    Scribed for Abilio Pratt M.d. by Jennifer Hua. 6/8/2024  3:10 PM    Primary Care Provider: Meagan Mcneil M.D.    CHIEF COMPLAINT   Chief Complaint   Patient presents with    Palpitations           Fatigue       HPI/ROS  LIMITATION TO HISTORY   Select: : None  OUTSIDE HISTORIAN(S):  Significant other present at bedside and provided additional information as noted below.    Sandip Zhao is a 83 y.o. male who presents to the ED complaining of feeling weak onset one day ago. The patient's wife states that the patient is normally very energetic, however since yesterday, he appears to be fatigued. The patient explains that he has been feeling extra beats of the heart, and regardless of resting today, states his resting heart rate has been in the 90s. He reports feeling generally, uncomfortable with pressure like pain in the upper chest. Additionally, the patient reports being constipated, as well as lower abdominal pain. He also states he had diarrhea this morning. The patient's wife adds he has had a small intermittent cough throughout the day as well. The patient states he has had hemorrhoids for a while, and denies any blood present in the stool. The patient takes baby aspirin daily, as his wife states he had an MI and 3 stents placed in 2015. He denies fever, dysuria, emesis, or any new rashes. The patient denies any alcohol or drug use at this time.     PAST MEDICAL HISTORY  Past Medical History:   Diagnosis Date    Anemia     Environmental allergies     Hemorrhoids     Hypertension     MI (myocardial infarction) (HCC)        SURGICAL HISTORY  Past Surgical History:   Procedure Laterality Date    ABDOMINAL EXPLORATION      APPENDECTOMY      KNEE ARTHROSCOPY         FAMILY HISTORY  Family History   Problem Relation Age of Onset    Hypertension Father     Heart Disease Father         mitral valve replacement       SOCIAL HISTORY   reports that he has never smoked. He has never  "used smokeless tobacco. He reports that he does not currently use alcohol. He reports that he does not use drugs.    CURRENT MEDICATIONS  Previous Medications    ASPIRIN 81 MG EC TABLET    Take 81 mg by mouth every morning.    DILTIAZEM CD (CARDIZEM CD) 120 MG CAPSULE SR 24 HR    Take 120 mg by mouth every morning.    EZETIMIBE (ZETIA) 10 MG TAB    Take 10 mg by mouth every evening.    ROSUVASTATIN (CRESTOR) 20 MG TAB    Take 20 mg by mouth every evening.       ALLERGIES  Patient has no known allergies.    PHYSICAL EXAM  /71   Pulse 100   Resp 18   Ht 1.676 m (5' 6\")   Wt 64 kg (141 lb)   SpO2 95%   BMI 22.76 kg/m²   Constitutional: Alert in no apparent distress.  HENT: No signs of trauma, Bilateral external ears normal, Nose normal. Uvula midline.   Eyes: Pupils are equal and reactive, Conjunctiva normal, Non-icteric.   Neck: Normal range of motion, No tenderness, Supple, No stridor.   Lymphatic: No lymphadenopathy noted.   Cardiovascular: Tachycardic intermittent in the room with a HR of 101.   Thorax & Lungs: Normal breath sounds, No respiratory distress, No wheezing, No chest tenderness.   Abdomen: Mild diffuse abdominal tenderness, Bowel sounds normal, Soft, No peritoneal signs, No masses, No pulsatile masses.   Skin: Warm, Dry, No erythema, No rash.   Back: No bony tenderness, No CVA tenderness.   Extremities: No lower extremity swelling. Intact distal pulses, No edema, No tenderness, No cyanosis.  Musculoskeletal: Good range of motion in all major joints. No tenderness to palpation or major deformities noted.   Neurologic: Alert , Normal motor function, Normal sensory function, No focal deficits noted.   Psychiatric: Affect normal, Judgment normal, Mood normal.       DIAGNOSTIC STUDIES    EKG/LABS  Labs Reviewed   CBC WITH DIFFERENTIAL - Abnormal; Notable for the following components:       Result Value    RBC 4.23 (*)     Hemoglobin 13.1 (*)     Hematocrit 38.1 (*)     Neutrophils-Polys 81.80 (*) "     Lymphocytes 8.40 (*)     Lymphs (Absolute) 0.42 (*)     All other components within normal limits   COMP METABOLIC PANEL - Abnormal; Notable for the following components:    Sodium 132 (*)     Co2 19 (*)     All other components within normal limits   PROBRAIN NATRIURETIC PEPTIDE, NT   COV-2, FLU A/B, AND RSV BY PCR (CEPHEID)   LACTIC ACID   TSH WITH REFLEX TO FT4   TROPONIN   ESTIMATED GFR   URINALYSIS   BLOOD CULTURE   BLOOD CULTURE       RADIOLOGY/PROCEDURES   The attending emergency physician has independently interpreted the diagnostic imaging associated with this visit and am waiting the final reading from the radiologist.   My preliminary interpretation is a follows:     Radiologist interpretation:  CT-ABDOMEN-PELVIS WITH   Final Result         1. Left inguinal hernia containing the sigmoid colon. No evidence of obstruction.      2. Air-fluid levels throughout the nondilated colon could relate to diarrheal disease.      3. Enlarged prostate.            DX-CHEST-PORTABLE (1 VIEW)   Final Result         1. No acute cardiopulmonary abnormalities are identified.          COURSE & MEDICAL DECISION MAKING     ASSESSMENT, COURSE AND PLAN  Care Narrative:   Hydration: Based on the patient's presentation of Acute Diarrhea the patient was given IV fluids. IV Hydration was used because oral hydration was not adequate alone. Upon recheck following hydration, the patient was improved.      3:11 PM - Patient seen and examined at bedside. Discussed plan of care, including diagnostic workup. Patient agrees to the plan of care. The patient will be medicated with LR bolus. Ordered for CT abdomen pevis, DX chest, TSH with reflex to FT4, CoV Flu A/B, LA, UA, CBC w/ differential, probrain yg peptide, troponin to evaluate his symptoms.     5:55 PM- Patient was reevaluated at bedside. Discussed lab and radiology results with the patient and informed them of the plans of care moving forward.    7:23 PM- The patient agrees upon  being admitted at this time. The patient had the opportunity to ask any questions. The plan for hospitalization was discussed with the patient given their current presentation and diagnostic study results. Patient is understanding and agreeable to the plan for hospitalization. Gonzalez will admit the patient for care.        Patient with colitis/multiple fluid-filled loops of bowel on CT and given patient's diarrhea and persistent diarrhea that was expressed in the emergency department along with tachycardia plan for  Admission and IV fluids and antibiotics  Patient was having worsening abdominal pain and diarrhea, therefore admitted      DISPOSITION AND DISCUSSIONS  I have discussed management of the patient with the following physicians and OK's:  Dr. Roth (Hospitalist)     Discussion of management with other QHP or appropriate source(s): Pharmacy regarding the patient's case.      DISPOSITION:  Patient will be hospitalized by Dr. Roth in guarded condition.    FINAL DIANGOSIS  1. Diarrhea, unspecified type    2. Palpitations    3. Unilateral inguinal hernia without obstruction or gangrene, recurrence not specified    4. Colitis          The note accurately reflects work and decisions made by me.  Abilio Pratt M.D.  6/8/2024  11:27 PM

## 2024-06-08 NOTE — ED NOTES
Pharmacy Medication Reconciliation      ~Medication reconciliation updated and complete per patient at bedside   ~Allergies have been verified   ~No oral ABX within the last 30 days  ~Patient home pharmacy :  [facility]      ~Anticoagulants (rivaroxaban, apixaban, edoxaban, dabigatran, warfarin, enoxaparin) taken in the last 14 days? No

## 2024-06-08 NOTE — ED TRIAGE NOTES
Pt presents via [facility] from home for palpitations and lethargy onset last over the night. Reports SOB with exertion this AM. EKG showed frequent PVCs but no runs. Family reports change in mental status and is hard of hearing. Echo and chemical stress test on 5/28 that was unremarkable. Has 3 stents and a loop recorder. Pt A&Ox4 and ambulatory.

## 2024-06-09 LAB
ALBUMIN SERPL BCP-MCNC: 3.8 G/DL (ref 3.2–4.9)
ALBUMIN/GLOB SERPL: 1.6 G/DL
ALP SERPL-CCNC: 81 U/L (ref 30–99)
ALT SERPL-CCNC: 23 U/L (ref 2–50)
ANION GAP SERPL CALC-SCNC: 14 MMOL/L (ref 7–16)
AST SERPL-CCNC: 21 U/L (ref 12–45)
BILIRUB SERPL-MCNC: 0.8 MG/DL (ref 0.1–1.5)
BUN SERPL-MCNC: 15 MG/DL (ref 8–22)
C DIFF DNA SPEC QL NAA+PROBE: NEGATIVE
C DIFF TOX GENS STL QL NAA+PROBE: NEGATIVE
C PARVUM AG STL QL IA.RAPID: NEGATIVE
CALCIUM ALBUM COR SERPL-MCNC: 8.6 MG/DL (ref 8.5–10.5)
CALCIUM SERPL-MCNC: 8.4 MG/DL (ref 8.4–10.2)
CHLORIDE SERPL-SCNC: 100 MMOL/L (ref 96–112)
CO2 SERPL-SCNC: 17 MMOL/L (ref 20–33)
CREAT SERPL-MCNC: 0.81 MG/DL (ref 0.5–1.4)
ERYTHROCYTE [DISTWIDTH] IN BLOOD BY AUTOMATED COUNT: 44.7 FL (ref 35.9–50)
G LAMBLIA AG STL QL IA.RAPID: NEGATIVE
GFR SERPLBLD CREATININE-BSD FMLA CKD-EPI: 87 ML/MIN/1.73 M 2
GLOBULIN SER CALC-MCNC: 2.4 G/DL (ref 1.9–3.5)
GLUCOSE SERPL-MCNC: 100 MG/DL (ref 65–99)
HCT VFR BLD AUTO: 36.9 % (ref 42–52)
HGB BLD-MCNC: 12.8 G/DL (ref 14–18)
MAGNESIUM SERPL-MCNC: 1.7 MG/DL (ref 1.5–2.5)
MCH RBC QN AUTO: 31.1 PG (ref 27–33)
MCHC RBC AUTO-ENTMCNC: 34.7 G/DL (ref 32.3–36.5)
MCV RBC AUTO: 89.6 FL (ref 81.4–97.8)
PLATELET # BLD AUTO: 148 K/UL (ref 164–446)
PMV BLD AUTO: 9.3 FL (ref 9–12.9)
POTASSIUM SERPL-SCNC: 3.6 MMOL/L (ref 3.6–5.5)
PROT SERPL-MCNC: 6.2 G/DL (ref 6–8.2)
RBC # BLD AUTO: 4.12 M/UL (ref 4.7–6.1)
RV AG STL QL IA: POSITIVE
SODIUM SERPL-SCNC: 131 MMOL/L (ref 135–145)
WBC # BLD AUTO: 5.6 K/UL (ref 4.8–10.8)

## 2024-06-09 PROCEDURE — A9270 NON-COVERED ITEM OR SERVICE: HCPCS | Performed by: HOSPITALIST

## 2024-06-09 PROCEDURE — A9270 NON-COVERED ITEM OR SERVICE: HCPCS | Performed by: INTERNAL MEDICINE

## 2024-06-09 PROCEDURE — 700111 HCHG RX REV CODE 636 W/ 250 OVERRIDE (IP): Mod: JZ | Performed by: HOSPITALIST

## 2024-06-09 PROCEDURE — 700102 HCHG RX REV CODE 250 W/ 637 OVERRIDE(OP): Performed by: INTERNAL MEDICINE

## 2024-06-09 PROCEDURE — 96366 THER/PROPH/DIAG IV INF ADDON: CPT

## 2024-06-09 PROCEDURE — 85027 COMPLETE CBC AUTOMATED: CPT

## 2024-06-09 PROCEDURE — 94760 N-INVAS EAR/PLS OXIMETRY 1: CPT

## 2024-06-09 PROCEDURE — 99233 SBSQ HOSP IP/OBS HIGH 50: CPT | Performed by: HOSPITALIST

## 2024-06-09 PROCEDURE — 83735 ASSAY OF MAGNESIUM: CPT

## 2024-06-09 PROCEDURE — 700102 HCHG RX REV CODE 250 W/ 637 OVERRIDE(OP): Performed by: HOSPITALIST

## 2024-06-09 PROCEDURE — 96372 THER/PROPH/DIAG INJ SC/IM: CPT

## 2024-06-09 PROCEDURE — 700105 HCHG RX REV CODE 258: Performed by: HOSPITALIST

## 2024-06-09 PROCEDURE — 80053 COMPREHEN METABOLIC PANEL: CPT

## 2024-06-09 PROCEDURE — 96367 TX/PROPH/DG ADDL SEQ IV INF: CPT

## 2024-06-09 PROCEDURE — G0378 HOSPITAL OBSERVATION PER HR: HCPCS

## 2024-06-09 RX ORDER — SODIUM CHLORIDE, SODIUM LACTATE, POTASSIUM CHLORIDE, CALCIUM CHLORIDE 600; 310; 30; 20 MG/100ML; MG/100ML; MG/100ML; MG/100ML
1000 INJECTION, SOLUTION INTRAVENOUS ONCE
Status: COMPLETED | OUTPATIENT
Start: 2024-06-09 | End: 2024-06-09

## 2024-06-09 RX ORDER — MAGNESIUM SULFATE HEPTAHYDRATE 40 MG/ML
2 INJECTION, SOLUTION INTRAVENOUS ONCE
Status: COMPLETED | OUTPATIENT
Start: 2024-06-09 | End: 2024-06-09

## 2024-06-09 RX ADMIN — Medication 5 MG: at 01:08

## 2024-06-09 RX ADMIN — ENOXAPARIN SODIUM 40 MG: 100 INJECTION SUBCUTANEOUS at 18:14

## 2024-06-09 RX ADMIN — ASPIRIN 81 MG: 81 TABLET, COATED ORAL at 05:29

## 2024-06-09 RX ADMIN — ROSUVASTATIN 20 MG: 10 TABLET, FILM COATED ORAL at 18:14

## 2024-06-09 RX ADMIN — DILTIAZEM HYDROCHLORIDE 120 MG: 120 CAPSULE, COATED, EXTENDED RELEASE ORAL at 05:29

## 2024-06-09 RX ADMIN — EZETIMIBE 10 MG: 10 TABLET ORAL at 18:13

## 2024-06-09 RX ADMIN — Medication 5 MG: at 21:03

## 2024-06-09 RX ADMIN — SODIUM CHLORIDE, POTASSIUM CHLORIDE, SODIUM LACTATE AND CALCIUM CHLORIDE: 600; 310; 30; 20 INJECTION, SOLUTION INTRAVENOUS at 05:30

## 2024-06-09 RX ADMIN — SODIUM CHLORIDE, POTASSIUM CHLORIDE, SODIUM LACTATE AND CALCIUM CHLORIDE 1000 ML: 600; 310; 30; 20 INJECTION, SOLUTION INTRAVENOUS at 18:15

## 2024-06-09 RX ADMIN — MAGNESIUM SULFATE HEPTAHYDRATE 2 G: 2 INJECTION, SOLUTION INTRAVENOUS at 08:26

## 2024-06-09 ASSESSMENT — ENCOUNTER SYMPTOMS
HEARTBURN: 0
WHEEZING: 0
VOMITING: 0
FEVER: 0
PND: 0
BRUISES/BLEEDS EASILY: 0
HEMOPTYSIS: 0
CHILLS: 0
PALPITATIONS: 0
MYALGIAS: 0
BLURRED VISION: 0
DOUBLE VISION: 0
DEPRESSION: 0
HEADACHES: 0
DIZZINESS: 0
CLAUDICATION: 0
COUGH: 0
BACK PAIN: 0
NAUSEA: 0

## 2024-06-09 ASSESSMENT — PAIN DESCRIPTION - PAIN TYPE
TYPE: ACUTE PAIN

## 2024-06-09 NOTE — ED NOTES
Patient ambulatory to restroom with steady weight bearing gait. Denied dizziness upon standing. Returned to [facility] and placed back on monitor.

## 2024-06-09 NOTE — ASSESSMENT & PLAN NOTE
Likely secondary to reduced oral intake, and increase in losses secondary to diarrhea  Encourage oral intake as tolerated, antiemetics as needed.  Intravenous hydration until adequate oral intake is achieved.   Due to diarrhea  Continue supportive treatment

## 2024-06-09 NOTE — HOSPITAL COURSE
Sandip Zhao is a 83 y.o. male with a past medical history of primary hypertension, coronary artery disease and prediabetes who was recently hospitalized for chest pain rule out last week who presented 6/8/2024 with generalized weakness, palpitations and easy fatigability and chest pain.  The pain is described as having a pressure like character.  He denies having fevers or chills.  Patient wife reports that he has been very inactive and lethargic throughout the day which is very different from his baseline.

## 2024-06-09 NOTE — ASSESSMENT & PLAN NOTE
The ASCVD Risk score (Jake STEEL, et al., 2019) failed to calculate for the following reasons:    The 2019 ASCVD risk score is only valid for ages 40 to 79  Patient was just hospitalized last week for chest pain and had extensive workup including echo telemetry, troponin trend and a stress test that were now unremarkable.  Rule out COVID-19, influenza and RSV  I will check a D-dimer, if elevated consider CT angiography of the chest to rule out pulmonary embolism    D-dimer is 0.66, corrected to patient's age it is within normal limits, patient is not tachycardic nor hypoxic denies any chest pain.

## 2024-06-09 NOTE — CARE PLAN
Problem: Knowledge Deficit - Standard  Goal: Patient and family/care givers will demonstrate understanding of plan of care, disease process/condition, diagnostic tests and medications  Outcome: Progressing     Problem: Fall Risk  Goal: Patient will remain free from falls  Outcome: Progressing  Note: Fall precaution measures in place,bed lowest position,call light within reach,bed and strip alarms on   The patient is Stable - Low risk of patient condition declining or worsening         Progress made toward(s) clinical / shift goals:  Submitted stool sample to lab    Patient is not progressing towards the following goals:

## 2024-06-09 NOTE — DISCHARGE INSTRUCTIONS
Please discuss the following findings with your regular doctor:  CT-ABDOMEN-PELVIS WITH   Final Result         1. Left inguinal hernia containing the sigmoid colon. No evidence of obstruction.      2. Air-fluid levels throughout the nondilated colon could relate to diarrheal disease.      3. Enlarged prostate.            DX-CHEST-PORTABLE (1 VIEW)   Final Result         1. No acute cardiopulmonary abnormalities are identified.        Labs Reviewed   CBC WITH DIFFERENTIAL - Abnormal; Notable for the following components:       Result Value    RBC 4.23 (*)     Hemoglobin 13.1 (*)     Hematocrit 38.1 (*)     Neutrophils-Polys 81.80 (*)     Lymphocytes 8.40 (*)     Lymphs (Absolute) 0.42 (*)     All other components within normal limits   COMP METABOLIC PANEL - Abnormal; Notable for the following components:    Sodium 132 (*)     Co2 19 (*)     All other components within normal limits   PROBRAIN NATRIURETIC PEPTIDE, NT   COV-2, FLU A/B, AND RSV BY PCR (CEPHEID)   LACTIC ACID   TSH WITH REFLEX TO FT4   TROPONIN   ESTIMATED GFR   URINALYSIS

## 2024-06-09 NOTE — PROGRESS NOTES
Telemetry Shift Summary     Rhythm SR ST  HR Range   Ectopy fPVC,rBig,rTrig as reported by monitor tech  Measurements  .16/.08/.32  Per strip printed 0400     Normal Values  Rhythm SR  HR Range    Measurements 0.12-0.20 / 0.06-0.10  / 0.30-0.52

## 2024-06-09 NOTE — PROGRESS NOTES
Hospital Medicine Daily Progress Note    Date of Service  6/9/2024    Chief Complaint  Sandip Zhao is a 83 y.o. male admitted 6/8/2024 with diarrhea chest pain    Hospital Course  Sandip Zhao is a 83 y.o. male with a past medical history of primary hypertension, coronary artery disease and prediabetes who was recently hospitalized for chest pain rule out last week who presented 6/8/2024 with generalized weakness, palpitations and easy fatigability and chest pain.  The pain is described as having a pressure like character.  He denies having fevers or chills.  Patient wife reports that he has been very inactive and lethargic throughout the day which is very different from his baseline.    Interval Problem Update  6/9 patient is new to me today, he is alert oriented follows commands, wife is at bedside, per patient and patient wife he had diarrhea earlier this morning but not since so far, he is tolerating diet, chest pain has resolved, C. difficile and stool analysis pending, patient is still feeling weak, D-dimer is mildly elevated but within normal limits when corrected to his age, patient denies any palpitations no shortness of breath, will continue close monitoring, hopefully will be able to DC home tomorrow if diarrhea improved and tolerating diet.    I have discussed this patient's plan of care and discharge plan at IDT rounds today with Case Management, Nursing, Nursing leadership, and other members of the IDT team.    Consultants/Specialty      Code Status  Full Code    Disposition  The patient is not medically cleared for discharge to home or a post-acute facility.      I have placed the appropriate orders for post-discharge needs.    Review of Systems  Review of Systems   Constitutional:  Negative for chills and fever.   Eyes:  Negative for blurred vision and double vision.   Respiratory:  Negative for cough, hemoptysis and wheezing.    Cardiovascular:  Negative for chest pain, palpitations,  claudication, leg swelling and PND.   Gastrointestinal:  Negative for heartburn, nausea and vomiting.   Genitourinary:  Negative for hematuria and urgency.   Musculoskeletal:  Negative for back pain and myalgias.   Skin:  Negative for rash.   Neurological:  Negative for dizziness and headaches.   Endo/Heme/Allergies:  Does not bruise/bleed easily.   Psychiatric/Behavioral:  Negative for depression.         Physical Exam  Temp:  [36.6 °C (97.8 °F)-37.3 °C (99.2 °F)] 36.6 °C (97.8 °F)  Pulse:  [] 72  Resp:  [17-18] 17  BP: (107-154)/(63-78) 125/64  SpO2:  [91 %-98 %] 94 %    Physical Exam  Vitals and nursing note reviewed.   Constitutional:       Appearance: Normal appearance.   Eyes:      General: No scleral icterus.        Right eye: No discharge.         Left eye: No discharge.      Conjunctiva/sclera: Conjunctivae normal.   Cardiovascular:      Rate and Rhythm: Normal rate and regular rhythm.      Pulses: Normal pulses.      Heart sounds: Normal heart sounds.   Pulmonary:      Effort: Pulmonary effort is normal. No respiratory distress.      Breath sounds: Normal breath sounds.   Abdominal:      General: Bowel sounds are normal. There is no distension.      Tenderness: There is no abdominal tenderness.   Musculoskeletal:         General: Normal range of motion.      Cervical back: Normal range of motion and neck supple.      Right lower leg: No edema.      Left lower leg: No edema.   Skin:     General: Skin is warm and dry.      Capillary Refill: Capillary refill takes less than 2 seconds.      Coloration: Skin is not jaundiced.   Neurological:      General: No focal deficit present.      Mental Status: He is alert and oriented to person, place, and time.      Cranial Nerves: No cranial nerve deficit.   Psychiatric:         Mood and Affect: Mood normal.         Behavior: Behavior normal.         Fluids    Intake/Output Summary (Last 24 hours) at 6/9/2024 1346  Last data filed at 6/9/2024 0728  Gross per 24  hour   Intake 1360 ml   Output 650 ml   Net 710 ml       Laboratory  Recent Labs     06/08/24  1500 06/09/24  0133   WBC 5.0 5.6   RBC 4.23* 4.12*   HEMOGLOBIN 13.1* 12.8*   HEMATOCRIT 38.1* 36.9*   MCV 90.1 89.6   MCH 31.0 31.1   MCHC 34.4 34.7   RDW 43.8 44.7   PLATELETCT 181 148*   MPV 9.2 9.3     Recent Labs     06/08/24  1500 06/09/24  0133   SODIUM 132* 131*   POTASSIUM 3.7 3.6   CHLORIDE 99 100   CO2 19* 17*   GLUCOSE 97 100*   BUN 16 15   CREATININE 0.78 0.81   CALCIUM 8.7 8.4                   Imaging  CT-ABDOMEN-PELVIS WITH   Final Result         1. Left inguinal hernia containing the sigmoid colon. No evidence of obstruction.      2. Air-fluid levels throughout the nondilated colon could relate to diarrheal disease.      3. Enlarged prostate.            DX-CHEST-PORTABLE (1 VIEW)   Final Result         1. No acute cardiopulmonary abnormalities are identified.           Assessment/Plan  * Diarrhea- (present on admission)  Assessment & Plan  Differentials include viral infection, C. difficile colitis    I will check stool for C. difficile toxins.  I will check for rotavirus, crypto, Giardia.  Supportive care with intravenous fluids monitor electrolytes and replace accordingly    Continue supportive treatment      Dehydration- (present on admission)  Assessment & Plan  Likely secondary to reduced oral intake, and increase in losses secondary to diarrhea  Encourage oral intake as tolerated, antiemetics as needed.  Intravenous hydration until adequate oral intake is achieved.   Due to diarrhea  Continue supportive treatment    Metabolic acidosis- (present on admission)  Assessment & Plan  Likely due to reduced intravascular volume and increase bicarb losses through diarrhea    I will start intravenous fluids    Anticipate improvement with intravenous fluids  Continue to monitor, I will order follow-up bicarb level          Due to diarrhea, monitoring    Anemia- (present on admission)  Assessment &  Plan  Appears to be chronic  No evidence of gross bleeding.  Monitor hemoglobin. I will order a follow-up CBC.  Transfuse for a hemoglobin of less than or equal to 7.     Hemoglobin stable 12.8    Mixed hyperlipidemia- (present on admission)  Assessment & Plan  Cardiac diet when able to take orally.  I will start rosuvastatin and ezetimibe     Monitoring    Primary hypertension- (present on admission)  Assessment & Plan  I will start diltiazem with hold parameters.  Monitoring    Prediabetes- (present on admission)  Assessment & Plan  Without significant hyperglycemia.    Monitor blood sugars with daily labs.  I will order a follow-up glucose level.  Consider sliding scale insulin according to blood sugar trend.     Monitoring    Chest pain- (present on admission)  Assessment & Plan  The ASCVD Risk score (Jake STEEL, et al., 2019) failed to calculate for the following reasons:    The 2019 ASCVD risk score is only valid for ages 40 to 79  Patient was just hospitalized last week for chest pain and had extensive workup including echo telemetry, troponin trend and a stress test that were now unremarkable.  Rule out COVID-19, influenza and RSV  I will check a D-dimer, if elevated consider CT angiography of the chest to rule out pulmonary embolism    D-dimer is 0.66, corrected to patient's age it is within normal limits, patient is not tachycardic nor hypoxic denies any chest pain.         VTE prophylaxis: Lovenox    I have performed a physical exam and reviewed and updated ROS and Plan today (6/9/2024). In review of yesterday's note (6/8/2024), there are no changes except as documented above.      Total time of 51 minutes spent prepping to see patient (e.g. reviewing  tests/imaging results, notes from consultants, bedside nurse, night shift ) obtaining and/or reviewing separately obtained history. Performing a medically appropriate examination and evaluation.  Counseling and educating the patient.  Ordering medications,  tests, or procedures.  Referring and communicating with other health care professionals.  Documenting clinical information in EPIC.  Independently interpreting results and communicating results to patient.  Care coordination.

## 2024-06-09 NOTE — ASSESSMENT & PLAN NOTE
Appears to be chronic  No evidence of gross bleeding.  Monitor hemoglobin. I will order a follow-up CBC.  Transfuse for a hemoglobin of less than or equal to 7.     Hemoglobin stable 12.8

## 2024-06-09 NOTE — H&P
Hospital Medicine History & Physical Note    Date of Service  6/8/2024    Primary Care Physician  Meagan Mcneil M.D.     Consultants  None     Code Status  Full Code    Chief Complaint  Chief Complaint   Patient presents with    Palpitations           Fatigue     History of Presenting Illness  Sandip Zhao is a 83 y.o. male with a past medical history of primary hypertension, coronary artery disease and prediabetes who was recently hospitalized for chest pain rule out last week who presented 6/8/2024 with generalized weakness, palpitations and easy fatigability and chest pain.  The pain is described as having a pressure like character.  He denies having fevers or chills.  Patient wife reports that he has been very inactive and lethargic throughout the day which is very different from his baseline.     I discussed the plan of care with emergency physician, the patient and patient wife present at bedside in the emergency room    Review of Systems  Review of Systems   Constitutional:  Positive for malaise/fatigue. Negative for chills and fever.   Eyes:  Negative for discharge and redness.   Respiratory:  Negative for cough, shortness of breath and stridor.    Cardiovascular:  Positive for chest pain. Negative for leg swelling.   Gastrointestinal:  Positive for diarrhea. Negative for abdominal pain and vomiting.   Genitourinary:  Negative for flank pain.   Musculoskeletal:  Negative for myalgias.   Skin: Negative.    Neurological:  Negative for focal weakness.   Endo/Heme/Allergies:  Does not bruise/bleed easily.   Psychiatric/Behavioral:  The patient is not nervous/anxious.        Past Medical History   has a past medical history of Anemia, Environmental allergies, Hemorrhoids, Hypertension, and MI (myocardial infarction) (HCC).    Surgical History   has a past surgical history that includes abdominal exploration; appendectomy; and knee arthroscopy.     Family History  family history includes Heart Disease  in his father; Hypertension in his father.      Social History   reports that he has never smoked. He has never used smokeless tobacco. He reports that he does not currently use alcohol. He reports that he does not use drugs.    Allergies  No Known Allergies    Medications  Prior to Admission Medications   Prescriptions Last Dose Informant Patient Reported? Taking?   DILTIAZem CD (CARDIZEM CD) 120 MG CAPSULE SR 24 HR 6/8/2024 at 0900 Patient Yes Yes   Sig: Take 120 mg by mouth every morning.   aspirin 81 MG EC tablet 6/8/2024 at 0900 Patient Yes Yes   Sig: Take 81 mg by mouth every morning.   ezetimibe (ZETIA) 10 MG Tab 6/7/2024 at PM Patient Yes Yes   Sig: Take 10 mg by mouth every evening.   rosuvastatin (CRESTOR) 20 MG Tab 6/7/2024 at PM Patient Yes Yes   Sig: Take 20 mg by mouth every evening.      Facility-Administered Medications: None     Physical Exam  Temp:  [36.7 °C (98 °F)] 36.7 °C (98 °F)  Pulse:  [] 102  Resp:  [17-18] 17  BP: (125-154)/(67-78) 154/77  SpO2:  [94 %-98 %] 97 %  Blood Pressure : (!) 154/77   Temperature: 36.7 °C (98 °F)   Pulse: (!) 102   Respiration: 17   Pulse Oximetry: 97 %     Physical Exam  Constitutional:       General: He is not in acute distress.     Appearance: He is not ill-appearing or diaphoretic.   HENT:      Head: Atraumatic.      Right Ear: External ear normal.      Left Ear: External ear normal.      Nose: No congestion or rhinorrhea.      Mouth/Throat:      Mouth: Mucous membranes are dry.   Eyes:      General: No scleral icterus.        Right eye: No discharge.         Left eye: No discharge.      Pupils: Pupils are equal, round, and reactive to light.   Cardiovascular:      Rate and Rhythm: Regular rhythm. Tachycardia present.   Pulmonary:      Effort: Pulmonary effort is normal.   Abdominal:      General: There is no distension.   Musculoskeletal:      Cervical back: Neck supple. No rigidity. No muscular tenderness.      Right lower leg: No edema.      Left lower  leg: No edema.   Skin:     General: Skin is dry.      Capillary Refill: Capillary refill takes 2 to 3 seconds.      Coloration: Skin is pale. Skin is not jaundiced.   Neurological:      Mental Status: He is alert and oriented to person, place, and time.      Coordination: Coordination normal.   Psychiatric:         Mood and Affect: Mood normal.         Behavior: Behavior normal.       Laboratory:  Recent Labs     06/08/24  1500   WBC 5.0   RBC 4.23*   HEMOGLOBIN 13.1*   HEMATOCRIT 38.1*   MCV 90.1   MCH 31.0   MCHC 34.4   RDW 43.8   PLATELETCT 181   MPV 9.2     Recent Labs     06/08/24  1500   SODIUM 132*   POTASSIUM 3.7   CHLORIDE 99   CO2 19*   GLUCOSE 97   BUN 16   CREATININE 0.78   CALCIUM 8.7     Recent Labs     06/08/24  1500   ALTSGPT 25   ASTSGOT 22   ALKPHOSPHAT 87   TBILIRUBIN 0.8   GLUCOSE 97         Recent Labs     06/08/24  1500   NTPROBNP 121         Recent Labs     06/08/24  1500   TROPONINT 16     Imaging:  CT-ABDOMEN-PELVIS WITH   Final Result         1. Left inguinal hernia containing the sigmoid colon. No evidence of obstruction.      2. Air-fluid levels throughout the nondilated colon could relate to diarrheal disease.      3. Enlarged prostate.            DX-CHEST-PORTABLE (1 VIEW)   Final Result         1. No acute cardiopulmonary abnormalities are identified.        Assessment/Plan:  Justification for Admission Status  I anticipate this patient is appropriate for observation status at this time because patient has chest pain and diarrhea with dehydration    Patient will need telemetry bed on medical service     * Diarrhea- (present on admission)  Assessment & Plan  Differentials include viral infection, C. difficile colitis    I will check stool for C. difficile toxins.  I will check for rotavirus, crypto, Giardia.  Supportive care with intravenous fluids monitor electrolytes and replace accordingly      Chest pain- (present on admission)  Assessment & Plan  The ASCVD Risk score (Jake DK, et  al., 2019) failed to calculate for the following reasons:    The 2019 ASCVD risk score is only valid for ages 40 to 79  Patient was just hospitalized last week for chest pain and had extensive workup including echo telemetry, troponin trend and a stress test that were now unremarkable.  Rule out COVID-19, influenza and RSV  I will check a D-dimer, if elevated consider CT angiography of the chest to rule out pulmonary embolism    Dehydration- (present on admission)  Assessment & Plan  Likely secondary to reduced oral intake, and increase in losses secondary to diarrhea  Encourage oral intake as tolerated, antiemetics as needed.  Intravenous hydration until adequate oral intake is achieved.     Metabolic acidosis- (present on admission)  Assessment & Plan  Likely due to reduced intravascular volume and increase bicarb losses through diarrhea    I will start intravenous fluids    Anticipate improvement with intravenous fluids  Continue to monitor, I will order follow-up bicarb level        Anemia- (present on admission)  Assessment & Plan  Appears to be chronic  No evidence of gross bleeding.  Monitor hemoglobin. I will order a follow-up CBC.  Transfuse for a hemoglobin of less than or equal to 7.     Primary hypertension- (present on admission)  Assessment & Plan  I will start diltiazem with hold parameters    Prediabetes- (present on admission)  Assessment & Plan  Without significant hyperglycemia.    Monitor blood sugars with daily labs.  I will order a follow-up glucose level.  Consider sliding scale insulin according to blood sugar trend.     Mixed hyperlipidemia- (present on admission)  Assessment & Plan  Cardiac diet when able to take orally.  I will start rosuvastatin and ezetimibe       VTE prophylaxis: SCDs/TEDs and enoxaparin ppx

## 2024-06-09 NOTE — ASSESSMENT & PLAN NOTE
Likely due to reduced intravascular volume and increase bicarb losses through diarrhea    I will start intravenous fluids    Anticipate improvement with intravenous fluids  Continue to monitor, I will order follow-up bicarb level          Due to diarrhea, monitoring

## 2024-06-09 NOTE — ASSESSMENT & PLAN NOTE
Differentials include viral infection, C. difficile colitis    I will check stool for C. difficile toxins.  I will check for rotavirus, crypto, Giardia.  Supportive care with intravenous fluids monitor electrolytes and replace accordingly    Continue supportive treatment

## 2024-06-09 NOTE — ASSESSMENT & PLAN NOTE
Without significant hyperglycemia.    Monitor blood sugars with daily labs.  I will order a follow-up glucose level.  Consider sliding scale insulin according to blood sugar trend.     Monitoring

## 2024-06-09 NOTE — ED NOTES
Patient discussed with Dr. Pratt his wishes to stay in the hospital due to his condition. Pt now up to restroom with steady weight bearing gait. He denied dizziness upon standing.

## 2024-06-09 NOTE — PROGRESS NOTES
4 Eyes Skin Assessment Completed by Abdoul RN and Sam RN.    Head scar right forehead  Ears WDL  Nose WDL  Mouth WDL  Neck WDL  Breast/Chest WDL  Shoulder Blades WDL  Spine WDL  (R) Arm/Elbow/Hand Redness and Abrasion right elbow  (L) Arm/Elbow/Hand WDL  Abdomen Scar and Scab  Groin WDL  Scrotum/Coccyx/Buttocks WDL  (R) Leg WDL  (L) Leg WDL  (R) Heel/Foot/Toe Boggy heel  (L) Heel/Foot/Toe Boggy heel          Devices In Places Tele Box      Interventions In Place Heel Mepilex and Pillows    Possible Skin Injury No    Pictures Uploaded Into Epic N/A  Wound Consult Placed N/A  RN Wound Prevention Protocol Ordered No

## 2024-06-10 VITALS
SYSTOLIC BLOOD PRESSURE: 119 MMHG | OXYGEN SATURATION: 98 % | RESPIRATION RATE: 18 BRPM | HEART RATE: 62 BPM | DIASTOLIC BLOOD PRESSURE: 58 MMHG | BODY MASS INDEX: 23.03 KG/M2 | WEIGHT: 143.3 LBS | TEMPERATURE: 96.5 F | HEIGHT: 66 IN

## 2024-06-10 PROBLEM — R19.7 DIARRHEA: Status: RESOLVED | Noted: 2024-06-08 | Resolved: 2024-06-10

## 2024-06-10 PROBLEM — E87.20 METABOLIC ACIDOSIS: Status: RESOLVED | Noted: 2024-06-08 | Resolved: 2024-06-10

## 2024-06-10 PROBLEM — R07.9 CHEST PAIN: Status: RESOLVED | Noted: 2024-05-27 | Resolved: 2024-06-10

## 2024-06-10 PROBLEM — E86.0 DEHYDRATION: Status: RESOLVED | Noted: 2024-06-08 | Resolved: 2024-06-10

## 2024-06-10 LAB
ANION GAP SERPL CALC-SCNC: 9 MMOL/L (ref 7–16)
BUN SERPL-MCNC: 11 MG/DL (ref 8–22)
CALCIUM SERPL-MCNC: 8.3 MG/DL (ref 8.4–10.2)
CHLORIDE SERPL-SCNC: 104 MMOL/L (ref 96–112)
CO2 SERPL-SCNC: 20 MMOL/L (ref 20–33)
CREAT SERPL-MCNC: 0.71 MG/DL (ref 0.5–1.4)
ERYTHROCYTE [DISTWIDTH] IN BLOOD BY AUTOMATED COUNT: 44.6 FL (ref 35.9–50)
GFR SERPLBLD CREATININE-BSD FMLA CKD-EPI: 91 ML/MIN/1.73 M 2
GLUCOSE SERPL-MCNC: 98 MG/DL (ref 65–99)
HCT VFR BLD AUTO: 32.1 % (ref 42–52)
HGB BLD-MCNC: 11.2 G/DL (ref 14–18)
MAGNESIUM SERPL-MCNC: 1.9 MG/DL (ref 1.5–2.5)
MCH RBC QN AUTO: 31 PG (ref 27–33)
MCHC RBC AUTO-ENTMCNC: 34.9 G/DL (ref 32.3–36.5)
MCV RBC AUTO: 88.9 FL (ref 81.4–97.8)
PHOSPHATE SERPL-MCNC: 3.1 MG/DL (ref 2.5–4.5)
PLATELET # BLD AUTO: 128 K/UL (ref 164–446)
PMV BLD AUTO: 9.5 FL (ref 9–12.9)
POTASSIUM SERPL-SCNC: 3.5 MMOL/L (ref 3.6–5.5)
RBC # BLD AUTO: 3.61 M/UL (ref 4.7–6.1)
SODIUM SERPL-SCNC: 133 MMOL/L (ref 135–145)
WBC # BLD AUTO: 2.9 K/UL (ref 4.8–10.8)

## 2024-06-10 PROCEDURE — G0378 HOSPITAL OBSERVATION PER HR: HCPCS

## 2024-06-10 PROCEDURE — 99239 HOSP IP/OBS DSCHRG MGMT >30: CPT | Performed by: HOSPITALIST

## 2024-06-10 PROCEDURE — 80048 BASIC METABOLIC PNL TOTAL CA: CPT

## 2024-06-10 PROCEDURE — 700102 HCHG RX REV CODE 250 W/ 637 OVERRIDE(OP): Performed by: HOSPITALIST

## 2024-06-10 PROCEDURE — 85027 COMPLETE CBC AUTOMATED: CPT

## 2024-06-10 PROCEDURE — 94760 N-INVAS EAR/PLS OXIMETRY 1: CPT

## 2024-06-10 PROCEDURE — 700102 HCHG RX REV CODE 250 W/ 637 OVERRIDE(OP): Mod: JZ | Performed by: HOSPITALIST

## 2024-06-10 PROCEDURE — 84100 ASSAY OF PHOSPHORUS: CPT

## 2024-06-10 PROCEDURE — 83735 ASSAY OF MAGNESIUM: CPT

## 2024-06-10 PROCEDURE — A9270 NON-COVERED ITEM OR SERVICE: HCPCS | Performed by: HOSPITALIST

## 2024-06-10 PROCEDURE — A9270 NON-COVERED ITEM OR SERVICE: HCPCS | Mod: JZ | Performed by: HOSPITALIST

## 2024-06-10 RX ORDER — POTASSIUM CHLORIDE 20 MEQ/1
20 TABLET, EXTENDED RELEASE ORAL DAILY
Status: DISCONTINUED | OUTPATIENT
Start: 2024-06-10 | End: 2024-06-10 | Stop reason: HOSPADM

## 2024-06-10 RX ADMIN — ASPIRIN 81 MG: 81 TABLET, COATED ORAL at 04:52

## 2024-06-10 RX ADMIN — POTASSIUM CHLORIDE 20 MEQ: 1500 TABLET, EXTENDED RELEASE ORAL at 08:11

## 2024-06-10 RX ADMIN — DILTIAZEM HYDROCHLORIDE 120 MG: 120 CAPSULE, COATED, EXTENDED RELEASE ORAL at 04:52

## 2024-06-10 ASSESSMENT — SOCIAL DETERMINANTS OF HEALTH (SDOH)
WITHIN THE PAST 12 MONTHS, THE FOOD YOU BOUGHT JUST DIDN'T LAST AND YOU DIDN'T HAVE MONEY TO GET MORE: NEVER TRUE
IN THE PAST 12 MONTHS, HAS THE ELECTRIC, GAS, OIL, OR WATER COMPANY THREATENED TO SHUT OFF SERVICE IN YOUR HOME?: NO
WITHIN THE PAST 12 MONTHS, YOU WORRIED THAT YOUR FOOD WOULD RUN OUT BEFORE YOU GOT THE MONEY TO BUY MORE: NEVER TRUE

## 2024-06-10 ASSESSMENT — PAIN DESCRIPTION - PAIN TYPE: TYPE: ACUTE PAIN

## 2024-06-10 ASSESSMENT — FIBROSIS 4 INDEX: FIB4 SCORE: 2.84

## 2024-06-10 NOTE — CARE PLAN
The patient is Watcher - Medium risk of patient condition declining or worsening    Shift Goals  Clinical Goals: monitor labs, stool consistency, maintain hydration, fall prevention  Patient Goals: rest, updates on POC  Family Goals: updates on POC    Progress made toward(s) clinical / shift goals:    Problem: Knowledge Deficit - Standard  Goal: Patient and family/care givers will demonstrate understanding of plan of care, disease process/condition, diagnostic tests and medications  Outcome: Progressing     Problem: Fall Risk  Goal: Patient will remain free from falls  Outcome: Progressing     Problem: Pain - Standard  Goal: Alleviation of pain or a reduction in pain to the patient’s comfort goal  Outcome: Progressing       Patient is not progressing towards the following goals:

## 2024-06-10 NOTE — PROGRESS NOTES
Monitor Summary     Rhythm SR/ST  HR Range 65-89  Ectopy F-PVCs, R-Big/Trig  WA/QRS/QT 0.20/0.08/0.36

## 2024-06-10 NOTE — PROGRESS NOTES
Telemetry Shift Summary     Rhythm SR  HR Range 69-77  Ectopy r-oPVC;rTrigem/Bigem  Measurements 0.20/0.08/0.38           Normal Values  Rhythm SR  HR Range    Measurements 0.12-0.20 / 0.06-0.10  / 0.30-0.52

## 2024-06-10 NOTE — DISCHARGE PLANNING
Met with pt who said he is independent with ADLs and IADLs. Wife was at bedside as well. Wife can help pt as needed.     PCP is Dr.Jennifer Mcneil.    Confirmed information on facesheet as correct.   PCP is : Dr. Meagan Mcneil  DME:none  Social Support:wife  Anticipate dc plan and resources needed:HOME      Care Transition Team Assessment    Information Source  Orientation Level: Oriented X4  Information Given By: Patient  Who is responsible for making decisions for patient? : Patient    Readmission Evaluation  Is this a readmission?: No    Elopement Risk  Legal Hold: No  Ambulatory or Self Mobile in Wheelchair: Yes  Disoriented: No  Psychiatric Symptoms: None  History of Wandering: No  Elopement this Admit: No  Vocalizing Wanting to Leave: No  Displays Behaviors, Body Language Wanting to Leave: No-Not at Risk for Elopement  Elopement Risk: Not at Risk for Elopement    Interdisciplinary Discharge Planning  Does Admitting Nurse Feel This Could be a Complex Discharge?: No  Primary Care Physician: Dr. Meagan Mcneil  Lives with - Patient's Self Care Capacity: Spouse  Patient or legal guardian wants to designate a caregiver: No  Support Systems: Family Member(s)  Housing / Facility: [address]  Do You Take your Prescribed Medications Regularly: Yes  Able to Return to Previous ADL's: No  Mobility Issues: No  Prior Services: Home-Independent  Patient Prefers to be Discharged to:: Home  Assistance Needed: Yes  Durable Medical Equipment: Not Applicable    Discharge Preparedness  What is your plan after discharge?: Home with help  What are your discharge supports?: Spouse  Prior Functional Level: Ambulatory, Drives Self, Independent with Activities of Daily Living, Independent with Medication Management  Difficulity with ADLs: None  Difficulity with IADLs: None    Functional Assesment  Prior Functional Level: Ambulatory, Drives Self, Independent with Activities of Daily Living, Independent with Medication  Management    Finances  Financial Barriers to Discharge: No  Prescription Coverage: Yes    Vision / Hearing Impairment  Vision Impairment : Yes  Right Eye Vision: Impaired, Wears Glasses  Left Eye Vision: Impaired, Wears Glasses  Hearing Impairment : Yes  Hearing Impairment: Both Ears, Hearing Device Not Available  Does Pt Need Special Equipment for the Hearing Impaired?: No    Values / Beliefs / Concerns  Values / Beliefs Concerns : No    Advance Directive  Advance Directive?: Living Will    Domestic Abuse  Have you ever been the victim of abuse or violence?: No  Possible Abuse/Neglect Reported to:: Not Applicable         Discharge Risks or Barriers  Discharge risks or barriers?: No  Patient risk factors: Cognitive / sensory / physical deficit    Anticipated Discharge Information  Discharge Disposition: Discharged to home/self care (01)

## 2024-06-10 NOTE — PROGRESS NOTES
Received report from night shift RN, assumed care of pt. [id number], VSS on room air. Pt denies pain, nausea, SOB. Reporting diarrhea still. Updated on plan of care, pt educated to call for assistance.     0923: received call from [facility], blood cultures positive for gram pos. Cocci- possibly staph. Results read back. Voalte message sent to Dr. Lizarraga.

## 2024-06-10 NOTE — DISCHARGE SUMMARY
Discharge Summary    CHIEF COMPLAINT ON ADMISSION  Chief Complaint   Patient presents with    Palpitations           Fatigue       Reason for Admission  EMS     Admission Date  6/8/2024    CODE STATUS  Full Code    HPI & HOSPITAL COURSE      Please see original H&P for specific information  Sandip Zhao is a 83 y.o. male with a past medical history of primary hypertension, coronary artery disease and prediabetes who was recently hospitalized for chest pain rule out last week who presented 6/8/2024 with generalized weakness, palpitations and easy fatigability and chest pain.  The pain is described as having a pressure like character.  He denies having fevers or chills.  Patient wife reports that he has been very inactive and lethargic throughout the day which is very different from his baseline.      6/9 patient is new to me today, he is alert oriented follows commands, wife is at bedside, per patient and patient wife he had diarrhea earlier this morning but not since so far, he is tolerating diet, chest pain has resolved, C. difficile and stool analysis pending, patient is still feeling weak, D-dimer is mildly elevated but within normal limits when corrected to his age, patient denies any palpitations no shortness of breath, will continue close monitoring, hopefully will be able to DC home tomorrow if diarrhea improved and tolerating diet.       Patient with stool analysis positive for rotavirus, requiring supportive treatment, patient's electrolytes have been replaced, today he is feeling much better he is alert oriented follows commands, patient's wife is at bedside, patient is able to drink fluids and tolerate diet, patient denies any dizziness lightheadedness shortness of breath chest pain focal weakness numbness tingling, patient at this time is going to be discharged home today, he will follow-up with primary care doctor, all question have been answered, patient and patient's wife expressed understanding  of her discharge plan and agree with the request have been answered.    Therefore, he is discharged in good and stable condition to home with close outpatient follow-up.        Discharge Date  6/10/2024    FOLLOW UP ITEMS POST DISCHARGE  Primary care physician    DISCHARGE DIAGNOSES  Principal Problem (Resolved):    Diarrhea (POA: Yes)  Active Problems:    Prediabetes (POA: Yes)    Primary hypertension (POA: Yes)    Mixed hyperlipidemia (POA: Yes)    Anemia (POA: Yes)  Resolved Problems:    Chest pain (POA: Yes)    Metabolic acidosis (POA: Yes)    Dehydration (POA: Yes)      FOLLOW UP  No future appointments.  Meagan Mcneil M.D.  [address]  [place] [facility] [address] [phone]    In 3 days      [facility], Emergency Dept  [address]  [place] [address] [phone]    If symptoms worsen    Viktor Law M.D.  [address]  [place] [facility] [address] [phone]    In 1 week  call to discuss hernia      MEDICATIONS ON DISCHARGE     Medication List        CONTINUE taking these medications        Instructions   aspirin 81 MG EC tablet   Take 81 mg by mouth every morning.  Dose: 81 mg     DILTIAZem  MG Cp24  Commonly known as: Cardizem CD   Take 120 mg by mouth every morning.  Dose: 120 mg     ezetimibe 10 MG Tabs  Commonly known as: Zetia   Take 10 mg by mouth every evening.  Dose: 10 mg     rosuvastatin 20 MG Tabs  Commonly known as: Crestor   Take 20 mg by mouth every evening.  Dose: 20 mg              Allergies  No Known Allergies    DIET  Orders Placed This Encounter   Procedures    Diet Order Diet: Low Fiber(GI Soft); Second Modifier: (optional): Cardiac     Standing Status:   Standing     Number of Occurrences:   1     Order Specific Question:   Diet:     Answer:   Low Fiber(GI Soft) [2]     Order Specific Question:   Second Modifier: (optional)     Answer:   Cardiac [6]       ACTIVITY  As tolerated.  Weight bearing as  tolerated    CONSULTATIONS  None    PROCEDURES  None    LABORATORY  Lab Results   Component Value Date    SODIUM 133 (L) 06/10/2024    POTASSIUM 3.5 (L) 06/10/2024    CHLORIDE 104 06/10/2024    CO2 20 06/10/2024    GLUCOSE 98 06/10/2024    BUN 11 06/10/2024    CREATININE 0.71 06/10/2024        Lab Results   Component Value Date    WBC 2.9 (L) 06/10/2024    HEMOGLOBIN 11.2 (L) 06/10/2024    HEMATOCRIT 32.1 (L) 06/10/2024    PLATELETCT 128 (L) 06/10/2024        Total time of the discharge process exceeds 32 minutes.

## 2024-06-10 NOTE — PROGRESS NOTES
Pt discharged home in good and stable condition. Reviewed all discharge instructions and answered any questions. IV discontinued. Escorted to ER entrance via  at 1458.

## 2024-06-10 NOTE — CARE PLAN
The patient is Stable - Low risk of patient condition declining or worsening    Shift Goals  Clinical Goals: monitor labs, stool consistency, maintain hydration, fall prevention  Patient Goals: rest, updates on POC  Family Goals: updates on POC    Progress made toward(s) clinical / shift goals:    Problem: Knowledge Deficit - Standard  Goal: Patient and family/care givers will demonstrate understanding of plan of care, disease process/condition, diagnostic tests and medications  Outcome: Progressing  Note: 1. Patient and family/caregiver oriented to unit, equipment, visitation policy and means for communicating concern 2. Complete/review Learning Assessment 3. Assess knowledge level of disease process/condition, treatment plan, diagnostic tests and medications 4. Explain disease process/condition, treatment plan, diagnostic tests and medications      Problem: Fall Risk  Goal: Patient will remain free from falls  Outcome: Progressing  Note: 1. Assess for fall risk factors 2. Implement fall precautions      Problem: Pain - Standard  Goal: Alleviation of pain or a reduction in pain to the patient’s comfort goal  Outcome: Progressing  Note: 1. Document pain using the appropriate pain scale per order or unit policy 2. Educate and implement non-pharmacologic comfort measures (i.e. relaxation, distraction, massage, cold/heat therapy, etc.) 3. Pain management medications as ordered 4. Reassess pain after pain med administration per policy 5. If opiods administered assess patient's response to pain medication is appropriate per POSS sedation scale 6. Follow pain management plan developed in collaboration with patient and interdisciplinary team (including palliative care or pain specialists if applicable)        Patient is not progressing towards the following goals:

## 2024-06-11 LAB
BACTERIA BLD CULT: ABNORMAL
BACTERIA BLD CULT: ABNORMAL
SIGNIFICANT IND 70042: ABNORMAL
SITE SITE: ABNORMAL
SOURCE SOURCE: ABNORMAL

## 2024-06-13 ENCOUNTER — HOSPITAL ENCOUNTER (OUTPATIENT)
Dept: LAB | Facility: MEDICAL CENTER | Age: 84
End: 2024-06-13
Attending: FAMILY MEDICINE
Payer: MEDICARE

## 2024-06-13 LAB
ALBUMIN SERPL BCP-MCNC: 3.8 G/DL (ref 3.2–4.9)
ALBUMIN/GLOB SERPL: 1.3 G/DL
ALP SERPL-CCNC: 82 U/L (ref 30–99)
ALT SERPL-CCNC: 45 U/L (ref 2–50)
ANION GAP SERPL CALC-SCNC: 11 MMOL/L (ref 7–16)
AST SERPL-CCNC: 34 U/L (ref 12–45)
BASOPHILS # BLD AUTO: 0.9 % (ref 0–1.8)
BASOPHILS # BLD: 0.03 K/UL (ref 0–0.12)
BILIRUB SERPL-MCNC: 0.6 MG/DL (ref 0.1–1.5)
BUN SERPL-MCNC: 10 MG/DL (ref 8–22)
BURR CELLS BLD QL SMEAR: NORMAL
CALCIUM ALBUM COR SERPL-MCNC: 9.6 MG/DL (ref 8.5–10.5)
CALCIUM SERPL-MCNC: 9.4 MG/DL (ref 8.5–10.5)
CHLORIDE SERPL-SCNC: 103 MMOL/L (ref 96–112)
CO2 SERPL-SCNC: 24 MMOL/L (ref 20–33)
CREAT SERPL-MCNC: 0.82 MG/DL (ref 0.5–1.4)
EOSINOPHIL # BLD AUTO: 0.13 K/UL (ref 0–0.51)
EOSINOPHIL NFR BLD: 3.5 % (ref 0–6.9)
ERYTHROCYTE [DISTWIDTH] IN BLOOD BY AUTOMATED COUNT: 44.6 FL (ref 35.9–50)
GFR SERPLBLD CREATININE-BSD FMLA CKD-EPI: 87 ML/MIN/1.73 M 2
GLOBULIN SER CALC-MCNC: 2.9 G/DL (ref 1.9–3.5)
GLUCOSE SERPL-MCNC: 100 MG/DL (ref 65–99)
HCT VFR BLD AUTO: 34.3 % (ref 42–52)
HGB BLD-MCNC: 11.5 G/DL (ref 14–18)
LYMPHOCYTES # BLD AUTO: 1.17 K/UL (ref 1–4.8)
LYMPHOCYTES NFR BLD: 31.6 % (ref 22–41)
MANUAL DIFF BLD: NORMAL
MCH RBC QN AUTO: 30.2 PG (ref 27–33)
MCHC RBC AUTO-ENTMCNC: 33.5 G/DL (ref 32.3–36.5)
MCV RBC AUTO: 90 FL (ref 81.4–97.8)
MICROCYTES BLD QL SMEAR: ABNORMAL
MONOCYTES # BLD AUTO: 0.33 K/UL (ref 0–0.85)
MONOCYTES NFR BLD AUTO: 8.8 % (ref 0–13.4)
MORPHOLOGY BLD-IMP: NORMAL
NEUTROPHILS # BLD AUTO: 2.04 K/UL (ref 1.82–7.42)
NEUTROPHILS NFR BLD: 55.2 % (ref 44–72)
NRBC # BLD AUTO: 0 K/UL
NRBC BLD-RTO: 0 /100 WBC (ref 0–0.2)
PLATELET # BLD AUTO: 178 K/UL (ref 164–446)
PLATELET BLD QL SMEAR: NORMAL
PMV BLD AUTO: 9.7 FL (ref 9–12.9)
POIKILOCYTOSIS BLD QL SMEAR: NORMAL
POTASSIUM SERPL-SCNC: 3.9 MMOL/L (ref 3.6–5.5)
PROT SERPL-MCNC: 6.7 G/DL (ref 6–8.2)
RBC # BLD AUTO: 3.81 M/UL (ref 4.7–6.1)
RBC BLD AUTO: PRESENT
SODIUM SERPL-SCNC: 138 MMOL/L (ref 135–145)
WBC # BLD AUTO: 3.7 K/UL (ref 4.8–10.8)

## 2024-06-13 PROCEDURE — 85027 COMPLETE CBC AUTOMATED: CPT

## 2024-06-13 PROCEDURE — 85007 BL SMEAR W/DIFF WBC COUNT: CPT

## 2024-06-13 PROCEDURE — 36415 COLL VENOUS BLD VENIPUNCTURE: CPT

## 2024-06-13 PROCEDURE — 80053 COMPREHEN METABOLIC PANEL: CPT

## 2024-06-14 LAB
BACTERIA BLD CULT: NORMAL
OVA AND PARASITE, FECAL INTERPRETATION Q0595: NEGATIVE
SIGNIFICANT IND 70042: NORMAL
SITE SITE: NORMAL
SOURCE SOURCE: NORMAL

## 2024-06-24 ENCOUNTER — HOSPITAL ENCOUNTER (OUTPATIENT)
Dept: LAB | Facility: MEDICAL CENTER | Age: 84
End: 2024-06-24
Attending: FAMILY MEDICINE
Payer: MEDICARE

## 2024-06-24 LAB
FERRITIN SERPL-MCNC: 181 NG/ML (ref 22–322)
FOLATE SERPL-MCNC: 14.4 NG/ML
HGB RETIC QN AUTO: 35.2 PG/CELL (ref 29–35)
IMM RETICS NFR: 12.8 % (ref 2.6–16.1)
IRON SATN MFR SERPL: 24 % (ref 15–55)
IRON SERPL-MCNC: 70 UG/DL (ref 50–180)
RETICS # AUTO: 0.09 M/UL (ref 0.04–0.12)
RETICS/RBC NFR: 2.3 % (ref 0.8–2.6)
TIBC SERPL-MCNC: 289 UG/DL (ref 250–450)
UIBC SERPL-MCNC: 219 UG/DL (ref 110–370)
VIT B12 SERPL-MCNC: 305 PG/ML (ref 211–911)

## 2024-06-24 PROCEDURE — 82607 VITAMIN B-12: CPT

## 2024-06-24 PROCEDURE — 83021 HEMOGLOBIN CHROMOTOGRAPHY: CPT

## 2024-06-24 PROCEDURE — 83550 IRON BINDING TEST: CPT

## 2024-06-24 PROCEDURE — 82746 ASSAY OF FOLIC ACID SERUM: CPT

## 2024-06-24 PROCEDURE — 82728 ASSAY OF FERRITIN: CPT

## 2024-06-24 PROCEDURE — 85046 RETICYTE/HGB CONCENTRATE: CPT

## 2024-06-24 PROCEDURE — 83540 ASSAY OF IRON: CPT

## 2024-06-24 PROCEDURE — 36415 COLL VENOUS BLD VENIPUNCTURE: CPT

## 2024-06-27 LAB
HGB A1 MFR BLD: 96.7 % (ref 95–97.9)
HGB A2 MFR BLD: 2.9 % (ref 2–3.5)
HGB C MFR BLD: 0 % (ref 0–0)
HGB E MFR BLD: 0 % (ref 0–0)
HGB F MFR BLD: 0.4 % (ref 0–2.1)
HGB FRACT BLD ELPH-IMP: NORMAL
HGB OTHER MFR BLD: 0 % (ref 0–0)
HGB S BLD QL SOLY: NORMAL
HGB S MFR BLD: 0 % (ref 0–0)
PATH INTERP BLD-IMP: NORMAL

## 2024-07-18 ENCOUNTER — TELEPHONE (OUTPATIENT)
Dept: HEALTH INFORMATION MANAGEMENT | Facility: OTHER | Age: 84
End: 2024-07-18
Payer: MEDICARE

## 2024-07-21 ENCOUNTER — HOSPITAL ENCOUNTER (INPATIENT)
Facility: MEDICAL CENTER | Age: 84
LOS: 3 days | DRG: 315 | End: 2024-07-24
Attending: EMERGENCY MEDICINE | Admitting: HOSPITALIST
Payer: MEDICARE

## 2024-07-21 ENCOUNTER — APPOINTMENT (OUTPATIENT)
Dept: RADIOLOGY | Facility: MEDICAL CENTER | Age: 84
DRG: 315 | End: 2024-07-21
Attending: EMERGENCY MEDICINE
Payer: MEDICARE

## 2024-07-21 DIAGNOSIS — I49.8 VENTRICULAR BIGEMINY: ICD-10-CM

## 2024-07-21 DIAGNOSIS — I49.8 BIGEMINY: ICD-10-CM

## 2024-07-21 DIAGNOSIS — R00.1 SYMPTOMATIC BRADYCARDIA: ICD-10-CM

## 2024-07-21 DIAGNOSIS — R07.9 CHEST PAIN, UNSPECIFIED TYPE: ICD-10-CM

## 2024-07-21 PROBLEM — E87.1 HYPONATREMIA: Status: ACTIVE | Noted: 2024-07-21

## 2024-07-21 LAB
ALBUMIN SERPL BCP-MCNC: 3.9 G/DL (ref 3.2–4.9)
ALBUMIN/GLOB SERPL: 1.3 G/DL
ALP SERPL-CCNC: 82 U/L (ref 30–99)
ALT SERPL-CCNC: 28 U/L (ref 2–50)
ANION GAP SERPL CALC-SCNC: 14 MMOL/L (ref 7–16)
APTT PPP: 24 SEC (ref 24.7–36)
AST SERPL-CCNC: 29 U/L (ref 12–45)
BASOPHILS # BLD AUTO: 0.5 % (ref 0–1.8)
BASOPHILS # BLD: 0.03 K/UL (ref 0–0.12)
BILIRUB SERPL-MCNC: 0.7 MG/DL (ref 0.1–1.5)
BUN SERPL-MCNC: 16 MG/DL (ref 8–22)
CALCIUM ALBUM COR SERPL-MCNC: 9.1 MG/DL (ref 8.5–10.5)
CALCIUM SERPL-MCNC: 9 MG/DL (ref 8.4–10.2)
CHLORIDE SERPL-SCNC: 98 MMOL/L (ref 96–112)
CO2 SERPL-SCNC: 20 MMOL/L (ref 20–33)
CREAT SERPL-MCNC: 0.93 MG/DL (ref 0.5–1.4)
EKG IMPRESSION: NORMAL
EOSINOPHIL # BLD AUTO: 0.08 K/UL (ref 0–0.51)
EOSINOPHIL NFR BLD: 1.2 % (ref 0–6.9)
ERYTHROCYTE [DISTWIDTH] IN BLOOD BY AUTOMATED COUNT: 42.1 FL (ref 35.9–50)
GFR SERPLBLD CREATININE-BSD FMLA CKD-EPI: 81 ML/MIN/1.73 M 2
GLOBULIN SER CALC-MCNC: 3.1 G/DL (ref 1.9–3.5)
GLUCOSE SERPL-MCNC: 92 MG/DL (ref 65–99)
HCT VFR BLD AUTO: 36.7 % (ref 42–52)
HGB BLD-MCNC: 12.4 G/DL (ref 14–18)
IMM GRANULOCYTES # BLD AUTO: 0.03 K/UL (ref 0–0.11)
IMM GRANULOCYTES NFR BLD AUTO: 0.5 % (ref 0–0.9)
INR PPP: 0.95 (ref 0.87–1.13)
LYMPHOCYTES # BLD AUTO: 1.6 K/UL (ref 1–4.8)
LYMPHOCYTES NFR BLD: 25 % (ref 22–41)
MAGNESIUM SERPL-MCNC: 2 MG/DL (ref 1.5–2.5)
MCH RBC QN AUTO: 30.9 PG (ref 27–33)
MCHC RBC AUTO-ENTMCNC: 33.8 G/DL (ref 32.3–36.5)
MCV RBC AUTO: 91.5 FL (ref 81.4–97.8)
MONOCYTES # BLD AUTO: 0.61 K/UL (ref 0–0.85)
MONOCYTES NFR BLD AUTO: 9.5 % (ref 0–13.4)
NEUTROPHILS # BLD AUTO: 4.06 K/UL (ref 1.82–7.42)
NEUTROPHILS NFR BLD: 63.3 % (ref 44–72)
NRBC # BLD AUTO: 0 K/UL
NRBC BLD-RTO: 0 /100 WBC (ref 0–0.2)
PHOSPHATE SERPL-MCNC: 3.6 MG/DL (ref 2.5–4.5)
PLATELET # BLD AUTO: 261 K/UL (ref 164–446)
PMV BLD AUTO: 10.3 FL (ref 9–12.9)
POTASSIUM SERPL-SCNC: 4.7 MMOL/L (ref 3.6–5.5)
PROT SERPL-MCNC: 7 G/DL (ref 6–8.2)
PROTHROMBIN TIME: 13.1 SEC (ref 12–14.6)
RBC # BLD AUTO: 4.01 M/UL (ref 4.7–6.1)
SODIUM SERPL-SCNC: 132 MMOL/L (ref 135–145)
TROPONIN T SERPL-MCNC: 18 NG/L (ref 6–19)
WBC # BLD AUTO: 6.4 K/UL (ref 4.8–10.8)

## 2024-07-21 PROCEDURE — 85025 COMPLETE CBC W/AUTO DIFF WBC: CPT

## 2024-07-21 PROCEDURE — 99497 ADVNCD CARE PLAN 30 MIN: CPT | Performed by: HOSPITALIST

## 2024-07-21 PROCEDURE — 80053 COMPREHEN METABOLIC PANEL: CPT

## 2024-07-21 PROCEDURE — 85730 THROMBOPLASTIN TIME PARTIAL: CPT

## 2024-07-21 PROCEDURE — 93005 ELECTROCARDIOGRAM TRACING: CPT | Performed by: EMERGENCY MEDICINE

## 2024-07-21 PROCEDURE — 85610 PROTHROMBIN TIME: CPT

## 2024-07-21 PROCEDURE — 36415 COLL VENOUS BLD VENIPUNCTURE: CPT

## 2024-07-21 PROCEDURE — A9270 NON-COVERED ITEM OR SERVICE: HCPCS

## 2024-07-21 PROCEDURE — 94760 N-INVAS EAR/PLS OXIMETRY 1: CPT

## 2024-07-21 PROCEDURE — 770020 HCHG ROOM/CARE - TELE (206)

## 2024-07-21 PROCEDURE — 700102 HCHG RX REV CODE 250 W/ 637 OVERRIDE(OP)

## 2024-07-21 PROCEDURE — A9270 NON-COVERED ITEM OR SERVICE: HCPCS | Performed by: HOSPITALIST

## 2024-07-21 PROCEDURE — 84100 ASSAY OF PHOSPHORUS: CPT

## 2024-07-21 PROCEDURE — 700105 HCHG RX REV CODE 258: Performed by: HOSPITALIST

## 2024-07-21 PROCEDURE — 99285 EMERGENCY DEPT VISIT HI MDM: CPT

## 2024-07-21 PROCEDURE — 71045 X-RAY EXAM CHEST 1 VIEW: CPT

## 2024-07-21 PROCEDURE — 99223 1ST HOSP IP/OBS HIGH 75: CPT | Mod: 25,AI | Performed by: HOSPITALIST

## 2024-07-21 PROCEDURE — 84484 ASSAY OF TROPONIN QUANT: CPT

## 2024-07-21 PROCEDURE — 83735 ASSAY OF MAGNESIUM: CPT

## 2024-07-21 PROCEDURE — 700102 HCHG RX REV CODE 250 W/ 637 OVERRIDE(OP): Performed by: HOSPITALIST

## 2024-07-21 RX ORDER — ACETAMINOPHEN 325 MG/1
650 TABLET ORAL EVERY 6 HOURS PRN
Status: DISCONTINUED | OUTPATIENT
Start: 2024-07-21 | End: 2024-07-24 | Stop reason: HOSPADM

## 2024-07-21 RX ORDER — AMOXICILLIN 250 MG
2 CAPSULE ORAL 2 TIMES DAILY
Status: DISCONTINUED | OUTPATIENT
Start: 2024-07-21 | End: 2024-07-24 | Stop reason: HOSPADM

## 2024-07-21 RX ORDER — ONDANSETRON 4 MG/1
4 TABLET, ORALLY DISINTEGRATING ORAL EVERY 4 HOURS PRN
Status: DISCONTINUED | OUTPATIENT
Start: 2024-07-21 | End: 2024-07-24 | Stop reason: HOSPADM

## 2024-07-21 RX ORDER — ONDANSETRON 2 MG/ML
4 INJECTION INTRAMUSCULAR; INTRAVENOUS EVERY 4 HOURS PRN
Status: DISCONTINUED | OUTPATIENT
Start: 2024-07-21 | End: 2024-07-24 | Stop reason: HOSPADM

## 2024-07-21 RX ORDER — EZETIMIBE 10 MG/1
TABLET ORAL
Status: COMPLETED
Start: 2024-07-21 | End: 2024-07-21

## 2024-07-21 RX ORDER — ROSUVASTATIN CALCIUM 10 MG/1
TABLET, COATED ORAL
Status: COMPLETED
Start: 2024-07-21 | End: 2024-07-21

## 2024-07-21 RX ORDER — AMOXICILLIN 250 MG
CAPSULE ORAL
Status: COMPLETED
Start: 2024-07-21 | End: 2024-07-21

## 2024-07-21 RX ORDER — EZETIMIBE 10 MG/1
10 TABLET ORAL EVERY EVENING
Status: DISCONTINUED | OUTPATIENT
Start: 2024-07-21 | End: 2024-07-24 | Stop reason: HOSPADM

## 2024-07-21 RX ORDER — METOPROLOL SUCCINATE 25 MG/1
50 TABLET, EXTENDED RELEASE ORAL
Status: DISCONTINUED | OUTPATIENT
Start: 2024-07-21 | End: 2024-07-24 | Stop reason: HOSPADM

## 2024-07-21 RX ORDER — ASPIRIN 81 MG/1
81 TABLET ORAL EVERY MORNING
Status: DISCONTINUED | OUTPATIENT
Start: 2024-07-22 | End: 2024-07-24 | Stop reason: HOSPADM

## 2024-07-21 RX ORDER — POLYETHYLENE GLYCOL 3350 17 G/17G
1 POWDER, FOR SOLUTION ORAL
Status: DISCONTINUED | OUTPATIENT
Start: 2024-07-21 | End: 2024-07-24 | Stop reason: HOSPADM

## 2024-07-21 RX ORDER — ROSUVASTATIN CALCIUM 10 MG/1
20 TABLET, COATED ORAL EVERY EVENING
Status: DISCONTINUED | OUTPATIENT
Start: 2024-07-21 | End: 2024-07-24 | Stop reason: HOSPADM

## 2024-07-21 RX ORDER — SODIUM CHLORIDE 9 MG/ML
INJECTION, SOLUTION INTRAVENOUS CONTINUOUS
Status: ACTIVE | OUTPATIENT
Start: 2024-07-21 | End: 2024-07-22

## 2024-07-21 RX ADMIN — METOPROLOL SUCCINATE 50 MG: 25 TABLET, EXTENDED RELEASE ORAL at 16:44

## 2024-07-21 RX ADMIN — RIVAROXABAN 10 MG: 10 TABLET, FILM COATED ORAL at 16:47

## 2024-07-21 RX ADMIN — SENNOSIDES AND DOCUSATE SODIUM 2 TABLET: 50; 8.6 TABLET ORAL at 16:46

## 2024-07-21 RX ADMIN — EZETIMIBE 10 MG: 10 TABLET ORAL at 16:47

## 2024-07-21 RX ADMIN — ROSUVASTATIN CALCIUM 20 MG: 10 TABLET, FILM COATED ORAL at 16:46

## 2024-07-21 RX ADMIN — SODIUM CHLORIDE: 9 INJECTION, SOLUTION INTRAVENOUS at 16:14

## 2024-07-21 ASSESSMENT — ENCOUNTER SYMPTOMS
SHORTNESS OF BREATH: 1
EYE DISCHARGE: 0
VOMITING: 0
ABDOMINAL PAIN: 0
MYALGIAS: 0
NERVOUS/ANXIOUS: 0
FLANK PAIN: 0
FEVER: 0
EYE REDNESS: 0
COUGH: 0
PALPITATIONS: 1
BRUISES/BLEEDS EASILY: 0
CHILLS: 0
FOCAL WEAKNESS: 0
STRIDOR: 0

## 2024-07-21 ASSESSMENT — COGNITIVE AND FUNCTIONAL STATUS - GENERAL
DAILY ACTIVITIY SCORE: 24
MOBILITY SCORE: 24
SUGGESTED CMS G CODE MODIFIER DAILY ACTIVITY: CH
SUGGESTED CMS G CODE MODIFIER MOBILITY: CH

## 2024-07-21 ASSESSMENT — LIFESTYLE VARIABLES
ALCOHOL_USE: NO
HAVE YOU EVER FELT YOU SHOULD CUT DOWN ON YOUR DRINKING: NO
HAVE PEOPLE ANNOYED YOU BY CRITICIZING YOUR DRINKING: NO
EVER FELT BAD OR GUILTY ABOUT YOUR DRINKING: NO
AVERAGE NUMBER OF DAYS PER WEEK YOU HAVE A DRINK CONTAINING ALCOHOL: 0
TOTAL SCORE: 0
CONSUMPTION TOTAL: NEGATIVE
EVER HAD A DRINK FIRST THING IN THE MORNING TO STEADY YOUR NERVES TO GET RID OF A HANGOVER: NO
ON A TYPICAL DAY WHEN YOU DRINK ALCOHOL HOW MANY DRINKS DO YOU HAVE: 0
TOTAL SCORE: 0
TOTAL SCORE: 0
DOES PATIENT WANT TO STOP DRINKING: NO
HOW MANY TIMES IN THE PAST YEAR HAVE YOU HAD 5 OR MORE DRINKS IN A DAY: 0

## 2024-07-21 ASSESSMENT — SOCIAL DETERMINANTS OF HEALTH (SDOH)

## 2024-07-21 ASSESSMENT — PAIN DESCRIPTION - PAIN TYPE: TYPE: ACUTE PAIN

## 2024-07-21 ASSESSMENT — FIBROSIS 4 INDEX
FIB4 SCORE: 1.74
FIB4 SCORE: 1.74

## 2024-07-22 LAB
ALBUMIN SERPL BCP-MCNC: 3.7 G/DL (ref 3.2–4.9)
ALBUMIN/GLOB SERPL: 1.2 G/DL
ALP SERPL-CCNC: 83 U/L (ref 30–99)
ALT SERPL-CCNC: 25 U/L (ref 2–50)
ANION GAP SERPL CALC-SCNC: 11 MMOL/L (ref 7–16)
AST SERPL-CCNC: 19 U/L (ref 12–45)
BILIRUB SERPL-MCNC: 0.5 MG/DL (ref 0.1–1.5)
BUN SERPL-MCNC: 16 MG/DL (ref 8–22)
CALCIUM ALBUM COR SERPL-MCNC: 9.2 MG/DL (ref 8.5–10.5)
CALCIUM SERPL-MCNC: 9 MG/DL (ref 8.4–10.2)
CHLORIDE SERPL-SCNC: 102 MMOL/L (ref 96–112)
CO2 SERPL-SCNC: 22 MMOL/L (ref 20–33)
CREAT SERPL-MCNC: 0.93 MG/DL (ref 0.5–1.4)
ERYTHROCYTE [DISTWIDTH] IN BLOOD BY AUTOMATED COUNT: 43.8 FL (ref 35.9–50)
GFR SERPLBLD CREATININE-BSD FMLA CKD-EPI: 81 ML/MIN/1.73 M 2
GLOBULIN SER CALC-MCNC: 3 G/DL (ref 1.9–3.5)
GLUCOSE SERPL-MCNC: 77 MG/DL (ref 65–99)
HCT VFR BLD AUTO: 39 % (ref 42–52)
HGB BLD-MCNC: 12.9 G/DL (ref 14–18)
MAGNESIUM SERPL-MCNC: 2.1 MG/DL (ref 1.5–2.5)
MCH RBC QN AUTO: 30.8 PG (ref 27–33)
MCHC RBC AUTO-ENTMCNC: 33.1 G/DL (ref 32.3–36.5)
MCV RBC AUTO: 93.1 FL (ref 81.4–97.8)
PLATELET # BLD AUTO: 250 K/UL (ref 164–446)
PMV BLD AUTO: 9.5 FL (ref 9–12.9)
POTASSIUM SERPL-SCNC: 4.3 MMOL/L (ref 3.6–5.5)
PROT SERPL-MCNC: 6.7 G/DL (ref 6–8.2)
RBC # BLD AUTO: 4.19 M/UL (ref 4.7–6.1)
SODIUM SERPL-SCNC: 135 MMOL/L (ref 135–145)
WBC # BLD AUTO: 6.9 K/UL (ref 4.8–10.8)

## 2024-07-22 PROCEDURE — 94760 N-INVAS EAR/PLS OXIMETRY 1: CPT

## 2024-07-22 PROCEDURE — 93005 ELECTROCARDIOGRAM TRACING: CPT | Performed by: STUDENT IN AN ORGANIZED HEALTH CARE EDUCATION/TRAINING PROGRAM

## 2024-07-22 PROCEDURE — 83735 ASSAY OF MAGNESIUM: CPT

## 2024-07-22 PROCEDURE — 80053 COMPREHEN METABOLIC PANEL: CPT

## 2024-07-22 PROCEDURE — 85027 COMPLETE CBC AUTOMATED: CPT

## 2024-07-22 PROCEDURE — 770020 HCHG ROOM/CARE - TELE (206)

## 2024-07-22 PROCEDURE — 700102 HCHG RX REV CODE 250 W/ 637 OVERRIDE(OP): Performed by: HOSPITALIST

## 2024-07-22 PROCEDURE — A9270 NON-COVERED ITEM OR SERVICE: HCPCS | Performed by: HOSPITALIST

## 2024-07-22 PROCEDURE — 99233 SBSQ HOSP IP/OBS HIGH 50: CPT | Performed by: STUDENT IN AN ORGANIZED HEALTH CARE EDUCATION/TRAINING PROGRAM

## 2024-07-22 RX ADMIN — EZETIMIBE 10 MG: 10 TABLET ORAL at 17:27

## 2024-07-22 RX ADMIN — ASPIRIN 81 MG: 81 TABLET, COATED ORAL at 06:30

## 2024-07-22 RX ADMIN — SENNOSIDES AND DOCUSATE SODIUM 2 TABLET: 50; 8.6 TABLET ORAL at 17:27

## 2024-07-22 RX ADMIN — ROSUVASTATIN CALCIUM 20 MG: 10 TABLET, FILM COATED ORAL at 17:27

## 2024-07-22 RX ADMIN — RIVAROXABAN 10 MG: 10 TABLET, FILM COATED ORAL at 17:27

## 2024-07-22 RX ADMIN — METOPROLOL SUCCINATE 50 MG: 25 TABLET, EXTENDED RELEASE ORAL at 06:30

## 2024-07-22 ASSESSMENT — PAIN DESCRIPTION - PAIN TYPE: TYPE: ACUTE PAIN

## 2024-07-23 LAB
GLUCOSE BLD STRIP.AUTO-MCNC: 100 MG/DL (ref 65–99)
GLUCOSE BLD STRIP.AUTO-MCNC: 117 MG/DL (ref 65–99)
GLUCOSE BLD STRIP.AUTO-MCNC: 120 MG/DL (ref 65–99)
GLUCOSE BLD STRIP.AUTO-MCNC: 64 MG/DL (ref 65–99)

## 2024-07-23 PROCEDURE — A9270 NON-COVERED ITEM OR SERVICE: HCPCS | Performed by: HOSPITALIST

## 2024-07-23 PROCEDURE — 700102 HCHG RX REV CODE 250 W/ 637 OVERRIDE(OP): Performed by: HOSPITALIST

## 2024-07-23 PROCEDURE — 770020 HCHG ROOM/CARE - TELE (206)

## 2024-07-23 PROCEDURE — 99233 SBSQ HOSP IP/OBS HIGH 50: CPT | Performed by: STUDENT IN AN ORGANIZED HEALTH CARE EDUCATION/TRAINING PROGRAM

## 2024-07-23 PROCEDURE — 82962 GLUCOSE BLOOD TEST: CPT | Mod: 91

## 2024-07-23 PROCEDURE — 94760 N-INVAS EAR/PLS OXIMETRY 1: CPT

## 2024-07-23 RX ADMIN — EZETIMIBE 10 MG: 10 TABLET ORAL at 17:35

## 2024-07-23 RX ADMIN — SENNOSIDES AND DOCUSATE SODIUM 2 TABLET: 50; 8.6 TABLET ORAL at 05:25

## 2024-07-23 RX ADMIN — ASPIRIN 81 MG: 81 TABLET, COATED ORAL at 05:25

## 2024-07-23 RX ADMIN — METOPROLOL SUCCINATE 50 MG: 25 TABLET, EXTENDED RELEASE ORAL at 05:25

## 2024-07-23 RX ADMIN — ROSUVASTATIN CALCIUM 20 MG: 10 TABLET, FILM COATED ORAL at 17:35

## 2024-07-23 RX ADMIN — RIVAROXABAN 10 MG: 10 TABLET, FILM COATED ORAL at 17:35

## 2024-07-23 RX ADMIN — SENNOSIDES AND DOCUSATE SODIUM 2 TABLET: 50; 8.6 TABLET ORAL at 17:35

## 2024-07-23 ASSESSMENT — PAIN DESCRIPTION - PAIN TYPE: TYPE: ACUTE PAIN

## 2024-07-23 ASSESSMENT — FIBROSIS 4 INDEX: FIB4 SCORE: 1.26

## 2024-07-24 ENCOUNTER — PHARMACY VISIT (OUTPATIENT)
Dept: PHARMACY | Facility: MEDICAL CENTER | Age: 84
End: 2024-07-24
Payer: COMMERCIAL

## 2024-07-24 VITALS
DIASTOLIC BLOOD PRESSURE: 58 MMHG | WEIGHT: 145.72 LBS | TEMPERATURE: 98 F | BODY MASS INDEX: 23.42 KG/M2 | RESPIRATION RATE: 17 BRPM | SYSTOLIC BLOOD PRESSURE: 113 MMHG | HEIGHT: 66 IN | HEART RATE: 69 BPM | OXYGEN SATURATION: 96 %

## 2024-07-24 LAB
ANION GAP SERPL CALC-SCNC: 14 MMOL/L (ref 7–16)
BUN SERPL-MCNC: 22 MG/DL (ref 8–22)
CALCIUM SERPL-MCNC: 8.9 MG/DL (ref 8.4–10.2)
CHLORIDE SERPL-SCNC: 102 MMOL/L (ref 96–112)
CO2 SERPL-SCNC: 20 MMOL/L (ref 20–33)
CREAT SERPL-MCNC: 0.99 MG/DL (ref 0.5–1.4)
EKG IMPRESSION: NORMAL
ERYTHROCYTE [DISTWIDTH] IN BLOOD BY AUTOMATED COUNT: 42.1 FL (ref 35.9–50)
EST. AVERAGE GLUCOSE BLD GHB EST-MCNC: 111 MG/DL
GFR SERPLBLD CREATININE-BSD FMLA CKD-EPI: 75 ML/MIN/1.73 M 2
GLUCOSE BLD STRIP.AUTO-MCNC: 144 MG/DL (ref 65–99)
GLUCOSE SERPL-MCNC: 106 MG/DL (ref 65–99)
HBA1C MFR BLD: 5.5 % (ref 4–5.6)
HCT VFR BLD AUTO: 36.8 % (ref 42–52)
HGB BLD-MCNC: 12.6 G/DL (ref 14–18)
MAGNESIUM SERPL-MCNC: 2 MG/DL (ref 1.5–2.5)
MCH RBC QN AUTO: 31 PG (ref 27–33)
MCHC RBC AUTO-ENTMCNC: 34.2 G/DL (ref 32.3–36.5)
MCV RBC AUTO: 90.6 FL (ref 81.4–97.8)
PHOSPHATE SERPL-MCNC: 4 MG/DL (ref 2.5–4.5)
PLATELET # BLD AUTO: 244 K/UL (ref 164–446)
PMV BLD AUTO: 9.8 FL (ref 9–12.9)
POTASSIUM SERPL-SCNC: 4.1 MMOL/L (ref 3.6–5.5)
RBC # BLD AUTO: 4.06 M/UL (ref 4.7–6.1)
SODIUM SERPL-SCNC: 136 MMOL/L (ref 135–145)
WBC # BLD AUTO: 7.3 K/UL (ref 4.8–10.8)

## 2024-07-24 PROCEDURE — A9270 NON-COVERED ITEM OR SERVICE: HCPCS | Performed by: HOSPITALIST

## 2024-07-24 PROCEDURE — 99239 HOSP IP/OBS DSCHRG MGMT >30: CPT | Performed by: STUDENT IN AN ORGANIZED HEALTH CARE EDUCATION/TRAINING PROGRAM

## 2024-07-24 PROCEDURE — 84100 ASSAY OF PHOSPHORUS: CPT

## 2024-07-24 PROCEDURE — RXMED WILLOW AMBULATORY MEDICATION CHARGE: Performed by: STUDENT IN AN ORGANIZED HEALTH CARE EDUCATION/TRAINING PROGRAM

## 2024-07-24 PROCEDURE — 85027 COMPLETE CBC AUTOMATED: CPT

## 2024-07-24 PROCEDURE — 700102 HCHG RX REV CODE 250 W/ 637 OVERRIDE(OP): Performed by: HOSPITALIST

## 2024-07-24 PROCEDURE — 80048 BASIC METABOLIC PNL TOTAL CA: CPT

## 2024-07-24 PROCEDURE — 94760 N-INVAS EAR/PLS OXIMETRY 1: CPT

## 2024-07-24 PROCEDURE — 93010 ELECTROCARDIOGRAM REPORT: CPT | Performed by: INTERNAL MEDICINE

## 2024-07-24 PROCEDURE — 82962 GLUCOSE BLOOD TEST: CPT

## 2024-07-24 PROCEDURE — 83735 ASSAY OF MAGNESIUM: CPT

## 2024-07-24 PROCEDURE — 83036 HEMOGLOBIN GLYCOSYLATED A1C: CPT

## 2024-07-24 RX ORDER — METOPROLOL SUCCINATE 50 MG/1
50 TABLET, EXTENDED RELEASE ORAL DAILY
Qty: 30 TABLET | Refills: 0 | Status: ON HOLD | OUTPATIENT
Start: 2024-07-25 | End: 2024-07-30

## 2024-07-24 RX ADMIN — ASPIRIN 81 MG: 81 TABLET, COATED ORAL at 04:31

## 2024-07-24 RX ADMIN — SENNOSIDES AND DOCUSATE SODIUM 2 TABLET: 50; 8.6 TABLET ORAL at 04:31

## 2024-07-24 RX ADMIN — METOPROLOL SUCCINATE 50 MG: 25 TABLET, EXTENDED RELEASE ORAL at 04:31

## 2024-07-24 ASSESSMENT — PAIN DESCRIPTION - PAIN TYPE: TYPE: ACUTE PAIN

## 2024-07-24 ASSESSMENT — FIBROSIS 4 INDEX: FIB4 SCORE: 1.29

## 2024-07-27 ENCOUNTER — OFFICE VISIT (OUTPATIENT)
Dept: URGENT CARE | Facility: CLINIC | Age: 84
End: 2024-07-27
Payer: MEDICARE

## 2024-07-27 ENCOUNTER — HOSPITAL ENCOUNTER (OUTPATIENT)
Facility: MEDICAL CENTER | Age: 84
End: 2024-07-27
Payer: MEDICARE

## 2024-07-27 VITALS
OXYGEN SATURATION: 97 % | RESPIRATION RATE: 16 BRPM | SYSTOLIC BLOOD PRESSURE: 110 MMHG | BODY MASS INDEX: 25.69 KG/M2 | HEIGHT: 63 IN | HEART RATE: 82 BPM | TEMPERATURE: 97.8 F | WEIGHT: 145 LBS | DIASTOLIC BLOOD PRESSURE: 64 MMHG

## 2024-07-27 DIAGNOSIS — R39.9 UTI SYMPTOMS: ICD-10-CM

## 2024-07-27 DIAGNOSIS — N36.8 URETHRAL IRRITATION: ICD-10-CM

## 2024-07-27 LAB
APPEARANCE UR: CLEAR
BILIRUB UR STRIP-MCNC: NORMAL MG/DL
COLOR UR AUTO: YELLOW
GLUCOSE UR STRIP.AUTO-MCNC: NORMAL MG/DL
KETONES UR STRIP.AUTO-MCNC: NORMAL MG/DL
LEUKOCYTE ESTERASE UR QL STRIP.AUTO: NORMAL
NITRITE UR QL STRIP.AUTO: NORMAL
PH UR STRIP.AUTO: 7 [PH] (ref 5–8)
PROT UR QL STRIP: NORMAL MG/DL
RBC UR QL AUTO: NORMAL
SP GR UR STRIP.AUTO: 1.01
UROBILINOGEN UR STRIP-MCNC: 0.2 MG/DL

## 2024-07-27 PROCEDURE — 3078F DIAST BP <80 MM HG: CPT

## 2024-07-27 PROCEDURE — 3074F SYST BP LT 130 MM HG: CPT

## 2024-07-27 PROCEDURE — 99213 OFFICE O/P EST LOW 20 MIN: CPT

## 2024-07-27 PROCEDURE — 87086 URINE CULTURE/COLONY COUNT: CPT

## 2024-07-27 PROCEDURE — 81002 URINALYSIS NONAUTO W/O SCOPE: CPT

## 2024-07-27 RX ORDER — MEXILETINE HYDROCHLORIDE 150 MG/1
150 CAPSULE ORAL EVERY 8 HOURS
COMMUNITY
Start: 2024-07-25

## 2024-07-27 RX ORDER — CLOTRIMAZOLE 1 %
1 CREAM (GRAM) TOPICAL 2 TIMES DAILY
Qty: 28 G | Refills: 0 | Status: SHIPPED
Start: 2024-07-27 | End: 2024-07-28

## 2024-07-27 RX ORDER — SULFAMETHOXAZOLE AND TRIMETHOPRIM 800; 160 MG/1; MG/1
1 TABLET ORAL 2 TIMES DAILY
Qty: 14 TABLET | Refills: 0 | Status: ON HOLD
Start: 2024-07-27 | End: 2024-07-30

## 2024-07-27 ASSESSMENT — FIBROSIS 4 INDEX: FIB4 SCORE: 1.29

## 2024-07-28 ENCOUNTER — HOSPITAL ENCOUNTER (EMERGENCY)
Facility: MEDICAL CENTER | Age: 84
DRG: 641 | End: 2024-07-28
Attending: EMERGENCY MEDICINE
Payer: MEDICARE

## 2024-07-28 ENCOUNTER — TELEPHONE (OUTPATIENT)
Dept: URGENT CARE | Facility: CLINIC | Age: 84
End: 2024-07-28
Payer: MEDICARE

## 2024-07-28 ENCOUNTER — APPOINTMENT (OUTPATIENT)
Dept: RADIOLOGY | Facility: MEDICAL CENTER | Age: 84
End: 2024-07-28
Attending: STUDENT IN AN ORGANIZED HEALTH CARE EDUCATION/TRAINING PROGRAM
Payer: MEDICARE

## 2024-07-28 ENCOUNTER — HOSPITAL ENCOUNTER (INPATIENT)
Facility: MEDICAL CENTER | Age: 84
LOS: 2 days | End: 2024-07-30
Attending: STUDENT IN AN ORGANIZED HEALTH CARE EDUCATION/TRAINING PROGRAM | Admitting: INTERNAL MEDICINE
Payer: MEDICARE

## 2024-07-28 VITALS
DIASTOLIC BLOOD PRESSURE: 109 MMHG | OXYGEN SATURATION: 95 % | HEIGHT: 63 IN | BODY MASS INDEX: 26.13 KG/M2 | TEMPERATURE: 98.1 F | SYSTOLIC BLOOD PRESSURE: 148 MMHG | RESPIRATION RATE: 18 BRPM | HEART RATE: 72 BPM | WEIGHT: 147.49 LBS

## 2024-07-28 DIAGNOSIS — N13.8 BENIGN PROSTATIC HYPERPLASIA WITH URINARY OBSTRUCTION: ICD-10-CM

## 2024-07-28 DIAGNOSIS — N39.0 ACUTE UTI: ICD-10-CM

## 2024-07-28 DIAGNOSIS — N40.1 BENIGN PROSTATIC HYPERPLASIA WITH URINARY OBSTRUCTION: ICD-10-CM

## 2024-07-28 DIAGNOSIS — R30.0 DYSURIA: ICD-10-CM

## 2024-07-28 DIAGNOSIS — E87.1 HYPONATREMIA: ICD-10-CM

## 2024-07-28 DIAGNOSIS — R33.8 ACUTE URINARY RETENTION: ICD-10-CM

## 2024-07-28 DIAGNOSIS — K59.09 OTHER CONSTIPATION: ICD-10-CM

## 2024-07-28 DIAGNOSIS — R53.1 WEAKNESS: ICD-10-CM

## 2024-07-28 LAB
ALBUMIN SERPL BCP-MCNC: 3.9 G/DL (ref 3.2–4.9)
ALBUMIN/GLOB SERPL: 1.5 G/DL
ALP SERPL-CCNC: 94 U/L (ref 30–99)
ALT SERPL-CCNC: 24 U/L (ref 2–50)
ANION GAP SERPL CALC-SCNC: 12 MMOL/L (ref 7–16)
APPEARANCE UR: CLEAR
AST SERPL-CCNC: 19 U/L (ref 12–45)
BACTERIA #/AREA URNS HPF: ABNORMAL /HPF
BASOPHILS # BLD AUTO: 0.5 % (ref 0–1.8)
BASOPHILS # BLD: 0.03 K/UL (ref 0–0.12)
BILIRUB SERPL-MCNC: 0.8 MG/DL (ref 0.1–1.5)
BILIRUB UR QL STRIP.AUTO: NEGATIVE
BUN SERPL-MCNC: 13 MG/DL (ref 8–22)
CALCIUM ALBUM COR SERPL-MCNC: 9.1 MG/DL (ref 8.5–10.5)
CALCIUM SERPL-MCNC: 9 MG/DL (ref 8.4–10.2)
CHLORIDE SERPL-SCNC: 92 MMOL/L (ref 96–112)
CO2 SERPL-SCNC: 20 MMOL/L (ref 20–33)
COLOR UR: ABNORMAL
CREAT SERPL-MCNC: 1.06 MG/DL (ref 0.5–1.4)
EOSINOPHIL # BLD AUTO: 0.06 K/UL (ref 0–0.51)
EOSINOPHIL NFR BLD: 1.1 % (ref 0–6.9)
EPI CELLS #/AREA URNS HPF: NEGATIVE /HPF
ERYTHROCYTE [DISTWIDTH] IN BLOOD BY AUTOMATED COUNT: 38.6 FL (ref 35.9–50)
GFR SERPLBLD CREATININE-BSD FMLA CKD-EPI: 69 ML/MIN/1.73 M 2
GLOBULIN SER CALC-MCNC: 2.6 G/DL (ref 1.9–3.5)
GLUCOSE SERPL-MCNC: 107 MG/DL (ref 65–99)
GLUCOSE UR STRIP.AUTO-MCNC: 250 MG/DL
HCT VFR BLD AUTO: 32.7 % (ref 42–52)
HGB BLD-MCNC: 11.3 G/DL (ref 14–18)
IMM GRANULOCYTES # BLD AUTO: 0.02 K/UL (ref 0–0.11)
IMM GRANULOCYTES NFR BLD AUTO: 0.4 % (ref 0–0.9)
KETONES UR STRIP.AUTO-MCNC: NEGATIVE MG/DL
LEUKOCYTE ESTERASE UR QL STRIP.AUTO: NEGATIVE
LYMPHOCYTES # BLD AUTO: 1.18 K/UL (ref 1–4.8)
LYMPHOCYTES NFR BLD: 20.9 % (ref 22–41)
MCH RBC QN AUTO: 30.6 PG (ref 27–33)
MCHC RBC AUTO-ENTMCNC: 34.6 G/DL (ref 32.3–36.5)
MCV RBC AUTO: 88.6 FL (ref 81.4–97.8)
MICRO URNS: ABNORMAL
MONOCYTES # BLD AUTO: 0.61 K/UL (ref 0–0.85)
MONOCYTES NFR BLD AUTO: 10.8 % (ref 0–13.4)
MUCOUS THREADS #/AREA URNS HPF: ABNORMAL /HPF
NEUTROPHILS # BLD AUTO: 3.75 K/UL (ref 1.82–7.42)
NEUTROPHILS NFR BLD: 66.3 % (ref 44–72)
NITRITE UR QL STRIP.AUTO: POSITIVE
NRBC # BLD AUTO: 0 K/UL
NRBC BLD-RTO: 0 /100 WBC (ref 0–0.2)
PH UR STRIP.AUTO: 7.5 [PH] (ref 5–8)
PLATELET # BLD AUTO: 210 K/UL (ref 164–446)
PMV BLD AUTO: 9.7 FL (ref 9–12.9)
POTASSIUM SERPL-SCNC: 4.3 MMOL/L (ref 3.6–5.5)
PROT SERPL-MCNC: 6.5 G/DL (ref 6–8.2)
PROT UR QL STRIP: NEGATIVE MG/DL
RBC # BLD AUTO: 3.69 M/UL (ref 4.7–6.1)
RBC # URNS HPF: ABNORMAL /HPF
RBC UR QL AUTO: ABNORMAL
SODIUM SERPL-SCNC: 124 MMOL/L (ref 135–145)
SP GR UR STRIP.AUTO: 1.01
WBC # BLD AUTO: 5.7 K/UL (ref 4.8–10.8)
WBC #/AREA URNS HPF: ABNORMAL /HPF

## 2024-07-28 PROCEDURE — 80053 COMPREHEN METABOLIC PANEL: CPT

## 2024-07-28 PROCEDURE — 700117 HCHG RX CONTRAST REV CODE 255: Performed by: STUDENT IN AN ORGANIZED HEALTH CARE EDUCATION/TRAINING PROGRAM

## 2024-07-28 PROCEDURE — 81001 URINALYSIS AUTO W/SCOPE: CPT

## 2024-07-28 PROCEDURE — 770020 HCHG ROOM/CARE - TELE (206)

## 2024-07-28 PROCEDURE — A9270 NON-COVERED ITEM OR SERVICE: HCPCS | Performed by: STUDENT IN AN ORGANIZED HEALTH CARE EDUCATION/TRAINING PROGRAM

## 2024-07-28 PROCEDURE — 74177 CT ABD & PELVIS W/CONTRAST: CPT

## 2024-07-28 PROCEDURE — 99223 1ST HOSP IP/OBS HIGH 75: CPT | Mod: AI | Performed by: INTERNAL MEDICINE

## 2024-07-28 PROCEDURE — 700102 HCHG RX REV CODE 250 W/ 637 OVERRIDE(OP): Performed by: STUDENT IN AN ORGANIZED HEALTH CARE EDUCATION/TRAINING PROGRAM

## 2024-07-28 PROCEDURE — 36415 COLL VENOUS BLD VENIPUNCTURE: CPT

## 2024-07-28 PROCEDURE — A9270 NON-COVERED ITEM OR SERVICE: HCPCS | Performed by: EMERGENCY MEDICINE

## 2024-07-28 PROCEDURE — 51798 US URINE CAPACITY MEASURE: CPT

## 2024-07-28 PROCEDURE — 85025 COMPLETE CBC W/AUTO DIFF WBC: CPT

## 2024-07-28 PROCEDURE — 99285 EMERGENCY DEPT VISIT HI MDM: CPT

## 2024-07-28 PROCEDURE — 99284 EMERGENCY DEPT VISIT MOD MDM: CPT

## 2024-07-28 PROCEDURE — 51702 INSERT TEMP BLADDER CATH: CPT

## 2024-07-28 PROCEDURE — 303105 HCHG CATHETER EXTRA

## 2024-07-28 PROCEDURE — 700102 HCHG RX REV CODE 250 W/ 637 OVERRIDE(OP): Performed by: EMERGENCY MEDICINE

## 2024-07-28 RX ORDER — TAMSULOSIN HYDROCHLORIDE 0.4 MG/1
0.4 CAPSULE ORAL ONCE
Status: COMPLETED | OUTPATIENT
Start: 2024-07-28 | End: 2024-07-28

## 2024-07-28 RX ORDER — ROSUVASTATIN CALCIUM 10 MG/1
20 TABLET, COATED ORAL EVERY EVENING
Status: DISCONTINUED | OUTPATIENT
Start: 2024-07-29 | End: 2024-07-30 | Stop reason: HOSPADM

## 2024-07-28 RX ORDER — TAMSULOSIN HYDROCHLORIDE 0.4 MG/1
0.4 CAPSULE ORAL DAILY
Status: DISCONTINUED | OUTPATIENT
Start: 2024-07-29 | End: 2024-07-30

## 2024-07-28 RX ORDER — MEXILETINE HYDROCHLORIDE 150 MG/1
150 CAPSULE ORAL EVERY 8 HOURS
Status: DISCONTINUED | OUTPATIENT
Start: 2024-07-29 | End: 2024-07-30 | Stop reason: HOSPADM

## 2024-07-28 RX ORDER — PHENAZOPYRIDINE HYDROCHLORIDE 200 MG/1
200 TABLET, FILM COATED ORAL 3 TIMES DAILY PRN
Status: DISCONTINUED | OUTPATIENT
Start: 2024-07-28 | End: 2024-07-30 | Stop reason: HOSPADM

## 2024-07-28 RX ORDER — FINASTERIDE 5 MG/1
5 TABLET, FILM COATED ORAL DAILY
Status: DISCONTINUED | OUTPATIENT
Start: 2024-07-29 | End: 2024-07-30 | Stop reason: HOSPADM

## 2024-07-28 RX ORDER — SODIUM CHLORIDE 9 MG/ML
INJECTION, SOLUTION INTRAVENOUS CONTINUOUS
Status: DISCONTINUED | OUTPATIENT
Start: 2024-07-29 | End: 2024-07-30 | Stop reason: HOSPADM

## 2024-07-28 RX ORDER — PHENAZOPYRIDINE HYDROCHLORIDE 200 MG/1
200 TABLET, FILM COATED ORAL 3 TIMES DAILY PRN
Qty: 6 TABLET | Refills: 0 | Status: ON HOLD | OUTPATIENT
Start: 2024-07-28 | End: 2024-07-30

## 2024-07-28 RX ORDER — PHENAZOPYRIDINE HYDROCHLORIDE 200 MG/1
200 TABLET, FILM COATED ORAL ONCE
Status: COMPLETED | OUTPATIENT
Start: 2024-07-28 | End: 2024-07-28

## 2024-07-28 RX ORDER — ACETAMINOPHEN 325 MG/1
650 TABLET ORAL EVERY 6 HOURS PRN
Status: DISCONTINUED | OUTPATIENT
Start: 2024-07-28 | End: 2024-07-30 | Stop reason: HOSPADM

## 2024-07-28 RX ORDER — ONDANSETRON 2 MG/ML
4 INJECTION INTRAMUSCULAR; INTRAVENOUS EVERY 4 HOURS PRN
Status: DISCONTINUED | OUTPATIENT
Start: 2024-07-28 | End: 2024-07-30 | Stop reason: HOSPADM

## 2024-07-28 RX ORDER — TAMSULOSIN HYDROCHLORIDE 0.4 MG/1
0.4 CAPSULE ORAL DAILY
Qty: 30 CAPSULE | Refills: 0 | Status: ON HOLD | OUTPATIENT
Start: 2024-07-28 | End: 2024-07-30

## 2024-07-28 RX ORDER — HYDRALAZINE HYDROCHLORIDE 20 MG/ML
10 INJECTION INTRAMUSCULAR; INTRAVENOUS EVERY 4 HOURS PRN
Status: DISCONTINUED | OUTPATIENT
Start: 2024-07-28 | End: 2024-07-30 | Stop reason: HOSPADM

## 2024-07-28 RX ORDER — ONDANSETRON 4 MG/1
4 TABLET, ORALLY DISINTEGRATING ORAL EVERY 4 HOURS PRN
Status: DISCONTINUED | OUTPATIENT
Start: 2024-07-28 | End: 2024-07-30 | Stop reason: HOSPADM

## 2024-07-28 RX ORDER — ASPIRIN 81 MG/1
81 TABLET ORAL EVERY MORNING
Status: DISCONTINUED | OUTPATIENT
Start: 2024-07-29 | End: 2024-07-30 | Stop reason: HOSPADM

## 2024-07-28 RX ORDER — SENNOSIDES 8.6 MG
1300 CAPSULE ORAL
Status: ON HOLD | COMMUNITY
End: 2024-07-30

## 2024-07-28 RX ORDER — SULFAMETHOXAZOLE/TRIMETHOPRIM 800-160 MG
1 TABLET ORAL 2 TIMES DAILY
Status: DISCONTINUED | OUTPATIENT
Start: 2024-07-29 | End: 2024-07-29

## 2024-07-28 RX ORDER — MEXILETINE HYDROCHLORIDE 150 MG/1
150 CAPSULE ORAL ONCE
Status: COMPLETED | OUTPATIENT
Start: 2024-07-28 | End: 2024-07-28

## 2024-07-28 RX ADMIN — PHENAZOPYRIDINE 200 MG: 200 TABLET ORAL at 12:11

## 2024-07-28 RX ADMIN — MEXILETINE HYDROCHLORIDE 150 MG: 150 CAPSULE ORAL at 22:30

## 2024-07-28 RX ADMIN — IOHEXOL 100 ML: 350 INJECTION, SOLUTION INTRAVENOUS at 23:14

## 2024-07-28 RX ADMIN — TAMSULOSIN HYDROCHLORIDE 0.4 MG: 0.4 CAPSULE ORAL at 12:11

## 2024-07-28 ASSESSMENT — FIBROSIS 4 INDEX
FIB4 SCORE: 1.29
FIB4 SCORE: 1.29

## 2024-07-28 NOTE — ED NOTES
Medication history reviewed with patient and wife at bedside.   Med rec is complete  Allergies reviewed.     Patient was prescribed a 7 day course of Bactrim DS on 7/27/24- last dose 7/28/24 am.  Anticoagulants: No    Larry Marquez

## 2024-07-28 NOTE — ED TRIAGE NOTES
"Chief Complaint   Patient presents with    Painful Urination     Pt states that about a month ago he developed dysuria. Went to UC yesterday and they were concerned for a fungal infection around the penis. He started medication. Pain has increased. Per pt states he produces urine at night but once the morning he comes he can't start a stream. UC suggested he come to the ED for further evaluation.     Pt was recently DC from hospital on Wednesday for cardiac issues. He has followed up cards since.      BP (!) 160/91   Pulse 60   Temp 36.7 °C (98 °F) (Temporal)   Resp 16   Ht 1.6 m (5' 3\")   Wt 66.9 kg (147 lb 7.8 oz)   SpO2 95%   BMI 26.13 kg/m²     "

## 2024-07-28 NOTE — ED NOTES
Pt states he has burning pain upon urination with frequent urge to urinate with unproductive urination. Pt sent to the bathroom to obtain urine sample as patient urgently needed to urinate. Pt was then, unable to give a urine sample.

## 2024-07-28 NOTE — ED PROVIDER NOTES
ED Provider Note    CHIEF COMPLAINT  Chief Complaint   Patient presents with    Painful Urination     Pt states that about a month ago he developed dysuria. Went to  yesterday and they were concerned for a fungal infection around the penis. He started medication. Pain has increased. Per pt states he produces urine at night but once the morning he comes he can't start a stream.  suggested he come to the ED for further evaluation.     Pt was recently DC from hospital on Wednesday for cardiac issues. He has followed up cards since.        EXTERNAL RECORDS REVIEWED  Discharge summary completed on 7/24/2024  Sandip Zhao is a 83 y.o. male with a past medical history of coronary artery disease, hyperlipidemia who presented 7/21/2024 with generalized weakness, dizziness and shortness of breath.  He stated his heart rate normally goes up with exercise, however his HR now stays low despite the exercise.  In the emergency room he was found to be bradycardic with a heart rate in the high 30s-low 40s. Also found frequent Bigeminy. TSH 1. Cardio consulted, home Diltiazem changed to Toprol 50mg daily. Patient continued on tele monitor, in which reported HR dropped to 32. The patient is asymptomatic.  I reconsulted cardio, who reviewed the strips, considered the low HR count likely due to bigeminy, which may make the heart rate count lower than the actual heart rate. Loop recorder was also checked by Device Rep and no episodes recorded. PVCs in bigeminy noted. No pacemaker needed at this point per cardio. Patient was advised to continue to follow up with his cardiologist Dr. Mcclendon at [facility].     reviewed.  CMP on 7/20/2024 reveals no evidence of elevated creatinine or BUN.      HPI/ROS    OUTSIDE HISTORIAN(S):  His wife is at bedside and history review of systems and the patient's had urinary hesitancy as well as dysuria increasing severity over the last 24 hours.  Patient was recently hospitalized, he was  discharged and diagnosed urinary tract infection yesterday and given Bactrim.  Since that has had painful urination and decreased urination has been dribbling.  His last urination was at 3 AM.    Sandip Zhao is a 83 y.o. male who presents with his wife complaining of painful urination, decreased urination.  He was recently hospitalized here secondary to bradycardia and workup.  The patient had increasing painful urination and was seen yesterday at urgent care.  Urinalysis was negative for infection although he was started on Bactrim.  Initially thought he possibly had a yeast infection and started antifungal medication.  Since that time is an increasing painful urination and decreased output.  His last normal urination was approximately 3 AM he states with his decreased flow.  The patient is been dribbling in his underwear as well.  Denies fever, severe abdominal pain, back pain.    PAST MEDICAL HISTORY   has a past medical history of Anemia, CAD (coronary artery disease), Environmental allergies, Hemorrhoids, Hypertension, MI (myocardial infarction) ([facility]), and Status post placement of implantable loop recorder.    SURGICAL HISTORY   has a past surgical history that includes abdominal exploration; appendectomy; and knee arthroscopy.    FAMILY HISTORY  Family History   Problem Relation Age of Onset    Hypertension Father     Heart Disease Father         mitral valve replacement       SOCIAL HISTORY  Social History     Tobacco Use    Smoking status: Never    Smokeless tobacco: Never   Vaping Use    Vaping status: Never Used   Substance and Sexual Activity    Alcohol use: Not Currently    Drug use: Never    Sexual activity: Not on file     Comment: food and beverage management       CURRENT MEDICATIONS  Home Medications       Reviewed by Larry Marquez (Pharmacy Tech) on 07/28/24 at 1216  Med List Status: Complete     Medication Last Dose Status   acetaminophen (TYLENOL 8 HOUR ARTHRITIS PAIN) 650 MG CR tablet  "7/28/2024 Active   aspirin 81 MG EC tablet 7/28/2024 Active   Cyanocobalamin (B-12 PO) 7/27/2024 Active   ezetimibe (ZETIA) 10 MG Tab 7/27/2024 Active   metoprolol SR (TOPROL XL) 50 MG TABLET SR 24 HR 7/24/2024 Active   mexiletine (MEXITIL) 150 MG Cap 7/28/2024 Active   Multiple Vitamins-Minerals (CENTRUM SILVER 50+MEN) Tab 7/27/2024 Active   rosuvastatin (CRESTOR) 20 MG Tab 7/27/2024 Active   sulfamethoxazole-trimethoprim (BACTRIM DS) 800-160 MG tablet 7/28/2024 Active                    ALLERGIES  No Known Allergies    PHYSICAL EXAM  VITAL SIGNS: BP (!) 148/109   Pulse 72   Temp 36.7 °C (98.1 °F) (Temporal)   Resp 18   Ht 1.6 m (5' 3\")   Wt 66.9 kg (147 lb 7.8 oz)   SpO2 95%   BMI 26.13 kg/m²      Nursing notes and vitals reviewed.  Constitutional: Well developed, Well nourished, No acute distress, Non-toxic appearance.   Thorax & Lungs: No respiratory distress, No rales, No rhonchi, No wheezing, No chest tenderness.   Abdomen: Bowel sounds normal, Soft, slight suprapubic tenderness   Skin: Warm, Dry, No erythema, No rash.   : Uncircumcised male, no phimosis.  Microthrombosis, foreskin freely mobile, no severe erythema on the glans penis, no penile discharge at the meatus, no scrotal edema, no scrotal tenderness        EKG/LABS  Patient had a bladder scan of 150 MLS.      COURSE & MEDICAL DECISION MAKING    ASSESSMENT, COURSE AND PLAN  Care Narrative: This is a pleasant 83-year-old male who presents with dysuria.  Here in the emergency department, the patient is giving a small amount of urine and is having dribbling.  I did review the urinalysis from yesterday revealed no active infection, culture is pending and is partially treated I do believe would be beneficial at this point to obtain a urinalysis.  I discussed the patient with Dr. Wadsworth agrees the plan for outpatient continuation of antibiotics, add Pyridium as well as Flomax and they will be seen tomorrow.  In addition, we discussed possible Michel " catheter but this point the patient does not require catheterization per current Dr. Wadsworth and rather try outpatient antibiotics and therapy.  Patient is nontoxic, afebrile do not believe patient requires further evaluation here in the emerged part and we discharged with strict return precautions.  Patient probably has prostatic hypertrophy.  Urgency and idea prostatitis but the patient is afebrile, wait for urine culture.  Patient will be discharged with strict return precautions.    DISPOSITION AND DISCUSSIONS  I have discussed management of the patient with the following physicians and OK's: Dr. Wadsworth recommends outpatient continuation of antibiotics, follow-up on the culture and they will see the patient in the clinic tomorrow or the next day, add Flomax as well as Pyridium for pain..    Decision tools and prescription drugs considered including, but not limited to: Consider blood work the patient recent was hospitalized and normal kidney function, urinalysis complete is negative is negative for infection he is on antibiotics and has a culture.  I believe the important part of this would be culture.    FINAL DIAGNOSIS  1. Dysuria    2. Benign prostatic hyperplasia with urinary obstruction      DISPOSITION:  Patient will be discharged home in stable condition.    FOLLOW UP:  [facility], Emergency Dept  [address] [facility] [address] [phone]    If symptoms worsen    Ignacio Juarez M.D.  [address] [facility] [address]  [phone]    Schedule an appointment as soon as possible for a visit in 1 day        OUTPATIENT MEDICATIONS:  Discharge Medication List as of 7/28/2024 12:13 PM        START taking these medications    Details   tamsulosin (FLOMAX) 0.4 MG capsule Take 1 Capsule by mouth every day., Disp-30 Capsule, R-0, Normal      phenazopyridine (PYRIDIUM) 200 MG Tab Take 1 Tablet by mouth 3 times a day as needed (with food) for up to 2 days., Disp-6 Tablet,  R-0, Normal               Electronically signed by: Vipin Bustamante D.O., 7/28/2024 11:32 AM

## 2024-07-28 NOTE — DISCHARGE INSTRUCTIONS
We do not believe you have a significant urinary retention or causing you to require a Michel catheter.  Please continue take the medications and I prescribed you new medications that will help with your urethra and prostate I will open it up as well as medication for the pain.  Your urine will become orange that is normal.  Please follow-up with Dr. Juarez next week for further evaluation return to the emergency room increasing symptoms.

## 2024-07-29 PROBLEM — R33.9 URINARY RETENTION: Status: ACTIVE | Noted: 2024-07-29

## 2024-07-29 PROBLEM — R91.1 PULMONARY NODULE: Status: ACTIVE | Noted: 2024-07-29

## 2024-07-29 LAB
ANION GAP SERPL CALC-SCNC: 12 MMOL/L (ref 7–16)
BACTERIA UR CULT: NORMAL
BUN SERPL-MCNC: 10 MG/DL (ref 8–22)
CALCIUM SERPL-MCNC: 9 MG/DL (ref 8.4–10.2)
CHLORIDE SERPL-SCNC: 99 MMOL/L (ref 96–112)
CO2 SERPL-SCNC: 21 MMOL/L (ref 20–33)
CREAT SERPL-MCNC: 1.07 MG/DL (ref 0.5–1.4)
ERYTHROCYTE [DISTWIDTH] IN BLOOD BY AUTOMATED COUNT: 40.4 FL (ref 35.9–50)
GFR SERPLBLD CREATININE-BSD FMLA CKD-EPI: 68 ML/MIN/1.73 M 2
GLUCOSE SERPL-MCNC: 99 MG/DL (ref 65–99)
HCT VFR BLD AUTO: 33 % (ref 42–52)
HGB BLD-MCNC: 11.5 G/DL (ref 14–18)
MCH RBC QN AUTO: 31.2 PG (ref 27–33)
MCHC RBC AUTO-ENTMCNC: 34.8 G/DL (ref 32.3–36.5)
MCV RBC AUTO: 89.4 FL (ref 81.4–97.8)
PLATELET # BLD AUTO: 184 K/UL (ref 164–446)
PMV BLD AUTO: 9.3 FL (ref 9–12.9)
POTASSIUM SERPL-SCNC: 4.1 MMOL/L (ref 3.6–5.5)
RBC # BLD AUTO: 3.69 M/UL (ref 4.7–6.1)
SIGNIFICANT IND 70042: NORMAL
SITE SITE: NORMAL
SODIUM SERPL-SCNC: 126 MMOL/L (ref 135–145)
SODIUM SERPL-SCNC: 127 MMOL/L (ref 135–145)
SODIUM SERPL-SCNC: 127 MMOL/L (ref 135–145)
SODIUM SERPL-SCNC: 132 MMOL/L (ref 135–145)
SOURCE SOURCE: NORMAL
WBC # BLD AUTO: 4.1 K/UL (ref 4.8–10.8)

## 2024-07-29 PROCEDURE — A9270 NON-COVERED ITEM OR SERVICE: HCPCS | Performed by: STUDENT IN AN ORGANIZED HEALTH CARE EDUCATION/TRAINING PROGRAM

## 2024-07-29 PROCEDURE — 84295 ASSAY OF SERUM SODIUM: CPT

## 2024-07-29 PROCEDURE — 80048 BASIC METABOLIC PNL TOTAL CA: CPT

## 2024-07-29 PROCEDURE — 700102 HCHG RX REV CODE 250 W/ 637 OVERRIDE(OP): Performed by: INTERNAL MEDICINE

## 2024-07-29 PROCEDURE — 94760 N-INVAS EAR/PLS OXIMETRY 1: CPT

## 2024-07-29 PROCEDURE — 85027 COMPLETE CBC AUTOMATED: CPT

## 2024-07-29 PROCEDURE — A9270 NON-COVERED ITEM OR SERVICE: HCPCS | Performed by: INTERNAL MEDICINE

## 2024-07-29 PROCEDURE — 99233 SBSQ HOSP IP/OBS HIGH 50: CPT | Performed by: STUDENT IN AN ORGANIZED HEALTH CARE EDUCATION/TRAINING PROGRAM

## 2024-07-29 PROCEDURE — 770020 HCHG ROOM/CARE - TELE (206)

## 2024-07-29 PROCEDURE — 99418 PROLNG IP/OBS E/M EA 15 MIN: CPT | Performed by: STUDENT IN AN ORGANIZED HEALTH CARE EDUCATION/TRAINING PROGRAM

## 2024-07-29 PROCEDURE — 700102 HCHG RX REV CODE 250 W/ 637 OVERRIDE(OP): Performed by: STUDENT IN AN ORGANIZED HEALTH CARE EDUCATION/TRAINING PROGRAM

## 2024-07-29 PROCEDURE — 700105 HCHG RX REV CODE 258: Performed by: INTERNAL MEDICINE

## 2024-07-29 RX ORDER — AMOXICILLIN 250 MG
2 CAPSULE ORAL 2 TIMES DAILY
Status: DISCONTINUED | OUTPATIENT
Start: 2024-07-29 | End: 2024-07-30 | Stop reason: HOSPADM

## 2024-07-29 RX ORDER — POLYETHYLENE GLYCOL 3350 17 G/17G
1 POWDER, FOR SOLUTION ORAL DAILY
Status: DISCONTINUED | OUTPATIENT
Start: 2024-07-29 | End: 2024-07-30 | Stop reason: HOSPADM

## 2024-07-29 RX ORDER — DILTIAZEM HYDROCHLORIDE 120 MG/1
120 CAPSULE, COATED, EXTENDED RELEASE ORAL DAILY
Status: ON HOLD | COMMUNITY
End: 2024-07-30

## 2024-07-29 RX ADMIN — POLYETHYLENE GLYCOL 3350 1 PACKET: 17 POWDER, FOR SOLUTION ORAL at 12:36

## 2024-07-29 RX ADMIN — SODIUM CHLORIDE: 9 INJECTION, SOLUTION INTRAVENOUS at 12:38

## 2024-07-29 RX ADMIN — ROSUVASTATIN CALCIUM 20 MG: 10 TABLET, FILM COATED ORAL at 17:46

## 2024-07-29 RX ADMIN — TAMSULOSIN HYDROCHLORIDE 0.4 MG: 0.4 CAPSULE ORAL at 06:42

## 2024-07-29 RX ADMIN — MEXILETINE HYDROCHLORIDE 150 MG: 150 CAPSULE ORAL at 21:53

## 2024-07-29 RX ADMIN — ASPIRIN 81 MG: 81 TABLET, COATED ORAL at 06:42

## 2024-07-29 RX ADMIN — SENNOSIDES AND DOCUSATE SODIUM 2 TABLET: 50; 8.6 TABLET ORAL at 17:42

## 2024-07-29 RX ADMIN — MEXILETINE HYDROCHLORIDE 150 MG: 150 CAPSULE ORAL at 13:39

## 2024-07-29 RX ADMIN — FINASTERIDE 5 MG: 5 TABLET, FILM COATED ORAL at 06:42

## 2024-07-29 RX ADMIN — SULFAMETHOXAZOLE AND TRIMETHOPRIM 1 TABLET: 800; 160 TABLET ORAL at 06:42

## 2024-07-29 RX ADMIN — MEXILETINE HYDROCHLORIDE 150 MG: 150 CAPSULE ORAL at 07:48

## 2024-07-29 RX ADMIN — SODIUM CHLORIDE: 9 INJECTION, SOLUTION INTRAVENOUS at 01:34

## 2024-07-29 ASSESSMENT — LIFESTYLE VARIABLES
HAVE PEOPLE ANNOYED YOU BY CRITICIZING YOUR DRINKING: NO
ON A TYPICAL DAY WHEN YOU DRINK ALCOHOL HOW MANY DRINKS DO YOU HAVE: 0
TOTAL SCORE: 0
DOES PATIENT WANT TO STOP DRINKING: NO
HOW MANY TIMES IN THE PAST YEAR HAVE YOU HAD 5 OR MORE DRINKS IN A DAY: 0
ALCOHOL_USE: NO
HAVE YOU EVER FELT YOU SHOULD CUT DOWN ON YOUR DRINKING: NO
TOTAL SCORE: 0
AVERAGE NUMBER OF DAYS PER WEEK YOU HAVE A DRINK CONTAINING ALCOHOL: 0
EVER FELT BAD OR GUILTY ABOUT YOUR DRINKING: NO
CONSUMPTION TOTAL: NEGATIVE
TOTAL SCORE: 0
EVER HAD A DRINK FIRST THING IN THE MORNING TO STEADY YOUR NERVES TO GET RID OF A HANGOVER: NO

## 2024-07-29 ASSESSMENT — ENCOUNTER SYMPTOMS
SPUTUM PRODUCTION: 0
DEPRESSION: 0
FEVER: 0
MYALGIAS: 0
CHILLS: 0
DIARRHEA: 0
WEAKNESS: 1
WEAKNESS: 0
CONSTIPATION: 0
FALLS: 0
STRIDOR: 0
COUGH: 0
TINGLING: 0
DIZZINESS: 1
PALPITATIONS: 1
VOMITING: 0
SHORTNESS OF BREATH: 0
NAUSEA: 0
DIZZINESS: 0
HEADACHES: 0
ABDOMINAL PAIN: 0
LOSS OF CONSCIOUSNESS: 0

## 2024-07-29 ASSESSMENT — PAIN DESCRIPTION - PAIN TYPE
TYPE: ACUTE PAIN
TYPE: ACUTE PAIN

## 2024-07-29 ASSESSMENT — FIBROSIS 4 INDEX
FIB4 SCORE: 1.53
FIB4 SCORE: 1.53

## 2024-07-29 ASSESSMENT — SOCIAL DETERMINANTS OF HEALTH (SDOH)
WITHIN THE LAST YEAR, HAVE TO BEEN RAPED OR FORCED TO HAVE ANY KIND OF SEXUAL ACTIVITY BY YOUR PARTNER OR EX-PARTNER?: NO
WITHIN THE LAST YEAR, HAVE YOU BEEN HUMILIATED OR EMOTIONALLY ABUSED IN OTHER WAYS BY YOUR PARTNER OR EX-PARTNER?: NO
WITHIN THE LAST YEAR, HAVE YOU BEEN KICKED, HIT, SLAPPED, OR OTHERWISE PHYSICALLY HURT BY YOUR PARTNER OR EX-PARTNER?: NO
WITHIN THE PAST 12 MONTHS, THE FOOD YOU BOUGHT JUST DIDN'T LAST AND YOU DIDN'T HAVE MONEY TO GET MORE: NEVER TRUE
IN THE PAST 12 MONTHS, HAS THE ELECTRIC, GAS, OIL, OR WATER COMPANY THREATENED TO SHUT OFF SERVICE IN YOUR HOME?: NO
WITHIN THE PAST 12 MONTHS, YOU WORRIED THAT YOUR FOOD WOULD RUN OUT BEFORE YOU GOT THE MONEY TO BUY MORE: NEVER TRUE
WITHIN THE LAST YEAR, HAVE YOU BEEN AFRAID OF YOUR PARTNER OR EX-PARTNER?: NO

## 2024-07-29 ASSESSMENT — PATIENT HEALTH QUESTIONNAIRE - PHQ9
1. LITTLE INTEREST OR PLEASURE IN DOING THINGS: NOT AT ALL
SUM OF ALL RESPONSES TO PHQ9 QUESTIONS 1 AND 2: 0
2. FEELING DOWN, DEPRESSED, IRRITABLE, OR HOPELESS: NOT AT ALL

## 2024-07-29 NOTE — ASSESSMENT & PLAN NOTE
Resulting in significant urinary retention and inability to void  Patient was just started on Flomax, will also add Proscar  Michel now in place, will leave it in place  Will need outpatient follow-up with urology-- Dr. Juarez 8/1  UA negative, urine culture on 7/27 negative, will dc antibiotics   Urinary retention causing some bladder spasm, continue Pyridium

## 2024-07-29 NOTE — ASSESSMENT & PLAN NOTE
Patient is currently not on any scheduled antihypertensives  Recently had medications adjusted  Start as needed hydralazine  Acutely worsened hypertension secondary to pain from urinary retention

## 2024-07-29 NOTE — PROGRESS NOTES
Telemetry Shift Summary    Rhythm SR  HR Range 70s  Ectopy frequent PVC, occasional bigeminy/trigeminy  Measurements 0.18/0.08/0.36        Normal Values  Rhythm SR  HR Range    Measurements 0.12-0.20 / 0.06-0.10  / 0.30-0.52

## 2024-07-29 NOTE — ASSESSMENT & PLAN NOTE
Patient was initially in ventricular bigeminy when my exam began, later transition to sinus with PVCs  Patient was having bradycardia on diltiazem, this was transitioned to metoprolol during last hospital stay  Patient's outpatient cardiologist further transition this to mexiletine, stopping the beta-blocker  Continue mexiletine  Monitor patient on telemetry

## 2024-07-29 NOTE — PROGRESS NOTES
4 Eyes Skin Assessment Completed by UZAIR Hall and UZAIR Grossman.    Head WDL  Ears WDL  Nose WDL  Mouth WDL  Neck WDL  Breast/Chest WDL  Shoulder Blades WDL  Spine WDL  (R) Arm/Elbow/Hand Bruising  (L) Arm/Elbow/Hand Bruising  Abdomen WDL  Groin WDL  Scrotum/Coccyx/Buttocks WDL  (R) Leg Bruising  (L) Leg Bruising  (R) Heel/Foot/Toe WDL  (L) Heel/Foot/Toe WDL          Devices In Places Tele Box and Blood Pressure Cuff      Interventions In Place N/A    Possible Skin Injury No    Pictures Uploaded Into Epic N/A  Wound Consult Placed N/A  RN Wound Prevention Protocol Ordered No

## 2024-07-29 NOTE — ASSESSMENT & PLAN NOTE
Sodium level noted to be 124, 4 days ago was normal at 136  I think this is due to dehydration, no sign of fluid overload  Start normal saline at 83  Obtain sodium level every 6 hours  Adjust fluids as needed  Avoid increasing sodium level more than 8/day  Free water restriction to 1.5L daily

## 2024-07-29 NOTE — PROGRESS NOTES
Medication history reviewed historically from 07/28/2024 ED visit prior to admission and PT's spouse, Kimberley ([phone]) over the phone.    Med rec is complete per historic review and spouse reporting.    Allergies reviewed with spouse over the phone     PT is on Bactrim DS 7 day course started 07/27/2024. Last dose was 07/28/2024 in the AM    Patient is not taking anticoagulants.    Preferred pharmacy for this visit - [facility] [address] ([phone])

## 2024-07-29 NOTE — PROGRESS NOTES
Hospital Medicine Daily Progress Note    Date of Service  7/29/2024    Chief Complaint  Sandip Zhao is a 83 y.o. male admitted 7/28/2024 with urinary retention    Hospital Course  83 y.o. male who presented 7/28/2024 with urinary retention.  Patient states yesterday he began having difficulty urinating however with further conversation it does sound like this has been a bit of a progressing issue.  Regardless, he began having significant issues with urinating so he presented to the emergency department 7/28.  Patient initially presented to the emergency department earlier this morning due to painful urination, ERP at that time spoke with patient's urologist, they agreed with outpatient antibiotics, Pyridium and Flomax and they would be seen in the office 7/29  Patient had worsening urinary retention so he came back to the emergency department.  He was noted to have almost a liter of urine in his bladder so Galindo was placed.  After Galindo was placed, labs were obtained and patient was noted to have new hyponatremia.     Interval Problem Update  Seen patient at bedside  Patient has galindo in placed.   Outpatient urology referral- patient has appointment with Dr. Juarez 8/1 10:15  Still feeling dizziness, continue IVF  Na 132, free water restriction 1500cc daily and continue IVF  PT/OT  Trend serum Na  Reviewed UA on 7/27 and 7/28 no sign of Uti  Urine culture on 7/27 negative, I have dc abx  I have reviewed CT abdomen  Constipations- bowel regimens    I have discussed this patient's plan of care and discharge plan at IDT rounds today with Case Management, Nursing, Nursing leadership, and other members of the IDT team.    Consultants/Specialty  na    Code Status  Full Code    Disposition  The patient is not medically cleared for discharge to home or a post-acute facility.      I have placed the appropriate orders for post-discharge needs.    Review of Systems  Review of Systems   Constitutional:  Positive for  malaise/fatigue.   Genitourinary:         Urinary retention   Neurological:  Positive for dizziness and weakness.   All other systems reviewed and are negative.       Physical Exam  Temp:  [36.3 °C (97.4 °F)-36.8 °C (98.3 °F)] 36.8 °C (98.3 °F)  Pulse:  [52-78] 74  Resp:  [16-20] 18  BP: (127-177)/(51-87) 146/71  SpO2:  [92 %-98 %] 92 %    Physical Exam  Vitals and nursing note reviewed.   Constitutional:       Appearance: Normal appearance. He is ill-appearing.   HENT:      Head: Normocephalic and atraumatic.      Mouth/Throat:      Pharynx: Oropharynx is clear.   Eyes:      Pupils: Pupils are equal, round, and reactive to light.   Neck:      Vascular: No carotid bruit.   Cardiovascular:      Rate and Rhythm: Normal rate and regular rhythm.   Pulmonary:      Effort: Pulmonary effort is normal.      Breath sounds: Normal breath sounds.   Abdominal:      General: Abdomen is flat. Bowel sounds are normal.      Palpations: Abdomen is soft. There is no mass.   Genitourinary:     Comments: Michel in place  Musculoskeletal:         General: Normal range of motion.      Cervical back: Neck supple.   Skin:     General: Skin is warm and dry.   Neurological:      General: No focal deficit present.      Mental Status: He is alert and oriented to person, place, and time.   Psychiatric:         Mood and Affect: Mood normal.         Behavior: Behavior normal.         Fluids    Intake/Output Summary (Last 24 hours) at 7/29/2024 1448  Last data filed at 7/29/2024 1400  Gross per 24 hour   Intake 440 ml   Output 3800 ml   Net -3360 ml       Laboratory  Recent Labs     07/28/24 2220 07/29/24  0654   WBC 5.7 4.1*   RBC 3.69* 3.69*   HEMOGLOBIN 11.3* 11.5*   HEMATOCRIT 32.7* 33.0*   MCV 88.6 89.4   MCH 30.6 31.2   MCHC 34.6 34.8   RDW 38.6 40.4   PLATELETCT 210 184   MPV 9.7 9.3     Recent Labs     07/28/24  2220 07/29/24  0055 07/29/24  0654 07/29/24  1246   SODIUM 124* 126* 132* 127*   POTASSIUM 4.3  --  4.1  --    CHLORIDE 92*  --   99  --    CO2 20  --  21  --    GLUCOSE 107*  --  99  --    BUN 13  --  10  --    CREATININE 1.06  --  1.07  --    CALCIUM 9.0  --  9.0  --                    Imaging  CT-ABDOMEN-PELVIS WITH   Final Result         1.  Left inguinal hernia containing loop of sigmoid colon without visualized obstructive changes. Small fat-containing right inguinal hernia   2.  Markedly enlarged prostate, workup and evaluation for causes of prostate enlargement recommended as clinically appropriate.   3.  Small free fluid collection the pelvis   4.  Atherosclerosis and atherosclerotic coronary artery disease   5.  Pulmonary nodule, see nodule follow-up recommendations below.      Fleischner Society pulmonary nodule recommendations:   Low Risk: No routine follow-up      High Risk: Optional CT at 12 months      Comments: Nodules less than 6 mm do not require routine follow-up, but certain patients at high risk with suspicious nodule morphology, upper lobe location, or both may warrant 12-month follow-up.      Low Risk - Minimal or absent history of smoking and of other known risk factors.      High Risk - History of smoking or of other known risk factors.      Note: These recommendations do not apply to lung cancer screening, patients with immunosuppression, or patients with known primary cancer.      Fleischner Society 2017 Guidelines for Management of Incidentally Detected Pulmonary Nodules in Adults              Assessment/Plan  * Hyponatremia- (present on admission)  Assessment & Plan  Sodium level noted to be 124, 4 days ago was normal at 136  I think this is due to dehydration, no sign of fluid overload  Start normal saline at 83  Obtain sodium level every 6 hours  Adjust fluids as needed  Avoid increasing sodium level more than 8/day  Free water restriction to 1.5L daily    Pulmonary nodule- (present on admission)  Assessment & Plan  Outpatient follow-up    Ventricular bigeminy- (present on admission)  Assessment & Plan  Patient  was initially in ventricular bigeminy when my exam began, later transition to sinus with PVCs  Patient was having bradycardia on diltiazem, this was transitioned to metoprolol during last hospital stay  Patient's outpatient cardiologist further transition this to mexiletine, stopping the beta-blocker  Continue mexiletine  Monitor patient on telemetry    Anemia- (present on admission)  Assessment & Plan  Normocytic anemia on labs  There is mild hematuria in the Michel bag, likely due to Michel placement  I will have bedside RN flush and monitor for any clots  Repeat CBC in the morning    Mixed hyperlipidemia- (present on admission)  Assessment & Plan  Continue home statin    Primary hypertension- (present on admission)  Assessment & Plan  Patient is currently not on any scheduled antihypertensives  Recently had medications adjusted  Start as needed hydralazine  Acutely worsened hypertension secondary to pain from urinary retention    Prediabetes- (present on admission)  Assessment & Plan  Mild hyperglycemia  No need for coverage    Benign prostatic hyperplasia with urinary obstruction- (present on admission)  Assessment & Plan  Resulting in significant urinary retention and inability to void  Patient was just started on Flomax, will also add Proscar  Michel now in place, will leave it in place  Will need outpatient follow-up with urology-- Dr. Juarez 8/1  UA negative, urine culture on 7/27 negative, will dc antibiotics   Urinary retention causing some bladder spasm, continue Pyridium         VTE prophylaxis: lovenox    I have performed a physical exam and reviewed and updated ROS and Plan today (7/29/2024). In review of yesterday's note (7/28/2024), there are no changes except as documented above.    Patient is has a high medical complexity, complex decision making and is at high risk for complication, morbidity, and mortality.  I spent 51 minutes, reviewing the chart, obtaining and/or reviewing separately obtained  history. Performing a medically appropriate examination and evaluation.  Counseling and educating the patient. Ordering and reviewing medications, tests, or procedures.  Documenting clinical information in EPIC. Independently interpreting results and communicating results to patient. Discussing future disposition of care with patient, RN and case management.  This does not include time spent on separately billable procedures/tests.    Additional 16 mins spent at bedside discussing with wife, goal of care discussed. Questions were all answered.

## 2024-07-29 NOTE — ASSESSMENT & PLAN NOTE
Normocytic anemia on labs  There is mild hematuria in the Michel bag, likely due to Michel placement  I will have bedside RN flush and monitor for any clots  Repeat CBC in the morning

## 2024-07-29 NOTE — H&P
Hospital Medicine History & Physical Note    Date of Service  7/28/2024    Primary Care Physician  Meagan Mcneil M.D.    Consultants  None    Specialist Names: None    Code Status  Full Code    Chief Complaint  Chief Complaint   Patient presents with    Urinary Retention     Unable to void since this AM       History of Presenting Illness  Sandip Zhao is a 83 y.o. male who presented 7/28/2024 with urinary retention.  Patient states yesterday he began having difficulty urinating however with further conversation it does sound like this has been a bit of a progressing issue.  Regardless, today he began having significant issues with urinating so he presented to the emergency department.  Patient initially presented to the emergency department earlier this morning due to painful urination, ERP at that time spoke with patient's urologist, they agreed with outpatient antibiotics, Pyridium and Flomax and they would be seen in the office tomorrow.  Patient had worsening urinary retention so he came back to the emergency department.  He was noted to have almost a liter of urine in his bladder so Michel was placed.  After Michel was placed, labs were obtained and patient was noted to have new hyponatremia.  I did discuss the case including labs and imaging with the ER physician.    I discussed the plan of care with patient and family.    Review of Systems  Review of Systems   Constitutional:  Positive for malaise/fatigue. Negative for chills and fever.   HENT:  Negative for congestion.    Respiratory:  Negative for cough, sputum production, shortness of breath and stridor.    Cardiovascular:  Positive for palpitations. Negative for chest pain and leg swelling.   Gastrointestinal:  Negative for abdominal pain, constipation, diarrhea, nausea and vomiting.   Genitourinary:  Positive for dysuria. Negative for urgency.        Difficulty urinating    Musculoskeletal:  Negative for falls and myalgias.   Neurological:   Negative for dizziness, tingling, loss of consciousness, weakness and headaches.   Psychiatric/Behavioral:  Negative for depression and suicidal ideas.    All other systems reviewed and are negative.      Past Medical History   has a past medical history of Anemia, CAD (coronary artery disease), Environmental allergies, Hemorrhoids, Hypertension, MI (myocardial infarction) ([facility]), and Status post placement of implantable loop recorder.    Surgical History   has a past surgical history that includes abdominal exploration; appendectomy; and knee arthroscopy.     Family History  family history includes Heart Disease in his father; Hypertension in his father.   Family history reviewed with patient. There is no family history that is pertinent to the chief complaint.     Social History   reports that he has never smoked. He has never used smokeless tobacco. He reports that he does not currently use alcohol. He reports that he does not use drugs.    Allergies  No Known Allergies    Medications  Prior to Admission Medications   Prescriptions Last Dose Informant Patient Reported? Taking?   Cyanocobalamin (B-12 PO)  Significant Other, Patient Yes No   Sig: Take 1 Tablet by mouth every day.   Multiple Vitamins-Minerals (CENTRUM SILVER 50+MEN) Tab  Significant Other, Patient Yes No   Sig: Take 1 Tablet by mouth every day.   acetaminophen (TYLENOL 8 HOUR ARTHRITIS PAIN) 650 MG CR tablet  Significant Other, Patient Yes No   Sig: Take 1,300 mg by mouth 1 time a day as needed for Mild Pain. 2 tablets= 1300mg   aspirin 81 MG EC tablet  Significant Other, Patient Yes No   Sig: Take 81 mg by mouth every morning.   ezetimibe (ZETIA) 10 MG Tab  Significant Other, Patient Yes No   Sig: Take 10 mg by mouth every evening.   metoprolol SR (TOPROL XL) 50 MG TABLET SR 24 HR  Significant Other, Patient No No   Sig: Take 1 Tablet by mouth every day for 30 days.   Patient not taking: Reported on 7/28/2024   mexiletine (MEXITIL) 150 MG Cap   Significant Other, Patient Yes No   Sig: Take 150 mg by mouth every 8 hours.   phenazopyridine (PYRIDIUM) 200 MG Tab   No No   Sig: Take 1 Tablet by mouth 3 times a day as needed (with food) for up to 2 days.   rosuvastatin (CRESTOR) 20 MG Tab  Significant Other, Patient Yes No   Sig: Take 20 mg by mouth every evening.   sulfamethoxazole-trimethoprim (BACTRIM DS) 800-160 MG tablet  Significant Other, Patient No No   Sig: Take 1 Tablet by mouth 2 times a day for 7 days.   tamsulosin (FLOMAX) 0.4 MG capsule   No No   Sig: Take 1 Capsule by mouth every day.      Facility-Administered Medications: None       Physical Exam  Temp:  [36.7 °C (98 °F)-36.8 °C (98.2 °F)] 36.8 °C (98.2 °F)  Pulse:  [42-78] 78  Resp:  [16-20] 16  BP: (119-177)/() 177/87  SpO2:  [93 %-96 %] 93 %  Blood Pressure : (!) 177/87   Temperature: 36.8 °C (98.2 °F)   Pulse: 78   Respiration: 16   Pulse Oximetry: 93 %       Physical Exam  Vitals and nursing note reviewed.   Constitutional:       General: He is not in acute distress.     Appearance: He is well-developed. He is not toxic-appearing or diaphoretic.   HENT:      Head: Normocephalic and atraumatic.      Right Ear: External ear normal.      Left Ear: External ear normal.      Nose: Nose normal. No congestion or rhinorrhea.      Mouth/Throat:      Mouth: Mucous membranes are dry.      Pharynx: No oropharyngeal exudate.   Eyes:      General:         Right eye: No discharge.         Left eye: No discharge.   Neck:      Trachea: No tracheal deviation.   Cardiovascular:      Rate and Rhythm: Normal rate. Rhythm irregular.      Heart sounds: No murmur heard.     No friction rub. No gallop.   Pulmonary:      Effort: Pulmonary effort is normal. No respiratory distress.      Breath sounds: Normal breath sounds. No stridor. No wheezing or rales.   Chest:      Chest wall: No tenderness.   Abdominal:      General: Bowel sounds are normal. There is no distension.      Palpations: Abdomen is soft.       "Tenderness: There is abdominal tenderness in the suprapubic area.   Musculoskeletal:         General: No tenderness. Normal range of motion.      Cervical back: Normal range of motion and neck supple. No edema or erythema.      Right lower leg: No edema.      Left lower leg: No edema.   Lymphadenopathy:      Cervical: No cervical adenopathy.   Skin:     General: Skin is warm and dry.      Findings: No erythema or rash.   Neurological:      Mental Status: He is alert and oriented to person, place, and time.      Cranial Nerves: No cranial nerve deficit.   Psychiatric:         Mood and Affect: Mood normal.         Behavior: Behavior normal.         Thought Content: Thought content normal.         Judgment: Judgment normal.         Laboratory:  Recent Labs     07/28/24  2220   WBC 5.7   RBC 3.69*   HEMOGLOBIN 11.3*   HEMATOCRIT 32.7*   MCV 88.6   MCH 30.6   MCHC 34.6   RDW 38.6   PLATELETCT 210   MPV 9.7     Recent Labs     07/28/24  2220   SODIUM 124*   POTASSIUM 4.3   CHLORIDE 92*   CO2 20   GLUCOSE 107*   BUN 13   CREATININE 1.06   CALCIUM 9.0     Recent Labs     07/28/24  2220   ALTSGPT 24   ASTSGOT 19   ALKPHOSPHAT 94   TBILIRUBIN 0.8   GLUCOSE 107*         No results for input(s): \"NTPROBNP\" in the last 72 hours.      No results for input(s): \"TROPONINT\" in the last 72 hours.    Imaging:  CT-ABDOMEN-PELVIS WITH   Final Result         1.  Left inguinal hernia containing loop of sigmoid colon without visualized obstructive changes. Small fat-containing right inguinal hernia   2.  Markedly enlarged prostate, workup and evaluation for causes of prostate enlargement recommended as clinically appropriate.   3.  Small free fluid collection the pelvis   4.  Atherosclerosis and atherosclerotic coronary artery disease   5.  Pulmonary nodule, see nodule follow-up recommendations below.      Fleischner Society pulmonary nodule recommendations:   Low Risk: No routine follow-up      High Risk: Optional CT at 12 months    "   Comments: Nodules less than 6 mm do not require routine follow-up, but certain patients at high risk with suspicious nodule morphology, upper lobe location, or both may warrant 12-month follow-up.      Low Risk - Minimal or absent history of smoking and of other known risk factors.      High Risk - History of smoking or of other known risk factors.      Note: These recommendations do not apply to lung cancer screening, patients with immunosuppression, or patients with known primary cancer.      Fleischner Society 2017 Guidelines for Management of Incidentally Detected Pulmonary Nodules in Adults             EKG:  I have personally reviewed the images and compared with prior images.    Assessment/Plan:  Justification for Admission Status  I anticipate this patient will require at least two midnights for appropriate medical management, necessitating inpatient admission because hyponatremia    Patient will need a Telemetry bed on MEDICAL service .  The need is secondary to hyponatremia.    * Hyponatremia- (present on admission)  Assessment & Plan  Sodium level noted to be 124, 4 days ago was normal at 136  I think this is due to dehydration, no sign of fluid overload  Start normal saline at 83  Obtain sodium level every 6 hours  Adjust fluids as needed  Avoid increasing sodium level more than 8/day    Benign prostatic hyperplasia with urinary obstruction- (present on admission)  Assessment & Plan  Resulting in significant urinary retention and inability to void  Patient was just started on Flomax, will also add Proscar  Michel now in place, will leave it in place  Will need outpatient follow-up with urology  Urology recommended antibiotic, was started on Bactrim we will continue this medication  While patient did have urinary retention and also has dehydration, no significant FAITH at this time, if his kidney function worsens, will need to stop Bactrim  Repeat BMP in the morning  Urinary retention causing some bladder  spasm, continue Pyridium    Ventricular bigeminy- (present on admission)  Assessment & Plan  Patient was initially in ventricular bigeminy when my exam began, later transition to sinus with PVCs  Patient was having bradycardia on diltiazem, this was transitioned to metoprolol during last hospital stay  Patient's outpatient cardiologist further transition this to mexiletine, stopping the beta-blocker  Continue mexiletine  Monitor patient on telemetry    Anemia- (present on admission)  Assessment & Plan  Normocytic anemia on labs  There is mild hematuria in the Michel bag, likely due to Michel placement  I will have bedside RN flush and monitor for any clots  Repeat CBC in the morning    Mixed hyperlipidemia- (present on admission)  Assessment & Plan  Continue home statin    Primary hypertension- (present on admission)  Assessment & Plan  Patient is currently not on any scheduled antihypertensives  Recently had medications adjusted  Start as needed hydralazine  Acutely worsened hypertension secondary to pain from urinary retention    Prediabetes- (present on admission)  Assessment & Plan  Mild hyperglycemia  No need for coverage    Pulmonary nodule- (present on admission)  Assessment & Plan  Outpatient follow-up        VTE prophylaxis: SCDs/TEDs

## 2024-07-29 NOTE — PROGRESS NOTES
Telemetry Shift Summary     Rhythm: SR  HR: 67-73  Ectopy: fPVC    Measurements: .20/.09/.39    Normal Values  Rhythm: SR  HR:   Measurements: 0.12-0.20/0.08-0.10/0.30-0.52

## 2024-07-29 NOTE — ED PROVIDER NOTES
ED Provider Note    CHIEF COMPLAINT  Chief Complaint   Patient presents with    Urinary Retention     Unable to void since this AM       EXTERNAL RECORDS REVIEWED  Inpatient Notes discharge summary from 7/24/2024 noted history of coronary artery disease, hyperlipidemia presented with generalized weakness, dizziness and shortness of breath.  Patient found to be bradycardic cardiology consulted change diltiazem to metoprolol no pacemaker recommended patient to follow-up with Dr. Mcclendon    HPI/RINA  LIMITATION TO HISTORY     OUTSIDE HISTORIAN(S):  Family daughter at bedside    Sandip Zhao is a 83 y.o. male who presents with urinary retention.  Patient was seen in the department earlier today for urinary dribbling and dysuria.  Patient was recently prescribed Bactrim for UTI.  Patient was given prescription for Pyridium earlier today.  Patient says he had worsening lower abdominal pain and urinary tension.  No fever, chills chest pain, shortness of breath, nausea or vomiting.  Patient says he is also been constipated.  No low back pain, lower extremity weakness or numbness.    PAST MEDICAL HISTORY   has a past medical history of Anemia, CAD (coronary artery disease), Environmental allergies, Hemorrhoids, Hypertension, MI (myocardial infarction) ([facility]), and Status post placement of implantable loop recorder.    SURGICAL HISTORY   has a past surgical history that includes abdominal exploration; appendectomy; and knee arthroscopy.    FAMILY HISTORY  Family History   Problem Relation Age of Onset    Hypertension Father     Heart Disease Father         mitral valve replacement       SOCIAL HISTORY  Social History     Tobacco Use    Smoking status: Never    Smokeless tobacco: Never   Vaping Use    Vaping status: Never Used   Substance and Sexual Activity    Alcohol use: Not Currently    Drug use: Never    Sexual activity: Not on file     Comment: food and beverage management       CURRENT MEDICATIONS  Home Medications  "      Reviewed by Lesli Sanchez R.N. (Registered Nurse) on 07/28/24 at 2130  Med List Status: Not Addressed     Medication Last Dose Status   acetaminophen (TYLENOL 8 HOUR ARTHRITIS PAIN) 650 MG CR tablet  Active   aspirin 81 MG EC tablet  Active   Cyanocobalamin (B-12 PO)  Active   ezetimibe (ZETIA) 10 MG Tab  Active   metoprolol SR (TOPROL XL) 50 MG TABLET SR 24 HR  Active   mexiletine (MEXITIL) 150 MG Cap  Active   Multiple Vitamins-Minerals (CENTRUM SILVER 50+MEN) Tab  Active   phenazopyridine (PYRIDIUM) 200 MG Tab  Active   rosuvastatin (CRESTOR) 20 MG Tab  Active   sulfamethoxazole-trimethoprim (BACTRIM DS) 800-160 MG tablet  Active   tamsulosin (FLOMAX) 0.4 MG capsule  Active                  Audit from Redirected Encounters    **Home medications have not yet been reviewed for this encounter**         ALLERGIES  No Known Allergies    PHYSICAL EXAM  VITAL SIGNS: BP (!) 177/87   Pulse 78   Temp 36.8 °C (98.2 °F) (Temporal)   Resp 16   Ht 1.6 m (5' 3\")   Wt 66.7 kg (147 lb)   SpO2 93%   BMI 26.04 kg/m²    Constitutional: Alert in no apparent distress.  HENT: No signs of trauma, Bilateral external ears normal, Nose normal.   Eyes: Pupils are equal and reactive, Conjunctiva normal, Non-icteric.   Neck: Normal range of motion, No tenderness, Supple, No stridor.   Cardiovascular: Regular rate and rhythm, no murmurs.   Thorax & Lungs: Normal breath sounds, No respiratory distress, No wheezing, No chest tenderness.   Abdomen: Bowel sounds normal, Soft, No tenderness, No masses, No pulsatile masses.   Skin: Warm, Dry, No erythema, No rash.   Back: No bony tenderness, no overlying skin changes, no CVA tenderness   Extremities: Intact distal pulses, No edema, No tenderness, No cyanosis  Musculoskeletal: Good range of motion in all major joints. No tenderness to palpation or major deformities noted.   Neurologic: Alert , Normal motor function, Normal sensory function, No focal deficits noted, 5/5 strength in all 4 " extremities, sensation intact to light touch throughout face all 4 extremities, no saddle anesthesia    EKG/LABS  Labs Reviewed   CBC WITH DIFFERENTIAL - Abnormal; Notable for the following components:       Result Value    RBC 3.69 (*)     Hemoglobin 11.3 (*)     Hematocrit 32.7 (*)     Lymphocytes 20.90 (*)     All other components within normal limits   COMP METABOLIC PANEL - Abnormal; Notable for the following components:    Sodium 124 (*)     Chloride 92 (*)     Glucose 107 (*)     All other components within normal limits   URINALYSIS,CULTURE IF INDICATED - Abnormal; Notable for the following components:    Glucose 250 (*)     Nitrite Positive (*)     Occult Blood Moderate (*)     All other components within normal limits   URINE MICROSCOPIC (W/UA) - Abnormal; Notable for the following components:    WBC 0-2 (*)     -150 (*)     Bacteria Few (*)     All other components within normal limits   ESTIMATED GFR   CBC WITHOUT DIFFERENTIAL   BASIC METABOLIC PANEL   SODIUM SERUM (NA)       I have independently interpreted this EKG    RADIOLOGY/PROCEDURES   I have independently interpreted the diagnostic imaging associated with this visit and am waiting the final reading from the radiologist.   My preliminary interpretation is as follows: No free air    Radiologist interpretation:  CT-ABDOMEN-PELVIS WITH   Final Result         1.  Left inguinal hernia containing loop of sigmoid colon without visualized obstructive changes. Small fat-containing right inguinal hernia   2.  Markedly enlarged prostate, workup and evaluation for causes of prostate enlargement recommended as clinically appropriate.   3.  Small free fluid collection the pelvis   4.  Atherosclerosis and atherosclerotic coronary artery disease   5.  Pulmonary nodule, see nodule follow-up recommendations below.      Fleischner Society pulmonary nodule recommendations:   Low Risk: No routine follow-up      High Risk: Optional CT at 12 months      Comments:  Nodules less than 6 mm do not require routine follow-up, but certain patients at high risk with suspicious nodule morphology, upper lobe location, or both may warrant 12-month follow-up.      Low Risk - Minimal or absent history of smoking and of other known risk factors.      High Risk - History of smoking or of other known risk factors.      Note: These recommendations do not apply to lung cancer screening, patients with immunosuppression, or patients with known primary cancer.      Fleischner Society 2017 Guidelines for Management of Incidentally Detected Pulmonary Nodules in Adults             COURSE & MEDICAL DECISION MAKING    ASSESSMENT, COURSE AND PLAN  Care Narrative: On arrival patient noted to be hypertensive vital signs otherwise within normal limits.  On arrival patient was noted to be in acute distress and have suprapubic tenderness, distention and retaining approximately 1 L, Michel catheter was successfully placed with drainage of urine.  Patient reports relief of lower abdominal pain.  Patient has history of enlarged prostate.  Will obtain CT to assess for obstructing mass, blood work to assess for heart block derangements as well as repeat urinalysis.  Patient currently follows closely with urology and has plans to see them tomorrow.    CT imaging without signs of obstructing mass.  Blood work is notable for significant drop in sodium currently 124 down from 136 4 days ago.  Patient has had mild hyponatremia in the past.  Patient does not look edematous has not had any clear GI losses.  Given patient's acute urinary retention, hyponatremia spoke with hospitalist regarding admission for close monitoring of kidney function, correction of sodium.  Urinalysis does show positive nitrates though patient did recently start Pyridium patient is currently on Bactrim.  Patient has stable mental status, vital signs not appear to have symptomatic hyponatremia or sepsis.            ADDITIONAL PROBLEMS  MANAGED      DISPOSITION AND DISCUSSIONS  I have discussed management of the patient with the following physicians and OK's: Hospitalist    FINAL DIAGNOSIS  1. Acute urinary retention    2. Hyponatremia    3. Acute UTI           Electronically signed by: Guillermo Duffy D.O., 7/28/2024 9:52 PM

## 2024-07-29 NOTE — CARE PLAN
The patient is Stable - Low risk of patient condition declining or worsening    Shift Goals  Clinical Goals: Monitor sodium, I/Os  Patient Goals: Rest, POC updates  Family Goals: merritt    Progress made toward(s) clinical / shift goals:  POC updates given to patient and wife with questions and concerns addressed. Patient remained free from falls throughout shift and calls appropriately.     Problem: Knowledge Deficit - Standard  Goal: Patient and family/care givers will demonstrate understanding of plan of care, disease process/condition, diagnostic tests and medications  Outcome: Progressing     Problem: Fall Risk  Goal: Patient will remain free from falls  Outcome: Progressing       Patient is not progressing towards the following goals:

## 2024-07-29 NOTE — ED TRIAGE NOTES
Patient presents with complaints of urinary retention and difficulty urinating all day. Was seen in the emergency department this AM and has been unable to void since discharge. Patient received prescriptions for Pyridium and flomax but wife reports he still has pain, discomfort and urgency. Patient has been using tylenol for the discomfort. Last dose around 2000 tonight.

## 2024-07-30 VITALS
BODY MASS INDEX: 26.17 KG/M2 | HEART RATE: 70 BPM | DIASTOLIC BLOOD PRESSURE: 71 MMHG | OXYGEN SATURATION: 97 % | TEMPERATURE: 98.3 F | SYSTOLIC BLOOD PRESSURE: 146 MMHG | HEIGHT: 63 IN | RESPIRATION RATE: 17 BRPM | WEIGHT: 147.71 LBS

## 2024-07-30 LAB
ANION GAP SERPL CALC-SCNC: 13 MMOL/L (ref 7–16)
BUN SERPL-MCNC: 8 MG/DL (ref 8–22)
CALCIUM SERPL-MCNC: 8.9 MG/DL (ref 8.4–10.2)
CHLORIDE SERPL-SCNC: 100 MMOL/L (ref 96–112)
CO2 SERPL-SCNC: 19 MMOL/L (ref 20–33)
CREAT SERPL-MCNC: 1.01 MG/DL (ref 0.5–1.4)
ERYTHROCYTE [DISTWIDTH] IN BLOOD BY AUTOMATED COUNT: 40.8 FL (ref 35.9–50)
GFR SERPLBLD CREATININE-BSD FMLA CKD-EPI: 73 ML/MIN/1.73 M 2
GLUCOSE SERPL-MCNC: 123 MG/DL (ref 65–99)
HCT VFR BLD AUTO: 33.2 % (ref 42–52)
HGB BLD-MCNC: 11.2 G/DL (ref 14–18)
MCH RBC QN AUTO: 30.4 PG (ref 27–33)
MCHC RBC AUTO-ENTMCNC: 33.7 G/DL (ref 32.3–36.5)
MCV RBC AUTO: 90.2 FL (ref 81.4–97.8)
PLATELET # BLD AUTO: 202 K/UL (ref 164–446)
PMV BLD AUTO: 9.6 FL (ref 9–12.9)
POTASSIUM SERPL-SCNC: 4 MMOL/L (ref 3.6–5.5)
RBC # BLD AUTO: 3.68 M/UL (ref 4.7–6.1)
SODIUM SERPL-SCNC: 130 MMOL/L (ref 135–145)
SODIUM SERPL-SCNC: 132 MMOL/L (ref 135–145)
SODIUM SERPL-SCNC: 134 MMOL/L (ref 135–145)
WBC # BLD AUTO: 5.4 K/UL (ref 4.8–10.8)

## 2024-07-30 PROCEDURE — 80048 BASIC METABOLIC PNL TOTAL CA: CPT

## 2024-07-30 PROCEDURE — 94760 N-INVAS EAR/PLS OXIMETRY 1: CPT

## 2024-07-30 PROCEDURE — A9270 NON-COVERED ITEM OR SERVICE: HCPCS | Performed by: INTERNAL MEDICINE

## 2024-07-30 PROCEDURE — 97535 SELF CARE MNGMENT TRAINING: CPT

## 2024-07-30 PROCEDURE — 85027 COMPLETE CBC AUTOMATED: CPT

## 2024-07-30 PROCEDURE — 700105 HCHG RX REV CODE 258: Performed by: INTERNAL MEDICINE

## 2024-07-30 PROCEDURE — 84295 ASSAY OF SERUM SODIUM: CPT | Mod: 91

## 2024-07-30 PROCEDURE — 700102 HCHG RX REV CODE 250 W/ 637 OVERRIDE(OP): Performed by: STUDENT IN AN ORGANIZED HEALTH CARE EDUCATION/TRAINING PROGRAM

## 2024-07-30 PROCEDURE — 99239 HOSP IP/OBS DSCHRG MGMT >30: CPT | Performed by: INTERNAL MEDICINE

## 2024-07-30 PROCEDURE — 97165 OT EVAL LOW COMPLEX 30 MIN: CPT

## 2024-07-30 PROCEDURE — 700102 HCHG RX REV CODE 250 W/ 637 OVERRIDE(OP): Performed by: INTERNAL MEDICINE

## 2024-07-30 PROCEDURE — 97161 PT EVAL LOW COMPLEX 20 MIN: CPT

## 2024-07-30 PROCEDURE — A9270 NON-COVERED ITEM OR SERVICE: HCPCS | Performed by: STUDENT IN AN ORGANIZED HEALTH CARE EDUCATION/TRAINING PROGRAM

## 2024-07-30 RX ORDER — AMOXICILLIN 250 MG
2 CAPSULE ORAL 2 TIMES DAILY
COMMUNITY
Start: 2024-07-30

## 2024-07-30 RX ORDER — TAMSULOSIN HYDROCHLORIDE 0.4 MG/1
0.8 CAPSULE ORAL DAILY
Status: DISCONTINUED | OUTPATIENT
Start: 2024-07-31 | End: 2024-07-30 | Stop reason: HOSPADM

## 2024-07-30 RX ORDER — TAMSULOSIN HYDROCHLORIDE 0.4 MG/1
0.8 CAPSULE ORAL DAILY
Qty: 60 CAPSULE | Refills: 1 | Status: SHIPPED | OUTPATIENT
Start: 2024-07-30

## 2024-07-30 RX ORDER — FINASTERIDE 5 MG/1
5 TABLET, FILM COATED ORAL DAILY
Qty: 30 TABLET | Refills: 1 | Status: SHIPPED | OUTPATIENT
Start: 2024-07-31

## 2024-07-30 RX ADMIN — TAMSULOSIN HYDROCHLORIDE 0.4 MG: 0.4 CAPSULE ORAL at 05:23

## 2024-07-30 RX ADMIN — ROSUVASTATIN CALCIUM 20 MG: 10 TABLET, FILM COATED ORAL at 17:06

## 2024-07-30 RX ADMIN — SODIUM CHLORIDE: 9 INJECTION, SOLUTION INTRAVENOUS at 00:44

## 2024-07-30 RX ADMIN — ASPIRIN 81 MG: 81 TABLET, COATED ORAL at 05:23

## 2024-07-30 RX ADMIN — FINASTERIDE 5 MG: 5 TABLET, FILM COATED ORAL at 05:23

## 2024-07-30 RX ADMIN — SENNOSIDES AND DOCUSATE SODIUM 2 TABLET: 50; 8.6 TABLET ORAL at 05:23

## 2024-07-30 RX ADMIN — MEXILETINE HYDROCHLORIDE 150 MG: 150 CAPSULE ORAL at 05:23

## 2024-07-30 RX ADMIN — MEXILETINE HYDROCHLORIDE 150 MG: 150 CAPSULE ORAL at 13:19

## 2024-07-30 RX ADMIN — POLYETHYLENE GLYCOL 3350 1 PACKET: 17 POWDER, FOR SOLUTION ORAL at 05:23

## 2024-07-30 ASSESSMENT — COGNITIVE AND FUNCTIONAL STATUS - GENERAL
WALKING IN HOSPITAL ROOM: A LITTLE
MOBILITY SCORE: 22
SUGGESTED CMS G CODE MODIFIER DAILY ACTIVITY: CH
CLIMB 3 TO 5 STEPS WITH RAILING: A LITTLE
DAILY ACTIVITIY SCORE: 24
SUGGESTED CMS G CODE MODIFIER MOBILITY: CJ

## 2024-07-30 ASSESSMENT — ACTIVITIES OF DAILY LIVING (ADL): TOILETING: INDEPENDENT

## 2024-07-30 ASSESSMENT — GAIT ASSESSMENTS
DEVIATION: DECREASED BASE OF SUPPORT
DISTANCE (FEET): 180
GAIT LEVEL OF ASSIST: STANDBY ASSIST

## 2024-07-30 ASSESSMENT — FIBROSIS 4 INDEX: FIB4 SCORE: 1.59

## 2024-07-30 ASSESSMENT — PAIN DESCRIPTION - PAIN TYPE: TYPE: ACUTE PAIN

## 2024-07-30 NOTE — DISCHARGE PLANNING
Care Transition Team Assessment    Information Source  Orientation Level: Oriented X4  Information Given By: Patient, Spouse  Who is responsible for making decisions for patient? : Patient    Readmission Evaluation  Is this a readmission?: No    Elopement Risk  Legal Hold: No  Ambulatory or Self Mobile in Wheelchair: Yes  Disoriented: No  Psychiatric Symptoms: None  History of Wandering: No  Elopement this Admit: No  Vocalizing Wanting to Leave: No  Displays Behaviors, Body Language Wanting to Leave: No-Not at Risk for Elopement  Elopement Risk: Not at Risk for Elopement    Interdisciplinary Discharge Planning  Primary Care Physician: Meagan Mcneil  Lives with - Patient's Self Care Capacity: Spouse  Patient or legal guardian wants to designate a caregiver: No  Support Systems: Spouse / Significant Other  Housing / Facility: 1 Story House  Able to Return to Previous ADL's: Yes  Mobility Issues: No  Prior Services: Home-Independent  Assistance Needed: No    Discharge Preparedness  What is your plan after discharge?: Home with help  What are your discharge supports?: Spouse  Prior Functional Level: Independent with Activities of Daily Living, Independent with Medication Management  Difficulity with ADLs: None  Difficulity with IADLs: None    Functional Assesment  Prior Functional Level: Independent with Activities of Daily Living, Independent with Medication Management    Finances  Financial Barriers to Discharge: No  Prescription Coverage: Yes    Vision / Hearing Impairment  Vision Impairment : No  Right Eye Vision: Impaired, Wears Glasses  Left Eye Vision: Impaired, Wears Glasses  Hearing Impairment : No  Hearing Impairment: Both Ears, Hearing Device Not Available  Does Pt Need Special Equipment for the Hearing Impaired?: No     Domestic Abuse  Have you ever been the victim of abuse or violence?: No  Possible Abuse/Neglect Reported to:: Not Applicable    Psychological Assessment  History of Substance Abuse:  None    Discharge Risks or Barriers  Discharge risks or barriers?: No    Anticipated Discharge Information  Discharge Disposition: Discharged to home/self care (01)  Discharge Address: [address] [facility] [place] [address]

## 2024-07-30 NOTE — PROGRESS NOTES
Telemetry Shift Summary    Rhythm SR  HR Range 70s-80s  Ectopy occasional  Measurements 0.16/0.08/0.41        Normal Values  Rhythm SR  HR Range    Measurements 0.12-0.20 / 0.06-0.10  / 0.30-0.52

## 2024-07-30 NOTE — CARE PLAN
The patient is Stable - Low risk of patient condition declining or worsening    Shift Goals  Clinical Goals: I/O, monitor labs  Patient Goals: rest and comfort  Family Goals: pt comfort    Progress made toward(s) clinical / shift goals:    Problem: Pain - Standard  Goal: Alleviation of pain or a reduction in pain to the patient’s comfort goal  Outcome: Progressing     Problem: Knowledge Deficit - Standard  Goal: Patient and family/care givers will demonstrate understanding of plan of care, disease process/condition, diagnostic tests and medications  Outcome: Progressing  Note: Pt updated and wife updated on POC, All questions and concerns addressed at this time.      Problem: Fall Risk  Goal: Patient will remain free from falls  Outcome: Progressing  Note: Pt is moderate fall risk, fall precautions in place. Call light within reach.

## 2024-07-30 NOTE — THERAPY
Physical Therapy   Initial Evaluation     Patient Name: Sandip Zhao  Age:  83 y.o., Sex:  male  Medical Record #: [id number]  Today's Date: 7/30/2024     Precautions  Precautions: (P) Fall Risk    Assessment  Patient is 83 y.o. male who was recently admitted for urinary retention and hyponatremia. Pt was agreeable to therapy evaluation and presented to PT with impaired balance and poor activity tolerance. These impairments appear to be due to immobility while in acute cares setting. Pt was able to demonstrate SPV to SBA for all upright mobility with no AD use. Pt did have one LOB and required Min A for maintain upright standing. Pt states LOB was due to tripping over his sock. Pt did demonstrate with slight veering and poor TERRI at times during ambulation. Pt provided with education on and compensatory strategies to improve upright balance and encouraged to use FWW upon d/c to home. Pt was agreeable. Anticipate pt to progress with mobility while in house with increasing his mobility. Anticipate pt to d/c home once medically clear. Anticipate that the patient will have no further physical therapy needs after discharge from the hospital.    Pt provided with following txt: Pt provided with compensatory strategies and education on reducing risk of falls and dec LOB with upright mobility. Pt encouraged to perform marching in place once EOB, sit<>stands x 10, and marching in place prior to ambulating to improve balance and conditioning prior to functional mobility.     Plan    DC Equipment Recommendations: (P) Front-Wheel Walker  Discharge Recommendations: (P) Anticipate that the patient will have no further physical therapy needs after discharge from the hospital    Objective       07/30/24 0815   Initial Contact Note    Initial Contact Note Order Received and Verified, Evaluation Only - Patient Does Not Require Further Acute Physical Therapy at this Time.  However, May Benefit from Post Acute Therapy for Higher Level  Functional Deficits.   Precautions   Precautions Fall Risk   Pain 0 - 10 Group   Therapist Pain Assessment Nurse Notified;0   Prior Living Situation   Prior Services Home-Independent   Housing / Facility 1 Story House   Steps Into Home 0   Steps In Home 0   Equipment Owned Front-Wheel Walker   Lives with - Patient's Self Care Capacity Spouse   Comments spouse is supportive and can assist upon d/c to home   Prior Level of Functional Mobility   Bed Mobility Independent   Transfer Status Independent   Ambulation Independent   Ambulation Distance   (community)   Assistive Devices Used None   Stairs Independent   History of Falls   History of Falls No   Cognition    Cognition / Consciousness WDL   Level of Consciousness Alert   Comments pleasant/cooperative   Passive ROM Upper Body   Passive ROM Upper Body WDL   Active ROM Upper Body   Active ROM Upper Body  WDL   Strength Upper Body   Upper Body Strength  WDL   Sensation Upper Body   Upper Extremity Sensation  WDL   Upper Body Muscle Tone   Upper Body Muscle Tone  WDL   Passive ROM Lower Body   Passive ROM Lower Body WDL   Active ROM Lower Body    Active ROM Lower Body  WDL   Strength Lower Body   Lower Body Strength  WDL   Sensation Lower Body   Lower Extremity Sensation   WDL   Lower Body Muscle Tone   Lower Body Muscle Tone  WDL   Neurological Concerns   Neurological Concerns No   Coordination Upper Body   Coordination WDL   Coordination Lower Body    Coordination Lower Body  WDL   Balance Assessment   Sitting Balance (Static) Good   Sitting Balance (Dynamic) Good   Standing Balance (Static) Fair +   Standing Balance (Dynamic) Fair +   Weight Shift Sitting Good   Weight Shift Standing Fair   Comments had LOB during ambulation and required Min A from therapist to maintain standing position. Pt states he lost balance due to sock tripping him   Bed Mobility    Supine to Sit Supervised   Sit to Supine Supervised   Scooting Supervised   Gait Analysis   Gait Level Of  Assist Standby Assist   Assistive Device None   Distance (Feet) 180   # of Times Distance was Traveled 1   Deviation Decreased Base Of Support   Weight Bearing Status fwb   Functional Mobility   Sit to Stand Supervised   Bed, Chair, Wheelchair Transfer Supervised   Transfer Method Stand Step   Mobility EOB, sit<>stand, ambulation, back to bed   6 Clicks Assessment - How much HELP from from another person do you currently need... (If the patient hasn't done an activity recently, how much help from another person do you think he/she would need if he/she tried?)   Turning from your back to your side while in a flat bed without using bedrails? 4   Moving from lying on your back to sitting on the side of a flat bed without using bedrails? 4   Moving to and from a bed to a chair (including a wheelchair)? 4   Standing up from a chair using your arms (e.g., wheelchair, or bedside chair)? 4   Walking in hospital room? 3   Climbing 3-5 steps with a railing? 3   6 clicks Mobility Score 22   Activity Tolerance   Sitting in Chair NT   Sitting Edge of Bed 5 mins   Standing 10 mins   Comments no adverse events noted   Edema / Skin Assessment   Edema / Skin  Not Assessed   Education Group   Education Provided Role of Physical Therapist;Use of Assistive Device   Role of Physical Therapist Patient Response Patient;Acceptance;Explanation;Demonstration;Verbal Demonstration;Action Demonstration   Use of Assistive Device Patient Response Patient;Acceptance;Demonstration;Explanation;Verbal Demonstration;Action Demonstration   Anticipated Discharge Equipment and Recommendations   DC Equipment Recommendations Front-Wheel Walker   Discharge Recommendations Anticipate that the patient will have no further physical therapy needs after discharge from the hospital   Interdisciplinary Plan of Care Collaboration   IDT Collaboration with  Nursing   Patient Position at End of Therapy In Bed;Call Light within Reach;Tray Table within Reach;Phone  within Reach   Collaboration Comments aware of visit and recs   Session Information   Date / Session Number  7/30 eval only   Priority 0

## 2024-07-30 NOTE — PROGRESS NOTES
Telemetry summary    Rhythm: SR  Rate: 78-85  Ectopy: r-o-fPVC, r-o-fBigem, rare bundle beats  Measurements: 0.20/0.08/0.34    Normal Values  Rhythm: SR  HR Range:   Measurement: 0.12-0.2/0.06-0.10/0.30-0.52

## 2024-07-31 NOTE — THERAPY
Occupational Therapy   Initial Evaluation     Patient Name: Sandip Zhao  Age:  83 y.o., Sex:  male  Medical Record #: [id number]  Today's Date: 7/30/2024     Precautions  Precautions: (P) Fall Risk    Assessment  Patient is 83 y.o. male with a diagnosis of hyponatremia. Pt is A&Ox3, impaired attn span. Pleasant and cooperative. Lives with wife, active prior. Education on home safety, fall prec's. Pt able to perform toileting, grooming, dressing, ADL transfers, walk in room/halls with SBA/SPV. Appears near his baseline. No further OT needs.          Plan  Eval only.     DC Equipment Recommendations: None  Discharge Recommendations: Anticipate that the patient will have no further occupational therapy needs after discharge from the hospital     Subjective  Agreeable     Objective     07/30/24 1015   Prior Living Situation   Prior Services Home-Independent   Housing / Facility 1 Story House   Bathroom Set up Walk In Shower;Shower Chair   Equipment Owned Front-Wheel Walker;Tub / Shower Seat   Lives with - Patient's Self Care Capacity Spouse   Prior Level of ADL Function   Self Feeding Independent   Grooming / Hygiene Independent   Bathing Independent   Dressing Independent   Toileting Independent   Prior Level of IADL Function   Medication Management Independent   Laundry Independent   Kitchen Mobility Independent   Finances Independent   Home Management Independent   Shopping Requires Assist   Prior Level Of Mobility Independent Without Device in Home   Driving / Transportation Unable To Determine At This Time   Occupation (Pre-Hospital Vocational) Retired Due To Age   Leisure Interests Exercise;Hobbies   Precautions   Precautions Fall Risk   Vitals   O2 Delivery Device None - Room Air   Cognition    Cognition / Consciousness WDL   Level of Consciousness Alert   Passive ROM Upper Body   Passive ROM Upper Body WDL   Active ROM Upper Body   Active ROM Upper Body  WDL   Strength Upper Body   Upper Body Strength  WDL    Sensation Upper Body   Upper Extremity Sensation  WDL   Upper Body Muscle Tone   Upper Body Muscle Tone  WDL   Coordination Upper Body   Coordination WDL   Balance Assessment   Sitting Balance (Static) Good   Sitting Balance (Dynamic) Good   Standing Balance (Static) Fair +   Standing Balance (Dynamic) Fair +   Weight Shift Sitting Good   Weight Shift Standing Good   Bed Mobility    Supine to Sit Supervised   Sit to Supine Supervised   ADL Assessment   Eating Independent   Grooming Supervision;Standing   Upper Body Dressing Independent   Lower Body Dressing Supervision   Toileting Supervision   How much help from another person does the patient currently need...   Putting on and taking off regular lower body clothing? 4   Bathing (including washing, rinsing, and drying)? 4   Toileting, which includes using a toilet, bedpan, or urinal? 4   Putting on and taking off regular upper body clothing? 4   Taking care of personal grooming such as brushing teeth? 4   Eating meals? 4   6 Clicks Daily Activity Score 24   Functional Mobility   Sit to Stand Supervised   Bed, Chair, Wheelchair Transfer Supervised   Toilet Transfers Supervised   Tub / Shower Transfers Supervised   Transfer Method Stand Step   Visual Perception   Visual Perception  WDL   Activity Tolerance   Sitting in Chair 1 hr   Sitting Edge of Bed 13   Standing 12   Education Group   Education Provided Home Safety   Role of Occupational Therapist Patient Response Patient;Family;Acceptance;Explanation;Verbal Demonstration   Home Safety Patient Response Patient;Acceptance;Explanation;Verbal Demonstration   Anticipated Discharge Equipment and Recommendations   DC Equipment Recommendations None   Discharge Recommendations Anticipate that the patient will have no further occupational therapy needs after discharge from the hospital   Interdisciplinary Plan of Care Collaboration   IDT Collaboration with  Nursing   Patient Position at End of Therapy Seated;Family /  Friend in Room;Phone within Reach;Call Light within Reach;Tray Table within Reach   Collaboration Comments OT Eval

## 2024-07-31 NOTE — DISCHARGE SUMMARY
Discharge Summary    CHIEF COMPLAINT ON ADMISSION  Chief Complaint   Patient presents with    Urinary Retention     Unable to void since this AM       Reason for Admission  Other     Admission Date  7/28/2024    CODE STATUS  Full Code    HPI & HOSPITAL COURSE  This is a 83 y.o. male here with multiple recent hospitalizations.  Patient originally was hospitalized for chest pain and had essentially negative workup he returned with episode of diarrhea secondary to rotavirus and was discharged home, he returned with palpitations and bigeminy and was discharged to home, now he returns with generalized weakness, no BM in greater than 4 days and urinary retention.  Patient recently was placed on Bactrim for presumed UTI as well as Pyridium.  He had been seen in the ER previously and only had 150 cc in his bladder so was discharged from the ER without intervention but returned later after inability to void for several hours and was found to have greater than 1 L urine in his bladder and catheter was placed urine culture on the 27th was negative Bactrim was discontinued due to possibility it contributed to the hyponatremia however spouse indicates that patient has been intermittently hyponatremic for some time, part of this may have been due to the rotavirus and he was volume depleted, they have been encouraged to maintain fluid and electrolyte replacement by mouth at home.  He has not had diarrhea lately but has had intermittent constipation when he does pass the stools he says they are soft to loose without much solid piece.  He has had intermittent difficulty voiding in the past but never to the degree that he has required a catheter.  Patient has a follow-up scheduled with Dr. Juarez on Thursday.    Patient's sodium was carefully corrected over 2 days with IV fluids, it did improve with IV fluids therefore I do not think this is SIADH.  At the time of discharge his sodium is 134.    Wife was extremely anxious about  catheter, about sodium, about the recurring hospitalizations as well as the constipation.  All of her questions were answered and addressed he will be discharged with home health to assist in post hospital monitoring and has follow-up scheduled with urology on Thursday, I did prepare the patient that it is likely the catheter will not be removed at that time.    Patient was incidentally found to have a 3.6 mm pulmonary nodule which was too small to characterize, the wife thinks this may be chronic however I do not have access to previous CT reports.  If it cannot be verified that this is chronic and unchanged she should have a follow-up CT scan in 12 months.       Therefore, he is discharged in good and stable condition to home with organized home healthcare and close outpatient follow-up.    The patient met 2-midnight criteria for an inpatient stay at the time of discharge.    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 *      FOLLOW UP  No future appointments.  Meagan Mcneil M.D.  [address] [address]  [phone]    Schedule an appointment as soon as possible for a visit in 1 week(s)      Wilner Perdue M.D.  [address] [address]  [phone]            MEDICATIONS ON DISCHARGE     Medication List        START taking these medications        Instructions   finasteride 5 MG Tabs  Start taking on: July 31, 2024  Commonly known as: Proscar   Take 1 Tablet by mouth every day.  Dose: 5 mg     senna-docusate 8.6-50 MG Tabs  Commonly  known as: Pericolace Or Senokot S   Take 2 Tablets by mouth 2 times a day.  Dose: 2 Tablet            CHANGE how you take these medications        Instructions   tamsulosin 0.4 MG capsule  What changed: how much to take  Commonly known as: Flomax   Take 2 Capsules by mouth every day.  Dose: 0.8 mg            CONTINUE taking these medications        Instructions   aspirin 81 MG EC tablet   Take 81 mg by mouth every morning.  Dose: 81 mg     B-12 PO   Take 1 Tablet by mouth every day.  Dose: 1 Tablet     Centrum Silver 50+Men Tabs   Take 1 Tablet by mouth every day.  Dose: 1 Tablet     ezetimibe 10 MG Tabs  Commonly known as: Zetia   Take 10 mg by mouth every evening.  Dose: 10 mg     mexiletine 150 MG Caps  Commonly known as: Mexitil   Take 150 mg by mouth every 8 hours.  Dose: 150 mg     rosuvastatin 20 MG Tabs  Commonly known as: Crestor   Take 20 mg by mouth every evening.  Dose: 20 mg            STOP taking these medications      DILTIAZem  MG Cp24  Commonly known as: Cardizem CD     metoprolol SR 50 MG Tb24  Commonly known as: Toprol XL     phenazopyridine 200 MG Tabs  Commonly known as: Pyridium     sulfamethoxazole-trimethoprim 800-160 MG tablet  Commonly known as: Bactrim DS     Tylenol 8 Hour Arthritis Pain 650 MG CR tablet  Generic drug: acetaminophen              Allergies  No Known Allergies    DIET  Orders Placed This Encounter   Procedures    Diet Order Diet: Regular     Standing Status:   Standing     Number of Occurrences:   1     Order Specific Question:   Diet:     Answer:   Regular [1]       ACTIVITY  As tolerated.  Weight bearing as tolerated    CONSULTATIONS  none    PROCEDURES  none    LABORATORY  Lab Results   Component Value Date    SODIUM 134 (L) 07/30/2024    POTASSIUM 4.0 07/30/2024    CHLORIDE 100 07/30/2024    CO2 19 (L) 07/30/2024    GLUCOSE 123 (H) 07/30/2024    BUN 8 07/30/2024    CREATININE 1.01 07/30/2024        Lab Results   Component Value Date    WBC 5.4 07/30/2024     HEMOGLOBIN 11.2 (L) 07/30/2024    HEMATOCRIT 33.2 (L) 07/30/2024    PLATELETCT 202 07/30/2024        Total time of the discharge process exceeds 45 minutes.

## 2024-07-31 NOTE — FACE TO FACE
Face to Face Note  -  Durable Medical Equipment    Farrah Barrera M.D. - NPI: [id number]  I certify that this patient is under my care and that they have had a durable medical equipment(DME)face to face encounter by myself that meets the physician DME face-to-face encounter requirements with this patient on:    Date of encounter:   Patient:                    MRN:                       YOB: 2024  Sandip Zhao  [id number]  1940     The encounter with the patient was in whole, or in part, for the following medical condition, which is the primary reason for durable medical equipment:  Other - weakness and hyponatremia    I certify that, based on my findings, the following durable medical equipment is medically necessary:  Walkers.        ------------------------------------------------------------------------------------------------------------------    Face to Face Supporting Documentation - Home Health    The encounter with this patient was in whole or in part the primary reason for home health admission.    Date of encounter:   Patient:                    MRN:                       YOB: 2024  Sandip Zhao  [id number]  1940     Home health to see patient for:  Skilled Nursing care for assessment, interventions & education    Skilled need for:  Exacerbation of Chronic Disease State hyponatremia, New Onset Medical Diagnosis urinary retention, and Comment: constipation    Skilled nursing interventions to include:  Comment:  monitoring for complications following hospital discharge    Homebound evidenced status by:  Need the aid of supportive devices such as crutches, canes, wheelchairs or walkers or Needs the assistance of another person in order to leave the home. Leaving home must require a considerable and taxing effort. There must exist a normal inability to leave the home.    Community Physician to provide follow up care: Meagan Mcneil M.D.      Optional Interventions    Wound information & treatment:    Home Infusion Therapy orders:    Line/Drain/Airway:    I certify the face to face encounter for this home care referral meets the CMS requirements and the encounter/clinical assessment with the patient was, in whole, or in part, for the medical condition(s) listed above, which is the primary reason for home health care. Based on my clinical findings: the service(s) are medically necessary, support the need for home health care, and the homebound criteria are met.  I certify that this patient has had a face to face encounter by myself.  Farrah Barrera M.D. - NPI: [id number]    *Debility, frailty and advanced age in the absence of an acute deterioration or exacerbation of a condition do not qualify a patient for home health.

## 2024-08-08 ENCOUNTER — HOSPITAL ENCOUNTER (OUTPATIENT)
Dept: LAB | Facility: MEDICAL CENTER | Age: 84
End: 2024-08-08
Attending: FAMILY MEDICINE
Payer: MEDICARE

## 2024-08-08 LAB
ALBUMIN SERPL BCP-MCNC: 4 G/DL (ref 3.2–4.9)
ALBUMIN/GLOB SERPL: 1.5 G/DL
ALP SERPL-CCNC: 92 U/L (ref 30–99)
ALT SERPL-CCNC: 31 U/L (ref 2–50)
ANION GAP SERPL CALC-SCNC: 10 MMOL/L (ref 7–16)
AST SERPL-CCNC: 26 U/L (ref 12–45)
BASOPHILS # BLD AUTO: 0.9 % (ref 0–1.8)
BASOPHILS # BLD: 0.04 K/UL (ref 0–0.12)
BILIRUB SERPL-MCNC: 0.4 MG/DL (ref 0.1–1.5)
BUN SERPL-MCNC: 14 MG/DL (ref 8–22)
CALCIUM ALBUM COR SERPL-MCNC: 9.3 MG/DL (ref 8.5–10.5)
CALCIUM SERPL-MCNC: 9.3 MG/DL (ref 8.5–10.5)
CHLORIDE SERPL-SCNC: 103 MMOL/L (ref 96–112)
CHOLEST SERPL-MCNC: 112 MG/DL (ref 100–199)
CO2 SERPL-SCNC: 22 MMOL/L (ref 20–33)
CREAT SERPL-MCNC: 0.96 MG/DL (ref 0.5–1.4)
EOSINOPHIL # BLD AUTO: 0.12 K/UL (ref 0–0.51)
EOSINOPHIL NFR BLD: 2.8 % (ref 0–6.9)
ERYTHROCYTE [DISTWIDTH] IN BLOOD BY AUTOMATED COUNT: 41.2 FL (ref 35.9–50)
EST. AVERAGE GLUCOSE BLD GHB EST-MCNC: 103 MG/DL
GFR SERPLBLD CREATININE-BSD FMLA CKD-EPI: 78 ML/MIN/1.73 M 2
GLOBULIN SER CALC-MCNC: 2.6 G/DL (ref 1.9–3.5)
GLUCOSE SERPL-MCNC: 95 MG/DL (ref 65–99)
HBA1C MFR BLD: 5.2 % (ref 4–5.6)
HCT VFR BLD AUTO: 34.8 % (ref 42–52)
HDLC SERPL-MCNC: 46 MG/DL
HGB BLD-MCNC: 11.8 G/DL (ref 14–18)
IMM GRANULOCYTES # BLD AUTO: 0.02 K/UL (ref 0–0.11)
IMM GRANULOCYTES NFR BLD AUTO: 0.5 % (ref 0–0.9)
LDLC SERPL CALC-MCNC: 54 MG/DL
LYMPHOCYTES # BLD AUTO: 1.13 K/UL (ref 1–4.8)
LYMPHOCYTES NFR BLD: 26.3 % (ref 22–41)
MCH RBC QN AUTO: 30.6 PG (ref 27–33)
MCHC RBC AUTO-ENTMCNC: 33.9 G/DL (ref 32.3–36.5)
MCV RBC AUTO: 90.4 FL (ref 81.4–97.8)
MONOCYTES # BLD AUTO: 0.47 K/UL (ref 0–0.85)
MONOCYTES NFR BLD AUTO: 11 % (ref 0–13.4)
NEUTROPHILS # BLD AUTO: 2.51 K/UL (ref 1.82–7.42)
NEUTROPHILS NFR BLD: 58.5 % (ref 44–72)
NRBC # BLD AUTO: 0 K/UL
NRBC BLD-RTO: 0 /100 WBC (ref 0–0.2)
PLATELET # BLD AUTO: 218 K/UL (ref 164–446)
PMV BLD AUTO: 9.3 FL (ref 9–12.9)
POTASSIUM SERPL-SCNC: 4 MMOL/L (ref 3.6–5.5)
PROT SERPL-MCNC: 6.6 G/DL (ref 6–8.2)
RBC # BLD AUTO: 3.85 M/UL (ref 4.7–6.1)
SODIUM SERPL-SCNC: 135 MMOL/L (ref 135–145)
TRIGL SERPL-MCNC: 60 MG/DL (ref 0–149)
TSH SERPL-ACNC: 1.36 UIU/ML (ref 0.35–5.5)
WBC # BLD AUTO: 4.3 K/UL (ref 4.8–10.8)

## 2024-08-08 PROCEDURE — 82043 UR ALBUMIN QUANTITATIVE: CPT

## 2024-08-08 PROCEDURE — 80053 COMPREHEN METABOLIC PANEL: CPT

## 2024-08-08 PROCEDURE — 84443 ASSAY THYROID STIM HORMONE: CPT

## 2024-08-08 PROCEDURE — 36415 COLL VENOUS BLD VENIPUNCTURE: CPT

## 2024-08-08 PROCEDURE — 82570 ASSAY OF URINE CREATININE: CPT

## 2024-08-08 PROCEDURE — 83036 HEMOGLOBIN GLYCOSYLATED A1C: CPT

## 2024-08-08 PROCEDURE — 80061 LIPID PANEL: CPT

## 2024-08-08 PROCEDURE — 85025 COMPLETE CBC W/AUTO DIFF WBC: CPT

## 2024-08-09 LAB
CREAT UR-MCNC: 65.64 MG/DL
MICROALBUMIN UR-MCNC: <1.2 MG/DL
MICROALBUMIN/CREAT UR: NORMAL MG/G (ref 0–30)

## 2024-08-12 ENCOUNTER — OFFICE VISIT (OUTPATIENT)
Dept: URGENT CARE | Facility: CLINIC | Age: 84
End: 2024-08-12
Payer: MEDICARE

## 2024-08-12 VITALS
WEIGHT: 142 LBS | BODY MASS INDEX: 25.16 KG/M2 | HEART RATE: 72 BPM | TEMPERATURE: 97.6 F | HEIGHT: 63 IN | SYSTOLIC BLOOD PRESSURE: 154 MMHG | OXYGEN SATURATION: 98 % | RESPIRATION RATE: 19 BRPM | DIASTOLIC BLOOD PRESSURE: 56 MMHG

## 2024-08-12 DIAGNOSIS — R68.89 EXCESSIVE SODIUM INTAKE: ICD-10-CM

## 2024-08-12 DIAGNOSIS — I10 PRIMARY HYPERTENSION: ICD-10-CM

## 2024-08-12 PROCEDURE — 3077F SYST BP >= 140 MM HG: CPT | Performed by: NURSE PRACTITIONER

## 2024-08-12 PROCEDURE — 3078F DIAST BP <80 MM HG: CPT | Performed by: NURSE PRACTITIONER

## 2024-08-12 PROCEDURE — 99214 OFFICE O/P EST MOD 30 MIN: CPT | Performed by: NURSE PRACTITIONER

## 2024-08-12 ASSESSMENT — FIBROSIS 4 INDEX: FIB4 SCORE: 1.78

## 2024-08-12 NOTE — PROGRESS NOTES
"Sandip Zhao is a 83 y.o. male who presents for Hypertension Follow-up      HPI  This is a new problem. Sandip Zhao is a 83 y.o. patient who presents to urgent care with c/o: Hospitalized 1.5 weeks ago for low sodium and prostate problems. Seen by urology clinic and was started on medication which has helped. Using electrolyte water and sodium in foods. Never had hypertension in the past.  170-190/ 85-90 variable blood pressure. He has appt with his cardiologist tomorrow.   Eating Spam, electrolyte drinks, Icelandic onion soup, ham and a lot of water.   He denies chest pain, shortness of breath, nausea, vomiting, muscle aches, weakness, confusion or headache. \" I feel great today\". No other aggravating or alleviating factors.  Denies any other concerns at this time.       Allergies:     No Known Allergies    PMSFS Hx:  Past Medical History:   Diagnosis Date    Anemia     CAD (coronary artery disease)     stents x3    Environmental allergies     Hemorrhoids     Hypertension     MI (myocardial infarction) (HCC)     Status post placement of implantable loop recorder      Past Surgical History:   Procedure Laterality Date    ABDOMINAL EXPLORATION      APPENDECTOMY      KNEE ARTHROSCOPY       Family History   Problem Relation Age of Onset    Hypertension Father     Heart Disease Father         mitral valve replacement     Social History     Tobacco Use    Smoking status: Never    Smokeless tobacco: Never   Substance Use Topics    Alcohol use: Not Currently       Problems:   Patient Active Problem List   Diagnosis    Benign prostatic hyperplasia with urinary obstruction    History of urinary tract infection    Nocturia    Raised prostate specific antigen    Bilateral carpal tunnel syndrome    Prediabetes    Primary hypertension    Mixed hyperlipidemia    Anemia    ACP (advance care planning)    Dehydration    Symptomatic bradycardia    Ventricular bigeminy    Hyponatremia    Pulmonary nodule       Medications: " "  Current Outpatient Medications on File Prior to Visit   Medication Sig Dispense Refill    tamsulosin (FLOMAX) 0.4 MG capsule Take 2 Capsules by mouth every day. 60 Capsule 1    finasteride (PROSCAR) 5 MG Tab Take 1 Tablet by mouth every day. 30 Tablet 1    senna-docusate (PERICOLACE OR SENOKOT S) 8.6-50 MG Tab Take 2 Tablets by mouth 2 times a day.      Multiple Vitamins-Minerals (CENTRUM SILVER 50+MEN) Tab Take 1 Tablet by mouth every day.      mexiletine (MEXITIL) 150 MG Cap Take 150 mg by mouth every 8 hours.      Cyanocobalamin (B-12 PO) Take 1 Tablet by mouth every day.      rosuvastatin (CRESTOR) 20 MG Tab Take 20 mg by mouth every evening.      aspirin 81 MG EC tablet Take 81 mg by mouth every morning.      ezetimibe (ZETIA) 10 MG Tab Take 10 mg by mouth every evening.       No current facility-administered medications on file prior to visit.        Objective:     BP (!) 156/88   Pulse 72   Temp 36.4 °C (97.6 °F) (Temporal)   Resp 19   Ht 1.6 m (5' 3\")   Wt 64.4 kg (142 lb)   SpO2 98%   BMI 25.15 kg/m²     Physical Exam  Vitals and nursing note reviewed.   Constitutional:       Appearance: Normal appearance.   Cardiovascular:      Rate and Rhythm: Normal rate and regular rhythm.      Pulses: Normal pulses.      Heart sounds: Murmur heard.   Pulmonary:      Effort: Pulmonary effort is normal.      Breath sounds: Normal breath sounds.   Musculoskeletal:      Right lower leg: No edema.      Left lower leg: No edema.   Skin:     General: Skin is warm and dry.      Capillary Refill: Capillary refill takes less than 2 seconds.   Neurological:      General: No focal deficit present.      Mental Status: He is alert and oriented to person, place, and time.   Psychiatric:         Mood and Affect: Mood normal.         Behavior: Behavior normal.         Assessment /Associated Orders:      1. Primary hypertension        2. Excessive sodium intake              Medical Decision Making:    Sandip is a very pleasant " 83 y.o. male who is clinically stable at today's acute urgent care visit.  No acute distress noted.  VSS. Appropriate for outpatient care at this time.   Acute problem today with uncertain prognosis.  Patient is asymptomatic today.  He feels perfectly well.  He denies chest pain, shortness of breath, nausea, vomiting, muscle aches, confusion or headache.  He was recently admitted for hyponatremia.  He has been trying to take in a lot of foods and beverages that have high sodium intake.  He has no history of hypertension.  He does not have peripheral edema at this time.  Etiology of his hypertension is unclear today however it could be related to his excessive sodium intake  Keep appt with cardiologist tomorrow   Avoid eating and drinking high sodium food / beverages.   Limit free water until seen by cardiology but stay hydrated.     Monitor BP at home.  Keep a written log and take to cardiology appointment.  Discussed Dx, management options (risks,benefits, and alternatives to planned treatment), natural progression and supportive care.  Expressed understanding and the treatment plan was agreed upon.   Questions were encouraged and answered   Return to urgent care prn if new or worsening sx or if there is no improvement in condition prn.    Educated in Red flags and indications to immediately call 911 or present to the Emergency Department.       Time I spent evaluating Sandip Zhao in urgent care today was 30  minutes. This time includes preparing for visit, reviewing any pertinent notes or test results, counseling/education, exam, obtaining HPI, interpretation of lab tests, medication management and documentation as indicated above.Time does not include separately billable procedures noted .       Please note that this dictation was created using voice recognition software. I have worked with consultants from the vendor as well as technical experts from [employer] to optimize the interface. I have made  every reasonable attempt to correct obvious errors, but I expect that there are errors of grammar and possibly content that I did not discover before finalizing the note.  This note was electronically signed by provider

## 2024-08-16 ENCOUNTER — APPOINTMENT (OUTPATIENT)
Dept: RADIOLOGY | Facility: MEDICAL CENTER | Age: 84
End: 2024-08-16
Attending: EMERGENCY MEDICINE
Payer: MEDICARE

## 2024-08-16 ENCOUNTER — OFFICE VISIT (OUTPATIENT)
Dept: URGENT CARE | Facility: CLINIC | Age: 84
End: 2024-08-16
Payer: MEDICARE

## 2024-08-16 ENCOUNTER — HOSPITAL ENCOUNTER (EMERGENCY)
Facility: MEDICAL CENTER | Age: 84
End: 2024-08-16
Payer: MEDICARE

## 2024-08-16 ENCOUNTER — HOSPITAL ENCOUNTER (EMERGENCY)
Facility: MEDICAL CENTER | Age: 84
End: 2024-08-16
Attending: EMERGENCY MEDICINE
Payer: MEDICARE

## 2024-08-16 VITALS
WEIGHT: 142 LBS | DIASTOLIC BLOOD PRESSURE: 76 MMHG | OXYGEN SATURATION: 98 % | SYSTOLIC BLOOD PRESSURE: 130 MMHG | BODY MASS INDEX: 25.16 KG/M2 | HEIGHT: 63 IN | TEMPERATURE: 98.2 F | HEART RATE: 74 BPM | RESPIRATION RATE: 20 BRPM

## 2024-08-16 VITALS
HEART RATE: 75 BPM | OXYGEN SATURATION: 97 % | TEMPERATURE: 97 F | RESPIRATION RATE: 18 BRPM | SYSTOLIC BLOOD PRESSURE: 175 MMHG | BODY MASS INDEX: 25.66 KG/M2 | DIASTOLIC BLOOD PRESSURE: 80 MMHG | WEIGHT: 144.84 LBS | HEIGHT: 63 IN

## 2024-08-16 DIAGNOSIS — S09.90XA CLOSED HEAD INJURY, INITIAL ENCOUNTER: ICD-10-CM

## 2024-08-16 DIAGNOSIS — S01.81XA FACIAL LACERATION, INITIAL ENCOUNTER: ICD-10-CM
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
## 2024-08-16 DIAGNOSIS — S09.90XA INJURY OF HEAD, INITIAL ENCOUNTER: ICD-10-CM

## 2024-08-16 DIAGNOSIS — R91.1 PULMONARY NODULE: ICD-10-CM

## 2024-08-16 DIAGNOSIS — S22.32XA CLOSED FRACTURE OF ONE RIB OF LEFT SIDE, INITIAL ENCOUNTER: ICD-10-CM

## 2024-08-16 DIAGNOSIS — S01.81XA LACERATION OF TEMPLE, INITIAL ENCOUNTER: ICD-10-CM

## 2024-08-16 PROCEDURE — 304217 HCHG IRRIGATION SYSTEM

## 2024-08-16 PROCEDURE — 700101 HCHG RX REV CODE 250: Performed by: EMERGENCY MEDICINE

## 2024-08-16 PROCEDURE — 99214 OFFICE O/P EST MOD 30 MIN: CPT

## 2024-08-16 PROCEDURE — 71250 CT THORAX DX C-: CPT

## 2024-08-16 PROCEDURE — 304999 HCHG REPAIR-SIMPLE/INTERMED LEVEL 1

## 2024-08-16 PROCEDURE — 70450 CT HEAD/BRAIN W/O DYE: CPT

## 2024-08-16 PROCEDURE — 99283 EMERGENCY DEPT VISIT LOW MDM: CPT

## 2024-08-16 PROCEDURE — 303747 HCHG EXTRA SUTURE

## 2024-08-16 RX ORDER — EMPAGLIFLOZIN 10 MG/1
TABLET, FILM COATED ORAL
COMMUNITY

## 2024-08-16 RX ORDER — LIDOCAINE 50 MG/G
1 PATCH TOPICAL EVERY 24 HOURS
Qty: 10 PATCH | Refills: 0 | Status: SHIPPED | OUTPATIENT
Start: 2024-08-16

## 2024-08-16 RX ORDER — LIDOCAINE 4 G/G
1 PATCH TOPICAL ONCE
Status: DISCONTINUED | OUTPATIENT
Start: 2024-08-16 | End: 2024-08-16 | Stop reason: HOSPADM

## 2024-08-16 RX ORDER — LIDOCAINE HYDROCHLORIDE AND EPINEPHRINE 10; 10 MG/ML; UG/ML
20 INJECTION, SOLUTION INFILTRATION; PERINEURAL ONCE
Status: COMPLETED | OUTPATIENT
Start: 2024-08-16 | End: 2024-08-16

## 2024-08-16 RX ADMIN — LIDOCAINE HYDROCHLORIDE AND EPINEPHRINE 20 ML: 10; 10 INJECTION, SOLUTION INFILTRATION; PERINEURAL at 14:00

## 2024-08-16 RX ADMIN — LIDOCAINE 1 PATCH: 4 PATCH TOPICAL at 14:30

## 2024-08-16 ASSESSMENT — ENCOUNTER SYMPTOMS
SENSORY CHANGE: 0
HEADACHES: 0
WEAKNESS: 0
NAUSEA: 0
VOMITING: 0
LOSS OF CONSCIOUSNESS: 0

## 2024-08-16 ASSESSMENT — FIBROSIS 4 INDEX
FIB4 SCORE: 1.78
FIB4 SCORE: 1.78

## 2024-08-16 NOTE — PROGRESS NOTES
CHIEF COMPLAINT  Chief Complaint   Patient presents with    Facial Laceration     Fell 2 steps down, Lt leg abrasion, Lt side of eyebrow laceration, Lt hand(knuckles) swelling/bruised, Lt side of ribs pain/injured  x today      Subjective:   Sandip Zhao is a 83 y.o. male who presents to urgent care with concern for laceration to left side of temple after sustaining a fall approximately 1 hour ago. Patient's wife reports that he was attempting to carry some heavy items outside of his house when he loss balance and fell, hitting his head on pavers. Patient reports pain to site of laceration and right ribs. He also reports pain to his left leg. Denies any LOC. Patient denies any dizziness or headache. No nausea/ vomiting.        Review of Systems   Gastrointestinal:  Negative for nausea and vomiting.   Neurological:  Negative for sensory change, loss of consciousness, weakness and headaches.       PAST MEDICAL HISTORY  Patient Active Problem List    Diagnosis Date Noted    Pulmonary nodule 07/29/2024    Symptomatic bradycardia 07/21/2024    Ventricular bigeminy 07/21/2024    Hyponatremia 07/21/2024    Dehydration 06/08/2024    Prediabetes 05/27/2024    Primary hypertension 05/27/2024    Mixed hyperlipidemia 05/27/2024    Anemia 05/27/2024    ACP (advance care planning) 05/27/2024    Bilateral carpal tunnel syndrome 05/31/2022    Benign prostatic hyperplasia with urinary obstruction 03/09/2020    History of urinary tract infection 03/09/2020    Nocturia 03/09/2020    Raised prostate specific antigen 03/09/2020       SURGICAL HISTORY   has a past surgical history that includes abdominal exploration; appendectomy; and knee arthroscopy.    ALLERGIES  No Known Allergies    CURRENT MEDICATIONS  Home Medications       Reviewed by Jerry Whitmore Ass't (Medical Assistant) on 08/16/24 at 1222  Med List Status: <None>     Medication Last Dose Status   aspirin 81 MG EC tablet Taking Active   Cyanocobalamin (B-12 PO)  "Taking Active   Empagliflozin (JARDIANCE) 10 MG Tab tablet Taking Active   ezetimibe (ZETIA) 10 MG Tab Taking Active   finasteride (PROSCAR) 5 MG Tab Taking Active   mexiletine (MEXITIL) 150 MG Cap Taking Active   Multiple Vitamins-Minerals (CENTRUM SILVER 50+MEN) Tab Taking Active   rosuvastatin (CRESTOR) 20 MG Tab Taking Active   senna-docusate (PERICOLACE OR SENOKOT S) 8.6-50 MG Tab Taking Active   tamsulosin (FLOMAX) 0.4 MG capsule PRN Active                    SOCIAL HISTORY  Social History     Tobacco Use    Smoking status: Never    Smokeless tobacco: Never   Vaping Use    Vaping status: Never Used   Substance and Sexual Activity    Alcohol use: Not Currently    Drug use: Never    Sexual activity: Not on file     Comment: food and beverage management       FAMILY HISTORY  Family History   Problem Relation Age of Onset    Hypertension Father     Heart Disease Father         mitral valve replacement         Medications, Allergies, and current problem list reviewed today in Epic.     Objective:     /76 (BP Location: Right arm, Patient Position: Sitting, BP Cuff Size: Adult)   Pulse 74   Temp 36.8 °C (98.2 °F) (Temporal)   Resp 20   Ht 1.6 m (5' 3\")   Wt 64.4 kg (142 lb)   SpO2 98%     Physical Exam  Vitals reviewed.   Constitutional:       General: He is not in acute distress.     Appearance: Normal appearance. He is normal weight. He is not ill-appearing or toxic-appearing.   HENT:      Head: Normocephalic.   Cardiovascular:      Rate and Rhythm: Normal rate.      Pulses: Normal pulses.   Pulmonary:      Effort: Pulmonary effort is normal.   Musculoskeletal:      Cervical back: Normal range of motion.   Skin:     General: Skin is warm.      Capillary Refill: Capillary refill takes less than 2 seconds.      Findings: Laceration present.             Comments: Laceration to left temple    Neurological:      General: No focal deficit present.      Mental Status: He is alert.   Psychiatric:         Mood " and Affect: Mood normal.         Assessment/Plan:     Diagnosis and associated orders:     1. Laceration of temple, initial encounter        2. Fall, initial encounter        3. Injury of head, initial encounter           Comments/MDM:     Patient presents with laceration left temple after sustaining a fall approximately 1 hour ago.  He reports direct injury to left side of his head after striking pavers in his urine.  Denies any LOC.  Given patient's age, presentation, as well as history of head injury with fall, advised to follow-up to ER for further evaluation and management.  Family verbalized understanding is agreeable plan.  Transfer center notified.         Differential diagnosis, natural history, supportive care, and indications for immediate follow-up discussed.    Advised the patient to follow-up with the primary care physician for recheck, reevaluation, and consideration of further management.    Please note that this dictation was created using voice recognition software. I have made a reasonable attempt to correct obvious errors, but I expect that there are errors of grammar and possibly content that I did not discover before finalizing the note.    This note was electronically signed by FATMATA Le

## 2024-08-16 NOTE — ED PROVIDER NOTES
ED Provider Note    CHIEF COMPLAINT  Chief Complaint   Patient presents with    Fall     Trip and fall into pavers approx 1.5 H ago. Takes ASA daily. Lac to L forehead. Denies LOC or blood thinners. Reports also L rib, L knee, L hand pain.           HPI/ROS    OUTSIDE HISTORIAN(S):  Wife is at bedside stating the patient is up-to-date on tetanus.    Sandip Zhao is a 83 y.o. male who presents complaint of ground-level fall and headache and chest pain.  The patient was walking, he stood up and so to walk off balance was normal for him instead of grabbing the side he fell down himself in the left side of the face/head as well as left chest wall.  Denies loss of conscious, loss of sensation or strength arms or legs, neck pain, back pain, abdominal pain, he associated symptoms.  Tetanus is up-to-date.  His wife is here and states that his tetanus is up-to-date.    PAST MEDICAL HISTORY   has a past medical history of Anemia, CAD (coronary artery disease), Environmental allergies, Hemorrhoids, Hypertension, MI (myocardial infarction) (HCC), and Status post placement of implantable loop recorder.    SURGICAL HISTORY   has a past surgical history that includes abdominal exploration; appendectomy; and knee arthroscopy.    FAMILY HISTORY  Family History   Problem Relation Age of Onset    Hypertension Father     Heart Disease Father         mitral valve replacement       SOCIAL HISTORY  Social History     Tobacco Use    Smoking status: Never    Smokeless tobacco: Never   Vaping Use    Vaping status: Never Used   Substance and Sexual Activity    Alcohol use: Not Currently    Drug use: Never    Sexual activity: Not on file     Comment: food and beverage management       CURRENT MEDICATIONS  Home Medications       Reviewed by Mikaela Hickman R.N. (Registered Nurse) on 08/16/24 at 1306  Med List Status: Not Addressed     Medication Last Dose Status   aspirin 81 MG EC tablet  Active   Cyanocobalamin (B-12 PO)  Active  "  Empagliflozin (JARDIANCE) 10 MG Tab tablet  Active   ezetimibe (ZETIA) 10 MG Tab  Active   finasteride (PROSCAR) 5 MG Tab  Active   mexiletine (MEXITIL) 150 MG Cap  Active   Multiple Vitamins-Minerals (CENTRUM SILVER 50+MEN) Tab  Active   rosuvastatin (CRESTOR) 20 MG Tab  Active   senna-docusate (PERICOLACE OR SENOKOT S) 8.6-50 MG Tab  Active   tamsulosin (FLOMAX) 0.4 MG capsule  Active                    ALLERGIES  No Known Allergies    PHYSICAL EXAM  VITAL SIGNS: BP (!) 175/80   Pulse 75   Temp 36.1 °C (97 °F) (Temporal)   Resp 18   Ht 1.6 m (5' 3\")   Wt 65.7 kg (144 lb 13.5 oz)   SpO2 97%   BMI 25.66 kg/m²      Nursing notes and vitals reviewed.  Constitutional: Well developed, Well nourished, Full back board a c-spine immobilization , Non-toxic appearance.   Eyes: PERRLA, EOMI, Conjunctiva normal, No discharge.   HENT: Symmetri 3.5 cm stellate laceration above the left eyebrow, no bony tenderness, no scalp laceration, no facial bony deformity or tenderness, no hemotympanum, no dental trauma, normal oropharynx.    Neck: No cervical spine tenderness, no step off deformity, patient is in a c-spine immobilization collar.   Cardiovascular: Normal heart rate, Normal rhythm, No murmurs, No rubs, No gallops, Normal heart tones.   Thorax & Lungs: No respiratory distress, Breath sounds equal bilaterally with equal chest expansion upon inspiration, No subcutaneous air, No flail segment, No rales, No rhonchi, No wheezing, moderate left-sided chest wall tenderness anterior wall, no crepitus, no subcutaneous air, no paradoxical movement   Abdomen: Bowel sounds normal, Soft, No tenderness, No guarding, No rebound, No pulsatile masses.   Skin: 3.5 cm stellate laceration above the left eyebrow.  Musculoskeletal: Intact distal pulses, No edema, No cyanosis, No clubbing. Good range of motion in all major joints. No tenderness to palpation or major deformities noted, no midline thoracic or lumbar spinous process " tenderness, no midline back tenderness.   Extremities: No long bone tenderness or deformity, distal pulses are brisk, pelvis is stable.   Neurologic: Alert & oriented x 3, GCS 15, strength and sensation intact upper and lower extremities          RADIOLOGY/PROCEDURES   I have independently interpreted the diagnostic imaging associated with this visit and am waiting the final reading from the radiologist.   My preliminary interpretation is as follows: CT head is negative for intracranial hemorrhage.    Radiologist interpretation:  CT-CHEST (THORAX) W/O   Final Result      1.  Comminuted and mildly displaced fracture of the left eighth rib.      2.  No evidence of pneumothorax or hemothorax.      3.  Bilateral pulmonary nodules measuring up to 4 mm in size.      4.  Linear atelectasis within lung bases.      5.  Prominent atherosclerotic change of the aorta and coronary arteries.      Fleischner Society pulmonary nodule recommendations:   Low Risk: No routine follow-up      High Risk: Optional CT at 12 months      Comments: Use most suspicious nodule as guide to management. Follow-up intervals may vary according to size and risk.      Low Risk - Minimal or absent history of smoking and of other known risk factors.      High Risk - History of smoking or of other known risk factors.      Note: These recommendations do not apply to lung cancer screening, patients with immunosuppression, or patients with known primary cancer.      Fleischner Society 2017 Guidelines for Management of Incidentally Detected Pulmonary Nodules in Adults   1.            CT-HEAD W/O   Final Result      1.  No evidence of acute intracranial process.      2.  Atrophy.               Laceration Repair Procedure Note    Indication: Laceration    Procedure: The patient was placed in the appropriate position and anesthesia around the laceration was obtained by infiltration using 1% Lidocaine without epinephrine. The area was then irrigated with normal  saline. The laceration was closed with 5-0 Ethilon using interrupted sutures. There were no additional lacerations requiring repair. The wound area was then dressed with bacitracin.      Total repaired wound length: 3.5 cm.     Other Items: Suture count: 6    The patient tolerated the procedure well.    Complications: None        COURSE & MEDICAL DECISION MAKING    ASSESSMENT, COURSE AND PLAN  Care Narrative: This a pleasant 83-year-old gentleman with ground-level fall resulting in laceration and contusion to the forehead.  He is on anticoagulation therefore code TBI was initiated and CT scan was completed was negative for acute abnormality in the head such as hemorrhage.  There is, present chest wall pain after fall, CT scan revealed has 1 comminuted rib fracture.  No hypoxia, tach because it was a pneumothorax or hemothorax, was doing just fine on his incentive spirometer therefore my thought is okay for him to go home.  She will Lidoderm patch.  Patient sutures as above for his laceration return in 7 to 10 days for suture removal with urgent care, primary care physician or this facility.  I discussed with the patient's wife later about the patient's pulmonary nodules and they will be following with a primary care physician for this as this may be cancer early in evolution.      ADDITIONAL PROBLEMS MANAGED  Pulmonary nodules.  Patient will follows primary care physician for these nodules.    DISPOSITION AND DISCUSSIONS      FINAL DIAGNOSIS  1. Closed head injury, initial encounter    2. Facial laceration, initial encounter    3. Closed fracture of one rib of left side, initial encounter    4. Pulmonary nodule        DISPOSITION:  Patient will be discharged home in stable condition.    FOLLOW UP:  [facility], Emergency Dept  [address]  [place] [address]  [phone]    If symptoms worsen      OUTPATIENT MEDICATIONS:  Discharge Medication List as of 8/16/2024  2:43 PM         START taking these medications    Details   lidocaine (LIDODERM) 5 % Patch Place 1 Patch on the skin every 24 hours., Disp-10 Patch, R-0, Normal             Electronically signed by: Vipin Bustamante D.O., 8/16/2024 1:20 PM

## 2024-08-16 NOTE — ED NOTES
Patient provided with IS and IS education patient demonstrated physical demonstration teach back as understanding.

## 2024-08-16 NOTE — DISCHARGE INSTRUCTIONS
Please buy the over-the-counter Lidoderm patch 4% and placed on chest wall twice a day for 5 days.    Laceration Care    Keep the wound moist with bacitracin for at least 5 days,  keep it clean afterwards and protect the wound from sunburn with SPF 50 for 9 months.  Return for evidence of infection streaking, pus from the wound. Followup with your primary care physician, urgent care, or emergency department in 7-10 days for suture removal.   A laceration is a cut or lesion that goes through all layers of the skin and into the tissue just beneath the skin.    This has been repaired by your caregiver. Your caregiver used either suturing, stapling, or steri-strips to repair the laceration. This brings the skin margins together. This allows faster healing.    HOME CARE INSTRUCTIONS  If you were given a dressing, you should change it at least once a day, or as instructed by your caregiver. If the bandage sticks, soak it off with a solution of hydrogen peroxide or soapy water.  Twice a day, wash the area with soap and water to remove all the creams or ointments if these were used. Rinse off the soap. Pat dry with a clean towel. Look for signs of infection (see below).  Re-apply prescribed creams or ointments as instructed. This will help prevent infection. This also helps keep the bandage from sticking. Telfa over the wound and under the dressing or wrap will also help keep the bandage from sticking.  If the bandage becomes wet, dirty, or develops a foul smell, change it as soon as possible.  Acetaminophen (Tylenol®) or ibuprofen (Advil® or Motrin®) may be taken as directed for relief from pain and discomfort.    Seek medical attention IF:  There is redness, swelling, increasing pain in the wound  There is a red line that goes up your arm or leg.  Pus is coming from wound.  You develop an unexplained oral temperature above 101.  You notice a foul smell coming from the wound or dressing.  There is a breaking open of the  wound  (edges not staying together) after sutures have been removed.    If you did not receive a tetanus shot today because you did not recall when your last one was given, make sure to check with your caregiver when you have your sutures removed to determine if you need one.      [employer]® Patient Information ©2007 [employer].

## 2024-08-24 ENCOUNTER — OFFICE VISIT (OUTPATIENT)
Dept: URGENT CARE | Facility: CLINIC | Age: 84
End: 2024-08-24
Payer: MEDICARE

## 2024-08-24 VITALS
OXYGEN SATURATION: 98 % | SYSTOLIC BLOOD PRESSURE: 114 MMHG | RESPIRATION RATE: 16 BRPM | WEIGHT: 144 LBS | TEMPERATURE: 98.7 F | DIASTOLIC BLOOD PRESSURE: 78 MMHG | HEIGHT: 66 IN | BODY MASS INDEX: 23.14 KG/M2 | HEART RATE: 81 BPM

## 2024-08-24 DIAGNOSIS — S01.81XA FACIAL LACERATION, INITIAL ENCOUNTER: ICD-10-CM

## 2024-08-24 DIAGNOSIS — Z48.02 VISIT FOR SUTURE REMOVAL: ICD-10-CM

## 2024-08-24 PROCEDURE — 99212 OFFICE O/P EST SF 10 MIN: CPT | Performed by: PHYSICIAN ASSISTANT

## 2024-08-24 PROCEDURE — 3074F SYST BP LT 130 MM HG: CPT | Performed by: PHYSICIAN ASSISTANT

## 2024-08-24 PROCEDURE — 3078F DIAST BP <80 MM HG: CPT | Performed by: PHYSICIAN ASSISTANT

## 2024-08-24 ASSESSMENT — ENCOUNTER SYMPTOMS
HEADACHES: 0
VOMITING: 0
LOSS OF CONSCIOUSNESS: 0
BLURRED VISION: 0
NAUSEA: 0

## 2024-08-24 ASSESSMENT — FIBROSIS 4 INDEX: FIB4 SCORE: 1.78

## 2024-08-24 NOTE — PROGRESS NOTES
Subjective     Sandip Zhao is a 83 y.o. male who presents with Suture / Staple Removal (Facial area left side about x6-7 stiches were placed )    HPI:  Sandip Zhao is a 83 y.o. male who presents today for suture removal.  Patient was seen on 8/16/2024 after sustaining a head injury from a fall.  He was actually referred to the emergency department for more thorough evaluation.  CT scan of his head was negative for any acute intracranial process.  He also had a CT thorax which showed a comminuted fracture of his eighth rib.  He had a laceration over his left temple that was repaired with sutures.  The ER note says that there were 6 simple interrupted sutures placed        Review of Systems   Eyes:  Negative for blurred vision.   Gastrointestinal:  Negative for nausea and vomiting.   Skin:         Healing laceration of left temple with sutures in place   Neurological:  Negative for loss of consciousness and headaches.           PMH:  has a past medical history of Anemia, CAD (coronary artery disease), Environmental allergies, Hemorrhoids, Hypertension, MI (myocardial infarction) ([facility]), and Status post placement of implantable loop recorder.  MEDS:   Current Outpatient Medications:     Empagliflozin (JARDIANCE) 10 MG Tab tablet, , Disp: , Rfl:     lidocaine (LIDODERM) 5 % Patch, Place 1 Patch on the skin every 24 hours., Disp: 10 Patch, Rfl: 0    tamsulosin (FLOMAX) 0.4 MG capsule, Take 2 Capsules by mouth every day., Disp: 60 Capsule, Rfl: 1    finasteride (PROSCAR) 5 MG Tab, Take 1 Tablet by mouth every day., Disp: 30 Tablet, Rfl: 1    senna-docusate (PERICOLACE OR SENOKOT S) 8.6-50 MG Tab, Take 2 Tablets by mouth 2 times a day., Disp: , Rfl:     Multiple Vitamins-Minerals (CENTRUM SILVER 50+MEN) Tab, Take 1 Tablet by mouth every day., Disp: , Rfl:     mexiletine (MEXITIL) 150 MG Cap, Take 150 mg by mouth every 8 hours., Disp: , Rfl:     Cyanocobalamin (B-12 PO), Take 1 Tablet by mouth every day., Disp: ,  "Rfl:     rosuvastatin (CRESTOR) 20 MG Tab, Take 20 mg by mouth every evening., Disp: , Rfl:     aspirin 81 MG EC tablet, Take 81 mg by mouth every morning., Disp: , Rfl:     ezetimibe (ZETIA) 10 MG Tab, Take 10 mg by mouth every evening., Disp: , Rfl:   ALLERGIES: No Known Allergies  SURGHX:   Past Surgical History:   Procedure Laterality Date    ABDOMINAL EXPLORATION      APPENDECTOMY      KNEE ARTHROSCOPY       SOCHX:  reports that he has never smoked. He has never used smokeless tobacco. He reports that he does not currently use alcohol. He reports that he does not use drugs.  FH: Family history was reviewed, no pertinent findings to report      Objective     /78   Pulse 81   Temp 37.1 °C (98.7 °F) (Temporal)   Resp 16   Ht 1.676 m (5' 6\")   Wt 65.3 kg (144 lb)   SpO2 98%   BMI 23.24 kg/m²      Physical Exam  Constitutional:       General: He is not in acute distress.     Appearance: He is not diaphoretic.   HENT:      Head: Normocephalic and atraumatic.        Comments: Lateral aspect of left eyebrow/temporal region exhibits a healing laceration.  There is overlying scab.  I see 5 sutures in place.  Wound appears to be well-approximated.     Right Ear: External ear normal.      Left Ear: External ear normal.   Eyes:      Conjunctiva/sclera: Conjunctivae normal.      Pupils: Pupils are equal, round, and reactive to light.   Pulmonary:      Effort: Pulmonary effort is normal. No respiratory distress.   Musculoskeletal:      Cervical back: Normal range of motion.   Skin:     Findings: No rash.   Neurological:      Mental Status: He is alert and oriented to person, place, and time.   Psychiatric:         Mood and Affect: Mood and affect normal.         Cognition and Memory: Memory normal.         Judgment: Judgment normal.           Assessment & Plan     1. Facial laceration, initial encounter    2. Visit for suture removal    Area was cleansed with an alcohol prep pad.  All 5 sutures were removed.  " There was some difficulty as 4 out of 5 of the sutures were embedded into the scab.  After carefully dissecting through a scab the wound still remained fairly well-approximated.  The most superior suture had some mild bleeding after removal.  No other bleeding noted.  Patient tolerated the procedure well.  Continued wound care instructions discussed.  Follow-up as needed.

## 2024-08-26 ENCOUNTER — HOSPITAL ENCOUNTER (OUTPATIENT)
Dept: LAB | Facility: MEDICAL CENTER | Age: 84
End: 2024-08-26
Attending: PHYSICIAN ASSISTANT
Payer: MEDICARE

## 2024-08-26 LAB
ANION GAP SERPL CALC-SCNC: 10 MMOL/L (ref 7–16)
BUN SERPL-MCNC: 13 MG/DL (ref 8–22)
CALCIUM SERPL-MCNC: 9.3 MG/DL (ref 8.5–10.5)
CHLORIDE SERPL-SCNC: 98 MMOL/L (ref 96–112)
CO2 SERPL-SCNC: 26 MMOL/L (ref 20–33)
CREAT SERPL-MCNC: 0.93 MG/DL (ref 0.5–1.4)
GFR SERPLBLD CREATININE-BSD FMLA CKD-EPI: 81 ML/MIN/1.73 M 2
GLUCOSE SERPL-MCNC: 89 MG/DL (ref 65–99)
POTASSIUM SERPL-SCNC: 4.7 MMOL/L (ref 3.6–5.5)
SODIUM SERPL-SCNC: 134 MMOL/L (ref 135–145)

## 2024-08-26 PROCEDURE — 36415 COLL VENOUS BLD VENIPUNCTURE: CPT

## 2024-08-26 PROCEDURE — 80048 BASIC METABOLIC PNL TOTAL CA: CPT

## 2024-08-30 ENCOUNTER — TELEPHONE (OUTPATIENT)
Dept: CARDIOLOGY | Facility: MEDICAL CENTER | Age: 84
End: 2024-08-30
Payer: MEDICARE

## 2024-08-30 ENCOUNTER — HOSPITAL ENCOUNTER (EMERGENCY)
Facility: MEDICAL CENTER | Age: 84
End: 2024-08-30
Attending: EMERGENCY MEDICINE
Payer: MEDICARE

## 2024-08-30 VITALS
HEIGHT: 66 IN | TEMPERATURE: 97.3 F | RESPIRATION RATE: 17 BRPM | HEART RATE: 84 BPM | DIASTOLIC BLOOD PRESSURE: 79 MMHG | SYSTOLIC BLOOD PRESSURE: 163 MMHG | BODY MASS INDEX: 23.03 KG/M2 | OXYGEN SATURATION: 99 % | WEIGHT: 143.3 LBS

## 2024-08-30 DIAGNOSIS — I49.3 PVC (PREMATURE VENTRICULAR CONTRACTION): ICD-10-CM

## 2024-08-30 DIAGNOSIS — I10 HYPERTENSION, UNSPECIFIED TYPE: ICD-10-CM

## 2024-08-30 PROCEDURE — 99284 EMERGENCY DEPT VISIT MOD MDM: CPT

## 2024-08-30 PROCEDURE — 93005 ELECTROCARDIOGRAM TRACING: CPT | Performed by: EMERGENCY MEDICINE

## 2024-08-30 ASSESSMENT — FIBROSIS 4 INDEX: FIB4 SCORE: 1.78

## 2024-08-30 NOTE — DISCHARGE INSTRUCTIONS
Return here immediately for stroke symptoms severe headache chest pain leg swelling shortness of breath or any other concerning symptoms

## 2024-08-30 NOTE — TELEPHONE ENCOUNTER
Received a outside referral from BJ Mcclendon for a C w/poss and was sent to [facility] To review.

## 2024-08-30 NOTE — ED TRIAGE NOTES
"Chief Complaint   Patient presents with    Hypertension     Pt reports high blood pressure reading at home this AM. Denies chest pain, SOB, HA, weakness, palpitations, or dizziness. Pt states he felt his heart was racing this AM.   Pt has 3 stents placed in 2015, per wife pt is awaiting an Angiogram due to recent PET scan results.      BP (!) 156/82   Pulse 90   Temp 36.7 °C (98 °F) (Temporal)   Resp 14   Ht 1.676 m (5' 6\")   Wt 65 kg (143 lb 4.8 oz)   SpO2 96%   BMI 23.13 kg/m²     "

## 2024-08-30 NOTE — ED NOTES
Dc instructions and medications discussed with patient at bedside. All questions answered at this time. Educated pt on signs and symptoms to return to ER, such as chest pain and stroke symptoms.  Pt to lobby without incident. Wife to drive patient home.

## 2024-08-30 NOTE — ED NOTES
Pt states he check his blood pressure every morning and noticed his blood pressure was 201/102 this morning. Pt states he is not on any blood pressure medication.

## 2024-08-30 NOTE — ED PROVIDER NOTES
ED Provider Note    CHIEF COMPLAINT  Chief Complaint   Patient presents with    Hypertension     Pt reports high blood pressure reading at home this AM. Denies chest pain, SOB, HA, weakness, palpitations, or dizziness. Pt states he felt his heart was racing this AM.   Pt has 3 stents placed in 2015, per wife pt is awaiting an Angiogram due to recent PET scan results.        EXTERNAL RECORDS REVIEWED  Reviewed recent laboratory studies ER encounters previous PCP visits    HPI/ROS  LIMITATION TO HISTORY   None  OUTSIDE HISTORIAN(S):  Wife provided additional HPI    Sandip Zhao is a 83 y.o. male who presents for evaluation of elevated blood pressure.  The patient has a known history of coronary artery disease as well as PVCs.  He is followed by Dr. Mcclendon at [facility].  He had stents placed several years ago.  She recently took him off diltiazem and started Mexitil around a month ago.  He feels like his extra beats have improved but has been checking his blood pressure at home with an arm cuff and has had several elevated readings.  He specifically denies any concerning symptoms associated with elevated blood pressure such as headache shortness of breath dyspnea leg swelling or strokelike symptoms.  He is not on any antihypertensive specific medication currently.  He is apparently in the process of getting scheduled for a surveillance angiogram but it has not been performed.  No syncope.    PAST MEDICAL HISTORY   has a past medical history of Anemia, CAD (coronary artery disease), Environmental allergies, Hemorrhoids, Hypertension, MI (myocardial infarction) (HCC), and Status post placement of implantable loop recorder.    SURGICAL HISTORY   has a past surgical history that includes abdominal exploration; appendectomy; and knee arthroscopy.    FAMILY HISTORY  Family History   Problem Relation Age of Onset    Hypertension Father     Heart Disease Father         mitral valve replacement  "      SOCIAL HISTORY  Social History     Tobacco Use    Smoking status: Never    Smokeless tobacco: Never   Vaping Use    Vaping status: Never Used   Substance and Sexual Activity    Alcohol use: Not Currently    Drug use: Never    Sexual activity: Not on file     Comment: food and beverage management     Non-smoker  CURRENT MEDICATIONS  No current facility-administered medications for this encounter.    Current Outpatient Medications:     Empagliflozin (JARDIANCE) 10 MG Tab tablet, , Disp: , Rfl:     lidocaine (LIDODERM) 5 % Patch, Place 1 Patch on the skin every 24 hours., Disp: 10 Patch, Rfl: 0    tamsulosin (FLOMAX) 0.4 MG capsule, Take 2 Capsules by mouth every day., Disp: 60 Capsule, Rfl: 1    finasteride (PROSCAR) 5 MG Tab, Take 1 Tablet by mouth every day., Disp: 30 Tablet, Rfl: 1    senna-docusate (PERICOLACE OR SENOKOT S) 8.6-50 MG Tab, Take 2 Tablets by mouth 2 times a day., Disp: , Rfl:     Multiple Vitamins-Minerals (CENTRUM SILVER 50+MEN) Tab, Take 1 Tablet by mouth every day., Disp: , Rfl:     mexiletine (MEXITIL) 150 MG Cap, Take 150 mg by mouth every 8 hours., Disp: , Rfl:     Cyanocobalamin (B-12 PO), Take 1 Tablet by mouth every day., Disp: , Rfl:     rosuvastatin (CRESTOR) 20 MG Tab, Take 20 mg by mouth every evening., Disp: , Rfl:     aspirin 81 MG EC tablet, Take 81 mg by mouth every morning., Disp: , Rfl:     ezetimibe (ZETIA) 10 MG Tab, Take 10 mg by mouth every evening., Disp: , Rfl:         ALLERGIES  No Known Allergies    PHYSICAL EXAM  VITAL SIGNS: BP (!) 163/79   Pulse 84   Temp 36.3 °C (97.3 °F) (Temporal)   Resp 17   Ht 1.676 m (5' 6\")   Wt 65 kg (143 lb 4.8 oz)   SpO2 99%   BMI 23.13 kg/m²    Pulse ox interpretation: I interpret this pulse ox as normal.  Constitutional: Alert and oriented x 3, acute distress  HEENT: Atraumatic normocephalic, pupils are equal round reactive to light extraocular movements are intact. The nares is clear, external ears are normal, mouth shows moist " mucous membranes normal dentition for age  Neck: Supple, no JVD no tracheal deviation  Cardiovascular: Regular rate and rhythm no murmur rub or gallop 2+ pulses peripherally x4  Thorax & Lungs: No respiratory distress, no wheezes rales or rhonchi, No chest tenderness.   GI: Soft nontender nondistended positive bowel sounds, no peritoneal signs  Skin: Warm dry no acute rash or lesion  Musculoskeletal: Moving all extremities with full range and 5 of 5 strength no acute  deformity no edema  Neurologic: Cranial nerves III through XII are grossly intact no sensory deficit no cerebellar dysfunction stroke scale score of 0  Psychiatric: Appropriate affect for situation at this time          EKG/LABS  Reviewed extensive laboratory studies including kidney function urine microscopy albumin creatinine ratios performed just 4 days ago none indicated  RADIOLOGY/PROCEDURES   None indicated      COURSE & MEDICAL DECISION MAKING    ASSESSMENT, COURSE AND PLAN  Care Narrative:     This is a very pleasant 83-year-old gentleman accompanied by his wife for asymptomatic hypertension.  He did recently discontinue diltiazem and started Mexitil.  My review indicates that Mexitil has a marginal antihypertensive profile.  Here he has no indication for extensive workup.  He has no signs of heart failure on clinical exam is no chest pain and has no strokelike symptoms.  I considered but did not feel that extensive workup is indicated.  Fortunately the patient just had extensive laboratory studies done just 4 days ago and his kidney function and urinalysis was essentially normal.  With his blood pressures being marginally elevated here there is no clinical indication for acute treatment.  I did attempt to page his cardiologist but have not received a call back yet.  I will put referral to [facility] to help facilitate his surveillance angiogram.  There is no clinical indication for admission to the hospital or further workup at this  time.  I counseled the patient to follow-up with his cardiologist but to return here as needed if he develops any new or worsening symptoms          ADDITIONAL PROBLEMS MANAGED      DISPOSITION AND DISCUSSIONS  I have discussed management of the patient with the following physicians and OK's: None    Discussion of management with other Q or appropriate source(s): None    Escalation of care considered, and ultimately not performed:    Barriers to care at this time, including but not limited to: None.     Decision tools and prescription drugs considered including, but not limited to: None.    FINAL DIAGNOSIS  1. Hypertension, unspecified type    2. PVC (premature ventricular contraction)         Electronically signed by: Cuauhtemoc Gambino M.D., 8/30/2024 11:04 AM

## 2024-09-02 ENCOUNTER — TELEPHONE (OUTPATIENT)
Dept: CARDIOLOGY | Facility: MEDICAL CENTER | Age: 84
End: 2024-09-02
Payer: MEDICARE

## 2024-09-02 DIAGNOSIS — R94.39 ABNORMAL CARDIOVASCULAR STRESS TEST: ICD-10-CM

## 2024-09-02 NOTE — TELEPHONE ENCOUNTER
----- Message from Irma GONZALEZ sent at 8/30/2024  3:41 PM PDT -----  Regarding: Outside referral from [facility]  Please review outside referral that is scanned into media from [facility] and if ok order in EPIC and let me know. Thank you    Pass code is [id number]

## 2024-09-05 ENCOUNTER — APPOINTMENT (OUTPATIENT)
Dept: ADMISSIONS | Facility: MEDICAL CENTER | Age: 84
End: 2024-09-05
Attending: INTERNAL MEDICINE
Payer: MEDICARE

## 2024-09-09 ENCOUNTER — APPOINTMENT (OUTPATIENT)
Dept: ADMISSIONS | Facility: MEDICAL CENTER | Age: 84
End: 2024-09-09
Attending: INTERNAL MEDICINE
Payer: MEDICARE

## 2024-09-09 DIAGNOSIS — Z01.810 PRE-OPERATIVE CARDIOVASCULAR EXAMINATION: ICD-10-CM

## 2024-09-09 DIAGNOSIS — Z01.812 PRE-OPERATIVE LABORATORY EXAMINATION: ICD-10-CM

## 2024-09-09 LAB
ALBUMIN SERPL BCP-MCNC: 4.1 G/DL (ref 3.2–4.9)
ALBUMIN/GLOB SERPL: 1.5 G/DL
ALP SERPL-CCNC: 121 U/L (ref 30–99)
ALT SERPL-CCNC: 22 U/L (ref 2–50)
ANION GAP SERPL CALC-SCNC: 9 MMOL/L (ref 7–16)
APTT PPP: 31.1 SEC (ref 24.7–36)
AST SERPL-CCNC: 16 U/L (ref 12–45)
BILIRUB SERPL-MCNC: 0.4 MG/DL (ref 0.1–1.5)
BUN SERPL-MCNC: 18 MG/DL (ref 8–22)
CALCIUM ALBUM COR SERPL-MCNC: 9.1 MG/DL (ref 8.5–10.5)
CALCIUM SERPL-MCNC: 9.2 MG/DL (ref 8.5–10.5)
CHLORIDE SERPL-SCNC: 103 MMOL/L (ref 96–112)
CO2 SERPL-SCNC: 23 MMOL/L (ref 20–33)
CREAT SERPL-MCNC: 0.9 MG/DL (ref 0.5–1.4)
EKG IMPRESSION: NORMAL
ERYTHROCYTE [DISTWIDTH] IN BLOOD BY AUTOMATED COUNT: 41.1 FL (ref 35.9–50)
GFR SERPLBLD CREATININE-BSD FMLA CKD-EPI: 84 ML/MIN/1.73 M 2
GLOBULIN SER CALC-MCNC: 2.7 G/DL (ref 1.9–3.5)
GLUCOSE SERPL-MCNC: 71 MG/DL (ref 65–99)
HCT VFR BLD AUTO: 34.2 % (ref 42–52)
HGB BLD-MCNC: 11.6 G/DL (ref 14–18)
INR PPP: 1.03 (ref 0.87–1.13)
MCH RBC QN AUTO: 30.3 PG (ref 27–33)
MCHC RBC AUTO-ENTMCNC: 33.9 G/DL (ref 32.3–36.5)
MCV RBC AUTO: 89.3 FL (ref 81.4–97.8)
PLATELET # BLD AUTO: 245 K/UL (ref 164–446)
PMV BLD AUTO: 8.9 FL (ref 9–12.9)
POTASSIUM SERPL-SCNC: 4.6 MMOL/L (ref 3.6–5.5)
PROT SERPL-MCNC: 6.8 G/DL (ref 6–8.2)
PROTHROMBIN TIME: 13.6 SEC (ref 12–14.6)
RBC # BLD AUTO: 3.83 M/UL (ref 4.7–6.1)
SODIUM SERPL-SCNC: 135 MMOL/L (ref 135–145)
WBC # BLD AUTO: 5.7 K/UL (ref 4.8–10.8)

## 2024-09-09 PROCEDURE — 85610 PROTHROMBIN TIME: CPT

## 2024-09-09 PROCEDURE — 93010 ELECTROCARDIOGRAM REPORT: CPT | Performed by: INTERNAL MEDICINE

## 2024-09-09 PROCEDURE — 36415 COLL VENOUS BLD VENIPUNCTURE: CPT

## 2024-09-09 PROCEDURE — 93005 ELECTROCARDIOGRAM TRACING: CPT

## 2024-09-09 PROCEDURE — 85027 COMPLETE CBC AUTOMATED: CPT

## 2024-09-09 PROCEDURE — 80053 COMPREHEN METABOLIC PANEL: CPT

## 2024-09-09 PROCEDURE — 85730 THROMBOPLASTIN TIME PARTIAL: CPT

## 2024-09-09 RX ORDER — ACETAMINOPHEN 500 MG
500-1000 TABLET ORAL EVERY 6 HOURS PRN
COMMUNITY

## 2024-09-09 RX ORDER — LIDOCAINE 4 G/G
1 PATCH TOPICAL PRN
COMMUNITY
End: 2024-09-17

## 2024-09-09 NOTE — OR NURSING
RN tele pre admit appointment completed with spouse, Kimberley:  Spouse provided medication and fasting instructions, per Cardiology Procedure Instruction Sheet and bathing instructions per Preparing for Your Procedure packet.  Procedure date 9/10/2024.  Spouse verbalized an understanding of the above instructions.

## 2024-09-10 ENCOUNTER — HOSPITAL ENCOUNTER (OUTPATIENT)
Facility: MEDICAL CENTER | Age: 84
End: 2024-09-10
Attending: INTERNAL MEDICINE | Admitting: INTERNAL MEDICINE
Payer: MEDICARE

## 2024-09-10 ENCOUNTER — APPOINTMENT (OUTPATIENT)
Dept: CARDIOLOGY | Facility: MEDICAL CENTER | Age: 84
End: 2024-09-10
Attending: NURSE PRACTITIONER
Payer: MEDICARE

## 2024-09-10 VITALS
OXYGEN SATURATION: 95 % | BODY MASS INDEX: 23.6 KG/M2 | HEART RATE: 50 BPM | SYSTOLIC BLOOD PRESSURE: 169 MMHG | TEMPERATURE: 96.6 F | HEIGHT: 66 IN | WEIGHT: 146.83 LBS | DIASTOLIC BLOOD PRESSURE: 91 MMHG | RESPIRATION RATE: 14 BRPM

## 2024-09-10 DIAGNOSIS — N13.8 BENIGN PROSTATIC HYPERPLASIA WITH URINARY OBSTRUCTION: ICD-10-CM

## 2024-09-10 DIAGNOSIS — R94.39 ABNORMAL CARDIOVASCULAR STRESS TEST: ICD-10-CM

## 2024-09-10 DIAGNOSIS — K59.09 OTHER CONSTIPATION: ICD-10-CM

## 2024-09-10 DIAGNOSIS — R30.0 DYSURIA: ICD-10-CM

## 2024-09-10 DIAGNOSIS — N40.1 BENIGN PROSTATIC HYPERPLASIA WITH URINARY OBSTRUCTION: ICD-10-CM

## 2024-09-10 LAB
ACT BLD: 208 SEC (ref 74–137)
ACT BLD: 232 SEC (ref 74–137)

## 2024-09-10 PROCEDURE — 700111 HCHG RX REV CODE 636 W/ 250 OVERRIDE (IP): Mod: JZ

## 2024-09-10 PROCEDURE — 700105 HCHG RX REV CODE 258: Performed by: INTERNAL MEDICINE

## 2024-09-10 PROCEDURE — 160046 HCHG PACU - 1ST 60 MINS PHASE II

## 2024-09-10 PROCEDURE — 93571 IV DOP VEL&/PRESS C FLO 1ST: CPT | Mod: 26 | Performed by: INTERNAL MEDICINE

## 2024-09-10 PROCEDURE — 92928 PRQ TCAT PLMT NTRAC ST 1 LES: CPT | Mod: 52,LD | Performed by: INTERNAL MEDICINE

## 2024-09-10 PROCEDURE — 99152 MOD SED SAME PHYS/QHP 5/>YRS: CPT | Performed by: INTERNAL MEDICINE

## 2024-09-10 PROCEDURE — 85347 COAGULATION TIME ACTIVATED: CPT | Mod: 91

## 2024-09-10 PROCEDURE — 93458 L HRT ARTERY/VENTRICLE ANGIO: CPT | Mod: 26,59 | Performed by: INTERNAL MEDICINE

## 2024-09-10 PROCEDURE — 160036 HCHG PACU - EA ADDL 30 MINS PHASE I

## 2024-09-10 PROCEDURE — 92978 ENDOLUMINL IVUS OCT C 1ST: CPT | Mod: 26 | Performed by: INTERNAL MEDICINE

## 2024-09-10 PROCEDURE — 99153 MOD SED SAME PHYS/QHP EA: CPT

## 2024-09-10 PROCEDURE — 700101 HCHG RX REV CODE 250

## 2024-09-10 PROCEDURE — 700117 HCHG RX CONTRAST REV CODE 255: Performed by: INTERNAL MEDICINE

## 2024-09-10 PROCEDURE — 160035 HCHG PACU - 1ST 60 MINS PHASE I

## 2024-09-10 PROCEDURE — 160047 HCHG PACU  - EA ADDL 30 MINS PHASE II

## 2024-09-10 PROCEDURE — 160002 HCHG RECOVERY MINUTES (STAT)

## 2024-09-10 RX ORDER — HEPARIN SODIUM 1000 [USP'U]/ML
INJECTION, SOLUTION INTRAVENOUS; SUBCUTANEOUS
Status: COMPLETED
Start: 2024-09-10 | End: 2024-09-10

## 2024-09-10 RX ORDER — LIDOCAINE HYDROCHLORIDE 20 MG/ML
INJECTION, SOLUTION INFILTRATION; PERINEURAL
Status: COMPLETED
Start: 2024-09-10 | End: 2024-09-10

## 2024-09-10 RX ORDER — HEPARIN SODIUM 200 [USP'U]/100ML
INJECTION, SOLUTION INTRAVENOUS
Status: COMPLETED
Start: 2024-09-10 | End: 2024-09-10

## 2024-09-10 RX ORDER — AMOXICILLIN 250 MG
1 CAPSULE ORAL 2 TIMES DAILY
Qty: 90 TABLET | Refills: 1
Start: 2024-09-10

## 2024-09-10 RX ORDER — TAMSULOSIN HYDROCHLORIDE 0.4 MG/1
0.4 CAPSULE ORAL 2 TIMES DAILY
Qty: 90 CAPSULE | Refills: 1
Start: 2024-09-10

## 2024-09-10 RX ORDER — VERAPAMIL HYDROCHLORIDE 2.5 MG/ML
INJECTION, SOLUTION INTRAVENOUS
Status: COMPLETED
Start: 2024-09-10 | End: 2024-09-10

## 2024-09-10 RX ORDER — MIDAZOLAM HYDROCHLORIDE 1 MG/ML
INJECTION INTRAMUSCULAR; INTRAVENOUS
Status: COMPLETED
Start: 2024-09-10 | End: 2024-09-10

## 2024-09-10 RX ORDER — SODIUM CHLORIDE 9 MG/ML
5 INJECTION, SOLUTION INTRAVENOUS CONTINUOUS
Status: ACTIVE | OUTPATIENT
Start: 2024-09-10 | End: 2024-09-10

## 2024-09-10 RX ADMIN — FENTANYL CITRATE 100 MCG: 50 INJECTION, SOLUTION INTRAMUSCULAR; INTRAVENOUS at 09:22

## 2024-09-10 RX ADMIN — HEPARIN SODIUM: 1000 INJECTION, SOLUTION INTRAVENOUS; SUBCUTANEOUS at 09:18

## 2024-09-10 RX ADMIN — VERAPAMIL HYDROCHLORIDE 2.5 MG: 2.5 INJECTION, SOLUTION INTRAVENOUS at 09:22

## 2024-09-10 RX ADMIN — MIDAZOLAM HYDROCHLORIDE 2 MG: 1 INJECTION, SOLUTION INTRAMUSCULAR; INTRAVENOUS at 09:22

## 2024-09-10 RX ADMIN — NITROGLYCERIN 10 ML: 20 INJECTION INTRAVENOUS at 09:18

## 2024-09-10 RX ADMIN — LIDOCAINE HYDROCHLORIDE: 20 INJECTION, SOLUTION INFILTRATION; PERINEURAL at 09:18

## 2024-09-10 RX ADMIN — HEPARIN SODIUM: 1000 INJECTION, SOLUTION INTRAVENOUS; SUBCUTANEOUS at 09:37

## 2024-09-10 RX ADMIN — IOHEXOL 120 ML: 350 INJECTION, SOLUTION INTRAVENOUS at 10:07

## 2024-09-10 RX ADMIN — HEPARIN SODIUM 2000 UNITS: 200 INJECTION, SOLUTION INTRAVENOUS at 09:22

## 2024-09-10 RX ADMIN — SODIUM CHLORIDE 5 ML/KG/HR: 9 INJECTION, SOLUTION INTRAVENOUS at 10:35

## 2024-09-10 ASSESSMENT — FIBROSIS 4 INDEX: FIB4 SCORE: 1.16

## 2024-09-10 NOTE — DISCHARGE INSTRUCTIONS
What to Expect Post Sedation    Rest and take it easy for the first 24 hours.  A responsible adult is recommended to remain with you during that time.  It is normal to feel sleepy.  We encourage you to not do anything that requires balance, judgment or coordination.    FOR 24 HOURS DO NOT:  Drive, operate machinery or run household appliances.  Drink beer or alcoholic beverages.  Make important decisions or sign legal documents.    To avoid nausea, slowly advance diet as tolerated, avoiding spicy or greasy foods for the first day.  Add more substantial food to your diet according to your provider's instructions.  Babies can be fed formula or breast milk as soon as they are hungry.  INCREASE FLUIDS AND FIBER TO AVOID CONSTIPATION.    MILD FLU-LIKE SYMPTOMS ARE NORMAL.  YOU MAY EXPERIENCE GENERALIZED MUSCLE ACHES, THROAT IRRITATION, HEADACHE AND/OR SOME NAUSEA.  If any questions arise, call your provider.  If your provider is not available, please feel free to call the [facility] at [phone].    MEDICATIONS: Resume taking daily medication.  Take prescribed pain medication with food.  If no medication is prescribed, you may take non-aspirin pain medication if needed.  PAIN MEDICATION CAN BE VERY CONSTIPATING.  Take a stool softener or laxative such as senokot, pericolace, or milk of magnesia if needed.    Diet    Resume your normal diet as tolerated.  A diet low in cholesterol, fat, and sodium is recommended for good health.     Discharge Instructions:  POST ANGIOGRAM  General Care Instructions  Maintain a bandage over the incision site for 24 hours.  It's normal to find a small bruise or dime-sized lump at the insertion site. This should disappear within a few weeks.  Do not apply lotions or powders to the site.  Do not immerse the catheter insertion site in water (bathtub/swimming) for five days. It is ok to shower 24 hours after the procedure.  You may resume your normal diet immediately; on the day of  your procedure, drink 6-10 glasses of water to help flush the contrast liquid out of your system.  If the doctor inserted the catheter in your arm:  For 3 days, DO NOT lift anything heavier than 10 pounds (approximately a gallon of milk). DO NOT do any heavy pushing, pulling, or twisting.    Medications  If your current medications need to be changed, you will be provided with an updated list of your medications prior to discharge.  If you take warfarin (Coumadin), resume taking your usual dose the evening after the procedure.  DO NOT STOP taking prescribed blood thinning (anti-platelet) medications unless instructed by your cardiologist.  These medications include:  Aspirin, Clopidogrel (Plavix), Ticagrelor (Brilinta), or Prasugrel (Effient)   If you take one of the following anticoagulants, RESUME 24 HOURS after your procedure:  Apixiban (Eliquis), Rivaroxaban (Xarelto), Dabigatran (Pradaxa), Edoxaban (Savaysa)  If you take metformin (Glucophage), RESUME 48 HOURS after your procedure.    When to call your healthcare provider  Call your cardiologist right away at [phone] if you have any of the following:   Problems/Concerns taking any of your prescribed heart medicines.   The insertion site has increasing pain, swelling, redness, bleeding, or drainage.   Your arm or leg below where the insertion site changes color, is cool, or is numb.   You have chest pain or shortness of breath that does not go away with rest.   Your pulse feels irregular -- very slow (less than 60 beats/minute) or very fast (over 100 beats/minute).   You have dizziness, fainting, or you are very tired.   You are coughing up blood or yellow or green mucus.   You have chills or a fever over 101°F (38.3°C).    If there is bleeding at the catheter insertion site, apply pressure for 10 minutes.  If bleeding persists, call [phone], and continue to hold pressure until advanced medical support arrives.        Exercising Safely After Percutaneous  Coronary Intervention (PCI)  After percutaneous coronary intervention (PCI), which involves angioplasty and often stenting, it's important to focus on your heart health. Exercise can help strengthen your heart. It can also help you feel good and improve your overall health. Talk with your health care provider or cardiac rehab team member about good options for you.  Start slowly. Work up to more vigorous exercise as you get stronger. Aim for at least 150 minutes of exercise each week.  Include aerobic activities. These make the heart beat faster. They work the heart and lungs, and improve the body's ability to use oxygen. Good choices include walking, swimming, and biking .  Always follow your doctor's recommendation for exercise.   You have been referred to cardiac rehabilitation, which is important for your recovery.  You may contact [facility] Intensive Cardiac Rehab Program at [phone] to learn more and schedule a visit.        Lifestyle Management After Percutaneous Coronary Intervention (PCI)  Percutaneous coronary intervention (PCI)  involves angioplasty and often stenting. This procedure can open arteries and relieve symptoms. But, it doesn't cure coronary artery disease. New blockages can still form. You need to take steps to prevent this by managing risk factors. Doing so will help make your heart and arteries healthier. Your doctor may prescribe cardiac rehabilitation to help with this lifelong process.  Understanding risk factors  Some risk factors for coronary artery disease can be controlled. These include smoking, high blood pressure, cholesterol, diabetes, and obesity. They can be managed with medication, diet, and exercise. Support and counseling can also play a role. The effort will pay off! Managing risk factors can help you be more active, feel better, and reduce the risk of heart attack.    If you smoke, quit!  If your doctor has been urging you to quit smoking, it's for good reasons. Smoking  damages your heart, blood vessels, and lungs. The good news is that quitting can halt or even reverse the damage of smoking. To quit now:  Get medical help. Ask your doctor for advice on stop-smoking programs. Also ask about medications or nicotine replacement therapy products that may help you quit smoking.  Get support. Join a support group. Ask for help from your family and friends.  Don't give up. It often takes several tries to succeed in quitting smoking.  Avoid secondhand smoke. Ask family and friends not to smoke around you.    DRESSING: Keep dressing and incision dry and intact for 24 hours, may remove dressing and shower after 12 pm  on 9/11, do not need to replace.  Do not submerge site in water for 7 days.     BOWEL FUNCTION:  If you are having problems, use what you normally would or call your provider for suggestions. It also helps to stay regular by including fiber in your diet (for example: bran or fruits and vegetables) and drink plenty of liquids (water, juice, etc.).

## 2024-09-10 NOTE — OR NURSING
1021 Patient arrived to PACU from cath lab.  Report from RN.  Patient is awake.  TR band to right wrist is CDI, soft, patient denies numbness or tingling to right hand.  Educated on wrist precautions.  Patient updated on plan of care. Patient up to bathroom steady on feet.     1045 wife called and updated on patient status. Tolerating orals.    1045 Belongings returned to patient.    1120 3 ml air removed from TR band, no bleeding to site.     1135 3 ml air removed from TR band, no bleeding to site. Patient up to bathroom, steady on feet. Upon return to room patient site bleeding, 2 ml air re injected in to TR band.     1150 3 ml air removed from TR band, no bleeding to site.     1210 3 ml air removed from TR band, no bleeding to site.     1230 remainig ml air removed from TR band, no bleeding to site. TR band removed, dressing CDI, soft. Wife at bedside with concerns, aprn messaged to come to bedside.    1342 APRN at bedside to talk with patient and wife.     1345 Patient up to edge of bed, denies discomfort.  Access site CDI, soft.  All lines and monitors discontinued. Reviewed discharge paperwork with pt and wife. Discussed diet, activity, medications, follow up care and worsening symptoms. No questions at this time. Dressing site CDI, no bleeding.     1400 Pt discharged home with wife via wc by RN.

## 2024-09-10 NOTE — PROGRESS NOTES
Brief Cardiology Update     Patient and wife concerned regarding radial puncture site. Right radial site is CDI, no erythema or bruising. No criteria for admission at this time as no intervention was performed. Discussed with Dr. Mora. Information for nearby hotels provided by nursing if patient wishes to remain close to hospital for any concerns overnight.

## 2024-09-10 NOTE — PROCEDURES
Cardiac Catheterization Procedure Note    DATE: 9/10/2024    : Kentrell Mora MD    PROCEDURES PERFORMED:  Left heart catheterization  Coronary angiography  Resting full-cycle ratio assessment of the left anterior descending coronary  Intravascular ultrasound of the left main coronary artery  Unsuccessful attempt at percutaneous coronary intervention to the proximal to mid right coronary artery due to severe calcification  Unsuccessful attempt at percutaneous coronary intervention to the ostial proximal left coronary artery due to severe calcification  Moderate conscious sedation    INDICATIONS:  The patient is an 83-year-old gentleman referred for cardiac catheterization to evaluate symptoms of dyspnea on exertion with worsening PVC burden and an abnormal cardiac stress test demonstrating ischemia in the inferior territory.    CONSENT:  The complete alternatives, risks, and benefits of the procedure were explained to the patient. Signed informed consent was obtained and placed in the chart prior to the procedure.  A timeout was performed prior to beginning procedure.    MEDICATIONS:  Lidocaine  Fentanyl  Midazolam  Nitroglycerin  Verapamil  Heparin    MODERATE CONSCIOUS SEDATION:  I personally supervised the administration of moderate conscious sedation by the nursing staff for 82 minutes.  Sedation start time: 8:45 AM  Sedation end time: 10:07 AM    CONTRAST: Omnipaque 120 cc    ACCESS: 6-[place] Glidesheath in the right radial artery.    ESTIMATED BLOOD LOSS: 30 cc    COMPLICATIONS: None    PROCEDURE IN DETAIL:  The patient was brought to the cardiac catheterization laboratory in the fasting state.  The skin over the right wrist was prepped and draped in the usual sterile fashion. Lidocaine infiltration was used to anesthetize the tissue over the right radial artery.  Using ultrasound guidance and the the micropuncture technique, a 6-[place] Glidesheath was inserted in the distal left radial artery.  A  5-Cymraes Colin catheter was then advanced over a standard J-wire into the left ventricular cavity where it was gently aspirated, flushed, and then withdrawn across the aortic valve with sequential pressures measured.  This catheter was unable to adequately engage either coronary ostia and it was exchanged for a 6-Cymraes JL-3.5 diagnostic catheter, which was then used to engage the ostium of the left main coronary artery and cineangiograms were obtained in multiple projections for complete evaluation of the left coronary system.  This catheter was then exchanged for a 6-Cymraes JR-4 guide catheter, which was used to engage the ostium of the right coronary artery and cineangiograms were obtained in multiple projections for complete evaluation of the right coronary system.  Following completion of coronary angiography, we proceeded with attempted intervention on the RCA.    A [place] wire could not be advanced into the distal aspect of the RCA.  A Run-through wire was able to be advanced through a [place]cross microcatheter into the distal aspect of the RCA, however, the microcatheter could not be advanced over the wire.  Attempts were made to pass a 2.0 x 12 mm compliant balloon and then a 1.2 x 8 mm compliant balloon unsuccessfully across the lesion.  We then again attempted to advance the microcatheter across the lesion unsuccessfully.  As the lesion was significantly calcified and would require more complex and higher risk techniques for completing the intervention we moved our attention to the left anterior descending coronary.     The JR catheter was exchanged for a 6-Cymraes EBU-3.5 guide catheter, which was used to engage the ostium of the left main coronary.  An RFR pressure wire was then advanced into the left main coronary artery where pressures were normalized.  The wire was then advanced into the d mid istal aspect of the LAD and RFR was performed with a minimum value of 0.82.  The wire was then withdrawn into  the left main with equalization of pressures to 1.0.  As the LAD was assessed to be hemodynamically significant, we advanced a Run-through wire into the circumflex for protection and advanced an [place] IVUS catheter over the LAD wire to better evaluate the vessel, which appeared angiographically to be severely calcified.  However, the  IVUS catheter could not be advanced beyond the ostial LAD due to severe calcification.  Within the distal left main there appeared to be mild concentric plaque with a true vessel diameter of 6.0 mm that was a reasonable landing zone for a stent.  The IVUS catheter was removed and we attempted to pass a 2.0 x 12 mm compliant balloon over the RFR wire unsuccessfully.  As a balloon could also not cross this lesion and revascularization was likely to require rotational atherectomy at the distal left main coronary artery, no further attempts at intervention were performed.  Final cineangiograms were obtained in orthogonal views which demonstrated,  no evidence of wire perforation, thrombus or dissection.  At the completion of the case, all wires, catheters, and sheaths were removed.  A TR band was placed using the patent hemostasis technique.    HEMODYNAMICS:   Aortic pressure: 116/66 mmHg  Pre A-wave pressure: 12 mmHg  No significant aortic gradient on pullback    CORONARY ANGIOGRAPHY:  The left main coronary artery has luminal disease and bifurcates into left anterior descending, ramus intermedius, and left circumflex coronaries.  The left anterior descending coronary artery is a moderate, transapical vessel with ostial 50% disease, a long segment of heavily calcified proximal to mid 90% disease followed by mid to distal luminal irregularities.  It supplies a moderate first diagonal branch with an ostial to proximal stent with mild in-stent restenosis.  The ramus intermedius branch is a moderate, bifurcating vessel with luminal irregularities.  The left circumflex coronary artery is a  moderate, nondominant vessel that supplies two small to moderate obtuse marginal branches with luminal irregularities.  The right coronary artery is a moderate, dominant vessel with a long segment of ostial to proximal 90% calcific disease followed by luminal irregularities.  It has a high bifurcation and supplies a large posterolateral branch with ostial 60% disease followed by luminal irregularities and a moderate posterior descending artery with ostial 30% disease followed by luminal irregularities.    RESTING FULL-CYCLE RATIO:  RFR of the ostial to mid left anterior descending coronary was 0.82.    INTRAVASCULAR ULTRASOUND:  See IVUS findings in procedure details above.    IMPRESSION:  Unsuccessful attempt at percutaneous coronary intervention to the proximal to mid right coronary and ostial to proximal left anterior descending coronary due to severe calcification.  Normal left heart filling pressures.    RECOMMENDATIONS:  Recover in post procedure unit.  TR band release per protocol.  Follow-up in Interventional Cardiology clinic to discuss risks/benefits of complex coronary revascularization that will likely require rotational atherectomy at the ostium of the right coronary in the distal aspect of the left main coronary artery.    NOTIFICATION:  The patient's family was notified of the results of his cardiac catheterization.

## 2024-09-12 ENCOUNTER — TELEPHONE (OUTPATIENT)
Dept: CARDIOLOGY | Facility: MEDICAL CENTER | Age: 84
End: 2024-09-12
Payer: MEDICARE

## 2024-09-12 NOTE — TELEPHONE ENCOUNTER
Marlyn: Can you please schedule on BN's calendar? Pt confirmed availability. Address provided.  =========================  Phone Number Called: [phone]     Call outcome:  Spoke with pt wife    Message: Called to see if pt was available 9/17 at 9:00am per BN. Wife confirmed appt.

## 2024-09-12 NOTE — TELEPHONE ENCOUNTER
Phone Number Called: [phone]     Call outcome:  spoke to wife-Kimberley    Message: Called to update that I sent a message to BN and am waiting to hear back. She was very appreciative of call.

## 2024-09-12 NOTE — TELEPHONE ENCOUNTER
BN: Please advise on scheduling pt. You do have an established pt slot open currently on 10/14 @8:20. If it is still open, please let me know if I can schedule or another option? Thank you!

## 2024-09-12 NOTE — TELEPHONE ENCOUNTER
HUBERT/ NELSON    Caller: Kimberley - Wife    Topic/issue: Patient had angiogram done by Dr. Mora on 09/10/24 and per wife, she was told by Dr. Mora that patient will need another procedure due to calcification and needs to go back in with drill. Dr. Mora instructed them to schedule an in office appointment to discuss this with him. No available appointments with Dr. Mora. Please advise.    Callback Number: [phone]    Thank you,  Radha ARIAS

## 2024-09-16 ENCOUNTER — TELEPHONE (OUTPATIENT)
Dept: CARDIOLOGY | Facility: MEDICAL CENTER | Age: 84
End: 2024-09-16
Payer: MEDICARE

## 2024-09-16 NOTE — TELEPHONE ENCOUNTER
Chart prep:    Spoke with patients wife. Stated his Cardiologist is Dr. Mcclendon.      Faxed STAT records request     Fax# [phone]

## 2024-09-17 ENCOUNTER — OFFICE VISIT (OUTPATIENT)
Dept: CARDIOLOGY | Facility: MEDICAL CENTER | Age: 84
End: 2024-09-17
Attending: EMERGENCY MEDICINE
Payer: MEDICARE

## 2024-09-17 VITALS
HEIGHT: 66 IN | DIASTOLIC BLOOD PRESSURE: 68 MMHG | RESPIRATION RATE: 16 BRPM | BODY MASS INDEX: 23.14 KG/M2 | SYSTOLIC BLOOD PRESSURE: 120 MMHG | OXYGEN SATURATION: 99 % | WEIGHT: 144 LBS | HEART RATE: 40 BPM

## 2024-09-17 DIAGNOSIS — R00.1 SYMPTOMATIC BRADYCARDIA: ICD-10-CM

## 2024-09-17 DIAGNOSIS — I25.10 CORONARY ARTERY DISEASE DUE TO CALCIFIED CORONARY LESION: ICD-10-CM

## 2024-09-17 DIAGNOSIS — I10 HYPERTENSION, UNSPECIFIED TYPE: ICD-10-CM

## 2024-09-17 DIAGNOSIS — I49.8 VENTRICULAR BIGEMINY: ICD-10-CM

## 2024-09-17 DIAGNOSIS — I25.84 CORONARY ARTERY DISEASE DUE TO CALCIFIED CORONARY LESION: ICD-10-CM

## 2024-09-17 DIAGNOSIS — I20.89 STABLE ANGINA (HCC): ICD-10-CM

## 2024-09-17 PROCEDURE — 99215 OFFICE O/P EST HI 40 MIN: CPT | Performed by: INTERNAL MEDICINE

## 2024-09-17 PROCEDURE — 99212 OFFICE O/P EST SF 10 MIN: CPT | Performed by: INTERNAL MEDICINE

## 2024-09-17 PROCEDURE — 3078F DIAST BP <80 MM HG: CPT | Performed by: INTERNAL MEDICINE

## 2024-09-17 PROCEDURE — 3074F SYST BP LT 130 MM HG: CPT | Performed by: INTERNAL MEDICINE

## 2024-09-17 RX ORDER — CLOTRIMAZOLE 1 %
CREAM (GRAM) TOPICAL
COMMUNITY
End: 2024-09-17

## 2024-09-17 RX ORDER — METOPROLOL SUCCINATE 50 MG/1
TABLET, EXTENDED RELEASE ORAL
COMMUNITY
End: 2024-09-17

## 2024-09-17 RX ORDER — FINASTERIDE 5 MG/1
1 TABLET, FILM COATED ORAL
COMMUNITY
Start: 2024-08-07 | End: 2024-09-17

## 2024-09-17 ASSESSMENT — FIBROSIS 4 INDEX: FIB4 SCORE: 1.17

## 2024-09-17 NOTE — LETTER
[facility] - Operated by [facility]   [address], [address]  [place] [address]  Phone: [phone]  Fax: [phone]              Sandip Zhao  1940    Encounter Date: 9/17/2024    Kentrell Mora M.D.          PROGRESS NOTE:        INTERVENTIONAL CARDIOLOGY COMPLEX CORONARY REVASCULARIZATION CONSULTATION NOTE      Date of Visit: 9/17/2024    Referring Provider: Dr. Mcclendon    Patient Name: Sandip Zhao    YOB: 1940  MRN: [id number]     Reason for Visit:   Discuss complex coronary revascularization     Patient Story:   Sandip Zhao is a 84 year-old gentleman with a past medical history of coronary artery disease with prior PCI to the D1, OM1, and RCA in 2015. Briefly, he reported increasingly symptomatic PVCs with exertional intolerance to his primary cardiologist Dr. Mcclendon.  He was initially started on metoprolol, but did not tolerate this medication due to fatigue and was changed to mexiletine, which helped his palpitations but overall did not resolve his fatigue symptoms.  He was ordered for a PET MPI, which showed evidence of inferior territory ischemia.  Therefore, he underwent further evaluation with a coronary angiogram on 9/10/2024.  This demonstrated patent stents with severe calcific disease in the proximal RCA and proximal to mid LAD.  Attempts at intervention were unsuccessful due to an inability to pass even a low-profile 1.2 mm balloons across either area of stenosis.  Given that revascularization would likely require rotational atherectomy in multiple vascular territories including the unprotected proximal LAD.  Further attempts at higher risk intervention were not performed.    He presents today for consultation regarding options for management of his severe multivessel coronary artery disease.  Symptomatically, he continues to note frequent episodes of bradycardia that he attributes to  his PVCs and causes fatigue.  He also feels that his overall exertional tolerance is lower than usual, which he thinks may be a side effect of mexiletine which she would like to discontinue if possible.  He has not been exercising at high levels for doing things that he enjoys like spa treatments because of his cardiac condition.     Medications and Allergies:     Current Outpatient Medications   Medication Sig Dispense Refill   • tamsulosin (FLOMAX) 0.4 MG capsule Take 1 Capsule by mouth 2 times a day. 90 Capsule 1   • senna-docusate (PERICOLACE OR SENOKOT S) 8.6-50 MG Tab Take 1 Tablet by mouth 2 times a day. 90 Tablet 1   • acetaminophen (TYLENOL) 500 MG Tab Take 500-1,000 mg by mouth every 6 hours as needed.     • finasteride (PROSCAR) 5 MG Tab Take 1 Tablet by mouth every day. 30 Tablet 1   • mexiletine (MEXITIL) 150 MG Cap Take 150 mg by mouth every 8 hours.     • Cyanocobalamin (B-12 PO) Take 1 Tablet by mouth every day.     • rosuvastatin (CRESTOR) 20 MG Tab Take 20 mg by mouth every evening.     • aspirin 81 MG EC tablet Take 81 mg by mouth every morning.     • ezetimibe (ZETIA) 10 MG Tab Take 10 mg by mouth every evening.       No current facility-administered medications for this visit.     No Known Allergies     Medical Decision Making:   I reviewed the patient's coronary angiogram images in detail with the patient and his wife.  I specifically discussed that his RCA will almost certainly require rotational atherectomy for revascularization.  It is possible that part of a stent strut from his D1 stent is obstructing the proximal LAD, and with other techniques this area might be able to be balloon dilated and then calcification modification with intracoronary lithotripsy instead of rotational atherectomy, which would allow for wire protection of the circumflex, could be performed.  Regardless, I discussed that these interventions are technically feasible and still relatively low risk compared to other  "revascularization options such as coronary artery bypass grafting, but these interventions are at higher risk than standard PCI.  Given the extent of his coronary disease, he likely will remained symptomatic without revascularization, although there is not a clear benefit or overall mortality.  Additionally, he may be able to discontinue mexiletine if his PVCs are ischemic in etiology, which he would like to do.  However, I did discuss with the patient that his PVCs could be scar-related and in that case they may not necessarily improve with revascularization.  Following this discussion, the patient wished to proceed with complex coronary revascularization to improve symptoms, reduce PVC burden, and reduce medication burden.    ASCVD risk  The ASCVD Risk score (Jake STEEL, et al., 2019) failed to calculate for the following reasons:    The 2019 ASCVD risk score is only valid for ages 40 to 79    Follow Up  Will schedule for complex coronary revascularization.     Cardiac Studies and Procedures:   I personally reviewed and interpreted the patient's most recent coronary angiogram, see above for pertinent details.    I personally reviewed and interpreted the patient's most recent echocardiogram, which demonstrated normal left ventricular systolic function with no evidence of significant wall motion abnormalities.    I personally reviewed and interpreted the patient's most recent ECG, which demonstrated normal sinus rhythm with no evidence of significant prior infarct.     Vital Signs:   /68 (BP Location: Left arm, Patient Position: Sitting)   Pulse (!) 40   Resp 16   Ht 1.676 m (5' 6\")   Wt 65.3 kg (144 lb)   SpO2 99%    BP Readings from Last 4 Encounters:   09/17/24 120/68   09/10/24 (!) 169/91   08/30/24 (!) 163/79   08/24/24 114/78     Wt Readings from Last 4 Encounters:   09/17/24 65.3 kg (144 lb)   09/10/24 66.6 kg (146 lb 13.2 oz)   08/30/24 65 kg (143 lb 4.8 oz)   08/24/24 65.3 kg (144 lb)     Body " "mass index is 23.24 kg/m².     Laboratories:   Lipids  Lab Results   Component Value Date/Time    LDL 54 08/08/2024 07:15 AM    LDL 49 05/28/2024 03:47 AM    LDL 62 04/25/2024 08:18 AM    LDL 62 01/09/2024 08:05 AM    LDL 62 10/12/2023 07:55 AM       Lab Results   Component Value Date/Time    HDL 46 08/08/2024 07:15 AM    HDL 50 05/28/2024 03:47 AM    HDL 60 04/25/2024 08:18 AM    HDL 60 01/09/2024 08:05 AM    HDL 49 10/12/2023 07:55 AM       Lab Results   Component Value Date/Time    TRIGLYCERIDE 60 08/08/2024 07:15 AM    TRIGLYCERIDE 63 05/28/2024 03:47 AM    TRIGLYCERIDE 47 04/25/2024 08:18 AM    TRIGLYCERIDE 45 01/09/2024 08:05 AM    TRIGLYCERIDE 80 10/12/2023 07:55 AM       Lab Results   Component Value Date/Time    CHOLSTRLTOT 112 08/08/2024 07:15 AM    CHOLSTRLTOT 112 05/28/2024 03:47 AM    CHOLSTRLTOT 131 04/25/2024 08:18 AM    CHOLSTRLTOT 131 01/09/2024 08:05 AM    CHOLSTRLTOT 127 10/12/2023 07:55 AM       No components found for: \"LPA\"      Chemistries  Lab Results   Component Value Date/Time    CREATININE 0.90 09/09/2024 10:34 AM    CREATININE 0.93 08/26/2024 01:03 PM    CREATININE 0.96 08/08/2024 07:15 AM    CREATININE 1.01 07/30/2024 01:44 AM    CREATININE 1.07 07/29/2024 06:54 AM     Lab Results   Component Value Date/Time    BUN 18 09/09/2024 10:34 AM    BUN 13 08/26/2024 01:03 PM    BUN 14 08/08/2024 07:15 AM    BUN 8 07/30/2024 01:44 AM    BUN 10 07/29/2024 06:54 AM     Lab Results   Component Value Date/Time    POTASSIUM 4.6 09/09/2024 10:34 AM    POTASSIUM 4.7 08/26/2024 01:03 PM    POTASSIUM 4.0 08/08/2024 07:15 AM     Lab Results   Component Value Date/Time    SODIUM 135 09/09/2024 10:34 AM    SODIUM 134 (L) 08/26/2024 01:03 PM    SODIUM 135 08/08/2024 07:15 AM     Lab Results   Component Value Date/Time    GLUCOSE 71 09/09/2024 10:34 AM    GLUCOSE 89 08/26/2024 01:03 PM    GLUCOSE 95 08/08/2024 07:15 AM     Lab Results   Component Value Date/Time    ASTSGOT 16 09/09/2024 10:34 AM    ASTSGOT 26 " "08/08/2024 07:15 AM    ASTSGOT 19 07/28/2024 10:20 PM     Lab Results   Component Value Date/Time    ALTSGPT 22 09/09/2024 10:34 AM    ALTSGPT 31 08/08/2024 07:15 AM    ALTSGPT 24 07/28/2024 10:20 PM     Lab Results   Component Value Date/Time    ALKPHOSPHAT 121 (H) 09/09/2024 10:34 AM    ALKPHOSPHAT 92 08/08/2024 07:15 AM    ALKPHOSPHAT 94 07/28/2024 10:20 PM     Lab Results   Component Value Date/Time    HBA1C 5.2 08/08/2024 07:15 AM    HBA1C 5.5 07/24/2024 02:27 AM    HBA1C 5.9 (H) 04/25/2024 08:18 AM     No results found for: \"TSH\"  Lab Results   Component Value Date/Time    NTPROBNP 121 06/08/2024 03:00 PM     Lab Results   Component Value Date/Time    TROPONINT 18 07/21/2024 12:24 PM    TROPONINT 15 06/08/2024 08:23 PM    TROPONINT 16 06/08/2024 03:00 PM       Blood Counts  Lab Results   Component Value Date/Time    HEMOGLOBIN 11.6 (L) 09/09/2024 10:34 AM    HEMOGLOBIN 11.8 (L) 08/08/2024 07:15 AM    HEMOGLOBIN 11.2 (L) 07/30/2024 01:44 AM     Lab Results   Component Value Date/Time    PLATELETCT 245 09/09/2024 10:34 AM    PLATELETCT 218 08/08/2024 07:15 AM    PLATELETCT 202 07/30/2024 01:44 AM     Lab Results   Component Value Date/Time    WBC 5.7 09/09/2024 10:34 AM    WBC 4.3 (L) 08/08/2024 07:15 AM    WBC 5.4 07/30/2024 01:44 AM      Physical Examination:   General: Well-appearing for age, no acute distress  Eyes: Extraocular movements intact, anicteric  Neck: Full range of motion, no gross jugular venous distension  Pulmonary: Normal respiratory effort, no distress  Cardiovascular: Regular rate, regular rhythm  Gastrointestinal: Thin, non-distended  Extremities: Moves all extremities normally, no gross lower extremity edema  Neurological: Alert and oriented, normal speech  Psychiatric: Appropriate affect, normal judgment     Past History:   Past Medical History  The patient's past medical history was reviewed.  See HPI and self-reported patient medical history form for pertinent medical history to " consultation.    Past Social History  The patient's social history was reviewed.  See [facility] self-reported patient medical history form for pertinent social history to consultation.    Past Family History  The patient's family history was reviewed.  See [facility] self-reported patient medical history form for pertinent family history to consultation.    Review of Systems  A pertinent cardiac review of systems was performed and was otherwise unremarkable except as per HPI and self-reported patient medical history form.        Kentrell Mora MD, [facility]  Interventional Cardiology  [facility], [address]  [address] [address]  [place] [address]  Phone: [phone]  Fax: [phone]                Sam Mcclendon M.D.  [address] [place] [facility]  [place] [address]  Via Fax: [phone]

## 2024-09-17 NOTE — PROGRESS NOTES
INTERVENTIONAL CARDIOLOGY COMPLEX CORONARY REVASCULARIZATION CONSULTATION NOTE      Date of Visit: 9/17/2024    Referring Provider: Dr. Mcclendon    Patient Name: Sandip Zhao    YOB: 1940  MRN: [id number]     Reason for Visit:   Discuss complex coronary revascularization     Patient Story:   Sandip Zhao is a 84 year-old gentleman with a past medical history of coronary artery disease with prior PCI to the D1, OM1, and RCA in 2015. Briefly, he reported increasingly symptomatic PVCs with exertional intolerance to his primary cardiologist Dr. Mcclendon.  He was initially started on metoprolol, but did not tolerate this medication due to fatigue and was changed to mexiletine, which helped his palpitations but overall did not resolve his fatigue symptoms.  He was ordered for a PET MPI, which showed evidence of inferior territory ischemia.  Therefore, he underwent further evaluation with a coronary angiogram on 9/10/2024.  This demonstrated patent stents with severe calcific disease in the proximal RCA and proximal to mid LAD.  Attempts at intervention were unsuccessful due to an inability to pass even a low-profile 1.2 mm balloons across either area of stenosis.  Given that revascularization would likely require rotational atherectomy in multiple vascular territories including the unprotected proximal LAD.  Further attempts at higher risk intervention were not performed.    He presents today for consultation regarding options for management of his severe multivessel coronary artery disease.  Symptomatically, he continues to note frequent episodes of bradycardia that he attributes to his PVCs and causes fatigue.  He also feels that his overall exertional tolerance is lower than usual, which he thinks may be a side effect of mexiletine which she would like to discontinue if possible.  He has not been exercising at high levels for doing things that he enjoys like spa treatments because of  his cardiac condition.     Medications and Allergies:     Current Outpatient Medications   Medication Sig Dispense Refill    tamsulosin (FLOMAX) 0.4 MG capsule Take 1 Capsule by mouth 2 times a day. 90 Capsule 1    senna-docusate (PERICOLACE OR SENOKOT S) 8.6-50 MG Tab Take 1 Tablet by mouth 2 times a day. 90 Tablet 1    acetaminophen (TYLENOL) 500 MG Tab Take 500-1,000 mg by mouth every 6 hours as needed.      finasteride (PROSCAR) 5 MG Tab Take 1 Tablet by mouth every day. 30 Tablet 1    mexiletine (MEXITIL) 150 MG Cap Take 150 mg by mouth every 8 hours.      Cyanocobalamin (B-12 PO) Take 1 Tablet by mouth every day.      rosuvastatin (CRESTOR) 20 MG Tab Take 20 mg by mouth every evening.      aspirin 81 MG EC tablet Take 81 mg by mouth every morning.      ezetimibe (ZETIA) 10 MG Tab Take 10 mg by mouth every evening.       No current facility-administered medications for this visit.     No Known Allergies     Medical Decision Making:   I reviewed the patient's coronary angiogram images in detail with the patient and his wife.  I specifically discussed that his RCA will almost certainly require rotational atherectomy for revascularization.  It is possible that part of a stent strut from his D1 stent is obstructing the proximal LAD, and with other techniques this area might be able to be balloon dilated and then calcification modification with intracoronary lithotripsy instead of rotational atherectomy, which would allow for wire protection of the circumflex, could be performed.  Regardless, I discussed that these interventions are technically feasible and still relatively low risk compared to other revascularization options such as coronary artery bypass grafting, but these interventions are at higher risk than standard PCI.  Given the extent of his coronary disease, he likely will remained symptomatic without revascularization, although there is not a clear benefit or overall mortality.  Additionally, he may be  "able to discontinue mexiletine if his PVCs are ischemic in etiology, which he would like to do.  However, I did discuss with the patient that his PVCs could be scar-related and in that case they may not necessarily improve with revascularization.  Following this discussion, the patient wished to proceed with complex coronary revascularization to improve symptoms, reduce PVC burden, and reduce medication burden.    ASCVD risk  The ASCVD Risk score (Jake STEEL, et al., 2019) failed to calculate for the following reasons:    The 2019 ASCVD risk score is only valid for ages 40 to 79    Follow Up  Will schedule for complex coronary revascularization.     Cardiac Studies and Procedures:   I personally reviewed and interpreted the patient's most recent coronary angiogram, see above for pertinent details.    I personally reviewed and interpreted the patient's most recent echocardiogram, which demonstrated normal left ventricular systolic function with no evidence of significant wall motion abnormalities.    I personally reviewed and interpreted the patient's most recent ECG, which demonstrated normal sinus rhythm with no evidence of significant prior infarct.     Vital Signs:   /68 (BP Location: Left arm, Patient Position: Sitting)   Pulse (!) 40   Resp 16   Ht 1.676 m (5' 6\")   Wt 65.3 kg (144 lb)   SpO2 99%    BP Readings from Last 4 Encounters:   09/17/24 120/68   09/10/24 (!) 169/91   08/30/24 (!) 163/79   08/24/24 114/78     Wt Readings from Last 4 Encounters:   09/17/24 65.3 kg (144 lb)   09/10/24 66.6 kg (146 lb 13.2 oz)   08/30/24 65 kg (143 lb 4.8 oz)   08/24/24 65.3 kg (144 lb)     Body mass index is 23.24 kg/m².     Laboratories:   Lipids  Lab Results   Component Value Date/Time    LDL 54 08/08/2024 07:15 AM    LDL 49 05/28/2024 03:47 AM    LDL 62 04/25/2024 08:18 AM    LDL 62 01/09/2024 08:05 AM    LDL 62 10/12/2023 07:55 AM       Lab Results   Component Value Date/Time    HDL 46 08/08/2024 07:15 AM    " "HDL 50 05/28/2024 03:47 AM    HDL 60 04/25/2024 08:18 AM    HDL 60 01/09/2024 08:05 AM    HDL 49 10/12/2023 07:55 AM       Lab Results   Component Value Date/Time    TRIGLYCERIDE 60 08/08/2024 07:15 AM    TRIGLYCERIDE 63 05/28/2024 03:47 AM    TRIGLYCERIDE 47 04/25/2024 08:18 AM    TRIGLYCERIDE 45 01/09/2024 08:05 AM    TRIGLYCERIDE 80 10/12/2023 07:55 AM       Lab Results   Component Value Date/Time    CHOLSTRLTOT 112 08/08/2024 07:15 AM    CHOLSTRLTOT 112 05/28/2024 03:47 AM    CHOLSTRLTOT 131 04/25/2024 08:18 AM    CHOLSTRLTOT 131 01/09/2024 08:05 AM    CHOLSTRLTOT 127 10/12/2023 07:55 AM       No components found for: \"LPA\"      Chemistries  Lab Results   Component Value Date/Time    CREATININE 0.90 09/09/2024 10:34 AM    CREATININE 0.93 08/26/2024 01:03 PM    CREATININE 0.96 08/08/2024 07:15 AM    CREATININE 1.01 07/30/2024 01:44 AM    CREATININE 1.07 07/29/2024 06:54 AM     Lab Results   Component Value Date/Time    BUN 18 09/09/2024 10:34 AM    BUN 13 08/26/2024 01:03 PM    BUN 14 08/08/2024 07:15 AM    BUN 8 07/30/2024 01:44 AM    BUN 10 07/29/2024 06:54 AM     Lab Results   Component Value Date/Time    POTASSIUM 4.6 09/09/2024 10:34 AM    POTASSIUM 4.7 08/26/2024 01:03 PM    POTASSIUM 4.0 08/08/2024 07:15 AM     Lab Results   Component Value Date/Time    SODIUM 135 09/09/2024 10:34 AM    SODIUM 134 (L) 08/26/2024 01:03 PM    SODIUM 135 08/08/2024 07:15 AM     Lab Results   Component Value Date/Time    GLUCOSE 71 09/09/2024 10:34 AM    GLUCOSE 89 08/26/2024 01:03 PM    GLUCOSE 95 08/08/2024 07:15 AM     Lab Results   Component Value Date/Time    ASTSGOT 16 09/09/2024 10:34 AM    ASTSGOT 26 08/08/2024 07:15 AM    ASTSGOT 19 07/28/2024 10:20 PM     Lab Results   Component Value Date/Time    ALTSGPT 22 09/09/2024 10:34 AM    ALTSGPT 31 08/08/2024 07:15 AM    ALTSGPT 24 07/28/2024 10:20 PM     Lab Results   Component Value Date/Time    ALKPHOSPHAT 121 (H) 09/09/2024 10:34 AM    ALKPHOSPHAT 92 08/08/2024 07:15 AM " "   ALKPHOSPHAT 94 07/28/2024 10:20 PM     Lab Results   Component Value Date/Time    HBA1C 5.2 08/08/2024 07:15 AM    HBA1C 5.5 07/24/2024 02:27 AM    HBA1C 5.9 (H) 04/25/2024 08:18 AM     No results found for: \"TSH\"  Lab Results   Component Value Date/Time    NTPROBNP 121 06/08/2024 03:00 PM     Lab Results   Component Value Date/Time    TROPONINT 18 07/21/2024 12:24 PM    TROPONINT 15 06/08/2024 08:23 PM    TROPONINT 16 06/08/2024 03:00 PM       Blood Counts  Lab Results   Component Value Date/Time    HEMOGLOBIN 11.6 (L) 09/09/2024 10:34 AM    HEMOGLOBIN 11.8 (L) 08/08/2024 07:15 AM    HEMOGLOBIN 11.2 (L) 07/30/2024 01:44 AM     Lab Results   Component Value Date/Time    PLATELETCT 245 09/09/2024 10:34 AM    PLATELETCT 218 08/08/2024 07:15 AM    PLATELETCT 202 07/30/2024 01:44 AM     Lab Results   Component Value Date/Time    WBC 5.7 09/09/2024 10:34 AM    WBC 4.3 (L) 08/08/2024 07:15 AM    WBC 5.4 07/30/2024 01:44 AM      Physical Examination:   General: Well-appearing for age, no acute distress  Eyes: Extraocular movements intact, anicteric  Neck: Full range of motion, no gross jugular venous distension  Pulmonary: Normal respiratory effort, no distress  Cardiovascular: Regular rate, regular rhythm  Gastrointestinal: Thin, non-distended  Extremities: Moves all extremities normally, no gross lower extremity edema  Neurological: Alert and oriented, normal speech  Psychiatric: Appropriate affect, normal judgment     Past History:   Past Medical History  The patient's past medical history was reviewed.  See HPI and self-reported patient medical history form for pertinent medical history to consultation.    Past Social History  The patient's social history was reviewed.  See [facility] self-reported patient medical history form for pertinent social history to consultation.    Past Family History  The patient's family history was reviewed.  See [facility] self-reported patient medical history form for pertinent family history to " consultation.    Review of Systems  A pertinent cardiac review of systems was performed and was otherwise unremarkable except as per HPI and self-reported patient medical history form.        Kentrell Mora MD, [facility]  Interventional Cardiology  [facility] Heart and Vascular [facility] Advanced Medicine, [facility]  [address] [address]  Phone: [phone]  Fax: [phone]

## 2024-09-19 ENCOUNTER — TELEPHONE (OUTPATIENT)
Dept: CARDIOLOGY | Facility: MEDICAL CENTER | Age: 84
End: 2024-09-19
Payer: MEDICARE

## 2024-09-19 PROBLEM — I25.10 CORONARY ARTERY DISEASE DUE TO CALCIFIED CORONARY LESION: Status: ACTIVE | Noted: 2024-09-19

## 2024-09-19 PROBLEM — I25.84 CORONARY ARTERY DISEASE DUE TO CALCIFIED CORONARY LESION: Status: ACTIVE | Noted: 2024-09-19

## 2024-09-19 RX ORDER — CLOPIDOGREL BISULFATE 75 MG/1
75 TABLET ORAL DAILY
Qty: 90 TABLET | Refills: 3 | Status: SHIPPED | OUTPATIENT
Start: 2024-09-19

## 2024-09-20 NOTE — TELEPHONE ENCOUNTER
Kentrell Mora M.D.  You; Batsheva Christiansen R.N.; Irma Lane20 minutes ago (8:03 AM)     BN  Okay, maybe the med list I saw wasn't updated.    He needs to be on both aspirin and Plavix prior to PCI.

## 2024-09-20 NOTE — TELEPHONE ENCOUNTER
Patient is scheduled on 10-3-24 for a C w/PCI with . No meds to stop and patient to check in at 10:00 for a 12:00 procedure. H&P was done on 9-17-24 by Dr. Mora. Pre admit to call patient.

## 2024-09-20 NOTE — TELEPHONE ENCOUNTER
Left wife Kimberley voice message with BN recommendations about Plavix & Aspirin. Advised her to call back with any questions or concerns

## 2024-09-20 NOTE — TELEPHONE ENCOUNTER
----- Message from Physician Kentrell Mora M.D. sent at 9/19/2024  5:52 PM PDT -----  Can he be scheduled for 2 hours on 10/3 for staged complex intervention? He also needs to be told to start clopidogrel 75 mg daily. Orders are placed.

## 2024-09-20 NOTE — TELEPHONE ENCOUNTER
Per - Can he be scheduled for 2 hours on 10/3 for staged complex intervention? He also needs to be told to start clopidogrel 75 mg daily. Orders are placed.

## 2024-09-20 NOTE — TELEPHONE ENCOUNTER
To SIDNEY, ok for pt to continue to take aspirin with plavix?  Please advise thank you!    To Genny for follow up.    To Irma please call pt to schedule procedure as requested by SIDNEY, see his recommendations below.    =======================    Called pt, [phone]  to review BN recommendations with wife Kimberley.  Advised she will receive a call from Irma to schedule procedure.  She is requesting clarification if pt should continue to take aspirin with plavix.  Kimberley states no other concerns or questions at this time and is appreciative of information given.

## 2024-09-23 ENCOUNTER — APPOINTMENT (OUTPATIENT)
Dept: ADMISSIONS | Facility: MEDICAL CENTER | Age: 84
End: 2024-09-23
Attending: INTERNAL MEDICINE
Payer: MEDICARE

## 2024-09-23 NOTE — TELEPHONE ENCOUNTER
Attempted to call pt, [phone], unable to reach.  Left voicemail to return this call at their earliest convenience.    Upon chart review, pt is active on [employer].  Last login 8/27/2024.  Exara message sent requesting follow up, awaiting pt response.

## 2024-09-23 NOTE — TELEPHONE ENCOUNTER
SIDNEY    Caller: Kimberley (wife)    Topic/issue: patient has a few questions regarding upcoming procedure.     Callback Number: [phone]    Thank you  Lauren DIAZ

## 2024-09-23 NOTE — TELEPHONE ENCOUNTER
SIDNEY    Caller: Kimberley - spouse    Topic/issue: Patients spouse returned call.     Please advise.     Callback Number: [phone]    Thank you,     Kassy BECERRIL

## 2024-09-23 NOTE — TELEPHONE ENCOUNTER
"To BN, please see concerns below, thank you!    Returned Kimberley's call, [phone]  and reviewed concerns.  She states at her age and disability, both her and pt does not \"feel comfortable at all\" being discharged the same day and are requesting to stay overnight after the procedure.  She states no other concerns or questions at this time and is appreciative of information given.    "

## 2024-09-25 NOTE — TELEPHONE ENCOUNTER
Yes, for him we should plan on overnight observation. He is elderly and will be undergoing complex PCI.    Irma, can you arrange that? Thanks.

## 2024-09-25 NOTE — TELEPHONE ENCOUNTER
To BN, see Irma's response below, thank you!    Returned Kimberley's call, [phone], and reviewed findings.  She states pt saw his primary cardiologist Dr. Mcclendon advised this procedure will keep him overnight.  She states no other concerns or questions at this time and is appreciative of information given.    =======================    Other (Requesting call back )   Irma Rodrigues; Kentrell Mora M.D.3 hours ago (7:58 AM)     Overnight stay for procedure. I can not book the overnight stay. You will have to just admit him after procedure.

## 2024-09-26 ENCOUNTER — PRE-ADMISSION TESTING (OUTPATIENT)
Dept: ADMISSIONS | Facility: MEDICAL CENTER | Age: 84
End: 2024-09-26
Attending: INTERNAL MEDICINE
Payer: MEDICARE

## 2024-10-01 ENCOUNTER — PRE-ADMISSION TESTING (OUTPATIENT)
Dept: ADMISSIONS | Facility: MEDICAL CENTER | Age: 84
End: 2024-10-01
Attending: INTERNAL MEDICINE
Payer: MEDICARE

## 2024-10-01 DIAGNOSIS — Z01.812 PRE-OPERATIVE LABORATORY EXAMINATION: ICD-10-CM

## 2024-10-01 DIAGNOSIS — Z01.810 PRE-OPERATIVE CARDIOVASCULAR EXAMINATION: ICD-10-CM

## 2024-10-01 LAB
ALBUMIN SERPL BCP-MCNC: 4 G/DL (ref 3.2–4.9)
ALBUMIN/GLOB SERPL: 1.3 G/DL
ALP SERPL-CCNC: 113 U/L (ref 30–99)
ALT SERPL-CCNC: 16 U/L (ref 2–50)
ANION GAP SERPL CALC-SCNC: 8 MMOL/L (ref 7–16)
APTT PPP: 31.4 SEC (ref 24.7–36)
AST SERPL-CCNC: 22 U/L (ref 12–45)
BILIRUB SERPL-MCNC: 0.5 MG/DL (ref 0.1–1.5)
BUN SERPL-MCNC: 15 MG/DL (ref 8–22)
CALCIUM ALBUM COR SERPL-MCNC: 9.3 MG/DL (ref 8.5–10.5)
CALCIUM SERPL-MCNC: 9.3 MG/DL (ref 8.5–10.5)
CHLORIDE SERPL-SCNC: 104 MMOL/L (ref 96–112)
CO2 SERPL-SCNC: 24 MMOL/L (ref 20–33)
CREAT SERPL-MCNC: 1.1 MG/DL (ref 0.5–1.4)
EKG IMPRESSION: NORMAL
ERYTHROCYTE [DISTWIDTH] IN BLOOD BY AUTOMATED COUNT: 42.9 FL (ref 35.9–50)
GFR SERPLBLD CREATININE-BSD FMLA CKD-EPI: 66 ML/MIN/1.73 M 2
GLOBULIN SER CALC-MCNC: 3 G/DL (ref 1.9–3.5)
GLUCOSE SERPL-MCNC: 105 MG/DL (ref 65–99)
HCT VFR BLD AUTO: 37.8 % (ref 42–52)
HGB BLD-MCNC: 12.6 G/DL (ref 14–18)
INR PPP: 1.04 (ref 0.87–1.13)
MCH RBC QN AUTO: 29.4 PG (ref 27–33)
MCHC RBC AUTO-ENTMCNC: 33.3 G/DL (ref 32.3–36.5)
MCV RBC AUTO: 88.1 FL (ref 81.4–97.8)
PLATELET # BLD AUTO: 210 K/UL (ref 164–446)
PMV BLD AUTO: 9.3 FL (ref 9–12.9)
POTASSIUM SERPL-SCNC: 4 MMOL/L (ref 3.6–5.5)
PROT SERPL-MCNC: 7 G/DL (ref 6–8.2)
PROTHROMBIN TIME: 13.6 SEC (ref 12–14.6)
RBC # BLD AUTO: 4.29 M/UL (ref 4.7–6.1)
SODIUM SERPL-SCNC: 136 MMOL/L (ref 135–145)
WBC # BLD AUTO: 4.1 K/UL (ref 4.8–10.8)

## 2024-10-01 PROCEDURE — 85610 PROTHROMBIN TIME: CPT

## 2024-10-01 PROCEDURE — 36415 COLL VENOUS BLD VENIPUNCTURE: CPT

## 2024-10-01 PROCEDURE — 93010 ELECTROCARDIOGRAM REPORT: CPT | Performed by: INTERNAL MEDICINE

## 2024-10-01 PROCEDURE — 93005 ELECTROCARDIOGRAM TRACING: CPT

## 2024-10-01 PROCEDURE — 85027 COMPLETE CBC AUTOMATED: CPT

## 2024-10-01 PROCEDURE — 85730 THROMBOPLASTIN TIME PARTIAL: CPT

## 2024-10-01 PROCEDURE — 80053 COMPREHEN METABOLIC PANEL: CPT

## 2024-10-03 ENCOUNTER — HOSPITAL ENCOUNTER (OUTPATIENT)
Dept: CARDIOLOGY | Facility: MEDICAL CENTER | Age: 84
End: 2024-10-03
Attending: INTERNAL MEDICINE
Payer: MEDICARE

## 2024-10-03 ENCOUNTER — HOSPITAL ENCOUNTER (OUTPATIENT)
Facility: MEDICAL CENTER | Age: 84
End: 2024-10-04
Attending: INTERNAL MEDICINE | Admitting: INTERNAL MEDICINE
Payer: MEDICARE

## 2024-10-03 DIAGNOSIS — I25.84 CORONARY ARTERY DISEASE DUE TO CALCIFIED CORONARY LESION: ICD-10-CM

## 2024-10-03 DIAGNOSIS — I25.10 CORONARY ARTERY DISEASE DUE TO CALCIFIED CORONARY LESION: ICD-10-CM

## 2024-10-03 DIAGNOSIS — I20.89 STABLE ANGINA (HCC): ICD-10-CM

## 2024-10-03 LAB
ACT BLD: 208 SEC (ref 74–137)
ACT BLD: 305 SEC (ref 74–137)
ACT BLD: 317 SEC (ref 74–137)
GLUCOSE BLD STRIP.AUTO-MCNC: 80 MG/DL (ref 65–99)
GLUCOSE BLD STRIP.AUTO-MCNC: 87 MG/DL (ref 65–99)

## 2024-10-03 PROCEDURE — 92928 PRQ TCAT PLMT NTRAC ST 1 LES: CPT | Mod: RC | Performed by: INTERNAL MEDICINE

## 2024-10-03 PROCEDURE — 700102 HCHG RX REV CODE 250 W/ 637 OVERRIDE(OP): Performed by: INTERNAL MEDICINE

## 2024-10-03 PROCEDURE — 99152 MOD SED SAME PHYS/QHP 5/>YRS: CPT | Performed by: INTERNAL MEDICINE

## 2024-10-03 PROCEDURE — 160035 HCHG PACU - 1ST 60 MINS PHASE I

## 2024-10-03 PROCEDURE — 700111 HCHG RX REV CODE 636 W/ 250 OVERRIDE (IP): Mod: JZ | Performed by: INTERNAL MEDICINE

## 2024-10-03 PROCEDURE — A9270 NON-COVERED ITEM OR SERVICE: HCPCS | Performed by: INTERNAL MEDICINE

## 2024-10-03 PROCEDURE — 700101 HCHG RX REV CODE 250

## 2024-10-03 PROCEDURE — G0378 HOSPITAL OBSERVATION PER HR: HCPCS

## 2024-10-03 PROCEDURE — 770020 HCHG ROOM/CARE - TELE (206)

## 2024-10-03 PROCEDURE — 85347 COAGULATION TIME ACTIVATED: CPT

## 2024-10-03 PROCEDURE — 82962 GLUCOSE BLOOD TEST: CPT | Mod: 91

## 2024-10-03 PROCEDURE — 92972 PERQ TRLUML CORONRY LITHOTRP: CPT | Mod: RC | Performed by: INTERNAL MEDICINE

## 2024-10-03 PROCEDURE — 99153 MOD SED SAME PHYS/QHP EA: CPT

## 2024-10-03 PROCEDURE — 92978 ENDOLUMINL IVUS OCT C 1ST: CPT | Mod: 26,RC | Performed by: INTERNAL MEDICINE

## 2024-10-03 PROCEDURE — 700117 HCHG RX CONTRAST REV CODE 255: Performed by: INTERNAL MEDICINE

## 2024-10-03 PROCEDURE — 93005 ELECTROCARDIOGRAM TRACING: CPT | Performed by: INTERNAL MEDICINE

## 2024-10-03 PROCEDURE — 700111 HCHG RX REV CODE 636 W/ 250 OVERRIDE (IP)

## 2024-10-03 PROCEDURE — 160036 HCHG PACU - EA ADDL 30 MINS PHASE I

## 2024-10-03 PROCEDURE — 160002 HCHG RECOVERY MINUTES (STAT)

## 2024-10-03 RX ORDER — HYDRALAZINE HYDROCHLORIDE 20 MG/ML
10 INJECTION INTRAMUSCULAR; INTRAVENOUS ONCE
Status: COMPLETED | OUTPATIENT
Start: 2024-10-03 | End: 2024-10-03

## 2024-10-03 RX ORDER — VERAPAMIL HYDROCHLORIDE 2.5 MG/ML
INJECTION, SOLUTION INTRAVENOUS
Status: DISPENSED
Start: 2024-10-03 | End: 2024-10-04

## 2024-10-03 RX ORDER — ROSUVASTATIN CALCIUM 20 MG/1
20 TABLET, COATED ORAL EVERY EVENING
Status: DISCONTINUED | OUTPATIENT
Start: 2024-10-04 | End: 2024-10-04 | Stop reason: HOSPADM

## 2024-10-03 RX ORDER — ASPIRIN 81 MG/1
81 TABLET ORAL EVERY MORNING
Status: DISCONTINUED | OUTPATIENT
Start: 2024-10-04 | End: 2024-10-04 | Stop reason: HOSPADM

## 2024-10-03 RX ORDER — CLOPIDOGREL BISULFATE 75 MG/1
75 TABLET ORAL EVERY MORNING
Status: DISCONTINUED | OUTPATIENT
Start: 2024-10-04 | End: 2024-10-04 | Stop reason: HOSPADM

## 2024-10-03 RX ORDER — HEPARIN SODIUM 200 [USP'U]/100ML
INJECTION, SOLUTION INTRAVENOUS
Status: COMPLETED
Start: 2024-10-03 | End: 2024-10-03

## 2024-10-03 RX ORDER — SODIUM CHLORIDE 9 MG/ML
5 INJECTION, SOLUTION INTRAVENOUS CONTINUOUS
Status: ACTIVE | OUTPATIENT
Start: 2024-10-03 | End: 2024-10-03

## 2024-10-03 RX ORDER — TAMSULOSIN HYDROCHLORIDE 0.4 MG/1
0.4 CAPSULE ORAL 2 TIMES DAILY
Status: DISCONTINUED | OUTPATIENT
Start: 2024-10-04 | End: 2024-10-04 | Stop reason: HOSPADM

## 2024-10-03 RX ORDER — MIDAZOLAM HYDROCHLORIDE 1 MG/ML
INJECTION INTRAMUSCULAR; INTRAVENOUS
Status: COMPLETED
Start: 2024-10-03 | End: 2024-10-03

## 2024-10-03 RX ORDER — HEPARIN SODIUM 200 [USP'U]/100ML
INJECTION, SOLUTION INTRAVENOUS
Status: DISPENSED
Start: 2024-10-03 | End: 2024-10-04

## 2024-10-03 RX ORDER — EZETIMIBE 10 MG/1
10 TABLET ORAL EVERY EVENING
Status: DISCONTINUED | OUTPATIENT
Start: 2024-10-04 | End: 2024-10-04 | Stop reason: HOSPADM

## 2024-10-03 RX ORDER — LIDOCAINE HYDROCHLORIDE 20 MG/ML
INJECTION, SOLUTION INFILTRATION; PERINEURAL
Status: DISPENSED
Start: 2024-10-03 | End: 2024-10-04

## 2024-10-03 RX ORDER — HEPARIN SODIUM 1000 [USP'U]/ML
INJECTION, SOLUTION INTRAVENOUS; SUBCUTANEOUS
Status: COMPLETED
Start: 2024-10-03 | End: 2024-10-03

## 2024-10-03 RX ORDER — LIDOCAINE HYDROCHLORIDE 20 MG/ML
INJECTION, SOLUTION INFILTRATION; PERINEURAL
Status: COMPLETED
Start: 2024-10-03 | End: 2024-10-03

## 2024-10-03 RX ORDER — FINASTERIDE 5 MG/1
5 TABLET, FILM COATED ORAL EVERY MORNING
Status: DISCONTINUED | OUTPATIENT
Start: 2024-10-04 | End: 2024-10-04 | Stop reason: HOSPADM

## 2024-10-03 RX ORDER — LABETALOL HYDROCHLORIDE 5 MG/ML
10 INJECTION, SOLUTION INTRAVENOUS EVERY 4 HOURS PRN
Status: DISCONTINUED | OUTPATIENT
Start: 2024-10-03 | End: 2024-10-04 | Stop reason: HOSPADM

## 2024-10-03 RX ORDER — MIDAZOLAM HYDROCHLORIDE 1 MG/ML
INJECTION INTRAMUSCULAR; INTRAVENOUS
Status: DISPENSED
Start: 2024-10-03 | End: 2024-10-04

## 2024-10-03 RX ORDER — VERAPAMIL HYDROCHLORIDE 2.5 MG/ML
INJECTION, SOLUTION INTRAVENOUS
Status: COMPLETED
Start: 2024-10-03 | End: 2024-10-03

## 2024-10-03 RX ORDER — HEPARIN SODIUM 1000 [USP'U]/ML
INJECTION, SOLUTION INTRAVENOUS; SUBCUTANEOUS
Status: DISPENSED
Start: 2024-10-03 | End: 2024-10-04

## 2024-10-03 RX ORDER — SODIUM CHLORIDE 9 MG/ML
5 INJECTION, SOLUTION INTRAVENOUS CONTINUOUS
Status: CANCELLED | OUTPATIENT
Start: 2024-10-03 | End: 2024-10-03

## 2024-10-03 RX ORDER — ACETAMINOPHEN 500 MG
500 TABLET ORAL EVERY 6 HOURS PRN
Status: DISCONTINUED | OUTPATIENT
Start: 2024-10-03 | End: 2024-10-04 | Stop reason: HOSPADM

## 2024-10-03 RX ORDER — MEXILETINE HYDROCHLORIDE 150 MG/1
150 CAPSULE ORAL EVERY 8 HOURS
Status: DISCONTINUED | OUTPATIENT
Start: 2024-10-03 | End: 2024-10-04 | Stop reason: HOSPADM

## 2024-10-03 RX ORDER — NOREPINEPHRINE BITARTRATE 1 MG/ML
INJECTION, SOLUTION INTRAVENOUS
Status: COMPLETED
Start: 2024-10-03 | End: 2024-10-03

## 2024-10-03 RX ORDER — AMOXICILLIN 250 MG
2 CAPSULE ORAL 2 TIMES DAILY
Status: DISCONTINUED | OUTPATIENT
Start: 2024-10-03 | End: 2024-10-04 | Stop reason: HOSPADM

## 2024-10-03 RX ADMIN — HEPARIN SODIUM 2000 UNITS: 200 INJECTION, SOLUTION INTRAVENOUS at 15:00

## 2024-10-03 RX ADMIN — MIDAZOLAM HYDROCHLORIDE 2 MG: 1 INJECTION, SOLUTION INTRAMUSCULAR; INTRAVENOUS at 15:46

## 2024-10-03 RX ADMIN — SENNOSIDES AND DOCUSATE SODIUM 2 TABLET: 50; 8.6 TABLET ORAL at 21:45

## 2024-10-03 RX ADMIN — LIDOCAINE HYDROCHLORIDE: 20 INJECTION, SOLUTION INFILTRATION; PERINEURAL at 15:01

## 2024-10-03 RX ADMIN — MEXILETINE HYDROCHLORIDE 150 MG: 150 CAPSULE ORAL at 22:40

## 2024-10-03 RX ADMIN — IOHEXOL 100 ML: 350 INJECTION, SOLUTION INTRAVENOUS at 16:58

## 2024-10-03 RX ADMIN — MIDAZOLAM HYDROCHLORIDE 2 MG: 1 INJECTION, SOLUTION INTRAMUSCULAR; INTRAVENOUS at 15:07

## 2024-10-03 RX ADMIN — MIDAZOLAM HYDROCHLORIDE 2 MG: 1 INJECTION, SOLUTION INTRAMUSCULAR; INTRAVENOUS at 16:03

## 2024-10-03 RX ADMIN — MIDAZOLAM HYDROCHLORIDE 2 MG: 1 INJECTION, SOLUTION INTRAMUSCULAR; INTRAVENOUS at 16:59

## 2024-10-03 RX ADMIN — VERAPAMIL HYDROCHLORIDE 5 MG: 2.5 INJECTION, SOLUTION INTRAVENOUS at 15:01

## 2024-10-03 RX ADMIN — FENTANYL CITRATE 100 MCG: 50 INJECTION, SOLUTION INTRAMUSCULAR; INTRAVENOUS at 16:24

## 2024-10-03 RX ADMIN — HEPARIN SODIUM: 1000 INJECTION, SOLUTION INTRAVENOUS; SUBCUTANEOUS at 15:00

## 2024-10-03 RX ADMIN — NOREPINEPHRINE BITARTRATE 8 MG: 1 INJECTION, SOLUTION, CONCENTRATE INTRAVENOUS at 16:03

## 2024-10-03 RX ADMIN — HYDRALAZINE HYDROCHLORIDE 10 MG: 20 INJECTION, SOLUTION INTRAMUSCULAR; INTRAVENOUS at 18:29

## 2024-10-03 RX ADMIN — FENTANYL CITRATE 100 MCG: 50 INJECTION, SOLUTION INTRAMUSCULAR; INTRAVENOUS at 15:07

## 2024-10-03 RX ADMIN — NITROGLYCERIN: 20 INJECTION INTRAVENOUS at 15:00

## 2024-10-03 ASSESSMENT — FIBROSIS 4 INDEX: FIB4 SCORE: 2.2

## 2024-10-04 VITALS
BODY MASS INDEX: 24.24 KG/M2 | OXYGEN SATURATION: 97 % | RESPIRATION RATE: 17 BRPM | TEMPERATURE: 97.3 F | WEIGHT: 141.98 LBS | DIASTOLIC BLOOD PRESSURE: 79 MMHG | HEART RATE: 80 BPM | HEIGHT: 64 IN | SYSTOLIC BLOOD PRESSURE: 153 MMHG

## 2024-10-04 LAB
ANION GAP SERPL CALC-SCNC: 13 MMOL/L (ref 7–16)
BUN SERPL-MCNC: 15 MG/DL (ref 8–22)
CALCIUM SERPL-MCNC: 9.2 MG/DL (ref 8.5–10.5)
CHLORIDE SERPL-SCNC: 103 MMOL/L (ref 96–112)
CO2 SERPL-SCNC: 22 MMOL/L (ref 20–33)
CREAT SERPL-MCNC: 0.8 MG/DL (ref 0.5–1.4)
EKG IMPRESSION: NORMAL
ERYTHROCYTE [DISTWIDTH] IN BLOOD BY AUTOMATED COUNT: 42.6 FL (ref 35.9–50)
GFR SERPLBLD CREATININE-BSD FMLA CKD-EPI: 87 ML/MIN/1.73 M 2
GLUCOSE SERPL-MCNC: 102 MG/DL (ref 65–99)
HCT VFR BLD AUTO: 36.8 % (ref 42–52)
HGB BLD-MCNC: 12.5 G/DL (ref 14–18)
MCH RBC QN AUTO: 29.8 PG (ref 27–33)
MCHC RBC AUTO-ENTMCNC: 34 G/DL (ref 32.3–36.5)
MCV RBC AUTO: 87.6 FL (ref 81.4–97.8)
PLATELET # BLD AUTO: 194 K/UL (ref 164–446)
PMV BLD AUTO: 9.4 FL (ref 9–12.9)
POTASSIUM SERPL-SCNC: 3.9 MMOL/L (ref 3.6–5.5)
RBC # BLD AUTO: 4.2 M/UL (ref 4.7–6.1)
SODIUM SERPL-SCNC: 138 MMOL/L (ref 135–145)
WBC # BLD AUTO: 4.2 K/UL (ref 4.8–10.8)

## 2024-10-04 PROCEDURE — A9270 NON-COVERED ITEM OR SERVICE: HCPCS | Performed by: INTERNAL MEDICINE

## 2024-10-04 PROCEDURE — 93010 ELECTROCARDIOGRAM REPORT: CPT | Performed by: INTERNAL MEDICINE

## 2024-10-04 PROCEDURE — 36415 COLL VENOUS BLD VENIPUNCTURE: CPT

## 2024-10-04 PROCEDURE — 700102 HCHG RX REV CODE 250 W/ 637 OVERRIDE(OP): Performed by: INTERNAL MEDICINE

## 2024-10-04 PROCEDURE — 80048 BASIC METABOLIC PNL TOTAL CA: CPT

## 2024-10-04 PROCEDURE — 85027 COMPLETE CBC AUTOMATED: CPT

## 2024-10-04 PROCEDURE — G0378 HOSPITAL OBSERVATION PER HR: HCPCS

## 2024-10-04 RX ADMIN — CLOPIDOGREL BISULFATE 75 MG: 75 TABLET ORAL at 05:19

## 2024-10-04 RX ADMIN — SENNOSIDES AND DOCUSATE SODIUM 2 TABLET: 50; 8.6 TABLET ORAL at 05:19

## 2024-10-04 RX ADMIN — MEXILETINE HYDROCHLORIDE 150 MG: 150 CAPSULE ORAL at 05:19

## 2024-10-04 RX ADMIN — ASPIRIN 81 MG: 81 TABLET, COATED ORAL at 05:19

## 2024-10-04 RX ADMIN — FINASTERIDE 5 MG: 5 TABLET, FILM COATED ORAL at 05:19

## 2024-10-04 RX ADMIN — TAMSULOSIN HYDROCHLORIDE 0.4 MG: 0.4 CAPSULE ORAL at 05:19

## 2024-10-04 ASSESSMENT — LIFESTYLE VARIABLES
EVER FELT BAD OR GUILTY ABOUT YOUR DRINKING: NO
CONSUMPTION TOTAL: NEGATIVE
HAVE PEOPLE ANNOYED YOU BY CRITICIZING YOUR DRINKING: NO
TOTAL SCORE: 0
ON A TYPICAL DAY WHEN YOU DRINK ALCOHOL HOW MANY DRINKS DO YOU HAVE: 0
HAVE YOU EVER FELT YOU SHOULD CUT DOWN ON YOUR DRINKING: NO
TOTAL SCORE: 0
DOES PATIENT WANT TO STOP DRINKING: NO
AVERAGE NUMBER OF DAYS PER WEEK YOU HAVE A DRINK CONTAINING ALCOHOL: 0
ALCOHOL_USE: NO
HOW MANY TIMES IN THE PAST YEAR HAVE YOU HAD 5 OR MORE DRINKS IN A DAY: 0
TOTAL SCORE: 0
EVER HAD A DRINK FIRST THING IN THE MORNING TO STEADY YOUR NERVES TO GET RID OF A HANGOVER: NO

## 2024-10-04 ASSESSMENT — COGNITIVE AND FUNCTIONAL STATUS - GENERAL
MOBILITY SCORE: 24
SUGGESTED CMS G CODE MODIFIER MOBILITY: CH
DAILY ACTIVITIY SCORE: 24
SUGGESTED CMS G CODE MODIFIER DAILY ACTIVITY: CH

## 2024-10-04 ASSESSMENT — SOCIAL DETERMINANTS OF HEALTH (SDOH)
WITHIN THE PAST 12 MONTHS, THE FOOD YOU BOUGHT JUST DIDN'T LAST AND YOU DIDN'T HAVE MONEY TO GET MORE: NEVER TRUE
WITHIN THE LAST YEAR, HAVE YOU BEEN HUMILIATED OR EMOTIONALLY ABUSED IN OTHER WAYS BY YOUR PARTNER OR EX-PARTNER?: NO
WITHIN THE PAST 12 MONTHS, YOU WORRIED THAT YOUR FOOD WOULD RUN OUT BEFORE YOU GOT THE MONEY TO BUY MORE: NEVER TRUE
WITHIN THE LAST YEAR, HAVE YOU BEEN KICKED, HIT, SLAPPED, OR OTHERWISE PHYSICALLY HURT BY YOUR PARTNER OR EX-PARTNER?: NO
WITHIN THE LAST YEAR, HAVE TO BEEN RAPED OR FORCED TO HAVE ANY KIND OF SEXUAL ACTIVITY BY YOUR PARTNER OR EX-PARTNER?: NO
IN THE PAST 12 MONTHS, HAS THE ELECTRIC, GAS, OIL, OR WATER COMPANY THREATENED TO SHUT OFF SERVICE IN YOUR HOME?: NO
WITHIN THE LAST YEAR, HAVE YOU BEEN AFRAID OF YOUR PARTNER OR EX-PARTNER?: NO

## 2024-10-04 ASSESSMENT — PAIN DESCRIPTION - PAIN TYPE: TYPE: ACUTE PAIN

## 2024-10-04 ASSESSMENT — FIBROSIS 4 INDEX: FIB4 SCORE: 2.38

## 2024-10-04 ASSESSMENT — PATIENT HEALTH QUESTIONNAIRE - PHQ9
2. FEELING DOWN, DEPRESSED, IRRITABLE, OR HOPELESS: NOT AT ALL
SUM OF ALL RESPONSES TO PHQ9 QUESTIONS 1 AND 2: 0
1. LITTLE INTEREST OR PLEASURE IN DOING THINGS: NOT AT ALL

## 2024-10-18 ENCOUNTER — TELEPHONE (OUTPATIENT)
Dept: CARDIOLOGY | Facility: MEDICAL CENTER | Age: 84
End: 2024-10-18

## 2024-10-18 ENCOUNTER — OFFICE VISIT (OUTPATIENT)
Dept: CARDIOLOGY | Facility: MEDICAL CENTER | Age: 84
End: 2024-10-18
Attending: PHYSICIAN ASSISTANT
Payer: MEDICARE

## 2024-10-18 VITALS
RESPIRATION RATE: 16 BRPM | OXYGEN SATURATION: 99 % | DIASTOLIC BLOOD PRESSURE: 44 MMHG | BODY MASS INDEX: 23.63 KG/M2 | WEIGHT: 147 LBS | HEART RATE: 64 BPM | SYSTOLIC BLOOD PRESSURE: 110 MMHG | HEIGHT: 66 IN

## 2024-10-18 DIAGNOSIS — I49.8 VENTRICULAR BIGEMINY: ICD-10-CM

## 2024-10-18 DIAGNOSIS — I25.10 CORONARY ARTERY DISEASE DUE TO CALCIFIED CORONARY LESION: ICD-10-CM

## 2024-10-18 DIAGNOSIS — I20.89 STABLE ANGINA (HCC): ICD-10-CM

## 2024-10-18 DIAGNOSIS — R00.2 PALPITATIONS: ICD-10-CM

## 2024-10-18 DIAGNOSIS — I25.84 CORONARY ARTERY DISEASE DUE TO CALCIFIED CORONARY LESION: ICD-10-CM

## 2024-10-18 PROCEDURE — 99214 OFFICE O/P EST MOD 30 MIN: CPT | Performed by: PHYSICIAN ASSISTANT

## 2024-10-18 PROCEDURE — 99212 OFFICE O/P EST SF 10 MIN: CPT | Performed by: PHYSICIAN ASSISTANT

## 2024-10-18 PROCEDURE — 3078F DIAST BP <80 MM HG: CPT | Performed by: PHYSICIAN ASSISTANT

## 2024-10-18 PROCEDURE — 3074F SYST BP LT 130 MM HG: CPT | Performed by: PHYSICIAN ASSISTANT

## 2024-10-18 RX ORDER — DOCUSATE SODIUM 100 MG/1
200 CAPSULE, LIQUID FILLED ORAL 2 TIMES DAILY
COMMUNITY

## 2024-10-18 ASSESSMENT — FIBROSIS 4 INDEX: FIB4 SCORE: 2.38

## 2024-10-21 ENCOUNTER — APPOINTMENT (OUTPATIENT)
Dept: ADMISSIONS | Facility: MEDICAL CENTER | Age: 84
End: 2024-10-21
Attending: INTERNAL MEDICINE
Payer: MEDICARE

## 2024-10-28 ENCOUNTER — PRE-ADMISSION TESTING (OUTPATIENT)
Dept: ADMISSIONS | Facility: MEDICAL CENTER | Age: 84
End: 2024-10-28
Attending: INTERNAL MEDICINE
Payer: MEDICARE

## 2024-10-28 NOTE — OR NURSING
Pre admit appointment completed with Pt's wife Kimberley Zhao for surgery/procedure on 11/18/24.    Medication and fasting instructions given per RN pre procedure protocol. Encouraged patient to increase oral intake the day prior to procedure including intake of electrolyte drinks such as Gatorade or electrolyte water. Patient instructed to stop taking all vitamins and herbal supplements 7 days prior to surgery. Patient instructed to stop any NSAIDS 5 days prior to surgery, unless otherwise instructed by medical provider.     Patient verbalizes understanding of all instructions given. No further questions at this time. Testing scheduled for 11/15/24.

## 2024-10-30 ENCOUNTER — PATIENT OUTREACH (OUTPATIENT)
Dept: HEALTH INFORMATION MANAGEMENT | Facility: OTHER | Age: 84
End: 2024-10-30
Payer: MEDICARE

## 2024-10-30 ENCOUNTER — PATIENT MESSAGE (OUTPATIENT)
Dept: HEALTH INFORMATION MANAGEMENT | Facility: OTHER | Age: 84
End: 2024-10-30

## 2024-10-30 DIAGNOSIS — I10 PRIMARY HYPERTENSION: ICD-10-CM

## 2024-10-30 DIAGNOSIS — I25.84 CORONARY ARTERY DISEASE DUE TO CALCIFIED CORONARY LESION: ICD-10-CM

## 2024-10-30 DIAGNOSIS — N40.1 BENIGN PROSTATIC HYPERPLASIA WITH URINARY OBSTRUCTION: ICD-10-CM

## 2024-10-30 DIAGNOSIS — E78.2 MIXED HYPERLIPIDEMIA: ICD-10-CM

## 2024-10-30 DIAGNOSIS — G56.03 BILATERAL CARPAL TUNNEL SYNDROME: ICD-10-CM

## 2024-10-30 DIAGNOSIS — I25.10 CORONARY ARTERY DISEASE DUE TO CALCIFIED CORONARY LESION: ICD-10-CM

## 2024-10-30 DIAGNOSIS — N13.8 BENIGN PROSTATIC HYPERPLASIA WITH URINARY OBSTRUCTION: ICD-10-CM

## 2024-11-12 NOTE — PROGRESS NOTES
This RN contacted pt's spouse to complete PCM enrollment. Pt's spouse rescheduled enrollment to 11/14 as they are currently being seen by orthopedics.

## 2024-11-14 ENCOUNTER — PRE-ADMISSION TESTING (OUTPATIENT)
Dept: ADMISSIONS | Facility: MEDICAL CENTER | Age: 84
End: 2024-11-14
Attending: PHYSICIAN ASSISTANT
Payer: MEDICARE

## 2024-11-14 DIAGNOSIS — Z01.810 PRE-OPERATIVE CARDIOVASCULAR EXAMINATION: ICD-10-CM

## 2024-11-14 DIAGNOSIS — Z01.812 PRE-OPERATIVE LABORATORY EXAMINATION: ICD-10-CM

## 2024-11-14 LAB
ALBUMIN SERPL BCP-MCNC: 4 G/DL (ref 3.2–4.9)
ALBUMIN/GLOB SERPL: 1.3 G/DL
ALP SERPL-CCNC: 110 U/L (ref 30–99)
ALT SERPL-CCNC: 19 U/L (ref 2–50)
ANION GAP SERPL CALC-SCNC: 10 MMOL/L (ref 7–16)
APTT PPP: 31.2 SEC (ref 24.7–36)
AST SERPL-CCNC: 23 U/L (ref 12–45)
BILIRUB SERPL-MCNC: 0.5 MG/DL (ref 0.1–1.5)
BUN SERPL-MCNC: 15 MG/DL (ref 8–22)
CALCIUM ALBUM COR SERPL-MCNC: 9.3 MG/DL (ref 8.5–10.5)
CALCIUM SERPL-MCNC: 9.3 MG/DL (ref 8.5–10.5)
CHLORIDE SERPL-SCNC: 102 MMOL/L (ref 96–112)
CO2 SERPL-SCNC: 24 MMOL/L (ref 20–33)
CREAT SERPL-MCNC: 1.12 MG/DL (ref 0.5–1.4)
EKG IMPRESSION: NORMAL
ERYTHROCYTE [DISTWIDTH] IN BLOOD BY AUTOMATED COUNT: 44.1 FL (ref 35.9–50)
GFR SERPLBLD CREATININE-BSD FMLA CKD-EPI: 65 ML/MIN/1.73 M 2
GLOBULIN SER CALC-MCNC: 3 G/DL (ref 1.9–3.5)
GLUCOSE SERPL-MCNC: 75 MG/DL (ref 65–99)
HCT VFR BLD AUTO: 35.9 % (ref 42–52)
HGB BLD-MCNC: 11.7 G/DL (ref 14–18)
INR PPP: 1.05 (ref 0.87–1.13)
MCH RBC QN AUTO: 28.8 PG (ref 27–33)
MCHC RBC AUTO-ENTMCNC: 32.6 G/DL (ref 32.3–36.5)
MCV RBC AUTO: 88.4 FL (ref 81.4–97.8)
PLATELET # BLD AUTO: 231 K/UL (ref 164–446)
PMV BLD AUTO: 9.4 FL (ref 9–12.9)
POTASSIUM SERPL-SCNC: 4.5 MMOL/L (ref 3.6–5.5)
PROT SERPL-MCNC: 7 G/DL (ref 6–8.2)
PROTHROMBIN TIME: 13.7 SEC (ref 12–14.6)
RBC # BLD AUTO: 4.06 M/UL (ref 4.7–6.1)
SODIUM SERPL-SCNC: 136 MMOL/L (ref 135–145)
WBC # BLD AUTO: 5.8 K/UL (ref 4.8–10.8)

## 2024-11-14 PROCEDURE — 85610 PROTHROMBIN TIME: CPT

## 2024-11-14 PROCEDURE — 85730 THROMBOPLASTIN TIME PARTIAL: CPT

## 2024-11-14 PROCEDURE — 93010 ELECTROCARDIOGRAM REPORT: CPT | Performed by: INTERNAL MEDICINE

## 2024-11-14 PROCEDURE — 93005 ELECTROCARDIOGRAM TRACING: CPT

## 2024-11-14 PROCEDURE — 36415 COLL VENOUS BLD VENIPUNCTURE: CPT

## 2024-11-14 PROCEDURE — 85027 COMPLETE CBC AUTOMATED: CPT

## 2024-11-14 PROCEDURE — 80053 COMPREHEN METABOLIC PANEL: CPT

## 2024-11-15 ENCOUNTER — APPOINTMENT (OUTPATIENT)
Dept: ADMISSIONS | Facility: MEDICAL CENTER | Age: 84
End: 2024-11-15
Attending: PHYSICIAN ASSISTANT
Payer: MEDICARE

## 2024-11-15 DIAGNOSIS — Z01.810 PRE-OPERATIVE CARDIOVASCULAR EXAMINATION: ICD-10-CM

## 2024-11-15 DIAGNOSIS — Z01.812 PRE-OPERATIVE LABORATORY EXAMINATION: ICD-10-CM

## 2024-11-15 SDOH — ECONOMIC STABILITY: FOOD INSECURITY: HOW HARD IS IT FOR YOU TO PAY FOR THE VERY BASICS LIKE FOOD, HOUSING, MEDICAL CARE, AND HEATING?: NOT VERY HARD

## 2024-11-15 SDOH — SOCIAL STABILITY: SOCIAL NETWORK
DO YOU BELONG TO ANY CLUBS OR ORGANIZATIONS SUCH AS CHURCH GROUPS, UNIONS, FRATERNAL OR ATHLETIC GROUPS, OR SCHOOL GROUPS?: NO

## 2024-11-15 SDOH — HEALTH STABILITY: MENTAL HEALTH: HOW OFTEN DO YOU HAVE SIX OR MORE DRINKS ON ONE OCCASION?: NEVER

## 2024-11-15 SDOH — HEALTH STABILITY: MENTAL HEALTH: HOW OFTEN DO YOU HAVE A DRINK CONTAINING ALCOHOL?: NEVER

## 2024-11-15 SDOH — SOCIAL STABILITY: SOCIAL NETWORK: HOW OFTEN DO YOU GET TOGETHER WITH FRIENDS OR RELATIVES?: MORE THAN THREE TIMES A WEEK

## 2024-11-15 SDOH — ECONOMIC STABILITY: FOOD INSECURITY: WITHIN THE PAST 12 MONTHS, THE FOOD YOU BOUGHT JUST DIDN'T LAST AND YOU DIDN'T HAVE MONEY TO GET MORE.: NEVER TRUE

## 2024-11-15 SDOH — SOCIAL STABILITY: SOCIAL NETWORK: IN A TYPICAL WEEK, HOW MANY TIMES DO YOU TALK ON THE PHONE WITH FAMILY, FRIENDS, OR NEIGHBORS?: TWICE A WEEK

## 2024-11-15 SDOH — HEALTH STABILITY: PHYSICAL HEALTH
HOW OFTEN DO YOU NEED TO HAVE SOMEONE HELP YOU WHEN YOU READ INSTRUCTIONS, PAMPHLETS, OR OTHER WRITTEN MATERIAL FROM YOUR DOCTOR OR PHARMACY?: SOMETIMES

## 2024-11-15 SDOH — ECONOMIC STABILITY: FOOD INSECURITY: WITHIN THE PAST 12 MONTHS, YOU WORRIED THAT YOUR FOOD WOULD RUN OUT BEFORE YOU GOT THE MONEY TO BUY MORE.: NEVER TRUE

## 2024-11-15 SDOH — ECONOMIC STABILITY: INCOME INSECURITY: IN THE PAST 12 MONTHS HAS THE ELECTRIC, GAS, OIL, OR WATER COMPANY THREATENED TO SHUT OFF SERVICES IN YOUR HOME?: NO

## 2024-11-15 SDOH — HEALTH STABILITY: PHYSICAL HEALTH: ON AVERAGE, HOW MANY MINUTES DO YOU ENGAGE IN EXERCISE AT THIS LEVEL?: 20 MIN

## 2024-11-15 SDOH — SOCIAL STABILITY: SOCIAL INSECURITY: WITHIN THE LAST YEAR, HAVE YOU BEEN HUMILIATED OR EMOTIONALLY ABUSED IN OTHER WAYS BY YOUR PARTNER OR EX-PARTNER?: NO

## 2024-11-15 SDOH — ECONOMIC STABILITY: HOUSING INSECURITY: IN THE LAST 12 MONTHS, WAS THERE A TIME WHEN YOU WERE NOT ABLE TO PAY THE MORTGAGE OR RENT ON TIME?: NO

## 2024-11-15 SDOH — SOCIAL STABILITY: SOCIAL INSECURITY: ARE YOU MARRIED, WIDOWED, DIVORCED, SEPARATED, NEVER MARRIED, OR LIVING WITH A PARTNER?: MARRIED

## 2024-11-15 SDOH — ECONOMIC STABILITY: TRANSPORTATION INSECURITY: IN THE PAST 12 MONTHS, HAS LACK OF TRANSPORTATION KEPT YOU FROM MEDICAL APPOINTMENTS OR FROM GETTING MEDICATIONS?: NO

## 2024-11-15 SDOH — HEALTH STABILITY: MENTAL HEALTH
DO YOU FEEL STRESS - TENSE, RESTLESS, NERVOUS, OR ANXIOUS, OR UNABLE TO SLEEP AT NIGHT BECAUSE YOUR MIND IS TROUBLED ALL THE TIME - THESE DAYS?: TO SOME EXTENT

## 2024-11-15 SDOH — SOCIAL STABILITY: SOCIAL NETWORK: HOW OFTEN DO YOU ATTEND MEETINGS OF THE CLUBS OR ORGANIZATIONS YOU BELONG TO?: NEVER

## 2024-11-15 SDOH — SOCIAL STABILITY: SOCIAL INSECURITY
WITHIN THE LAST YEAR, HAVE YOU BEEN RAPED OR FORCED TO HAVE ANY KIND OF SEXUAL ACTIVITY BY YOUR PARTNER OR EX-PARTNER?: NO

## 2024-11-15 SDOH — HEALTH STABILITY: MENTAL HEALTH: HOW MANY DRINKS CONTAINING ALCOHOL DO YOU HAVE ON A TYPICAL DAY WHEN YOU ARE DRINKING?: PATIENT DOES NOT DRINK

## 2024-11-15 SDOH — SOCIAL STABILITY: SOCIAL INSECURITY: WITHIN THE LAST YEAR, HAVE YOU BEEN AFRAID OF YOUR PARTNER OR EX-PARTNER?: NO

## 2024-11-15 SDOH — ECONOMIC STABILITY: HOUSING INSECURITY: AT ANY TIME IN THE PAST 12 MONTHS, WERE YOU HOMELESS OR LIVING IN A SHELTER (INCLUDING NOW)?: NO

## 2024-11-15 SDOH — SOCIAL STABILITY: SOCIAL INSECURITY
WITHIN THE LAST YEAR, HAVE YOU BEEN KICKED, HIT, SLAPPED, OR OTHERWISE PHYSICALLY HURT BY YOUR PARTNER OR EX-PARTNER?: NO

## 2024-11-15 SDOH — ECONOMIC STABILITY: HOUSING INSECURITY: IN THE PAST 12 MONTHS, HOW MANY TIMES HAVE YOU MOVED WHERE YOU WERE LIVING?: 1

## 2024-11-15 SDOH — SOCIAL STABILITY: SOCIAL NETWORK: HOW OFTEN DO YOU ATTEND CHURCH OR RELIGIOUS SERVICES?: MORE THAN 4 TIMES PER YEAR

## 2024-11-15 SDOH — HEALTH STABILITY: PHYSICAL HEALTH: ON AVERAGE, HOW MANY DAYS PER WEEK DO YOU ENGAGE IN MODERATE TO STRENUOUS EXERCISE (LIKE A BRISK WALK)?: 7 DAYS

## 2024-11-15 ASSESSMENT — PATIENT HEALTH QUESTIONNAIRE - PHQ9: CLINICAL INTERPRETATION OF PHQ2 SCORE: 0

## 2024-11-15 ASSESSMENT — LIFESTYLE VARIABLES
SKIP TO QUESTIONS 9-10: 1
AUDIT-C TOTAL SCORE: 0

## 2024-11-15 ASSESSMENT — ACTIVITIES OF DAILY LIVING (ADL): LACK_OF_TRANSPORTATION: NO

## 2024-11-18 ENCOUNTER — HOSPITAL ENCOUNTER (OUTPATIENT)
Facility: MEDICAL CENTER | Age: 84
End: 2024-11-19
Attending: INTERNAL MEDICINE | Admitting: INTERNAL MEDICINE
Payer: MEDICARE

## 2024-11-18 ENCOUNTER — APPOINTMENT (OUTPATIENT)
Dept: CARDIOLOGY | Facility: MEDICAL CENTER | Age: 84
End: 2024-11-18
Attending: PHYSICIAN ASSISTANT
Payer: MEDICARE

## 2024-11-18 DIAGNOSIS — I25.10 CORONARY ARTERY DISEASE DUE TO CALCIFIED CORONARY LESION: ICD-10-CM

## 2024-11-18 DIAGNOSIS — I25.84 CORONARY ARTERY DISEASE DUE TO CALCIFIED CORONARY LESION: ICD-10-CM

## 2024-11-18 DIAGNOSIS — I20.89 STABLE ANGINA (HCC): ICD-10-CM

## 2024-11-18 PROBLEM — R07.9 CHEST PAIN: Status: ACTIVE | Noted: 2024-11-18

## 2024-11-18 LAB
ACT BLD: 201 SEC (ref 74–137)
ACT BLD: 262 SEC (ref 74–137)
EKG IMPRESSION: NORMAL

## 2024-11-18 PROCEDURE — 700111 HCHG RX REV CODE 636 W/ 250 OVERRIDE (IP): Mod: JZ

## 2024-11-18 PROCEDURE — 93005 ELECTROCARDIOGRAM TRACING: CPT | Performed by: INTERNAL MEDICINE

## 2024-11-18 PROCEDURE — G0378 HOSPITAL OBSERVATION PER HR: HCPCS

## 2024-11-18 PROCEDURE — 160002 HCHG RECOVERY MINUTES (STAT)

## 2024-11-18 PROCEDURE — 700117 HCHG RX CONTRAST REV CODE 255: Performed by: INTERNAL MEDICINE

## 2024-11-18 PROCEDURE — 160035 HCHG PACU - 1ST 60 MINS PHASE I

## 2024-11-18 PROCEDURE — 92928 PRQ TCAT PLMT NTRAC ST 1 LES: CPT | Mod: LD | Performed by: INTERNAL MEDICINE

## 2024-11-18 PROCEDURE — 700101 HCHG RX REV CODE 250

## 2024-11-18 PROCEDURE — 85347 COAGULATION TIME ACTIVATED: CPT | Mod: 91

## 2024-11-18 PROCEDURE — A9270 NON-COVERED ITEM OR SERVICE: HCPCS | Performed by: NURSE PRACTITIONER

## 2024-11-18 PROCEDURE — 93010 ELECTROCARDIOGRAM REPORT: CPT | Mod: 59 | Performed by: INTERNAL MEDICINE

## 2024-11-18 PROCEDURE — 99153 MOD SED SAME PHYS/QHP EA: CPT

## 2024-11-18 PROCEDURE — 700102 HCHG RX REV CODE 250 W/ 637 OVERRIDE(OP): Performed by: NURSE PRACTITIONER

## 2024-11-18 PROCEDURE — 99152 MOD SED SAME PHYS/QHP 5/>YRS: CPT | Performed by: INTERNAL MEDICINE

## 2024-11-18 PROCEDURE — 92979 ENDOLUMINL IVUS OCT C EA: CPT | Mod: 26,LM | Performed by: INTERNAL MEDICINE

## 2024-11-18 PROCEDURE — 92978 ENDOLUMINL IVUS OCT C 1ST: CPT | Mod: 26,LD | Performed by: INTERNAL MEDICINE

## 2024-11-18 RX ORDER — HEPARIN SODIUM 1000 [USP'U]/ML
INJECTION, SOLUTION INTRAVENOUS; SUBCUTANEOUS
Status: COMPLETED
Start: 2024-11-18 | End: 2024-11-18

## 2024-11-18 RX ORDER — MIDAZOLAM HYDROCHLORIDE 1 MG/ML
INJECTION INTRAMUSCULAR; INTRAVENOUS
Status: COMPLETED
Start: 2024-11-18 | End: 2024-11-18

## 2024-11-18 RX ORDER — PHENYLEPHRINE HCL IN 0.9% NACL 1 MG/10 ML
SYRINGE (ML) INTRAVENOUS
Status: COMPLETED
Start: 2024-11-18 | End: 2024-11-18

## 2024-11-18 RX ORDER — EZETIMIBE 10 MG/1
10 TABLET ORAL EVERY EVENING
Status: DISCONTINUED | OUTPATIENT
Start: 2024-11-18 | End: 2024-11-19 | Stop reason: HOSPADM

## 2024-11-18 RX ORDER — ASPIRIN 81 MG/1
81 TABLET ORAL EVERY MORNING
Status: DISCONTINUED | OUTPATIENT
Start: 2024-11-19 | End: 2024-11-19 | Stop reason: HOSPADM

## 2024-11-18 RX ORDER — DOCUSATE SODIUM 100 MG/1
200 CAPSULE, LIQUID FILLED ORAL
Status: DISCONTINUED | OUTPATIENT
Start: 2024-11-18 | End: 2024-11-19 | Stop reason: HOSPADM

## 2024-11-18 RX ORDER — VERAPAMIL HYDROCHLORIDE 2.5 MG/ML
INJECTION, SOLUTION INTRAVENOUS
Status: COMPLETED
Start: 2024-11-18 | End: 2024-11-18

## 2024-11-18 RX ORDER — TAMSULOSIN HYDROCHLORIDE 0.4 MG/1
0.4 CAPSULE ORAL 2 TIMES DAILY
Status: DISCONTINUED | OUTPATIENT
Start: 2024-11-18 | End: 2024-11-19 | Stop reason: HOSPADM

## 2024-11-18 RX ORDER — ACETAMINOPHEN 325 MG/1
650 TABLET ORAL EVERY 6 HOURS PRN
Status: DISCONTINUED | OUTPATIENT
Start: 2024-11-18 | End: 2024-11-19 | Stop reason: HOSPADM

## 2024-11-18 RX ORDER — CLOPIDOGREL BISULFATE 75 MG/1
75 TABLET ORAL DAILY
Status: DISCONTINUED | OUTPATIENT
Start: 2024-11-19 | End: 2024-11-19 | Stop reason: HOSPADM

## 2024-11-18 RX ORDER — MEXILETINE HYDROCHLORIDE 150 MG/1
150 CAPSULE ORAL EVERY 8 HOURS
Status: DISCONTINUED | OUTPATIENT
Start: 2024-11-18 | End: 2024-11-19 | Stop reason: HOSPADM

## 2024-11-18 RX ORDER — LIDOCAINE HYDROCHLORIDE 20 MG/ML
INJECTION, SOLUTION INFILTRATION; PERINEURAL
Status: COMPLETED
Start: 2024-11-18 | End: 2024-11-18

## 2024-11-18 RX ORDER — ROSUVASTATIN CALCIUM 20 MG/1
20 TABLET, COATED ORAL EVERY EVENING
Status: DISCONTINUED | OUTPATIENT
Start: 2024-11-18 | End: 2024-11-19 | Stop reason: HOSPADM

## 2024-11-18 RX ORDER — HEPARIN SODIUM 200 [USP'U]/100ML
INJECTION, SOLUTION INTRAVENOUS
Status: COMPLETED
Start: 2024-11-18 | End: 2024-11-18

## 2024-11-18 RX ORDER — FINASTERIDE 5 MG/1
5 TABLET, FILM COATED ORAL DAILY
Status: DISCONTINUED | OUTPATIENT
Start: 2024-11-19 | End: 2024-11-19 | Stop reason: HOSPADM

## 2024-11-18 RX ORDER — HYDRALAZINE HYDROCHLORIDE 20 MG/ML
10 INJECTION INTRAMUSCULAR; INTRAVENOUS EVERY 4 HOURS PRN
Status: DISCONTINUED | OUTPATIENT
Start: 2024-11-18 | End: 2024-11-19 | Stop reason: HOSPADM

## 2024-11-18 RX ADMIN — NITROGLYCERIN 10 ML: 20 INJECTION INTRAVENOUS at 10:13

## 2024-11-18 RX ADMIN — HEPARIN SODIUM 2000 UNITS: 200 INJECTION, SOLUTION INTRAVENOUS at 10:47

## 2024-11-18 RX ADMIN — TAMSULOSIN HYDROCHLORIDE 0.4 MG: 0.4 CAPSULE ORAL at 18:20

## 2024-11-18 RX ADMIN — MIDAZOLAM HYDROCHLORIDE 2 MG: 1 INJECTION, SOLUTION INTRAMUSCULAR; INTRAVENOUS at 10:55

## 2024-11-18 RX ADMIN — FENTANYL CITRATE 25 MCG: 50 INJECTION, SOLUTION INTRAMUSCULAR; INTRAVENOUS at 11:56

## 2024-11-18 RX ADMIN — ROSUVASTATIN CALCIUM 20 MG: 20 TABLET, FILM COATED ORAL at 18:20

## 2024-11-18 RX ADMIN — MIDAZOLAM HYDROCHLORIDE 2 MG: 1 INJECTION, SOLUTION INTRAMUSCULAR; INTRAVENOUS at 11:37

## 2024-11-18 RX ADMIN — EZETIMIBE 10 MG: 10 TABLET ORAL at 18:29

## 2024-11-18 RX ADMIN — LIDOCAINE HYDROCHLORIDE: 20 INJECTION, SOLUTION INFILTRATION; PERINEURAL at 10:13

## 2024-11-18 RX ADMIN — FENTANYL CITRATE 100 MCG: 50 INJECTION, SOLUTION INTRAMUSCULAR; INTRAVENOUS at 10:55

## 2024-11-18 RX ADMIN — IOHEXOL 65 ML: 350 INJECTION, SOLUTION INTRAVENOUS at 11:56

## 2024-11-18 RX ADMIN — MEXILETINE HYDROCHLORIDE 150 MG: 150 CAPSULE ORAL at 17:08

## 2024-11-18 RX ADMIN — HEPARIN SODIUM: 1000 INJECTION, SOLUTION INTRAVENOUS; SUBCUTANEOUS at 10:13

## 2024-11-18 RX ADMIN — DOCUSATE SODIUM 200 MG: 100 CAPSULE, LIQUID FILLED ORAL at 21:27

## 2024-11-18 ASSESSMENT — FIBROSIS 4 INDEX: FIB4 SCORE: 1.92

## 2024-11-18 ASSESSMENT — LIFESTYLE VARIABLES
TOTAL SCORE: 0
HAVE PEOPLE ANNOYED YOU BY CRITICIZING YOUR DRINKING: NO
EVER HAD A DRINK FIRST THING IN THE MORNING TO STEADY YOUR NERVES TO GET RID OF A HANGOVER: NO
HOW MANY TIMES IN THE PAST YEAR HAVE YOU HAD 5 OR MORE DRINKS IN A DAY: 0
TOTAL SCORE: 0
AVERAGE NUMBER OF DAYS PER WEEK YOU HAVE A DRINK CONTAINING ALCOHOL: 0
EVER FELT BAD OR GUILTY ABOUT YOUR DRINKING: NO
ALCOHOL_USE: NO
TOTAL SCORE: 0
ON A TYPICAL DAY WHEN YOU DRINK ALCOHOL HOW MANY DRINKS DO YOU HAVE: 0
HAVE YOU EVER FELT YOU SHOULD CUT DOWN ON YOUR DRINKING: NO
CONSUMPTION TOTAL: NEGATIVE
DOES PATIENT WANT TO STOP DRINKING: NO

## 2024-11-18 ASSESSMENT — SOCIAL DETERMINANTS OF HEALTH (SDOH)
WITHIN THE LAST YEAR, HAVE YOU BEEN HUMILIATED OR EMOTIONALLY ABUSED IN OTHER WAYS BY YOUR PARTNER OR EX-PARTNER?: NO
WITHIN THE LAST YEAR, HAVE TO BEEN RAPED OR FORCED TO HAVE ANY KIND OF SEXUAL ACTIVITY BY YOUR PARTNER OR EX-PARTNER?: NO
WITHIN THE PAST 12 MONTHS, THE FOOD YOU BOUGHT JUST DIDN'T LAST AND YOU DIDN'T HAVE MONEY TO GET MORE: NEVER TRUE
IN THE PAST 12 MONTHS, HAS THE ELECTRIC, GAS, OIL, OR WATER COMPANY THREATENED TO SHUT OFF SERVICE IN YOUR HOME?: NO
WITHIN THE LAST YEAR, HAVE YOU BEEN KICKED, HIT, SLAPPED, OR OTHERWISE PHYSICALLY HURT BY YOUR PARTNER OR EX-PARTNER?: NO
WITHIN THE PAST 12 MONTHS, YOU WORRIED THAT YOUR FOOD WOULD RUN OUT BEFORE YOU GOT THE MONEY TO BUY MORE: NEVER TRUE
WITHIN THE LAST YEAR, HAVE YOU BEEN AFRAID OF YOUR PARTNER OR EX-PARTNER?: NO

## 2024-11-18 NOTE — PROGRESS NOTES
1545 - assumed care of patient, report received from Herb DUNNE.  Patient resting in bed, bed rest ends at 1605.  Declines pain and nausea, no other needs at this time.  Right femoral site is clean, dry and soft.  Gauze and tegaderm dressing is clean, dry, and intact.     1605 - sat patient up in bed, bed rest period over.  Sand bag removed.  Site remains unchanged from previous assessment.      1700 - assisted patient to restroom to void.  Return to [facility], no change to right femoral site.      1710 - phoned report to April UZAIR.  All questions answered.  Patient transferred via [facility] with RN and Allan.  All belongings accounted for.  Phoned patient's wife to update of patient's transfer to 13.

## 2024-11-18 NOTE — PROGRESS NOTES
Brief Cardiology Update     Patient admitted to overnight observational status per-routine following elective PCI to distal LM. Patient recovering well; education provided. Right groin site CDI, dressed, no erythema or hematoma, very mild swelling after additional time with manual pressure. Continue 4 hour bedrest as ordered.     Questions answered; nursing and patient declines further needs/concerns at this time. For any questions or issues after 5PM please contact the listed ON CALL cardiologist not the admitting provider or interventional backup doctor.

## 2024-11-18 NOTE — PROGRESS NOTES
"INITIAL CARE MANAGEMENT CARE PLAN/ASSESSMENT     Program Criteria    Sandip requires assistance for optimal management of multiple chronic health conditions, including hypertension, BPH, and prediabetes. He meets all necessary criteria to qualify for PCM program enrollment.    Consent    Sandip has verbalized his full name and date of birth, and has verbally consented to enrollment in Personal Care Management services.    Caregiver Consent (if applicable)    Sandip has given verbal consent for his caregiver Kimberley Zhao (spouse) to give and receive health information.    Clinical History    HTN: He does not currently require scheduled antihypertensive medications. Spouse denies any recent concerns about uncontrolled blood pressure.    BPH: Pt prescribed tamsulosin and finasteride. He occasionally has difficulty urinating due to urinary retention.    Prediabetes: Per pt's spouse, they try to eat a generally healthy diet. He takes Crestor and Zetia to help manage cholesterol. His physical activity tolerance is limited at this time due to severe arthritis in his knees.    CAD: Managed with Plavix and aspirin. He has recently undergone a series of angiograms per spouse in preparation for cardiac catheterization. He is followed closely by a cardiologist.    Arthritis: Located in his knees, makes it difficult for him to ambulate safely. Ordinarily, his arthritic pain is controlled with steroid injections per his spouse, but they have had to pause these while he is receiving angiograms. He also occasionally uses Tylenol as needed.     Health Status    At this time, Sandip has had a few recent health concerns including at least one fall, and multiple angiograms in preparation for a heart catheterization. As a result he has had to pause steroid injections in his knees, and per his spouse this has limited his activity tolerance and ability to ambulate safely. She does report that he is \"very careful\" with ambulation " "and is aware of appropriate fall precautions. He uses a walker for stability. Right now, his biggest concern is preparing for cardiac catheterization next week.    Behavioral Health Status    Sanidp and his spouse decline any mental/behavioral health concerns at this time.    Preferred Learning Method(s)    He prefers written material and demonstration when attempting to learn. Sandip is hard of hearing, which limits the effectiveness of methods like discussion and listening.    Living Situation    Sandip and his spouse Kimberley live together in a private residence. Their home is located on a shared lot with other members of their family including their adult children, who provide a strong support network for them.    Barriers Identified    Activity intolerance r/t arthritic pain; at risk for falls; hearing impairment    Patient Goals    Per spouse: \"to get better\"     Medication Self-Management Goals:     Reviewed medications listed below with patient.    No current facility-administered medications for this visit.  No current outpatient medications on file.    Facility-Administered Medications Ordered in Other Visits:     [START ON 11/19/2024] aspirin EC tablet 81 mg, 81 mg, Oral, QAM, Meaagn Ware, A.P.R.N.    [START ON 11/19/2024] clopidogrel (Plavix) tablet 75 mg, 75 mg, Oral, DAILY, Meagan Ware A.P.R.N.    docusate sodium (Colace) capsule 200 mg, 200 mg, Oral, QHS, Meagan Ware, A.P.R.N.    ezetimibe (Zetia) tablet 10 mg, 10 mg, Oral, Q EVENING, Meagan Ware A.P.R.N.    [START ON 11/19/2024] finasteride (Proscar) tablet 5 mg, 5 mg, Oral, DAILY, Meagan Ware, A.P.R.N.    rosuvastatin (Crestor) tablet 20 mg, 20 mg, Oral, Q EVENING, Meagan Ware A.P.R.N.    mexiletine (Mexitil) capsule 150 mg, 150 mg, Oral, Q8HRS, Meagan Ware A.P.R.N.    tamsulosin (Flomax) capsule 0.4 mg, 0.4 mg, Oral, BID, Meagan Ware A.P.R.N.    acetaminophen (Tylenol) tablet 650 mg, 650 " mg, Oral, Q6HRS PRN, Meagan Ware, A.P.R.N.    hydrALAZINE (Apresoline) injection 10 mg, 10 mg, Intravenous, Q4HRS PRN, Meagan Ware, A.P.R.N.         Goal:  Maintain adherence to prescribed medication regimen with assistance of family.       Physical/Functional/Environmental Status:     Activities of Daily Living:  Bathing: independent   Dressing: independent  Grooming: independent  Mouth Care: independent  Toileting: independent  Climbing a Flight of Stairs: independent    Independent Activities of Daily Living:  Shopping: needs assistance  Cooking: needs assistance  Managing Medications: independent  Using the phone and looking up numbers: independent  Driving or using public transportation: needs assistance  Managing Finances: independent        11/15/2024     3:05 PM   STEADI Fall Risk   STEADI Risk for Falling Score 6   One or more falls in the last year Yes   Advised to use a cane or walker to get around safely Yes   Feels unsteady when walking No   Steadies self on furniture while walking at home No   Worried about falling Yes   Needs to push with hands when rising from a chair No   Has trouble stepping up onto a curb / using stairs Yes   Often has to rush to the toilet No   Has lost some feeling in feet No   Takes medicine that makes him/her feel lightheaded or more tired than usual No   Takes medicine to sleep or improve mood No   Often feels sad or depressed No         Goal:  Remain as independent as possible with ADLs/IADLs. Remain free from falls or injury at home. Continue to use caution when ambulating. Collaborate with family to identify and implement fall prevention strategies at home.    Social Determinants of Health       Financial Status:      Financial Resource Strain: Low Risk  (11/15/2024)    Overall Financial Resource Strain (CARDIA)     Difficulty of Paying Living Expenses: Not very hard         Referred to CHW/SW:  NA       Transportation Status:      Transportation Needs: No  Transportation Needs (11/15/2024)    PRAPARE - Transportation     Lack of Transportation (Medical): No     Lack of Transportation (Non-Medical): No        Referred to CHW/SW:  NA      Food Insecurity:      Food Insecurity: No Food Insecurity (11/15/2024)    Hunger Vital Sign     Worried About Running Out of Food in the Last Year: Never true     Ran Out of Food in the Last Year: Never true        Referred to CHW/SW:  NA       Housing Status:     Housing Stability: Low Risk  (11/15/2024)    Housing Stability Vital Sign     Unable to Pay for Housing in the Last Year: No     Number of Times Moved in the Last Year: 1     Homeless in the Last Year: No        Referred to CHW/SW:  NA      Social Connections:     Social Connections: Moderately Integrated (11/15/2024)    Social Connection and Isolation Panel [NHANES]     Frequency of Communication with Friends and Family: Twice a week     Frequency of Social Gatherings with Friends and Family: More than three times a week     Attends [employer] Services: More than 4 times per year     Active Member of Clubs or Organizations: No     Attends Club or Organization Meetings: Never     Marital Status:         Referred to CHW/SW:  NA      Mental/Behavioral/Psychosocial Status:        7/29/2024     1:18 AM 10/4/2024     5:22 AM 10/30/2024     9:43 AM   Depression Screen (PHQ-2/PHQ-9)   PHQ-2 Total Score 0 0    PHQ-2 Total Score   0       Interpretation of PHQ-9 Total Score   Score Severity   1-4 No Depression   5-9 Mild Depression   10-14 Moderate Depression   15-19 Moderately Severe Depression   20-27 Severe Depression       Goal:  Consider pursuing social connections outside family to avoid social isolation. Maintain healthy familial relationships. Continue to rely on family for strong support.    Review of Benefits    This service is an included benefit with your insurance plan, for all other benefits a copy of the website and phone number have been provided for any  questions.    Phone Number and Website provided for questions:    [facility]:  [phone]   [email or web address]    Advance Planning    Do you have an Advance Directive?  Yes  Is there one on file? Yes      (If no, a copy can be provided for patient.)    Would you like an Advance Directive Packet sent to you? No      Chronic Care Management Care Plan         Care Plans       General Care Management    Met: 0 of 9 Met: 0 of 9      No Outcome (9)       (Long Term) Reduce the Risk of Falls and Fall Related Injuries (Risk of Falls)  Disciplines:  Nurse, Interdisciplinary  Expected end:  -     (Short Term) Demonstrate ability to verbalize fall safety concerns (Knowledge deficit surrounding fall safety)  Disciplines:  Nurse, Interdisciplinary  Expected end:  -     (Short Term) Demonstrate Knowledge of Hypertension (Knowledge deficit of hypertension)  Disciplines:  Interdisciplinary  Expected end:  -     (Short Term) Demonstrate factors that can contribute to high blood pressure (Knowledge deficit of factors contributing to hypertension)  Disciplines:  Interdisciplinary  Expected end:  -     (Long Term) Identify which modifiable health habits can be modifed (Recognize current health habits that may contribute to hypertension)  Disciplines:  Interdisciplinary  Expected end:  -     (Short Term) Identify barriers to managing blood pressure (Patient barriers to managing high blood pressure)  Disciplines:  Interdisciplinary  Expected end:  -     (Short Term) Demonstrate the elements of self-monitoring blood pressure (Knowledge deficit of self-monitoring blood pressure)  Disciplines:  Interdisciplinary  Expected end:  -     (Long Term) Improve medication adherence (Adherence to prescribed medications)  Disciplines:  Interdisciplinary  Expected end:  -     (Long Term) Learn to Manage Calories (Diet Management)  Disciplines:  Nurse, Interdisciplinary  Expected end:  -                                             Discussion of Self Management of Care:   Per spouse, pt's biggest goal right now is to prepare for cardiac catheterization. He also wants to stay safe at home and remain free from falls. His wife and family are involved in his care and provide good support.     Barriers to Goals: Activity intolerance r/t arthritic pain; at risk for falls; hearing impairment    Resources Provided:  Information re: local resources available through [facility]; assurance that this RN will be available for care coordination, especially with cardiology; list of DME providers offered     Next Scheduled patient outreach: December 2024.

## 2024-11-18 NOTE — DISCHARGE INSTRUCTIONS
RECOMMENDATIONS:  Continue dual antiplatelet therapy with aspirin and clopidogrel for at least 6 months if tolerated and then recommend clopidogrel monotherapy indefinitely thereafter.  Optimal cardiovascular risk factor management.  Referral to cardiac rehabilitation.    POST ANGIOGRAM  General Care Instructions  Maintain a bandage over the incision site for 24 hours.  It's normal to find a small bruise or dime-sized lump at the insertion site. This should disappear within a few weeks.  Do not apply lotions or powders to the site.  Do not immerse the catheter insertion site in water (bathtub/swimming) for five days. It is ok to shower 24 hours after the procedure.  You may resume your normal diet immediately; on the day of your procedure, drink 6-10 glasses of water to help flush the contrast liquid out of your system.  If the doctor inserted the catheter in your arm:  For 3 days, DO NOT lift anything heavier than 10 pounds (approximately a gallon of milk). DO NOT do any heavy pushing, pulling, or twisting.    Medications  If your current medications need to be changed, you will be provided with an updated list of your medications prior to discharge.  If you take warfarin (Coumadin), resume taking your usual dose the evening after the procedure.  DO NOT STOP taking prescribed blood thinning (anti-platelet) medications unless instructed by your cardiologist.  These medications include:  Aspirin, Clopidogrel (Plavix), Ticagrelor (Brilinta), or Prasugrel (Effient)   If you take one of the following anticoagulants, RESUME 24 HOURS after your procedure:  Apixiban (Eliquis), Rivaroxaban (Xarelto), Dabigatran (Pradaxa), Edoxaban (Savaysa)  If you take metformin (Glucophage), RESUME 48 HOURS after your procedure.    When to call your healthcare provider  Call your cardiologist right away at [phone] if you have any of the following:   Problems/Concerns taking any of your prescribed heart medicines.   The insertion site  has increasing pain, swelling, redness, bleeding, or drainage.   Your arm or leg below where the insertion site changes color, is cool, or is numb.   You have chest pain or shortness of breath that does not go away with rest.   Your pulse feels irregular -- very slow (less than 60 beats/minute) or very fast (over 100 beats/minute).   You have dizziness, fainting, or you are very tired.   You are coughing up blood or yellow or green mucus.   You have chills or a fever over 101°F (38.3°C).    If there is bleeding at the catheter insertion site, apply pressure for 10 minutes.  If bleeding persists, call 911, and continue to hold pressure until advanced medical support arrives.        Exercising Safely After Percutaneous Coronary Intervention (PCI)  After percutaneous coronary intervention (PCI), which involves angioplasty and often stenting, it's important to focus on your heart health. Exercise can help strengthen your heart. It can also help you feel good and improve your overall health. Talk with your health care provider or cardiac rehab team member about good options for you.  Start slowly. Work up to more vigorous exercise as you get stronger. Aim for at least 150 minutes of exercise each week.  Include aerobic activities. These make the heart beat faster. They work the heart and lungs, and improve the body's ability to use oxygen. Good choices include walking, swimming, and biking .  Always follow your doctor's recommendation for exercise.   You have been referred to cardiac rehabilitation, which is important for your recovery.  You may contact [facility] at [phone] to learn more and schedule a visit.        Lifestyle Management After Percutaneous Coronary Intervention (PCI)  Percutaneous coronary intervention (PCI)  involves angioplasty and often stenting. This procedure can open arteries and relieve symptoms. But, it doesn't cure coronary artery disease. New blockages can still  form. You need to take steps to prevent this by managing risk factors. Doing so will help make your heart and arteries healthier. Your doctor may prescribe cardiac rehabilitation to help with this lifelong process.  Understanding risk factors  Some risk factors for coronary artery disease can be controlled. These include smoking, high blood pressure, cholesterol, diabetes, and obesity. They can be managed with medication, diet, and exercise. Support and counseling can also play a role. The effort will pay off! Managing risk factors can help you be more active, feel better, and reduce the risk of heart attack.    If you smoke, quit!  If your doctor has been urging you to quit smoking, it's for good reasons. Smoking damages your heart, blood vessels, and lungs. The good news is that quitting can halt or even reverse the damage of smoking. To quit now:  Get medical help. Ask your doctor for advice on stop-smoking programs. Also ask about medications or nicotine replacement therapy products that may help you quit smoking.  Get support. Join a support group. Ask for help from your family and friends.  Don't give up. It often takes several tries to succeed in quitting smoking.  Avoid secondhand smoke. Ask family and friends not to smoke around you.

## 2024-11-18 NOTE — OR NURSING
1400 - assumption of care, report from Grecia DUNNE  1345 - hand off to Arnoldo DUNNE, right groin site C/D/soft.

## 2024-11-18 NOTE — OR NURSING
1214 - Patient arrival to [facility] after [facility] with planned PCI. Pt wakes to verbal. Assessed patient with Bev cath lab RN. Dressing to right groin is C/D/soft. VSS. Denies pain at this time. Educated patient on bedrest instructions and plan of care.   1215 - belongings returned to patient at bedside  1218 - updated patient's wife Kimberley

## 2024-11-18 NOTE — H&P
History:  Primary Diagnosis: [facility] with staged PCI     HPI:  Sandip Zhao patient of Dr. Mcclendon and Dr. Mora with significant history of coronary artery disease with prior PCI to the D1, OM1, and RCA in 2015. Recent cardiac imaging showed ...    CORONARY ANGIOGRAPHY:  Limited right coronary system angiography redemonstrated a severely stenotic and calcified lesion in the proximal right coronary extending into the mid vessel.     INTRAVASCULAR ULTRASOUND:  See IVUS findings in procedure details above.     PERCUTANEOUS CORONARY INTERVENTION:  Lesion location: Ostial to mid right coronary artery  Lesion type: C  Lesion length: 30 mm  Pre-intervention NIKKO flow: 3  Post-intervention NIKKO flow: 3  Pre-intervention stenosis: 95%  Post-intervention stenosis: 10%  Stent #1: 3.5 x 20 mm Synergy Megatron drug-eluting stent  Stent #2: 3.0 x 32 mm Synergy drug-eluting stent     IMPRESSION:  Successful intracoronary lithotripsy and percutaneous coronary intervention to the ostial to mid right coronary artery with overlapping 3.5 x 20 mm Synergy Megatron and 3.0 x 32 mm Synergy drug-eluting stents postdilated with a 4.5 x 12 mm noncompliant balloon at high pressure.  Known residual severe disease in the proximal left anterior descending coronary artery.     RECOMMENDATIONS:  Admit for overnight observation.  Bedrest for 4 hours.  Post procedure fluids for renal protection.  Continue dual antiplatelet therapy with aspirin and clopidogrel.  Plan for staged pertains coronary invention to the proximal left anterior descending coronary artery as an outpatient in 1-2 months.    Currently patient denies chest pain, shortness of breath or swelling. Patient does complain of symptoms of occasional palpitations and joint pain    Medical history:   Past Medical History:   Diagnosis Date    Anemia     Arrhythmia     Bigeminy PVC's    Arthritis     Asthma     H/O    BPH (benign prostatic hyperplasia)     Broken rib 09/09/2024    Fell  8/16/2024 Left rib #8. Recent fall broke left rib #8 and stitches on left side of head, stitches have been removed.    CAD (coronary artery disease)     stents x3    Dental disorder 09/09/2024    Full Set Dentures    Environmental allergies     Fall 08/2024    Missed a step and fell, broke 8th Rib, left side. Cardiologist's Dr. Mora and Dr. Giordano are aware of this, per patient's wife.    Gall stones 09/09/2024    Currently has gall stones.    Hemorrhagic disorder ([facility]) 09/26/2024    Taking Plavix    Hemorrhoids     High cholesterol     Hypertension     Inguinal hernia 09/26/2024    MI (myocardial infarction) ([facility]) 07/04/2015    Pain     Chronic Arthritic Pain    Pneumonia 09/26/2024    H/O Once    Status post placement of implantable loop recorder        Surgical history:   Past Surgical History:   Procedure Laterality Date    OTHER CARDIAC SURGERY  09/17/2024    Angiogram    OTHER CARDIAC SURGERY  09/09/2024    Loop recorder placed about a year ago.    ABDOMINAL EXPLORATION      APPENDECTOMY      When a child    KNEE ARTHROSCOPY         Social history:   Social History     Tobacco Use   Smoking Status Former    Types: Cigarettes   Smokeless Tobacco Never   Tobacco Comments    Quit smoking cigarettes around 1990.      Social History     Substance and Sexual Activity   Alcohol Use Not Currently    Comment: No alcohol since 2/2018.      Social History     Substance and Sexual Activity   Drug Use Never        Family history:   Family History   Problem Relation Age of Onset    Hypertension Father     Heart Disease Father         mitral valve replacement       Allergies: No Known Allergies    Home medications: No current facility-administered medications for this encounter.    Review of Systems:  ROS Complete review of systems negative except as noted in HPI/subjective    Physical Examination:  Physical Exam  Vitals reviewed.   Constitutional:       Appearance: Normal appearance.   Cardiovascular:      Rate and  Rhythm: Normal rate and regular rhythm.      Pulses: Normal pulses.      Heart sounds: Normal heart sounds.   Pulmonary:      Effort: Pulmonary effort is normal.      Breath sounds: Normal breath sounds.   Musculoskeletal:      Right lower leg: No edema.      Left lower leg: No edema.   Skin:     General: Skin is warm and dry.   Neurological:      General: No focal deficit present.      Mental Status: He is alert and oriented to person, place, and time.         Impression:  Stage LHC and PCI for known residual severe disease in the proximal left anterior descending coronary artery    Plan:  Left cardiac cath with possible intervention.     The risks, benefits, and alternatives to coronary angiography with IV sedation were discussed in great detail. Specific risks mentioned include bleeding, infection, kidney damage, allergic reaction, cardiac perforation with possible tamponade requiring pericardiocentesis or possibly open heart surgery. In addition, we discussed that 10% of patients will experience small to moderate bruising at the site of the arterial puncture. Lastly, the risks of heart attack, stroke and death were discussed; the risk of major complications such as heart attack or stroke caused by the angiogram is approximately 1%; the risk of death is approximately 1 in 1000. The patient verbalized understanding of the potential complications and wishes to proceed with this procedure.    The risks/benefits of the procedure will be further discussed with the consenting physician performing the procedure.

## 2024-11-18 NOTE — PROCEDURES
Cardiac Catheterization Procedure Note    DATE: 11/18/2024    : Kentrell Mora MD    PROCEDURES PERFORMED:  Limited coronary angiography  Intravascular ultrasound of the left main coronary artery  Intravascular ultrasound of the left anterior descending coronary  Percutaneous coronary intervention to the left main coronary artery  Percutaneous coronary intervention to the left anterior descending coronary  Moderate conscious sedation    INDICATIONS:  The patient is an 84-year-old gentleman who presents for staged percutaneous coronary invention to the left anterior descending coronary artery, which was previously assessed to be hemodynamically significant by resting full-cycle ratio assessment.    CONSENT:  The complete alternatives, risks, and benefits of the procedure were explained to the patient. Signed informed consent was obtained and placed in the chart prior to the procedure.  A timeout was performed prior to beginning the procedure.    MEDICATIONS:  Lidocaine  Fentanyl  Midazolam  Nitroglycerin  Verapamil  Heparin    MODERATE CONSCIOUS SEDATION:  I personally supervised the administration of moderate conscious sedation by the nursing staff for 86 minutes.  Sedation start time: 10:32 AM  Sedation end time: 11:58 AM    CONTRAST: Omnipaque 65 cc    ACCESS: 6-Kazakh Glidesheath in the right radial artery.    ESTIMATED BLOOD LOSS: 20 cc    COMPLICATIONS: None    PROCEDURE IN DETAIL:  The patient was brought to the cardiac catheterization laboratory in the fasting state.  The skin over the right groin was prepped and draped in the usual sterile fashion. Lidocaine infiltration was used to anesthetize the tissue over the right femoral artery.  Using ultrasound guidance and the micropuncture technique, a 7-Kazakh long [place] sheath was inserted in the right femoral artery.  A 7-Kazakh EBU-3.5 guide catheter was then used to engage the ostium of the left main coronary artery and planning cineangiograms  were obtained.    A Run-through wire was then advanced through a [place] Red microcatheter, however, the microcatheter could not be advanced across the obstructing first diagonal branch stent struts.  The microcatheter tip was advanced into the stent struts and then the Run-through wire was exchanged for a Ironman wire.  With additional support, the microcatheter was able to be advanced around the stent struts in the Ironman wire was advanced into the distal aspect of the vessel.  The microcatheter was then removed using a trapping technique.  The D1 stent struts were then sequentially crushed using 1.2 x 12 mm 1.5 x 12 mm, and 2.0 x 12 mm,  compliant balloons.  After successful removing the stent struts out of the lumen of the LAD, an [employer] IVUS catheter was then advanced over the guidewire and IVUS was performed.  This demonstrated a healthy distal vessel with a true vessel diameter of approximate 3.2 mm.  The LAD lesion consisted of primarily eccentric calcification with only a short segment of near concentric calcification that did not appear to require plaque modification.  The short distal aspect of the left main coronary between the ostium of the circumflex and the ramus branch met IVUS criteria for severe stenosis with a luminal area of approximately 5.8 mm².  Following IVUS, we proceeded with intervention.    The ramus branch was protected with the Run-through wire and the circumflex was protected with a P[place]ater wire.  Distal left main and LAD was then further postdilated using a 2.75 x 15 mm noncompliant balloon.  A 3.0 x 32 mm Synergy drug-eluting stent was then advanced over the guidewire and was deployed across the lesion at a maximum pressure of 12 lyubov.  After deployment, the stent was initially postdilated with a 3.25 x 15 noncompliant balloon and then IVUS was repeated.  The stent appeared underexpanded in the proximal to mid aspects and it was further postdilated with a 3.5 x 15 mm noncompliant  balloon.  The Run-through wire was removed from the ramus branch and was used to rewire through the circumflex struts, which were then opened with a 2.0 x 8 mm compliant balloon.  Further predilation was then performed using a 4.0 x 8 mm noncompliant balloon with a proximal optimization technique in the left main.  The IVUS catheter was then reinserted, which demonstrated eccentric, but good expansion throughout the LAD and good expansion in the left main with a minimal luminal area of 11.5 mm².  Angiographically, however, the ostial LAD did not appear optimally expanded and further post dilation was performed using a 4.0 x 12 mm noncompliant balloon.  Final cineangiograms were obtained in orthogonal views, which demonstrated excellent stent expansion and apposition with no evidence of wire perforation, thrombus or dissection. At the completion of the case, all wires, catheters, and sheaths were removed. A Perclose device was deployed with excellent hemostasis.    HEMODYNAMICS:   Aortic pressure: 127/48 mmHg    CORONARY ANGIOGRAPHY:  Limited left coronary system angiography redemonstrated a short segment of severe disease in the distal left main coronary artery between the ostium of the left circumflex coronary and ramus intermedius branch followed by a long segment of severe proximal to mid partially calcified, left anterior descending coronary disease with partial luminal obstruction from the proximal edge of the first diagonal branch stent.    INTRAVASCULAR ULTRASOUND:  See IVUS findings the procedure details above.    PERCUTANEOUS CORONARY INTERVENTION:  Lesion location: Distal left main coronary artery to mid left anterior descending coronary artery  Lesion type: C  Lesion length: 20 mm  Preintervention NIKKO flow: 3  Postintervention NIKKO flow: 3  Preintervention stenosis: 90%  Postintervention stenosis: 5%  Stent: 3.0 x 32 mm Synergy drug-eluting stent    IMPRESSION:  Successful percutaneous coronary of the  distal left main coronary artery to mid left anterior descending coronary with one 3.0 x 32 mm Synergy drug-eluting stent postdilated to 4.1 mm proximally.    RECOMMENDATIONS:  Overnight observation.  Bedrest for 4 hours.  Continue dual antiplatelet therapy with aspirin and clopidogrel for at least 6 months if tolerated and then recommend clopidogrel monotherapy indefinitely thereafter.  Optimal cardiovascular risk factor management.  Referral to cardiac rehabilitation.    NOTIFICATION:  The patient's family was called and notified of the results of his cardiac catheterization.

## 2024-11-18 NOTE — PROGRESS NOTES
PPU NOTE:  ASSUMED CARE WITH PT AT 1241, RADHA RN HOLDING PETERSON PRESSURE AT THE RIGHT GROIN SITE FOR 20MIN, SANDBAG PLACED AFTER DRESSING IN PLACE +1PP, COOL TO TOUCH, CAP REFILL <3SEC, TRIGEMY APRN AWARE   3220 INA HERE TO SEE PT, WAITING FOR BED

## 2024-11-19 VITALS
HEIGHT: 66 IN | SYSTOLIC BLOOD PRESSURE: 108 MMHG | HEART RATE: 77 BPM | RESPIRATION RATE: 16 BRPM | BODY MASS INDEX: 23.28 KG/M2 | OXYGEN SATURATION: 96 % | WEIGHT: 144.84 LBS | DIASTOLIC BLOOD PRESSURE: 59 MMHG | TEMPERATURE: 97.5 F

## 2024-11-19 LAB
ANION GAP SERPL CALC-SCNC: 10 MMOL/L (ref 7–16)
BUN SERPL-MCNC: 12 MG/DL (ref 8–22)
CALCIUM SERPL-MCNC: 8.9 MG/DL (ref 8.5–10.5)
CHLORIDE SERPL-SCNC: 103 MMOL/L (ref 96–112)
CO2 SERPL-SCNC: 22 MMOL/L (ref 20–33)
CREAT SERPL-MCNC: 0.84 MG/DL (ref 0.5–1.4)
ERYTHROCYTE [DISTWIDTH] IN BLOOD BY AUTOMATED COUNT: 43.1 FL (ref 35.9–50)
GFR SERPLBLD CREATININE-BSD FMLA CKD-EPI: 86 ML/MIN/1.73 M 2
GLUCOSE SERPL-MCNC: 109 MG/DL (ref 65–99)
HCT VFR BLD AUTO: 33.1 % (ref 42–52)
HGB BLD-MCNC: 11.3 G/DL (ref 14–18)
MCH RBC QN AUTO: 29.7 PG (ref 27–33)
MCHC RBC AUTO-ENTMCNC: 34.1 G/DL (ref 32.3–36.5)
MCV RBC AUTO: 86.9 FL (ref 81.4–97.8)
PLATELET # BLD AUTO: 185 K/UL (ref 164–446)
PMV BLD AUTO: 9 FL (ref 9–12.9)
POTASSIUM SERPL-SCNC: 3.8 MMOL/L (ref 3.6–5.5)
RBC # BLD AUTO: 3.81 M/UL (ref 4.7–6.1)
SODIUM SERPL-SCNC: 135 MMOL/L (ref 135–145)
WBC # BLD AUTO: 4.6 K/UL (ref 4.8–10.8)

## 2024-11-19 PROCEDURE — G0378 HOSPITAL OBSERVATION PER HR: HCPCS

## 2024-11-19 PROCEDURE — 85027 COMPLETE CBC AUTOMATED: CPT

## 2024-11-19 PROCEDURE — A9270 NON-COVERED ITEM OR SERVICE: HCPCS | Performed by: NURSE PRACTITIONER

## 2024-11-19 PROCEDURE — 700102 HCHG RX REV CODE 250 W/ 637 OVERRIDE(OP): Performed by: NURSE PRACTITIONER

## 2024-11-19 PROCEDURE — 97162 PT EVAL MOD COMPLEX 30 MIN: CPT

## 2024-11-19 PROCEDURE — 80048 BASIC METABOLIC PNL TOTAL CA: CPT

## 2024-11-19 PROCEDURE — 97535 SELF CARE MNGMENT TRAINING: CPT

## 2024-11-19 RX ADMIN — TAMSULOSIN HYDROCHLORIDE 0.4 MG: 0.4 CAPSULE ORAL at 05:43

## 2024-11-19 RX ADMIN — ASPIRIN 81 MG: 81 TABLET, COATED ORAL at 05:43

## 2024-11-19 RX ADMIN — FINASTERIDE 5 MG: 5 TABLET, FILM COATED ORAL at 05:43

## 2024-11-19 RX ADMIN — MEXILETINE HYDROCHLORIDE 150 MG: 150 CAPSULE ORAL at 08:52

## 2024-11-19 RX ADMIN — MEXILETINE HYDROCHLORIDE 150 MG: 150 CAPSULE ORAL at 01:05

## 2024-11-19 RX ADMIN — CLOPIDOGREL BISULFATE 75 MG: 75 TABLET ORAL at 05:43

## 2024-11-19 ASSESSMENT — GAIT ASSESSMENTS
DISTANCE (FEET): 200
GAIT LEVEL OF ASSIST: SUPERVISED

## 2024-11-19 ASSESSMENT — COGNITIVE AND FUNCTIONAL STATUS - GENERAL
MOBILITY SCORE: 24
SUGGESTED CMS G CODE MODIFIER MOBILITY: CH

## 2024-11-19 NOTE — PROGRESS NOTES
Telemetry monitoring summary:    Rhythm: NSR w/ 1st degree  Rate: 70's-80's  Ectopy: frequent PVCs, frequent bigeminal PVCs  .22/.09/.42

## 2024-11-19 NOTE — PROGRESS NOTES
Patient resting comfortably in bed. He has his wife at bedside. His bed in locked and in the lowest position with his call light within reach.

## 2024-11-19 NOTE — CARE PLAN
The patient is Stable - Low risk of patient condition declining or worsening    Shift Goals  Clinical Goals: Observe for bleeding  Patient Goals: Rest, go home    Progress made toward(s) clinical / shift goals:    Problem: Fall Risk  Goal: Patient will remain free from falls  Description: Target End Date:  Prior to discharge or change in level of care    Document interventions on the Puja Bishop Fall Risk Assessment    1.  Assess for fall risk factors  2.  Implement fall precautions  Outcome: Progressing     Problem: Knowledge Deficit - Standard  Goal: Patient and family/care givers will demonstrate understanding of plan of care, disease process/condition, diagnostic tests and medications  Description: Target End Date:  1-3 days or as soon as patient condition allows    Document in Patient Education    1.  Patient and family/caregiver oriented to unit, equipment, visitation policy and means for communicating concern  2.  Complete/review Learning Assessment  3.  Assess knowledge level of disease process/condition, treatment plan, diagnostic tests and medications  4.  Explain disease process/condition, treatment plan, diagnostic tests and medications  Outcome: Progressing

## 2024-11-19 NOTE — CARE PLAN
The patient is Stable - Low risk of patient condition declining or worsening    Shift Goals  Clinical Goals: Observe for bleeding  Patient Goals: Rest, go home        Problem: Fall Risk  Goal: Patient will remain free from falls  Description: Target End Date:  Prior to discharge or change in level of care    Document interventions on the Puja Bishop Fall Risk Assessment    1.  Assess for fall risk factors  2.  Implement fall precautions  Outcome: Progressing     Problem: Knowledge Deficit - Standard  Goal: Patient and family/care givers will demonstrate understanding of plan of care, disease process/condition, diagnostic tests and medications  Description: Target End Date:  1-3 days or as soon as patient condition allows    Document in Patient Education    1.  Patient and family/caregiver oriented to unit, equipment, visitation policy and means for communicating concern  2.  Complete/review Learning Assessment  3.  Assess knowledge level of disease process/condition, treatment plan, diagnostic tests and medications  4.  Explain disease process/condition, treatment plan, diagnostic tests and medications  Outcome: Progressing

## 2024-11-19 NOTE — THERAPY
"Physical Therapy   Initial Evaluation     Patient Name: Sandip Zhao  Age:  84 y.o., Sex:  male  Medical Record #: [id number]  Today's Date: 11/19/2024     Precautions  Precautions: Fall Risk;Cardiac Precautions (See Comments)    Assessment   Patient is an 83 yo male admitted to overnight observational status per-routine following elective PCI to distal LM.  PT cardiac rehab order received and chart reviewed. A cardiac handout was provided and discussed regarding talk test/RPE scale to modify home walking program, modifiable cardiac risk factors and role/timeline of outpatient cardiac rehab. Patient reporting compliance and understanding. Patient will benefit from referral to cardiac rehab at [place].     Plan    Physical Therapy Initial Treatment Plan   Duration: Evaluation only    DC Equipment Recommendations: None  Discharge Recommendations: Other - (Cardiac rehab)       Subjective    \"I'm very interested in all of this stuff. I have been tracking my vitals and exercise for years\"     Objective       11/19/24 0900   Precautions   Precautions Fall Risk;Cardiac Precautions (See Comments)   Vitals   Vitals Comments all VSS throughout   Pain 0 - 10 Group   Therapist Pain Assessment 0;Post Activity Pain Same as Prior to Activity   Prior Living Situation   Prior Services Home-Independent   Housing / Facility 1 Story House   Steps Into Home 0   Steps In Home 0   Equipment Owned Front-Wheel Walker   Lives with - Patient's Self Care Capacity Spouse   Comments lives with his wife in a cottage on their family compound.   Prior Level of Functional Mobility   Bed Mobility Independent   Transfer Status Independent   Ambulation Independent   Assistive Devices Used Front-Wheel Walker   Comments intermittent use of FWW when B knee pain flares up   History of Falls   History of Falls No   Cognition    Cognition / Consciousness WDL   Comments very pleasant and cooperative   Active ROM Upper Body   Active ROM Upper Body  WDL   Strength " Upper Body   Upper Body Strength  WDL   Sensation Upper Body   Upper Extremity Sensation  WDL   Passive ROM Lower Body   Passive ROM Lower Body WDL   Active ROM Lower Body    Active ROM Lower Body  WDL   Strength Lower Body   Lower Body Strength  WDL   Sensation Lower Body   Lower Extremity Sensation   WDL   Coordination Upper Body   Coordination WDL   Coordination Lower Body    Coordination Lower Body  WDL   Other Treatments   Other Treatments Provided extensive discussion about return to activity   Balance Assessment   Sitting Balance (Static) Fair   Sitting Balance (Dynamic) Fair   Standing Balance (Static) Fair   Standing Balance (Dynamic) Fair   Weight Shift Sitting Fair   Weight Shift Standing Fair   Comments no AD   Bed Mobility    Supine to Sit Supervised   Sit to Supine Supervised   Scooting Supervised   Gait Analysis   Gait Level Of Assist Supervised   Assistive Device None   Distance (Feet) 200   Functional Mobility   Sit to Stand Supervised   Bed, Chair, Wheelchair Transfer Supervised   6 Clicks Assessment - How much HELP from from another person do you currently need... (If the patient hasn't done an activity recently, how much help from another person do you think he/she would need if he/she tried?)   Turning from your back to your side while in a flat bed without using bedrails? 4   Moving from lying on your back to sitting on the side of a flat bed without using bedrails? 4   Moving to and from a bed to a chair (including a wheelchair)? 4   Standing up from a chair using your arms (e.g., wheelchair, or bedside chair)? 4   Walking in hospital room? 4   Climbing 3-5 steps with a railing? 4   6 clicks Mobility Score 24   Activity Tolerance   Sitting Edge of Bed 10 min   Standing 3 min   Edema / Skin Assessment   Edema / Skin  WDL   Education Group   Education Provided Role of Physical Therapist;Gait Training   Physical Therapy Initial Treatment Plan    Duration Evaluation only   Anticipated Discharge  Equipment and Recommendations   DC Equipment Recommendations None   Discharge Recommendations Other -  (Cardiac rehab)     Lisa Juarez, PT, DPT, [facility]

## 2024-11-19 NOTE — DISCHARGE SUMMARY
CHIEF COMPLAINT ON ADMISSION  Post Elective Cardiac Catheterization     CODE STATUS  Full Code    HPI & HOSPITAL COURSE  Sandip Zhao is a very pleasant patient of Dr. Dr. Mcclendon and Dr. Mora  admitted for elective cardiac catheterization for on 11/18/2024 after he presented to our office on 10/18/2024 with complaints of palpitations and follow up for high risk PCI.      Past medical history significant for coronary artery disease with prior PCI to the D1, OM1, and RCA in 2015 .      Cardiac catheterization showed   HEMODYNAMICS:   Aortic pressure: 127/48 mmHg     CORONARY ANGIOGRAPHY:  Limited left coronary system angiography redemonstrated a short segment of severe disease in the distal left main coronary artery between the ostium of the left circumflex coronary and ramus intermedius branch followed by a long segment of severe proximal to mid partially calcified, left anterior descending coronary disease with partial luminal obstruction from the proximal edge of the first diagonal branch stent.     INTRAVASCULAR ULTRASOUND:  See IVUS findings the procedure details above.     PERCUTANEOUS CORONARY INTERVENTION:  Lesion location: Distal left main coronary artery to mid left anterior descending coronary artery  Lesion type: C  Lesion length: 20 mm  Preintervention NIKKO flow: 3  Postintervention NIKKO flow: 3  Preintervention stenosis: 90%  Postintervention stenosis: 5%  Stent: 3.0 x 32 mm Synergy drug-eluting stent     IMPRESSION:  Successful percutaneous coronary of the distal left main coronary artery to mid left anterior descending coronary with one 3.0 x 32 mm Synergy drug-eluting stent postdilated to 4.1 mm proximally.     Post-cath, the patient recovered well with no complications. Right  femoral cath site was clean, dry, and intact with no signs of hematoma, bleeding, or infection. He was able to ambulate the halls without any exertional angina, vitals and laboratory workup remained unremarkable.       Reviewed discharge instructions with patient related to lifting precautions with femoral site for one week. Patient was given thorough discussion of his discharge instructions per nursing and myself. DAPT and statin adherence was discussed in detail. Patient was discharged home with family with no further questions/concerns, their outpatient follow up has been arranged by our office as below.     FOLLOW UP  With primary cardiologist, Dr. Garcia    MEDICATIONS ON DISCHARGE     Medication List        CONTINUE taking these medications        Instructions   acetaminophen 500 MG Tabs  Commonly known as: Tylenol   Take 500-1,000 mg by mouth every 6 hours as needed.  Dose: 500-1,000 mg     aspirin 81 MG EC tablet   Take 81 mg by mouth every morning.  Dose: 81 mg     B-12 PO   Take 1 Tablet by mouth every day.  Dose: 1 Tablet     clopidogrel 75 MG Tabs  Commonly known as: Plavix   Take 1 Tablet by mouth every day.  Dose: 75 mg     docusate sodium 100 MG Caps  Commonly known as: Colace   Take 200 mg by mouth 2 times a day.  Dose: 200 mg     ezetimibe 10 MG Tabs  Commonly known as: Zetia   Take 10 mg by mouth every evening.  Dose: 10 mg     finasteride 5 MG Tabs  Commonly known as: Proscar   Take 1 Tablet by mouth every day.  Dose: 5 mg     mexiletine 150 MG Caps  Commonly known as: Mexitil   Take 150 mg by mouth every 8 hours.  Dose: 150 mg     rosuvastatin 20 MG Tabs  Commonly known as: Crestor   Take 20 mg by mouth every evening.  Dose: 20 mg     tamsulosin 0.4 MG capsule  Commonly known as: Flomax   Take 1 Capsule by mouth 2 times a day.  Dose: 0.4 mg                PROCEDURES  LEFT CARDIAC CATH on 11/18/2024  POST-OPERATIVE DIAGNOSES: Successful percutaneous coronary of the distal left main coronary artery to mid left anterior descending coronary with one 3.0 x 32 mm Synergy drug-eluting stent postdilated to 4.1 mm proximally.     RECOMMENDATIONS:  Overnight observation.  Bedrest for 4 hours.  Continue dual  antiplatelet therapy with aspirin and clopidogrel for at least 6 months if tolerated and then recommend clopidogrel monotherapy indefinitely thereafter.  Optimal cardiovascular risk factor management.  Referral to cardiac rehabilitation.    LABORATORY  Lab Results   Component Value Date/Time    SODIUM 136 02/14/2018 03:29 AM    POTASSIUM 4.2 02/14/2018 03:29 AM    CHLORIDE 106 02/14/2018 03:29 AM    CO2 22 02/14/2018 03:29 AM    GLUCOSE 95 02/14/2018 03:29 AM    BUN 16 02/14/2018 03:29 AM    CREATININE 1.02 02/14/2018 03:29 AM        Lab Results   Component Value Date/Time    WBC 8.0 02/14/2018 03:29 AM    HEMOGLOBIN 15.0 02/14/2018 03:29 AM    HEMATOCRIT 43.1 02/14/2018 03:29 AM    PLATELETCT 247 02/14/2018 03:29 AM

## 2024-11-19 NOTE — DISCHARGE INSTR - DIET
Heart-Healthy Nutrition Therapy    A heart-healthy diet is recommended to reduce your unhealthy blood cholesterol levels, manage high blood pressure, and lower your risk for heart disease.    To follow a heart-healthy diet,    Eat a balanced diet with whole grains, fruits and vegetables, and lean protein sources.  Achieve and maintain a healthy weight.  Choose heart-healthy unsaturated fats. Limit saturated fats, trans fats, and cholesterol intake. Eat more plant-based or vegetarian meals using beans and soy foods for protein.  Eat whole, unprocessed foods to limit the amount of sodium (salt) you eat.  Limit refined carbohydrates especially sugar, sweets and sugar-sweetened beverages.  If you drink alcohol, do so in moderation: one serving per day (women) and two servings per day (men).  One serving is equivalent to 12 ounces beer, 5 ounces wine, or 1.5 ounces distilled spirits    Tips for Choosing Heart-Healthy Fats    Choose lean protein and low-fat dairy foods to reduce saturated fat intake.    Saturated fat is usually found in animal-based protein and is associated with certain health risks. Saturated fat is the biggest contributor to raised low-density lipoprotein (LDL) cholesterol levels in the diet. Research shows that limiting saturated fat lowers unhealthy cholesterol levels. Eat no more than 5-6% of your total calories each day from saturated fat. Ask your registered dietitian nutritionist (RDN) to help you determine how much saturated fat is right for you.  There are many foods that do not contain large amounts of saturated fats. Swapping these foods to replace foods high in saturated fats will help you limit the saturated fat you eat and improve your cholesterol levels. You can also try eating more plant-based or vegetarian meals.        Avoid trans fats    Trans fats increase levels of LDL-cholesterol. Hydrogenated fat in processed foods is the main source of trans fats in foods.   Trans fats can be  found in stick margarine, shortening, processed sweets, baked goods, some fried foods, and packaged foods made with hydrogenated oils. Avoid foods with “partially hydrogenated oil” on the ingredient list such as: cookies, pastries, baked goods, biscuits, crackers, microwave popcorn, and frozen dinners.    Choose foods with heart healthy fats    Polyunsaturated and monounsaturated fat are unsaturated fats that may help lower your blood cholesterol level when used in place of saturated fat in your diet.  Ask your RDN about taking a dietary supplement with plant sterols and stanols to help lower your cholesterol level.  Research shows that substituting saturated fats with unsaturated fats is beneficial to cholesterol levels. Try these easy swaps:      Limit the amount of cholesterol you eat to less than 200 milligrams per day.    Cholesterol is a substance carried through the bloodstream via lipoproteins, which are known as “transporters” of fat. Some body functions need cholesterol to work properly, but too much cholesterol in the bloodstream can damage arteries and build up blood vessel linings (which can lead to heart attack and stroke). You should eat less than 200 milligrams cholesterol per day.  People respond differently to eating cholesterol. There is no test available right now that can figure out which people will respond more to dietary cholesterol and which will respond less. For individuals with high intake of dietary cholesterol, different types of increase (none, small, moderate, large) in LDL-cholesterol levels are all possible.    Food sources of cholesterol include egg yolks and organ meats such as liver, gizzards.  Limit egg yolks to two to four per week and avoid organ meats like liver and gizzards to control cholesterol intake.    Tips for Choosing Heart-Healthy Carbohydrates    Consume foods rich in viscous (soluble) fiber    Viscous, or soluble, fiber is found in the walls of plant cells. Viscous  fiber is found only in plant-based foods--animal-based foods like meat or dairy products do not contain fiber. In the stomach, viscous fibers absorb water and swell to form a thick, jelly-like mass. This helps to lower your unhealthy cholesterol  Rich sources of viscous fiber include asparagus, [place] sprouts, sweet potatoes, turnips, apricots, mangoes, oranges, legumes, barley, oats, and oat bran.  Eat at least 5 to 10 grams of viscous fiber each day. As you increase your fiber intake gradually, also increase the amount of water you drink. This will help prevent constipation.  If you have difficulty achieving this goal, ask your RDN about fiber laxatives. Choose fiber supplements made with viscous fibers such as psyllium seed husks or methylcellulose to help lower unhealthy cholesterol.    Limit refined carbohydrates    There are three types of carbohydrates: starches, sugar, and fiber. Some carbohydrates occur naturally in food, like the starches in rice or corn or the sugars in fruits and milk. Refined carbohydrates--foods with high amounts of simple sugars--can raise triglyceride levels. High triglyceride levels are associated with coronary heart disease.  Some examples of refined carbohydrate foods are table sugar, sweets, and beverages sweetened with added sugar.    Tips for Reducing Sodium (Salt)  Although sodium is important for your body to function, too much sodium can be harmful for people with high blood pressure.  As sodium and fluid buildup in your tissues and bloodstream, your blood pressure increases. High blood pressure may cause damage to other organs and increase your risk for a stroke.    Keep your salt intake to 2300 milligrams or less per day. Even if you take a pill for blood pressure or a water pill (diuretic) to remove fluid, it is still important to have less salt in your diet. Ask your RDN what amount of sodium is right for you.    Avoid processed foods.  Eat more fresh foods.  Fresh  "fruits and vegetables are naturally low in sodium, as well as frozen vegetables and fruits that have no added juices or sauces.  Fresh meats are lower in sodium than processed meats, such as bojorquez, sausage, and hotdogs.  Read the nutrition label or ask your  to help you find a fresh meat that is low in sodium.  Eat less salt--at the table and when cooking.  A single teaspoon of table salt has 2,300 mg of sodium.  Leave the salt out of recipes for pasta, casseroles, and soups.  Ask your RDN how to cook your favorite recipes without sodium  Be a smart .  Look for food packages that say “salt-free” or “sodium-free.” These items contain less than 5 milligrams of sodium per serving.  “Very low-sodium” products contain less than 35 milligrams of sodium per serving.  “Low-sodium” products contain less than 140 milligrams of sodium per serving.   Beware of “reduced salt” or \"reduced sodium\" products.  These items may still be high in sodium. Check the nutrition label.   Add flavors to your food without adding sodium.  Try lemon juice, lime juice, fruit juice or vinegar.    Dry or fresh herbs add flavor. Try basil, bay leaf, dill, rosemary, parsley, abel, dry mustard, nutmeg, thyme, and paprika.  Pepper, red pepper flakes, and cayenne pepper can add spice to your meals without adding sodium. Hot sauce contains sodium, but if you use just a drop or two, it will not add up to much.  Buy a sodium-free seasoning blend or make your own at home.     Additional Lifestyle Tips    Achieve and maintain a healthy weight.  Talk with your RDN or your doctor about what is a healthy weight for you.  Set goals to reach and maintain that weight.   To lose weight, reduce your calorie intake along with increasing your physical activity. A weight loss of 10 to 15 pounds could reduce LDL-cholesterol by 5 milligrams per deciliter.  Participate in physical activity.    Talk with your health care team to find out what types of " physical activity are best for you. Set a plan to get about 30 minutes of exercise on most days.          Nutrition Counseling  Our expert team offers:   Medical Nutrition Therapy for Chronic Conditions   Weight Management   Diabetes Education and Management   Wellness Services   Body Composition Measurements   Gastrointestinal Health    Nutrition Counseling Services are located at:  [address], [place] [address]  For more information and to schedule a consultation, please call [phone].  A physician referral may be required by your insurance for coverage.

## 2024-11-19 NOTE — DIETARY
Nutrition Services: Diet Education Consult   Day 0 of admit.  Sandip Zhao is a 84 y.o. male with admitting DX of Other forms of angina pectoris [I20.89]  Chest pain [R07.9]    RD received referral for cardiac diet education. S/p PCI. RD provided information via discharge instructions which includes nutrition recommendations and tips to support a heart healthy dietary pattern. This includes outpatient resources to [facility] affiliated [facility] Program for continued nutrition education and guidance as desired.     No other education needs identified at this time. Please re-consult RD for supplemental education or at the request of patient.    Please re-consult RD PRN

## 2024-11-19 NOTE — CARE PLAN
The patient is Stable - Low risk of patient condition declining or worsening    Shift Goals  Clinical Goals: PT, monitor tele  Patient Goals: discharge  Family Goals: CARMELINA    Progress made toward(s) clinical / shift goals:  discharge

## 2024-11-19 NOTE — PROGRESS NOTES
4 Eyes Skin Assessment Completed by Sylvia LORENZO, RN and Dylan KIM ACT-A.    Head WDL  Ears WDL  Nose WDL  Mouth WDL  Neck WDL  Breast/Chest WDL  Shoulder Blades WDL  Spine WDL  (R) Arm/Elbow/Hand WDL  (L) Arm/Elbow/Hand WDL  Abdomen WDL  Groin Incision R CDI  Scrotum/Coccyx/Buttocks WDL  (R) Leg WDL  (L) Leg WDL  (R) Heel/Foot/Toe WDL  (L) Heel/Foot/Toe WDL          Devices In Places Tele Box, Blood Pressure Cuff, and Pulse Ox      Interventions In Place Pillows and Pressure Redistribution Mattress    Possible Skin Injury No    Pictures Uploaded Into Epic N/A  Wound Consult Placed N/A  RN Wound Prevention Protocol Ordered No

## 2024-11-19 NOTE — DISCHARGE PLANNING
[facility] TCN chart review completed.  Per chart patient has a low LACE (16) and while no 6 clicks score documented patient ambulated 30 feet with no AD or assist per kardex.  In addition patient is on RA.     No TCN needs anticipated at this time. Should post acute discharge needs arise warranting TCN involvement, please contact TCN team via Voalte. Thank you.

## 2024-12-06 ENCOUNTER — HOSPITAL ENCOUNTER (EMERGENCY)
Facility: MEDICAL CENTER | Age: 84
End: 2024-12-06
Attending: EMERGENCY MEDICINE
Payer: MEDICARE

## 2024-12-06 VITALS
WEIGHT: 147.93 LBS | DIASTOLIC BLOOD PRESSURE: 76 MMHG | RESPIRATION RATE: 16 BRPM | OXYGEN SATURATION: 98 % | SYSTOLIC BLOOD PRESSURE: 142 MMHG | HEART RATE: 99 BPM | TEMPERATURE: 98.4 F | HEIGHT: 66 IN | BODY MASS INDEX: 23.77 KG/M2

## 2024-12-06 DIAGNOSIS — I10 HYPERTENSION, UNSPECIFIED TYPE: Primary | ICD-10-CM

## 2024-12-06 LAB — EKG IMPRESSION: NORMAL

## 2024-12-06 PROCEDURE — 36415 COLL VENOUS BLD VENIPUNCTURE: CPT

## 2024-12-06 PROCEDURE — 99284 EMERGENCY DEPT VISIT MOD MDM: CPT

## 2024-12-06 PROCEDURE — 93005 ELECTROCARDIOGRAM TRACING: CPT | Mod: TC | Performed by: EMERGENCY MEDICINE

## 2024-12-06 ASSESSMENT — FIBROSIS 4 INDEX: FIB4 SCORE: 2.4

## 2024-12-06 NOTE — ED TRIAGE NOTES
"Chief Complaint   Patient presents with    Blood Pressure Problem     Pt reports high blood pressures at home, per pt the highest BP at home was 190/90. Pt reports recent heart caths with stents placed.   Pt reports cortisone shots at [facility] today and is concerned about this relating to BP. Pt denies chest pain, weakness, or shortness of breath.   Pt reports \"high heart rate of 100\"      BP (!) 147/83   Pulse 100   Temp 36.9 °C (98.4 °F) (Temporal)   Resp 18   Ht 1.676 m (5' 6\")   Wt 67.1 kg (147 lb 14.9 oz)   SpO2 98%   BMI 23.88 kg/m²     "

## 2024-12-06 NOTE — ED PROVIDER NOTES
"ED Provider Note    Scribed for No att. providers found by Chris Dias. 12/6/2024  1:48 AM    Primary care provider: Meagan Mcneil M.D.  Means of arrival: private vehicle   History obtained from: Patient  History limited by: None    CHIEF COMPLAINT  Chief Complaint   Patient presents with    Blood Pressure Problem     Pt reports high blood pressures at home, per pt the highest BP at home was 190/90. Pt reports recent heart caths with stents placed.   Pt reports cortisone shots at [facility] today and is concerned about this relating to BP. Pt denies chest pain, weakness, or shortness of breath.   Pt reports \"high heart rate of 100\"      EXTERNAL RECORDS REVIEWED  Inpatient Notes patient was admitted overnight on the 18th of last month for chest pain and elective cardiac catheterization with a history of CAD with prior PCI in 2015.  He had a successful PCI done with drug-eluting stent.    HPI/ROS  LIMITATION TO HISTORY   Select: : None  OUTSIDE HISTORIAN(S):  Wife at bedside help provide collateral formation    HPI  Sandip Zhao is a 84 y.o. male who presents to the Emergency Department with a history of coronary disease, prior PCI in 2015 and last month on the 18th, 3 weeks ago, presenting for hypertension.  Patient states that he was concerned because his blood pressure was going up and his heart rate was going up as well tonight.  Wife at bedside states that he took his blood pressure at least 60 times this afternoon.  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  He had no chest pain, no lightheadedness, no dizziness, no headaches.  Asymptomatic.  Except that he was anxious about his heart rate being close to 100 and his blood pressure being elevated in the 150s.  He is not any blood pressure medicine.  He is otherwise healthy.  Non-smoker, no drinking, no drug use.    REVIEW OF SYSTEMS  As above, all other systems reviewed and are " "negative.   See HPI for further details.     PAST MEDICAL HISTORY   has a past medical history of Anemia, Arrhythmia, Arthritis, Asthma, BPH (benign prostatic hyperplasia), Broken rib (09/09/2024), CAD (coronary artery disease), Dental disorder (09/09/2024), Environmental allergies, Fall (08/2024), Gall stones (09/09/2024), Hemorrhagic disorder (HCC) (09/26/2024), Hemorrhoids, High cholesterol, Hypertension, Inguinal hernia (09/26/2024), MI (myocardial infarction) ([facility]) (07/04/2015), Pain, Pneumonia (09/26/2024), and Status post placement of implantable loop recorder.  SURGICAL HISTORY   has a past surgical history that includes abdominal exploration; appendectomy; knee arthroscopy; other cardiac surgery (09/09/2024); and other cardiac surgery (09/17/2024).  SOCIAL HISTORY  Social History     Tobacco Use    Smoking status: Former     Types: Cigarettes    Smokeless tobacco: Never    Tobacco comments:     Quit smoking cigarettes around 1990.   Vaping Use    Vaping status: Never Used   Substance Use Topics    Alcohol use: Not Currently     Comment: No alcohol since 2/2018.    Drug use: Never      Social History     Substance and Sexual Activity   Drug Use Never     FAMILY HISTORY  Family History   Problem Relation Age of Onset    Hypertension Father     Heart Disease Father         mitral valve replacement     CURRENT MEDICATIONS  Home Medications    **Home medications have not yet been reviewed for this encounter**       ALLERGIES  No Known Allergies    PHYSICAL EXAM    VITAL SIGNS:   Vitals:    12/06/24 0144 12/06/24 0157 12/06/24 0212   BP: (!) 147/83  (!) 142/76   Pulse: 100 93 99   Resp: 18  16   Temp: 36.9 °C (98.4 °F)     TempSrc: Temporal     SpO2: 98% 98% 98%   Weight: 67.1 kg (147 lb 14.9 oz)     Height: 1.676 m (5' 6\")       Vitals: My interpretation: Borderline hypertension, not tachycardic, afebrile, not hypoxic    Reinterpretation of vitals: Unchanged and unremarkable    Cardiac Monitor Interpretation: " The cardiac monitor revealed normal Sinus Rhythm as interpreted by me. The cardiac monitor was ordered secondary to the patient's history of hypertension and to monitor for dysrhythmia and/or tachycardia.    PE:   Gen: sitting comfortably, speaking clearly, appears in no acute distress   ENT: Mucous membranes moist, posterior pharynx clear, uvula midline, nares patent bilaterally   Neck: Supple, FROM  Pulmonary: Lungs are clear to auscultation bilaterally. No tachypnea  CV:  RRR, no murmur appreciated, pulses 2+ in both upper and lower extremities  Abdomen: soft, NT/ND; no rebound/guarding  : no CVA or suprapubic tenderness   Neuro: A&Ox4 (person, place, time, situation), speech fluent, gait steady, no focal deficits appreciated  Skin: No rash or lesions.  No pallor or jaundice.  No cyanosis.  Warm and dry.     DIAGNOSTIC STUDIES / PROCEDURES    LABS  Results for orders placed or performed during the hospital encounter of 24   EKG    Collection Time: 24  2:10 AM   Result Value Ref Range    Report       [facility] Emergency Dept.    Test Date:  2024  Pt Name:    EVELYN WEAVER             Department[facility]  MRN:        [id number]                      [facility]  Gender:     Male                         Technician: [id number]  :        [id number]                   Requested By:JOANNE VALENCIA  Order #:    [id number]                    Reading MD: Joanne Valencia    Measurements  Intervals                                Axis  Rate:       90                           P:          67  VT:         200                          QRS:        24  QRSD:       95                           T:          69  QT:         361  QTc:        442    Interpretive Statements  Sinus rhythm  Probable left atrial enlargement  Compared to ECG 2024 12:29:52  Ventricular premature complex(es) no longer present  Electronically Signed On 2024 02:19:12 PST by Joanne Valencia         COURSE & MEDICAL DECISION MAKING  Nursing notes, VS, PMSFHx, labs, imaging, EKG reviewed in chart.    Heart Score: None    ED Observation Status? No; Patient does not meet criteria for ED Observation.     Ddx: Anxiety, hypertension, hypertensive crisis    MDM: 1:48 AM Sandip Zhao is a 84 y.o. male who presented with evaluation of episodic hypertension.  Presents with his wife who help provide collateral formation.  Patient took his blood pressure around 60 times tonight and was anxious about it being elevated in the 150s range systolically.  His heart rate was also elevated between 80 and 100 and he was very concerned about this.  He has no symptoms whatsoever.  He follows with cardiology.  Recently had a cath and PCI done on the 18th of last month.  Again no chest pain, shortness of breath, lightheadedness, dizziness, headache.  Vital signs upon arrival here however are fairly unremarkable with borderline hypertension but otherwise normal.  He had an EKG done which is completely unremarkable shows sinus rhythm.  Patient states that he is very relieved and all he wanted done was a EKG, declines further evaluation with blood work.  I counseled the patient on his high blood pressure and how to take his blood pressure appropriately.  Overall he is a benign physical exam and vital signs normalizing.  I do not think he needs further workup.  Patient does not want this.  Will have him follow-up with his cardiology team for blood pressure management.  Patient and wife verbalize understanding strict return precautions outpatient follow-up plan and are amenable.    Hypertension counseling:   I spent a total of 5 minutes counseling patient on blood pressure control and management.  We discussed medication management with PCP.  I recommended keeping a blood pressure journal, taking the blood pressure once in the morning once evening after resting for 5 minutes.  Presented in general to PCP and discussing medication  management or adjustment.  Discussed hypertensive emergencies and what signs and symptoms to be monitoring for and to return to the ED for.  Patient verbalized understanding and was receptive of counseling.    ADDITIONAL PROBLEM LIST AND DISPOSITION    I have discussed management of the patient with the following physicians and OK's:  None    Discussion of management with other QHP or appropriate source(s): None     Escalation of care considered, and ultimately not performed:IV fluids, Laboratory analysis, and diagnostic imaging    Barriers to care at this time, including but not limited to:  None .     Decision tools and prescription drugs considered including, but not limited to:  None .    FINAL IMPRESSION  1. Hypertension, unspecified type Acute      The note accurately reflects work and decisions made by me.  Chris Dias  12/6/2024  1:48 AM

## 2024-12-06 NOTE — DISCHARGE INSTRUCTIONS
In general, I do not recommend taking her blood pressure over and over again.  It will just constantly go up.  Your blood pressure needs to be followed by your cardiology team at your PCP.    Your blood pressure was elevated today.  I want you to take your blood pressure once in the morning and once in the evening and write down the results in a blood pressure journal.  Do this for 2 weeks and then presented to your primary care physician so they can better assess you for management of high blood pressure.    They have any worsening symptoms or concerns, please return to the ED for further evaluation and treatment.  Thank you for coming today.

## 2024-12-06 NOTE — ED NOTES
Dc instructions and medications discussed with patient at bedside. All questions answered at this time. VSS. Pt to lobby without incident. Spouse to drive patient home.

## 2024-12-11 ENCOUNTER — NON-PROVIDER VISIT (OUTPATIENT)
Dept: CARDIOLOGY | Facility: MEDICAL CENTER | Age: 84
End: 2024-12-11
Payer: MEDICARE

## 2024-12-11 DIAGNOSIS — Z95.5 STENTED CORONARY ARTERY: Primary | ICD-10-CM

## 2024-12-12 ENCOUNTER — NON-PROVIDER VISIT (OUTPATIENT)
Dept: CARDIOLOGY | Facility: MEDICAL CENTER | Age: 84
End: 2024-12-12
Payer: MEDICARE

## 2024-12-12 DIAGNOSIS — Z95.5 STENTED CORONARY ARTERY: ICD-10-CM

## 2024-12-12 PROCEDURE — G0422 INTENS CARDIAC REHAB W/EXERC: HCPCS | Performed by: INTERNAL MEDICINE

## 2024-12-12 PROCEDURE — G0423 INTENS CARDIAC REHAB NO EXER: HCPCS | Mod: 59 | Performed by: INTERNAL MEDICINE

## 2024-12-12 NOTE — PROGRESS NOTES
Sandip Zhao attended Intensive Cardiac Rehab today from 1000 to 1200. During his time he exercised and attended class.   His education today was a video titled: Body Composition. Patient received handouts and class discussion pertaining to the topic.

## 2024-12-13 ENCOUNTER — PATIENT OUTREACH (OUTPATIENT)
Dept: HEALTH INFORMATION MANAGEMENT | Facility: OTHER | Age: 84
End: 2024-12-13
Payer: MEDICARE

## 2024-12-13 DIAGNOSIS — I25.84 CORONARY ARTERY DISEASE DUE TO CALCIFIED CORONARY LESION: ICD-10-CM

## 2024-12-13 DIAGNOSIS — I10 PRIMARY HYPERTENSION: ICD-10-CM

## 2024-12-13 DIAGNOSIS — I25.10 CORONARY ARTERY DISEASE DUE TO CALCIFIED CORONARY LESION: ICD-10-CM

## 2024-12-13 NOTE — PROGRESS NOTES
Assessment    Monthly outreach completed via telephone call with patient's spouse Kimberley, whom he consented to give and receive health information on his behalf. Sandip is doing well with a few recent health changes to report. First, he successfully underwent PCI in November, with no complications reported. Second, he has resumed steroid injections through [facility] for his knees, which has greatly improved his ambulation. Third, he did present to the ER early this month for uncontrolled hypertension, but was not admitted. His blood pressure stabilized and his physician stated that the hypertension was likely due to the steroid injection. Sandip's spouse denies concerns related to this. Finally, Sandip has started attending the Intensive Cardiac Rehab program and is doing well so far.    Notably, pt's spouse reports that he feels he strained his shoulder while exercising, and believes that he may have an old injury that is acting up. They are scheduled to be seen at [facility] for shoulder imaging and further workup. No other concerns noted at this time.    Assessment by Diagnosis:    HTN  Recently presented to ER for hypertensive emergency, likely due to steroid injections  Since discharge, blood pressures have normalized  Pt's spouse denies further episodes of uncontrolled HTN, reports he is compliant with medications and BP self-monitoring    CAD  Underwent successful PCI without complications  Attending ICR and following closely with Cardiology    Education and Self-Management of Care    Education topics discussed during this outreach include: PCI recovery process; goals of ICR; importance of continued self-monitoring of blood pressure at home    Resources offered during this outreach include: outreach to Cardiology/other specialties as needed    Pt's spouse denies need for further resources at this time. She verbalized she will call [phone] if needs are identified before next monthly outreach.    Plan of Care and  Goals    See Care Plan note.    Barriers:    Communication deficit r/t hearing loss; fall risk    Progress:    Progressing as expected toward established goals. See Care Plan note for full details.    Next outreach:    January 2025.

## 2024-12-13 NOTE — CARE PLAN
Problem: Risk of Falls  Goal: (Long Term) Reduce the Risk of Falls and Fall Related Injuries  Outcome: Progressing  Note: Pt's spouse reports he is able to ambulate without use of walker after steroid injections to knees.  Intervention: Assess the patient's history of falls, including frequency, circumstances, and any resulting injuries  Intervention: Evaluate intrinsic risk factors (e.g., age, chronic conditions, medications, balance issues)  Intervention: Assess extrinsic risk factors (e.g., home environment, footwear, assistive devices)     Problem: Knowledge deficit surrounding fall safety  Goal: (Short Term) Demonstrate ability to verbalize fall safety concerns  Outcome: Progressing  Note: Pt and spouse verbalize awareness of fall prevention strategies to use at home.     Problem: Recognize current health habits that may contribute to hypertension  Goal: (Long Term) Identify which modifiable health habits can be modifed  Outcome: Progressing  Note: Pt has begun Intensive Cardiac Rehab program, which promotes modification of health habits including physical activity and diet.  Intervention: Provide education and support about changing health habits  Intervention: Evaluate and identify modifiable health habits and readiness

## 2024-12-17 ENCOUNTER — APPOINTMENT (OUTPATIENT)
Dept: CARDIOLOGY | Facility: MEDICAL CENTER | Age: 84
End: 2024-12-17
Payer: MEDICARE

## 2024-12-17 DIAGNOSIS — Z95.5 STENTED CORONARY ARTERY: ICD-10-CM

## 2024-12-18 ENCOUNTER — NON-PROVIDER VISIT (OUTPATIENT)
Dept: CARDIOLOGY | Facility: MEDICAL CENTER | Age: 84
End: 2024-12-18
Payer: MEDICARE

## 2024-12-18 DIAGNOSIS — Z95.5 STENTED CORONARY ARTERY: ICD-10-CM

## 2024-12-18 PROCEDURE — G0422 INTENS CARDIAC REHAB W/EXERC: HCPCS | Performed by: INTERNAL MEDICINE

## 2024-12-18 PROCEDURE — G0423 INTENS CARDIAC REHAB NO EXER: HCPCS | Mod: 59 | Performed by: INTERNAL MEDICINE

## 2024-12-18 NOTE — PROGRESS NOTES
Sandip Cece attended Intensive Cardiac Rehab today from 1000 to 1200. During his time he exercised and attended class.   His education today was a [employer] LIVE COOKING WORKSHOP titled: FAST AND HEALTHY BREAKFAST. Patient received handouts and class discussion pertaining to the topic.

## 2024-12-19 ENCOUNTER — NON-PROVIDER VISIT (OUTPATIENT)
Dept: CARDIOLOGY | Facility: MEDICAL CENTER | Age: 84
End: 2024-12-19
Payer: MEDICARE

## 2024-12-19 DIAGNOSIS — Z95.5 STENTED CORONARY ARTERY: ICD-10-CM

## 2024-12-19 PROCEDURE — G0423 INTENS CARDIAC REHAB NO EXER: HCPCS | Mod: 59 | Performed by: INTERNAL MEDICINE

## 2024-12-19 PROCEDURE — G0422 INTENS CARDIAC REHAB W/EXERC: HCPCS | Performed by: INTERNAL MEDICINE

## 2024-12-19 NOTE — PROGRESS NOTES
Sandip Zhao attended Intensive Cardiac Rehab today from 1000 to 1200. During his time he exercised and attended class.   His education today was a workshop titled: Rodrigo. Patient received handouts and class discussion pertaining to the topic.

## 2025-01-02 ENCOUNTER — NON-PROVIDER VISIT (OUTPATIENT)
Dept: CARDIOLOGY | Facility: MEDICAL CENTER | Age: 85
End: 2025-01-02
Payer: MEDICARE

## 2025-01-02 DIAGNOSIS — Z95.5 STENTED CORONARY ARTERY: ICD-10-CM

## 2025-01-02 PROCEDURE — G0422 INTENS CARDIAC REHAB W/EXERC: HCPCS | Performed by: INTERNAL MEDICINE

## 2025-01-02 PROCEDURE — G0423 INTENS CARDIAC REHAB NO EXER: HCPCS | Mod: 59 | Performed by: INTERNAL MEDICINE

## 2025-01-02 NOTE — PROGRESS NOTES
Sandip Zhao attended Intensive Cardiac Rehab today from 1000 to 1200. During his time he exercised and attended class.   His education today was a WORKSHOP titled: BIOMECHANICAL LIMITATIONS. Patient received handouts and class discussion pertaining to the topic.

## 2025-01-07 ENCOUNTER — NON-PROVIDER VISIT (OUTPATIENT)
Dept: CARDIOLOGY | Facility: MEDICAL CENTER | Age: 85
End: 2025-01-07
Payer: MEDICARE

## 2025-01-07 DIAGNOSIS — Z95.5 STENTED CORONARY ARTERY: ICD-10-CM

## 2025-01-07 PROCEDURE — G0422 INTENS CARDIAC REHAB W/EXERC: HCPCS | Performed by: INTERNAL MEDICINE

## 2025-01-07 PROCEDURE — G0423 INTENS CARDIAC REHAB NO EXER: HCPCS | Mod: 59 | Performed by: INTERNAL MEDICINE

## 2025-01-07 NOTE — PROGRESS NOTES
Sandip Zhao attended Intensive Cardiac Rehab today from 1000 to 1200. During his time he exercised and attended class.   His education today was a video titled: [employer]. Patient received handouts and class discussion pertaining to the topic.

## 2025-01-08 ENCOUNTER — NON-PROVIDER VISIT (OUTPATIENT)
Dept: CARDIOLOGY | Facility: MEDICAL CENTER | Age: 85
End: 2025-01-08
Payer: MEDICARE

## 2025-01-08 DIAGNOSIS — Z95.5 STENTED CORONARY ARTERY: ICD-10-CM

## 2025-01-08 PROCEDURE — G0423 INTENS CARDIAC REHAB NO EXER: HCPCS | Mod: 59 | Performed by: INTERNAL MEDICINE

## 2025-01-08 PROCEDURE — G0422 INTENS CARDIAC REHAB W/EXERC: HCPCS | Performed by: INTERNAL MEDICINE

## 2025-01-08 NOTE — PROGRESS NOTES
Sandip Cece attended Intensive Cardiac Rehab today from 1000 to 1200. During his time he exercised and attended class.   His education today was a cooking school titled: Simple Sides and Sauces. Patient received handouts and class discussion pertaining to the topic.

## 2025-01-09 ENCOUNTER — NON-PROVIDER VISIT (OUTPATIENT)
Dept: CARDIOLOGY | Facility: MEDICAL CENTER | Age: 85
End: 2025-01-09
Payer: MEDICARE

## 2025-01-09 DIAGNOSIS — Z95.5 STENTED CORONARY ARTERY: ICD-10-CM

## 2025-01-09 PROCEDURE — G0423 INTENS CARDIAC REHAB NO EXER: HCPCS | Mod: 59 | Performed by: INTERNAL MEDICINE

## 2025-01-09 PROCEDURE — G0422 INTENS CARDIAC REHAB W/EXERC: HCPCS | Performed by: INTERNAL MEDICINE

## 2025-01-09 NOTE — PROGRESS NOTES
Sandip Zhao attended Intensive Cardiac Rehab today from 1000 to 1200. During his time he exercised and attended class.   His education today was a WORKSHOP titled: HEAD TO HEART. Patient received handouts and class discussion pertaining to the topic.

## 2025-01-14 ENCOUNTER — PATIENT OUTREACH (OUTPATIENT)
Dept: HEALTH INFORMATION MANAGEMENT | Facility: OTHER | Age: 85
End: 2025-01-14

## 2025-01-14 ENCOUNTER — NON-PROVIDER VISIT (OUTPATIENT)
Dept: CARDIOLOGY | Facility: MEDICAL CENTER | Age: 85
End: 2025-01-14
Payer: MEDICARE

## 2025-01-14 DIAGNOSIS — Z95.5 STENTED CORONARY ARTERY: ICD-10-CM

## 2025-01-14 PROCEDURE — G0422 INTENS CARDIAC REHAB W/EXERC: HCPCS | Performed by: INTERNAL MEDICINE

## 2025-01-14 PROCEDURE — G0423 INTENS CARDIAC REHAB NO EXER: HCPCS | Mod: 59 | Performed by: INTERNAL MEDICINE

## 2025-01-14 NOTE — PROGRESS NOTES
Sandip Zhao attended Intensive Cardiac Rehab today from 1000 to 1200. During his time he exercised and attended class.   His education today was a video titled: How Our Thoughts Heal Our Hearts. Patient received handouts and class discussion pertaining to the topic.

## 2025-01-15 ENCOUNTER — NON-PROVIDER VISIT (OUTPATIENT)
Dept: CARDIOLOGY | Facility: MEDICAL CENTER | Age: 85
End: 2025-01-15
Payer: MEDICARE

## 2025-01-15 DIAGNOSIS — Z95.5 STENTED CORONARY ARTERY: ICD-10-CM

## 2025-01-15 PROCEDURE — G0423 INTENS CARDIAC REHAB NO EXER: HCPCS | Mod: 59 | Performed by: INTERNAL MEDICINE

## 2025-01-15 PROCEDURE — G0422 INTENS CARDIAC REHAB W/EXERC: HCPCS | Performed by: INTERNAL MEDICINE

## 2025-01-15 NOTE — PROGRESS NOTES
Sandip Zhao attended Intensive Cardiac Rehab today from 1000 to 1200. During his time he exercised and attended class.   His education today was a [employer] LIVE COOKING WORKSHOP titled: INTERNATIONAL CUISINE. Patient received handouts and class discussion pertaining to the topic.

## 2025-01-15 NOTE — PROGRESS NOTES
The patient has previously given verbal consent for his caregiver Kimberley Zhao (spouse) to give and receive health information as per 11/18/24 initial PCM enrollment assessment. Patient outreach made via telephone in order to complete the January PCM monthly call.  Patient's spouse verbalized the patient's intent to opt out of the program at this time as she feels confident in assisting the patient with independently managing his own healthcare needs. Patient's spouse verbalized intent to potentially re-enroll them both in the program in the future if they have more of a need for it at that time. Patient's spouse reported no concerns/needs at this time on the patient's behalf. All questions answered. Patient's spouse (patient's caregiver) encouraged to call the PCM phone line at [phone] for any future questions in regards to the patient as needed. Patient's spouse verbalized understanding.

## 2025-01-16 ENCOUNTER — NON-PROVIDER VISIT (OUTPATIENT)
Dept: CARDIOLOGY | Facility: MEDICAL CENTER | Age: 85
End: 2025-01-16
Payer: MEDICARE

## 2025-01-16 DIAGNOSIS — Z95.5 STENTED CORONARY ARTERY: ICD-10-CM

## 2025-01-16 PROCEDURE — G0422 INTENS CARDIAC REHAB W/EXERC: HCPCS | Performed by: INTERNAL MEDICINE

## 2025-01-16 PROCEDURE — G0423 INTENS CARDIAC REHAB NO EXER: HCPCS | Mod: 59 | Performed by: INTERNAL MEDICINE

## 2025-01-16 NOTE — PROGRESS NOTES
Sandip Zhao attended Intensive Cardiac Rehab today from 1000 to 1200. During his time he exercised and attended class.   His education today was a NUTRITION WORKSHOP titled: MINDFUL EATING. Patient received handouts and class discussion pertaining to the topic.

## 2025-01-21 ENCOUNTER — NON-PROVIDER VISIT (OUTPATIENT)
Dept: CARDIOLOGY | Facility: MEDICAL CENTER | Age: 85
End: 2025-01-21
Payer: MEDICARE

## 2025-01-21 DIAGNOSIS — Z95.5 STENTED CORONARY ARTERY: ICD-10-CM

## 2025-01-21 PROCEDURE — G0423 INTENS CARDIAC REHAB NO EXER: HCPCS | Mod: 59 | Performed by: INTERNAL MEDICINE

## 2025-01-21 PROCEDURE — G0422 INTENS CARDIAC REHAB W/EXERC: HCPCS | Performed by: INTERNAL MEDICINE

## 2025-01-21 NOTE — PROGRESS NOTES
Sandip Zhao attended Intensive Cardiac Rehab today from 1000 to 1200. During his time he exercised and attended class.   His education today was a VIDEO  titled: METABOLIC SYNDROME. Patient received handouts and class discussion pertaining to the topic.

## 2025-01-22 ENCOUNTER — NON-PROVIDER VISIT (OUTPATIENT)
Dept: CARDIOLOGY | Facility: MEDICAL CENTER | Age: 85
End: 2025-01-22
Payer: MEDICARE

## 2025-01-22 DIAGNOSIS — Z95.5 STENTED CORONARY ARTERY: ICD-10-CM

## 2025-01-22 PROCEDURE — G0423 INTENS CARDIAC REHAB NO EXER: HCPCS | Mod: 59 | Performed by: INTERNAL MEDICINE

## 2025-01-22 PROCEDURE — G0422 INTENS CARDIAC REHAB W/EXERC: HCPCS | Performed by: INTERNAL MEDICINE

## 2025-01-22 NOTE — PROGRESS NOTES
Sandip Yolandajulianne attended Intensive Cardiac Rehab today from 1000 to 1200. During his time he exercised and attended class.   His education today was a cooking school titled: Delicious Desserts. Patient received handouts and class discussion pertaining to the topic.

## 2025-01-24 NOTE — PROGRESS NOTES
INTERVENTIONAL CARDIOLOGY CLINIC NOTE      Date of Visit: 1/27/2024  Referring Provider: Dr. Mcclendon    Patient Name: Sandip Zhao    YOB: 1940  MRN: [id number]     Reason for Visit:   Post-cath follow-up     Patient Story:   Sandip Zhao is a 84 year-old gentleman with a past medical history of coronary artery disease with prior PCI to the D1, OM1, and RCA in 2015. Briefly, he reported increasingly symptomatic PVCs with exertional intolerance to his primary cardiologist Dr. Mcclendon.  He was initially started on metoprolol, but did not tolerate this medication due to fatigue and was changed to mexiletine, which helped his palpitations but overall did not resolve his fatigue symptoms.  He was ordered for a PET MPI, which showed evidence of inferior territory ischemia.  Therefore, he underwent further evaluation with a coronary angiogram on 9/10/2024.  This demonstrated patent stents with severe calcific disease in the proximal RCA and proximal to mid LAD.  Attempts at intervention were unsuccessful due to an inability to pass even a low-profile balloons across either area of stenosis.  Given that revascularization would likely require rotational atherectomy in multiple vascular territories including the unprotected proximal LAD.  Further attempts at higher risk intervention were not performed at that time.  He subsequently underwent successful intracoronary lithotripsy and PCI to the ostial to mid RCA with overlapping 3.5 x 20 mm and 3.0 x 32 mm Synergy JOSHUA post-dilated to 4.5 mm on 10/3/3024 and PCI to the distal left main coronary artery into mid LAD with one 3.0 x 32 mm Synergy drug-eluting stent postdilated to 4.1 mm on 11/18/2024).    He presents today for post cath follow-up.  He reports feeling great today and states that he has noticed a significant improvement in his exertional tolerance and a significant reduction in symptomatic palpitations since revascularization.  He  has not had any concerning bleeding complications on DAPT.  He has been participating in cardiac rehabilitation.     Medications and Allergies:     Current Outpatient Medications   Medication Sig Dispense Refill    clopidogrel (PLAVIX) 75 MG Tab Take 1 Tablet by mouth every day. 90 Tablet 3    docusate sodium (COLACE) 100 MG Cap Take 200 mg by mouth 2 times a day.      tamsulosin (FLOMAX) 0.4 MG capsule Take 1 Capsule by mouth 2 times a day. 90 Capsule 1    acetaminophen (TYLENOL) 500 MG Tab Take 500-1,000 mg by mouth every 6 hours as needed.      finasteride (PROSCAR) 5 MG Tab Take 1 Tablet by mouth every day. 30 Tablet 1    mexiletine (MEXITIL) 150 MG Cap Take 150 mg by mouth every 8 hours.      Cyanocobalamin (B-12 PO) Take 1 Tablet by mouth every day.      rosuvastatin (CRESTOR) 20 MG Tab Take 20 mg by mouth every evening.      aspirin 81 MG EC tablet Take 81 mg by mouth every morning.      ezetimibe (ZETIA) 10 MG Tab Take 10 mg by mouth every evening.       No current facility-administered medications for this visit.     No Known Allergies     Medical Decision Making:   # Coronary artery disease (s/p PCI to LM-LAD, D1, RCA): He has improved significantly symptomatically since complete revascularization.  Given extensive stenting including left main involvement, I recommended continuing DAPT for 1 year if tolerated.  Would recommend long-term clopidogrel in favor of aspirin as SAPT.    # Hyperlipidemia: His LDL is well-controlled on rosuvastatin and ezetimibe.    # Ventricular ectopy: He reports a significant reduction in symptomatic palpitations following revascularization.  Defer management of mexiletine to his primary cardiologist.    Follow Up  8 will follow-up routinely with Dr. Mcclendon.  If he has any concerning recurrent symptoms or findings on cardiac testing findings, he can be referred back for further evaluation at that time.     Cardiac Studies and Procedures:   TTE (5/28/2024)  Normal LV size,  thickness, and systolic function with estimated LVEF of 60%.  Indeterminate LV diastolic function with impaired relaxation filling   Pattern.  Normal RV size and systolic function.  Mitral annular calcifications with trace MR  Mild aortic valve stenosis: Vmax 2.8 m/s  IVC not well-visualized    CAG (9/10/2024)  The left main coronary artery trifurcates into left anterior descending, ramus intermedius, and left circumflex coronaries and has distal 50% disease between ostium of the circumflex and ramus.  The left anterior descending coronary artery is a moderate, transapical vessel with ostial 50% disease, a long segment of heavily calcified proximal to mid 90% disease followed by mid to distal luminal irregularities.  It supplies a moderate first diagonal branch with an ostial to proximal stent with mild in-stent restenosis.  The ramus intermedius branch is a moderate, bifurcating vessel with luminal irregularities.  The left circumflex coronary artery is a moderate, nondominant vessel that supplies two small to moderate obtuse marginal branches with luminal irregularities.  The right coronary artery is a moderate, dominant vessel with a long segment of ostial to proximal 90% calcific disease followed by luminal irregularities.  It has a high bifurcation and supplies a large posterolateral branch with ostial 60% disease followed by luminal irregularities and a moderate posterior descending artery with ostial 30% disease followed by luminal irregularities.    PCI (10/3/2024)  Successful intracoronary lithotripsy and percutaneous coronary intervention to the ostial to mid right coronary artery with overlapping 3.5 x 20 mm Synergy Megatron and 3.0 x 32 mm Synergy drug-eluting stents postdilated with a 4.5 x 12 mm noncompliant balloon at high pressure.    PCI (11/18/2024)  Successful percutaneous coronary of the distal left main coronary artery into mid left anterior descending coronary with one 3.0 x 32 mm Synergy  "drug-eluting stent postdilated to 4.1 mm proximally.      Vital Signs:   /60 (BP Location: Left arm, Patient Position: Sitting)   Pulse 78   Resp 16   Ht 1.676 m (5' 6\")   Wt 66.2 kg (146 lb)   SpO2 98%    BP Readings from Last 4 Encounters:   01/27/25 122/60   12/06/24 (!) 142/76   11/19/24 108/59   10/18/24 110/44     Wt Readings from Last 4 Encounters:   01/27/25 66.2 kg (146 lb)   12/30/24 63.5 kg (140 lb)   12/06/24 67.1 kg (147 lb 14.9 oz)   11/18/24 65.7 kg (144 lb 13.5 oz)     Body mass index is 23.57 kg/m².     Laboratories:   Lipids  Lab Results   Component Value Date/Time    LDL 54 08/08/2024 07:15 AM    LDL 49 05/28/2024 03:47 AM    LDL 62 04/25/2024 08:18 AM    LDL 62 01/09/2024 08:05 AM    LDL 62 10/12/2023 07:55 AM       Lab Results   Component Value Date/Time    HDL 46 08/08/2024 07:15 AM    HDL 50 05/28/2024 03:47 AM    HDL 60 04/25/2024 08:18 AM    HDL 60 01/09/2024 08:05 AM    HDL 49 10/12/2023 07:55 AM       Lab Results   Component Value Date/Time    TRIGLYCERIDE 60 08/08/2024 07:15 AM    TRIGLYCERIDE 63 05/28/2024 03:47 AM    TRIGLYCERIDE 47 04/25/2024 08:18 AM    TRIGLYCERIDE 45 01/09/2024 08:05 AM    TRIGLYCERIDE 80 10/12/2023 07:55 AM       Lab Results   Component Value Date/Time    CHOLSTRLTOT 112 08/08/2024 07:15 AM    CHOLSTRLTOT 112 05/28/2024 03:47 AM    CHOLSTRLTOT 131 04/25/2024 08:18 AM    CHOLSTRLTOT 131 01/09/2024 08:05 AM    CHOLSTRLTOT 127 10/12/2023 07:55 AM       No components found for: \"LPA\"      Chemistries  Lab Results   Component Value Date/Time    CREATININE 0.84 11/19/2024 01:55 AM    CREATININE 1.12 11/14/2024 12:58 PM    CREATININE 0.80 10/04/2024 03:45 AM    CREATININE 1.10 10/01/2024 10:32 AM    CREATININE 0.90 09/09/2024 10:34 AM     Lab Results   Component Value Date/Time    BUN 12 11/19/2024 01:55 AM    BUN 15 11/14/2024 12:58 PM    BUN 15 10/04/2024 03:45 AM    BUN 15 10/01/2024 10:32 AM    BUN 18 09/09/2024 10:34 AM     Lab Results   Component Value " "Date/Time    POTASSIUM 3.8 11/19/2024 01:55 AM    POTASSIUM 4.5 11/14/2024 12:58 PM    POTASSIUM 3.9 10/04/2024 03:45 AM     Lab Results   Component Value Date/Time    SODIUM 135 11/19/2024 01:55 AM    SODIUM 136 11/14/2024 12:58 PM    SODIUM 138 10/04/2024 03:45 AM     Lab Results   Component Value Date/Time    GLUCOSE 109 (H) 11/19/2024 01:55 AM    GLUCOSE 75 11/14/2024 12:58 PM    GLUCOSE 102 (H) 10/04/2024 03:45 AM     Lab Results   Component Value Date/Time    ASTSGOT 23 11/14/2024 12:58 PM    ASTSGOT 22 10/01/2024 10:32 AM    ASTSGOT 16 09/09/2024 10:34 AM     Lab Results   Component Value Date/Time    ALTSGPT 19 11/14/2024 12:58 PM    ALTSGPT 16 10/01/2024 10:32 AM    ALTSGPT 22 09/09/2024 10:34 AM     Lab Results   Component Value Date/Time    ALKPHOSPHAT 110 (H) 11/14/2024 12:58 PM    ALKPHOSPHAT 113 (H) 10/01/2024 10:32 AM    ALKPHOSPHAT 121 (H) 09/09/2024 10:34 AM     Lab Results   Component Value Date/Time    HBA1C 5.2 08/08/2024 07:15 AM    HBA1C 5.5 07/24/2024 02:27 AM    HBA1C 5.9 (H) 04/25/2024 08:18 AM     No results found for: \"TSH\"  Lab Results   Component Value Date/Time    NTPROBNP 121 06/08/2024 03:00 PM     Lab Results   Component Value Date/Time    TROPONINT 18 07/21/2024 12:24 PM    TROPONINT 15 06/08/2024 08:23 PM    TROPONINT 16 06/08/2024 03:00 PM     Blood Counts  Lab Results   Component Value Date/Time    HEMOGLOBIN 11.3 (L) 11/19/2024 01:55 AM    HEMOGLOBIN 11.7 (L) 11/14/2024 12:58 PM    HEMOGLOBIN 12.5 (L) 10/04/2024 03:45 AM     Lab Results   Component Value Date/Time    PLATELETCT 185 11/19/2024 01:55 AM    PLATELETCT 231 11/14/2024 12:58 PM    PLATELETCT 194 10/04/2024 03:45 AM     Lab Results   Component Value Date/Time    WBC 4.6 (L) 11/19/2024 01:55 AM    WBC 5.8 11/14/2024 12:58 PM    WBC 4.2 (L) 10/04/2024 03:45 AM      Physical Examination:   General: Well-appearing for age, no acute distress  Eyes: Extraocular movements intact, anicteric  Neck: Full range of motion, no " gross jugular venous distension  Pulmonary: Normal respiratory effort, no distress  Cardiovascular: Regular rate, regular rhythm  Gastrointestinal: Thin, non-distended  Extremities: Moves all extremities normally, no gross lower extremity edema  Neurological: Alert and oriented, normal speech  Psychiatric: Appropriate affect, normal judgment     Past History:   Past Medical History  The patient's past medical history was reviewed.  See HPI and self-reported patient medical history form for pertinent medical history to consultation.    Past Social History  The patient's social history was reviewed.  See HPI self-reported patient medical history form for pertinent social history to consultation.    Past Family History  The patient's family history was reviewed.  See HPI self-reported patient medical history form for pertinent family history to consultation.    Review of Systems  A pertinent cardiac review of systems was performed and was otherwise unremarkable except as per HPI and self-reported patient medical history form.        Kentrell Mora MD, [facility]  Interventional Cardiology  [facility], [address]  [address] [address]  Phone: [phone]  Fax: [phone]

## 2025-01-27 ENCOUNTER — OFFICE VISIT (OUTPATIENT)
Dept: CARDIOLOGY | Facility: MEDICAL CENTER | Age: 85
End: 2025-01-27
Attending: INTERNAL MEDICINE
Payer: MEDICARE

## 2025-01-27 VITALS
OXYGEN SATURATION: 98 % | HEIGHT: 66 IN | HEART RATE: 78 BPM | RESPIRATION RATE: 16 BRPM | DIASTOLIC BLOOD PRESSURE: 60 MMHG | BODY MASS INDEX: 23.46 KG/M2 | WEIGHT: 146 LBS | SYSTOLIC BLOOD PRESSURE: 122 MMHG

## 2025-01-27 DIAGNOSIS — Z95.5 STATUS POST INSERTION OF DRUG ELUTING CORONARY ARTERY STENT: ICD-10-CM

## 2025-01-27 DIAGNOSIS — I25.84 CORONARY ARTERY DISEASE DUE TO CALCIFIED CORONARY LESION: ICD-10-CM

## 2025-01-27 DIAGNOSIS — I20.89 STABLE ANGINA (HCC): ICD-10-CM

## 2025-01-27 DIAGNOSIS — E78.00 PURE HYPERCHOLESTEROLEMIA: ICD-10-CM

## 2025-01-27 DIAGNOSIS — I25.10 CORONARY ARTERY DISEASE DUE TO CALCIFIED CORONARY LESION: ICD-10-CM

## 2025-01-27 PROCEDURE — 99214 OFFICE O/P EST MOD 30 MIN: CPT | Performed by: INTERNAL MEDICINE

## 2025-01-27 PROCEDURE — 99212 OFFICE O/P EST SF 10 MIN: CPT | Performed by: INTERNAL MEDICINE

## 2025-01-27 RX ORDER — CLOPIDOGREL BISULFATE 75 MG/1
75 TABLET ORAL DAILY
Qty: 90 TABLET | Refills: 3 | Status: SHIPPED | OUTPATIENT
Start: 2025-01-27

## 2025-01-27 ASSESSMENT — FIBROSIS 4 INDEX: FIB4 SCORE: 2.4

## 2025-01-28 ENCOUNTER — NON-PROVIDER VISIT (OUTPATIENT)
Dept: CARDIOLOGY | Facility: MEDICAL CENTER | Age: 85
End: 2025-01-28
Payer: MEDICARE

## 2025-01-28 DIAGNOSIS — Z95.5 STENTED CORONARY ARTERY: ICD-10-CM

## 2025-01-28 PROBLEM — I10 PRIMARY HYPERTENSION: Status: RESOLVED | Noted: 2024-05-27 | Resolved: 2025-01-28

## 2025-01-28 PROBLEM — R07.9 CHEST PAIN: Status: RESOLVED | Noted: 2024-11-18 | Resolved: 2025-01-28

## 2025-01-28 PROBLEM — E78.2 MIXED HYPERLIPIDEMIA: Status: RESOLVED | Noted: 2024-05-27 | Resolved: 2025-01-28

## 2025-01-28 PROBLEM — R35.1 NOCTURIA: Status: RESOLVED | Noted: 2020-03-09 | Resolved: 2025-01-28

## 2025-01-28 PROBLEM — E86.0 DEHYDRATION: Status: RESOLVED | Noted: 2024-06-08 | Resolved: 2025-01-28

## 2025-01-28 PROBLEM — E78.00 PURE HYPERCHOLESTEROLEMIA: Status: ACTIVE | Noted: 2025-01-28

## 2025-01-28 PROBLEM — E87.1 HYPONATREMIA: Status: RESOLVED | Noted: 2024-07-21 | Resolved: 2025-01-28

## 2025-01-28 PROBLEM — R73.03 PREDIABETES: Status: RESOLVED | Noted: 2024-05-27 | Resolved: 2025-01-28

## 2025-01-28 PROBLEM — Z71.89 ACP (ADVANCE CARE PLANNING): Status: RESOLVED | Noted: 2024-05-27 | Resolved: 2025-01-28

## 2025-01-28 NOTE — PROGRESS NOTES
Sandip Zhao attended Intensive Cardiac Rehab today from 1000 to 1200. During his time he exercised and attended class.   His education today was a workshop titled: Healthy Sleep. Patient received handouts and class discussion pertaining to the topic.

## 2025-01-29 ENCOUNTER — NON-PROVIDER VISIT (OUTPATIENT)
Dept: CARDIOLOGY | Facility: MEDICAL CENTER | Age: 85
End: 2025-01-29
Payer: MEDICARE

## 2025-01-29 DIAGNOSIS — Z95.5 STENTED CORONARY ARTERY: ICD-10-CM

## 2025-01-29 NOTE — PROGRESS NOTES
Sandip Zhao attended Intensive Cardiac Rehab today from 1000 to 1200. During his time he exercised and attended class.   His education today was a COOKING WORKSHOP titled: SAVORY SOUPS. Patient received handouts and class discussion pertaining to the topic.

## 2025-01-30 ENCOUNTER — NON-PROVIDER VISIT (OUTPATIENT)
Dept: CARDIOLOGY | Facility: MEDICAL CENTER | Age: 85
End: 2025-01-30
Payer: MEDICARE

## 2025-01-30 DIAGNOSIS — Z95.5 STENTED CORONARY ARTERY: ICD-10-CM

## 2025-01-30 NOTE — PROGRESS NOTES
Sandip Zhao attended Intensive Cardiac Rehab today from 1000 to 1200. During his time he exercised and attended class.   His education today was a video titled: Nutrition Action Plan. Patient received handouts and class discussion pertaining to the topic.

## 2025-02-04 ENCOUNTER — NON-PROVIDER VISIT (OUTPATIENT)
Dept: CARDIOLOGY | Facility: MEDICAL CENTER | Age: 85
End: 2025-02-04
Payer: MEDICARE

## 2025-02-04 DIAGNOSIS — Z95.5 STENTED CORONARY ARTERY: ICD-10-CM

## 2025-02-04 PROCEDURE — G0423 INTENS CARDIAC REHAB NO EXER: HCPCS | Mod: 59 | Performed by: INTERNAL MEDICINE

## 2025-02-04 PROCEDURE — G0422 INTENS CARDIAC REHAB W/EXERC: HCPCS | Performed by: INTERNAL MEDICINE

## 2025-02-04 NOTE — PROGRESS NOTES
Sandip Zhao attended Intensive Cardiac Rehab today from 1000 to 1200. During his time he exercised and attended class.   His education today was a video titled: Diseases of Our Time. Patient received handouts and class discussion pertaining to the topic.

## 2025-02-05 ENCOUNTER — NON-PROVIDER VISIT (OUTPATIENT)
Dept: CARDIOLOGY | Facility: MEDICAL CENTER | Age: 85
End: 2025-02-05
Payer: MEDICARE

## 2025-02-05 DIAGNOSIS — Z95.5 STENTED CORONARY ARTERY: ICD-10-CM

## 2025-02-05 PROCEDURE — G0422 INTENS CARDIAC REHAB W/EXERC: HCPCS | Performed by: INTERNAL MEDICINE

## 2025-02-05 PROCEDURE — G0423 INTENS CARDIAC REHAB NO EXER: HCPCS | Mod: 59 | Performed by: INTERNAL MEDICINE

## 2025-02-05 NOTE — PROGRESS NOTES
Sandip Cece attended Intensive Cardiac Rehab today from 1000 to 1200. During his time he exercised and attended class.   His education today was a cooking school titled: Cooking Meals in a Snap. Patient received handouts and class discussion pertaining to the topic.

## 2025-02-06 ENCOUNTER — NON-PROVIDER VISIT (OUTPATIENT)
Dept: CARDIOLOGY | Facility: MEDICAL CENTER | Age: 85
End: 2025-02-06
Payer: MEDICARE

## 2025-02-06 DIAGNOSIS — Z95.5 STENTED CORONARY ARTERY: ICD-10-CM

## 2025-02-06 PROCEDURE — G0423 INTENS CARDIAC REHAB NO EXER: HCPCS | Mod: 59 | Performed by: INTERNAL MEDICINE

## 2025-02-06 PROCEDURE — G0422 INTENS CARDIAC REHAB W/EXERC: HCPCS | Performed by: INTERNAL MEDICINE

## 2025-02-06 NOTE — PROGRESS NOTES
Sandip Zhao attended Intensive Cardiac Rehab today from 1000 to 1200. During his time he exercised and attended class.   His education today WORKSHOP titled: TARGETING NUTRITIONAL PRIORITIES. Patient received handouts and class discussion pertaining to the topic.

## 2025-02-11 ENCOUNTER — NON-PROVIDER VISIT (OUTPATIENT)
Dept: CARDIOLOGY | Facility: MEDICAL CENTER | Age: 85
End: 2025-02-11
Payer: MEDICARE

## 2025-02-11 DIAGNOSIS — Z95.5 STENTED CORONARY ARTERY: ICD-10-CM

## 2025-02-11 PROCEDURE — G0423 INTENS CARDIAC REHAB NO EXER: HCPCS | Mod: 59 | Performed by: INTERNAL MEDICINE

## 2025-02-11 PROCEDURE — G0422 INTENS CARDIAC REHAB W/EXERC: HCPCS | Performed by: INTERNAL MEDICINE

## 2025-02-11 NOTE — PROGRESS NOTES
Sandip Cece attended Intensive Cardiac Rehab today from 1000 to 1200. During his time he exercised and attended class.   His education today was a VIDEO titled: HYPERTENSION AND HEART DISEASE. Patient received handouts and class discussion pertaining to the topic.      none

## 2025-02-12 ENCOUNTER — NON-PROVIDER VISIT (OUTPATIENT)
Dept: CARDIOLOGY | Facility: MEDICAL CENTER | Age: 85
End: 2025-02-12
Payer: MEDICARE

## 2025-02-12 DIAGNOSIS — Z95.5 STENTED CORONARY ARTERY: ICD-10-CM

## 2025-02-12 PROCEDURE — G0423 INTENS CARDIAC REHAB NO EXER: HCPCS | Mod: 59 | Performed by: INTERNAL MEDICINE

## 2025-02-12 PROCEDURE — G0422 INTENS CARDIAC REHAB W/EXERC: HCPCS | Performed by: INTERNAL MEDICINE

## 2025-02-12 NOTE — PROGRESS NOTES
Sandip Cece attended Intensive Cardiac Rehab today from 1000 to 1200. During his time he exercised and attended class.   His education today was a cooking school titled: [employer]. Patient received handouts and class discussion pertaining to the topic.

## 2025-02-15 ENCOUNTER — HOSPITAL ENCOUNTER (EMERGENCY)
Facility: MEDICAL CENTER | Age: 85
End: 2025-02-15
Attending: EMERGENCY MEDICINE
Payer: MEDICARE

## 2025-02-15 VITALS
HEART RATE: 79 BPM | TEMPERATURE: 98 F | OXYGEN SATURATION: 97 % | HEIGHT: 64 IN | WEIGHT: 147.71 LBS | DIASTOLIC BLOOD PRESSURE: 68 MMHG | RESPIRATION RATE: 20 BRPM | SYSTOLIC BLOOD PRESSURE: 142 MMHG | BODY MASS INDEX: 25.22 KG/M2

## 2025-02-15 DIAGNOSIS — I10 HYPERTENSION, UNSPECIFIED TYPE: ICD-10-CM

## 2025-02-15 DIAGNOSIS — R00.2 PALPITATIONS: ICD-10-CM

## 2025-02-15 DIAGNOSIS — I49.8 BIGEMINY: ICD-10-CM

## 2025-02-15 LAB
ALBUMIN SERPL BCP-MCNC: 4.2 G/DL (ref 3.2–4.9)
ALBUMIN/GLOB SERPL: 1.8 G/DL
ALP SERPL-CCNC: 102 U/L (ref 30–99)
ALT SERPL-CCNC: 29 U/L (ref 2–50)
ANION GAP SERPL CALC-SCNC: 10 MMOL/L (ref 7–16)
AST SERPL-CCNC: 21 U/L (ref 12–45)
BASOPHILS # BLD AUTO: 0.5 % (ref 0–1.8)
BASOPHILS # BLD: 0.02 K/UL (ref 0–0.12)
BILIRUB SERPL-MCNC: 0.5 MG/DL (ref 0.1–1.5)
BUN SERPL-MCNC: 18 MG/DL (ref 8–22)
CALCIUM ALBUM COR SERPL-MCNC: 8.6 MG/DL (ref 8.5–10.5)
CALCIUM SERPL-MCNC: 8.8 MG/DL (ref 8.4–10.2)
CHLORIDE SERPL-SCNC: 104 MMOL/L (ref 96–112)
CO2 SERPL-SCNC: 24 MMOL/L (ref 20–33)
CREAT SERPL-MCNC: 0.81 MG/DL (ref 0.5–1.4)
EKG IMPRESSION: NORMAL
EKG IMPRESSION: NORMAL
EOSINOPHIL # BLD AUTO: 0.11 K/UL (ref 0–0.51)
EOSINOPHIL NFR BLD: 3 % (ref 0–6.9)
ERYTHROCYTE [DISTWIDTH] IN BLOOD BY AUTOMATED COUNT: 45.3 FL (ref 35.9–50)
GFR SERPLBLD CREATININE-BSD FMLA CKD-EPI: 87 ML/MIN/1.73 M 2
GLOBULIN SER CALC-MCNC: 2.4 G/DL (ref 1.9–3.5)
GLUCOSE SERPL-MCNC: 89 MG/DL (ref 65–99)
HCT VFR BLD AUTO: 35.8 % (ref 42–52)
HGB BLD-MCNC: 12.1 G/DL (ref 14–18)
IMM GRANULOCYTES # BLD AUTO: 0.01 K/UL (ref 0–0.11)
IMM GRANULOCYTES NFR BLD AUTO: 0.3 % (ref 0–0.9)
LYMPHOCYTES # BLD AUTO: 1.09 K/UL (ref 1–4.8)
LYMPHOCYTES NFR BLD: 29.7 % (ref 22–41)
MAGNESIUM SERPL-MCNC: 2 MG/DL (ref 1.5–2.5)
MCH RBC QN AUTO: 30.5 PG (ref 27–33)
MCHC RBC AUTO-ENTMCNC: 33.8 G/DL (ref 32.3–36.5)
MCV RBC AUTO: 90.2 FL (ref 81.4–97.8)
MONOCYTES # BLD AUTO: 0.49 K/UL (ref 0–0.85)
MONOCYTES NFR BLD AUTO: 13.4 % (ref 0–13.4)
NEUTROPHILS # BLD AUTO: 1.95 K/UL (ref 1.82–7.42)
NEUTROPHILS NFR BLD: 53.1 % (ref 44–72)
NRBC # BLD AUTO: 0 K/UL
NRBC BLD-RTO: 0 /100 WBC (ref 0–0.2)
NT-PROBNP SERPL IA-MCNC: 116 PG/ML (ref 0–125)
PLATELET # BLD AUTO: 186 K/UL (ref 164–446)
PMV BLD AUTO: 9 FL (ref 9–12.9)
POTASSIUM SERPL-SCNC: 4.3 MMOL/L (ref 3.6–5.5)
PROT SERPL-MCNC: 6.6 G/DL (ref 6–8.2)
RBC # BLD AUTO: 3.97 M/UL (ref 4.7–6.1)
SODIUM SERPL-SCNC: 138 MMOL/L (ref 135–145)
TROPONIN T SERPL-MCNC: 18 NG/L (ref 6–19)
WBC # BLD AUTO: 3.7 K/UL (ref 4.8–10.8)

## 2025-02-15 PROCEDURE — 85025 COMPLETE CBC W/AUTO DIFF WBC: CPT

## 2025-02-15 PROCEDURE — 93005 ELECTROCARDIOGRAM TRACING: CPT | Mod: TC

## 2025-02-15 PROCEDURE — 700111 HCHG RX REV CODE 636 W/ 250 OVERRIDE (IP): Mod: JZ | Performed by: EMERGENCY MEDICINE

## 2025-02-15 PROCEDURE — 84484 ASSAY OF TROPONIN QUANT: CPT

## 2025-02-15 PROCEDURE — 83735 ASSAY OF MAGNESIUM: CPT

## 2025-02-15 PROCEDURE — 96374 THER/PROPH/DIAG INJ IV PUSH: CPT

## 2025-02-15 PROCEDURE — 99285 EMERGENCY DEPT VISIT HI MDM: CPT

## 2025-02-15 PROCEDURE — 83880 ASSAY OF NATRIURETIC PEPTIDE: CPT

## 2025-02-15 PROCEDURE — 36415 COLL VENOUS BLD VENIPUNCTURE: CPT

## 2025-02-15 PROCEDURE — 93005 ELECTROCARDIOGRAM TRACING: CPT | Mod: TC | Performed by: EMERGENCY MEDICINE

## 2025-02-15 PROCEDURE — 80053 COMPREHEN METABOLIC PANEL: CPT

## 2025-02-15 RX ORDER — HYDRALAZINE HYDROCHLORIDE 20 MG/ML
5 INJECTION INTRAMUSCULAR; INTRAVENOUS ONCE
Status: COMPLETED | OUTPATIENT
Start: 2025-02-15 | End: 2025-02-15

## 2025-02-15 RX ADMIN — HYDRALAZINE HYDROCHLORIDE 5 MG: 20 INJECTION INTRAMUSCULAR; INTRAVENOUS at 20:06

## 2025-02-15 ASSESSMENT — FIBROSIS 4 INDEX: FIB4 SCORE: 2.4

## 2025-02-16 NOTE — DISCHARGE INSTRUCTIONS
Your laboratory evaluation is quite clinically reassuring.  We have seen your bigeminy which you were already aware of on telemetry.  We believe the fact that you are using a Holter monitor and you can follow-up with primary care/cardiology to follow-up on the results is quite reassuring.  If you have any concern or chest pain or shortness of breath please return to the emergency department.

## 2025-02-16 NOTE — ED PROVIDER NOTES
"ER Provider Note    Scribed for  Arnoldo Giraldo D.O. by Randy Jansen. 2/15/2025   7:29 PM    Primary Care Provider: Meagan Mcneil M.D.    CHIEF COMPLAINT  Chief Complaint   Patient presents with    Hypertension     Not feeling well today and took his BP and it was running high   recent \"clean out\" of his heart per wife  done by Dr Mora    is concerned about his BP    c/o being tired      EXTERNAL RECORDS REVIEWED  Inpatient Notes patient with recent PCI.    HPI/ROS  LIMITATION TO HISTORY   Select: : None  OUTSIDE HISTORIAN(S):  Wife was present at bedside.  And corroborates story    Sandip Zhao is a 84 y.o. male who presents to the ED for evaluation of hypertension.  History of bigeminy, history of PVCs, history of coronary artery disease.  The patient reports he felt he was having occasional palpitations and decided measure his heart rate and blood pressure. He notes his heart rate was elevated at the time less than 120. Concern of symptoms the patient presents to the ED for further evaluation. He does reports to have had 3 heart surgeries last year.       Patient is from [facility], and had a previous career as a perfusionist.    PAST MEDICAL HISTORY  Past Medical History:   Diagnosis Date    Anemia     Arrhythmia     Bigeminy PVC's    Arthritis     Asthma     H/O    BPH (benign prostatic hyperplasia)     Broken rib 09/09/2024    Fell 8/16/2024 Left rib #8. Recent fall broke left rib #8 and stitches on left side of head, stitches have been removed.    CAD (coronary artery disease)     stents x3    Dental disorder 09/09/2024    Full Set Dentures    Environmental allergies     Fall 08/2024    Missed a step and fell, broke 8th Rib, left side. Cardiologist's Dr. Mora and Dr. Giordano are aware of this, per patient's wife.    Gall stones 09/09/2024    Currently has gall stones.    Hemorrhagic disorder (HCC) 09/26/2024    Taking Plavix    Hemorrhoids     High cholesterol     Hypertension     Inguinal " "hernia 09/26/2024    MI (myocardial infarction) ([facility]) 07/04/2015    Pain     Chronic Arthritic Pain    Pneumonia 09/26/2024    H/O Once    Status post placement of implantable loop recorder        SURGICAL HISTORY  Past Surgical History:   Procedure Laterality Date    OTHER CARDIAC SURGERY  09/17/2024    Angiogram    OTHER CARDIAC SURGERY  09/09/2024    Loop recorder placed about a year ago.    ABDOMINAL EXPLORATION      APPENDECTOMY      When a child    KNEE ARTHROSCOPY         FAMILY HISTORY  Family History   Problem Relation Age of Onset    Hypertension Father     Heart Disease Father         mitral valve replacement       SOCIAL HISTORY   reports that he has quit smoking. His smoking use included cigarettes. He has never used smokeless tobacco. He reports that he does not currently use alcohol. He reports that he does not use drugs.    CURRENT MEDICATIONS  Previous Medications    ACETAMINOPHEN (TYLENOL) 500 MG TAB    Take 500-1,000 mg by mouth every 6 hours as needed.    ASPIRIN 81 MG EC TABLET    Take 81 mg by mouth every morning.    CLOPIDOGREL (PLAVIX) 75 MG TAB    Take 1 Tablet by mouth every day.    CYANOCOBALAMIN (B-12 PO)    Take 1 Tablet by mouth every day.    DOCUSATE SODIUM (COLACE) 100 MG CAP    Take 200 mg by mouth 2 times a day.    EZETIMIBE (ZETIA) 10 MG TAB    Take 10 mg by mouth every evening.    FINASTERIDE (PROSCAR) 5 MG TAB    Take 1 Tablet by mouth every day.    MEXILETINE (MEXITIL) 150 MG CAP    Take 150 mg by mouth every 8 hours.    ROSUVASTATIN (CRESTOR) 20 MG TAB    Take 20 mg by mouth every evening.    TAMSULOSIN (FLOMAX) 0.4 MG CAPSULE    Take 1 Capsule by mouth 2 times a day.       ALLERGIES  No Known Allergies     PHYSICAL EXAM  BP (!) 175/75   Pulse 74   Temp 36.7 °C (98 °F) (Temporal)   Resp 16   Ht 1.626 m (5' 4\")   Wt 67 kg (147 lb 11.3 oz)   SpO2 98%   BMI 25.35 kg/m²    General: No acute distress, very kind.  Neuro: Awake alert and oriented, muscle strength sensation " normal  Neck: Supple  Cardiac: Regular rate and rhythm, no murmurs rubs or gallops  Pulmonary: Clear to auscultation bilaterally no distress  Abdomen: Soft nontender nondistended  Back: Nontender  Psych: Normal  Skin: Pink warm dry  Extremities: Full range of motion, muscle strength sensation intact 2+ pulses      DIAGNOSTIC STUDIES/PROCEDURES  Labs:   Labs Reviewed   CBC WITH DIFFERENTIAL - Abnormal; Notable for the following components:       Result Value    WBC 3.7 (*)     RBC 3.97 (*)     Hemoglobin 12.1 (*)     Hematocrit 35.8 (*)     All other components within normal limits   COMP METABOLIC PANEL - Abnormal; Notable for the following components:    Alkaline Phosphatase 102 (*)     All other components within normal limits   TROPONIN   PROBRAIN NATRIURETIC PEPTIDE, NT   MAGNESIUM   ESTIMATED GFR     I have independently interpreted the above labs    EKG:   Results for orders placed or performed during the hospital encounter of 02/15/25   EKG   Result Value Ref Range    Report       [facility] Dept.    Test Date:  2025-02-15  Pt Name:    EVELYN WEAVER             Department: [facility]  MRN:        [id number]                      [facility]  Gender:     Male                         Technician:  :        1940                   Requested By:ER TRIAGE PROTOCOL  Order #:    [id number]                    Reading MD: Arnoldo Giraldo    Measurements  Intervals                                Axis  Rate:       90                           P:          38  MN:         146                          QRS:        11  QRSD:       93                           T:          65  QT:         356  QTc:        436    Interpretive Statements  Sinus rhythm  Abnormal R-wave progression, early transition  Probable left ventricular hypertrophy  Compared to ECG 2024 02:10:57  No significant changes  Electronically Signed On 02- 19:37:48 PST by Arnoldo Giraldo     EKG   Result Value Ref  Range    Report       [facility] Emergency Dept.    Test Date:  2025-02-15  Pt Name:    EVELYN WEAVER             Department: [facility]  MRN:        [id number]                      Room:       -ROOM 9  Gender:     Male                         Technician: [id number]  :        1940                   Requested By:KEVON SANDOVAL  Order #:    [id number]                    Reading MD:    Measurements  Intervals                                Axis  Rate:       79                           P:          59  DE:         56                           QRS:        10  QRSD:       92                           T:          43  QT:         394  QTc:        452    Interpretive Statements  Sinus rhythm  Ventricular bigeminy  Short DE interval  RSR' in V1 or V2, right VCD or RVH  LVH by voltage  Compared to ECG 02/15/2025 19:15:28  Ventricular premature complex(es) now present  Short DE interval now present  Right ventricular hypertrophy now present  RSR' in V1 or V2 now present       I have independently interpreted this EKG    Radiology:   This attending emergency physician has independently interpreted the diagnostic imaging associated with this visit and is awaiting the final reading from the radiologist.   Preliminary interpretation is a follows: None  Radiologist interpretation:   No orders to display        COURSE & MEDICAL DECISION MAKING         INITIAL ASSESSMENT, COURSE AND PLAN  Differential diagnoses include but not limited to: Arrhythmia, Electrolyte abnormalities, hypertension    Care Narrative: 84-year-old gentleman with a history of CAD, bigeminy.  States that he has hypertension was concerned.  He had tachycardia earlier in the day    7:29 PM - Patient was first seen and evaluated at bedside. Patient presents to the ED for elevated heart rate with associated hypertension. Ordered for EKG, Magnesium, CBC w/diff, Troponin, pro-brain natriuretic to evaluate. The patient will be medicated with  Apresoline 5 mg for his symptoms. Patient verbalizes understanding and support with my plan of care.     Hypertension counseling:   I spent a total of 5 minutes counseling patient on blood pressure control and management.  We discussed medication management with PCP.  I recommended keeping a blood pressure journal, taking the blood pressure once in the morning once evening after resting for 5 minutes.  Presented in general to PCP and discussing medication management or adjustment.  Discussed hypertensive emergencies and what signs and symptoms to be monitoring for and to return to the ED for.  Patient verbalized understanding and was receptive of counseling.         ED COURSE AND ADDITIONAL PROBLEMS    Palpitations/bigeminy/PVCs: Patient does have occasional PVCs and bigeminy here without any chest pain or shortness of breath.  He is not even having palpitations while here with the occasional PVCs and bigeminy.  On review of his history he has numerous EKGs with this and it is in his past medical history.  He states that he has not on and off.  He also repeatedly denies any chest pain today.  Troponin normal electrolytes normal.  Telemetry throughout his stay is consistent with occasional PVC.  Patient has a Holter monitor.  Patient is can follow-up with a cardiologist on Monday or return to emergency department if symptoms recur or worsen.    Hypertension: Patient was given hydralazine here and the blood pressure improved.  He has yet to take his evening blood pressure.  Medication is denise take that when he goes home.    Patient does understand there are some risk of major adverse cardiac event need for intervention heart attack prolonged disability and or death with discharge.  This is an informed discharge.  We had a prolonged discussion with wife and patient at time of discharge and they feel comfortable with outpatient move management with the cardiology follow-up.  And reasons to return.    DISPOSITION AND  DISCUSSIONS    I have discussed management of the patient with the following physicians and OK's: None    Discussion of management with other Q or appropriate source(s): Laboratory technician    Escalation of care considered, and ultimately not performed: acute inpatient care management, however at this time, the patient is most appropriate for outpatient management.    Barriers to care at this time, including but not limited to: None    Decision tools and prescription drugs considered including, but not limited to: None      FINAL DIAGNOSIS  1. Palpitations    2. Hypertension, unspecified type    3. Bigeminy         Randy SHEPPARD (Cheryl), am scribing for, and in the presence of, Arnoldo Giraldo D.O..    Electronically signed by: Randy Jansen (Cheryl), 2/15/2025    IArnoldo D.O. personally performed the services described in this documentation, as scribed by Randy Jansen in my presence, and it is both accurate and complete.      The note accurately reflects work and decisions made by me.  Arnoldo Giraldo D.O.  2/15/2025  10:30 PM

## 2025-02-16 NOTE — ED TRIAGE NOTES
"BP (!) 167/83   Pulse 91   Temp 36.7 °C (98 °F) (Temporal)   Resp 16   Ht 1.626 m (5' 4\")   Wt 67 kg (147 lb 11.3 oz)   SpO2 97%   BMI 25.35 kg/m²   Chief Complaint   Patient presents with    Hypertension     Not feeling well today and took his BP and it was running high   recent \"clean out\" of his heart per wife  done by Dr Mora    is concerned about his BP    c/o being tired      Comes in w/ his wife   "

## 2025-02-18 ENCOUNTER — NON-PROVIDER VISIT (OUTPATIENT)
Dept: CARDIOLOGY | Facility: MEDICAL CENTER | Age: 85
End: 2025-02-18
Payer: MEDICARE

## 2025-02-18 DIAGNOSIS — Z95.5 STENTED CORONARY ARTERY: ICD-10-CM

## 2025-02-18 PROCEDURE — G0422 INTENS CARDIAC REHAB W/EXERC: HCPCS | Performed by: INTERNAL MEDICINE

## 2025-02-18 PROCEDURE — G0423 INTENS CARDIAC REHAB NO EXER: HCPCS | Mod: 59 | Performed by: INTERNAL MEDICINE

## 2025-02-19 ENCOUNTER — NON-PROVIDER VISIT (OUTPATIENT)
Dept: CARDIOLOGY | Facility: MEDICAL CENTER | Age: 85
End: 2025-02-19
Payer: MEDICARE

## 2025-02-19 DIAGNOSIS — Z95.5 STENTED CORONARY ARTERY: ICD-10-CM

## 2025-02-19 PROCEDURE — G0422 INTENS CARDIAC REHAB W/EXERC: HCPCS | Performed by: INTERNAL MEDICINE

## 2025-02-19 PROCEDURE — G0423 INTENS CARDIAC REHAB NO EXER: HCPCS | Mod: 59 | Performed by: INTERNAL MEDICINE

## 2025-02-19 NOTE — PROGRESS NOTES
Sandip Cece attended Intensive Cardiac Rehab today from 1000 to 1200. During his time he exercised and attended class.   His education today was a cooking school titled: Comforting Weekend Breakfasts. Patient received handouts and class discussion pertaining to the topic.

## 2025-02-27 ENCOUNTER — NON-PROVIDER VISIT (OUTPATIENT)
Dept: CARDIOLOGY | Facility: MEDICAL CENTER | Age: 85
End: 2025-02-27
Payer: MEDICARE

## 2025-02-27 DIAGNOSIS — Z95.5 STENTED CORONARY ARTERY: ICD-10-CM

## 2025-02-27 NOTE — PROGRESS NOTES
Sandip Zhao attended Intensive Cardiac Rehab today from 1000 to 1200. During his time he exercised and attended class.   His education today was a workshop titled: Dining Out . Patient received handouts and class discussion pertaining to the topic.

## 2025-03-04 ENCOUNTER — NON-PROVIDER VISIT (OUTPATIENT)
Dept: CARDIOLOGY | Facility: MEDICAL CENTER | Age: 85
End: 2025-03-04
Payer: MEDICARE

## 2025-03-04 DIAGNOSIS — Z95.5 STENTED CORONARY ARTERY: ICD-10-CM

## 2025-03-04 PROCEDURE — G0423 INTENS CARDIAC REHAB NO EXER: HCPCS | Mod: 59 | Performed by: INTERNAL MEDICINE

## 2025-03-04 PROCEDURE — G0422 INTENS CARDIAC REHAB W/EXERC: HCPCS | Performed by: INTERNAL MEDICINE

## 2025-03-04 NOTE — PROGRESS NOTES
Sandip Zhao attended Intensive Cardiac Rehab today from 1000 to 1200. During his time he exercised and attended class.   His education today was a VIDEO titled: FACTS ON FATS. Patient received handouts and class discussion pertaining to the topic.

## 2025-03-05 ENCOUNTER — NON-PROVIDER VISIT (OUTPATIENT)
Dept: CARDIOLOGY | Facility: MEDICAL CENTER | Age: 85
End: 2025-03-05
Payer: MEDICARE

## 2025-03-05 DIAGNOSIS — Z95.5 STENTED CORONARY ARTERY: ICD-10-CM

## 2025-03-05 PROCEDURE — G0423 INTENS CARDIAC REHAB NO EXER: HCPCS | Mod: 59 | Performed by: INTERNAL MEDICINE

## 2025-03-05 PROCEDURE — G0422 INTENS CARDIAC REHAB W/EXERC: HCPCS | Performed by: INTERNAL MEDICINE

## 2025-03-05 NOTE — PROGRESS NOTES
Sandip Cece attended Intensive Cardiac Rehab today from 1000 to 1200. During his time he exercised and attended class.   His education today was a cooking school titled: Fast Evening Meals. Patient received handouts and class discussion pertaining to the topic.

## 2025-03-06 ENCOUNTER — NON-PROVIDER VISIT (OUTPATIENT)
Dept: CARDIOLOGY | Facility: MEDICAL CENTER | Age: 85
End: 2025-03-06
Payer: MEDICARE

## 2025-03-06 DIAGNOSIS — Z95.5 STENTED CORONARY ARTERY: ICD-10-CM

## 2025-03-06 PROCEDURE — G0423 INTENS CARDIAC REHAB NO EXER: HCPCS | Mod: 59 | Performed by: INTERNAL MEDICINE

## 2025-03-06 PROCEDURE — G0422 INTENS CARDIAC REHAB W/EXERC: HCPCS | Performed by: INTERNAL MEDICINE

## 2025-03-06 NOTE — PROGRESS NOTES
Sandip Zhao attended Intensive Cardiac Rehab today from 1000 to 1200. During his time he exercised and attended class.   His education today was a WORKSHOP titled: MANAGING HEART DISEASE PART 2.. Patient received handouts and class discussion pertaining to the topic.

## 2025-03-11 ENCOUNTER — NON-PROVIDER VISIT (OUTPATIENT)
Dept: CARDIOLOGY | Facility: MEDICAL CENTER | Age: 85
End: 2025-03-11
Payer: MEDICARE

## 2025-03-11 DIAGNOSIS — Z95.5 STENTED CORONARY ARTERY: ICD-10-CM

## 2025-03-11 PROCEDURE — G0423 INTENS CARDIAC REHAB NO EXER: HCPCS | Mod: 59 | Performed by: INTERNAL MEDICINE

## 2025-03-11 PROCEDURE — G0422 INTENS CARDIAC REHAB W/EXERC: HCPCS | Performed by: INTERNAL MEDICINE

## 2025-03-11 NOTE — PROGRESS NOTES
Sandip Zhao attended Intensive Cardiac Rehab today from 1000 to 1200. During his time he exercised and attended class.   His education today was a VIDEO titled: HEART DISEASE AND RISK REDUCTION. Patient received handouts and class discussion pertaining to the topic.

## 2025-03-12 ENCOUNTER — APPOINTMENT (OUTPATIENT)
Dept: RADIOLOGY | Facility: MEDICAL CENTER | Age: 85
DRG: 481 | End: 2025-03-12
Attending: EMERGENCY MEDICINE
Payer: MEDICARE

## 2025-03-12 ENCOUNTER — HOSPITAL ENCOUNTER (INPATIENT)
Facility: MEDICAL CENTER | Age: 85
LOS: 5 days | DRG: 481 | End: 2025-03-17
Attending: EMERGENCY MEDICINE | Admitting: HOSPITALIST
Payer: MEDICARE

## 2025-03-12 ENCOUNTER — APPOINTMENT (OUTPATIENT)
Dept: RADIOLOGY | Facility: IMAGING CENTER | Age: 85
End: 2025-03-12
Attending: PHYSICIAN ASSISTANT
Payer: MEDICARE

## 2025-03-12 ENCOUNTER — OFFICE VISIT (OUTPATIENT)
Dept: URGENT CARE | Facility: CLINIC | Age: 85
End: 2025-03-12
Payer: MEDICARE

## 2025-03-12 ENCOUNTER — NON-PROVIDER VISIT (OUTPATIENT)
Dept: CARDIOLOGY | Facility: MEDICAL CENTER | Age: 85
End: 2025-03-12
Payer: MEDICARE

## 2025-03-12 VITALS
DIASTOLIC BLOOD PRESSURE: 60 MMHG | OXYGEN SATURATION: 99 % | HEIGHT: 65 IN | SYSTOLIC BLOOD PRESSURE: 140 MMHG | BODY MASS INDEX: 24.32 KG/M2 | WEIGHT: 146 LBS | RESPIRATION RATE: 16 BRPM | TEMPERATURE: 97.9 F | HEART RATE: 72 BPM
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
## 2025-03-12 DIAGNOSIS — M25.552 LEFT HIP PAIN: ICD-10-CM

## 2025-03-12 DIAGNOSIS — S72.035A: ICD-10-CM

## 2025-03-12 DIAGNOSIS — I49.8 VENTRICULAR BIGEMINY: ICD-10-CM

## 2025-03-12 DIAGNOSIS — S72.002A CLOSED FRACTURE OF LEFT HIP, INITIAL ENCOUNTER (HCC): ICD-10-CM

## 2025-03-12 DIAGNOSIS — S72.002A CLOSED LEFT HIP FRACTURE, INITIAL ENCOUNTER (HCC): ICD-10-CM

## 2025-03-12 DIAGNOSIS — Z95.5 STENTED CORONARY ARTERY: ICD-10-CM

## 2025-03-12 LAB
ALBUMIN SERPL BCP-MCNC: 4 G/DL (ref 3.2–4.9)
ALBUMIN/GLOB SERPL: 1.6 G/DL
ALP SERPL-CCNC: 88 U/L (ref 30–99)
ALT SERPL-CCNC: 27 U/L (ref 2–50)
ANION GAP SERPL CALC-SCNC: 11 MMOL/L (ref 7–16)
APTT PPP: 30.2 SEC (ref 24.7–36)
AST SERPL-CCNC: 24 U/L (ref 12–45)
BASOPHILS # BLD AUTO: 0.5 % (ref 0–1.8)
BASOPHILS # BLD: 0.02 K/UL (ref 0–0.12)
BILIRUB SERPL-MCNC: 0.5 MG/DL (ref 0.1–1.5)
BUN SERPL-MCNC: 15 MG/DL (ref 8–22)
CALCIUM ALBUM COR SERPL-MCNC: 8.9 MG/DL (ref 8.5–10.5)
CALCIUM SERPL-MCNC: 8.9 MG/DL (ref 8.4–10.2)
CHLORIDE SERPL-SCNC: 103 MMOL/L (ref 96–112)
CO2 SERPL-SCNC: 24 MMOL/L (ref 20–33)
CREAT SERPL-MCNC: 0.97 MG/DL (ref 0.5–1.4)
EKG IMPRESSION: NORMAL
EOSINOPHIL # BLD AUTO: 0.12 K/UL (ref 0–0.51)
EOSINOPHIL NFR BLD: 2.8 % (ref 0–6.9)
ERYTHROCYTE [DISTWIDTH] IN BLOOD BY AUTOMATED COUNT: 46.5 FL (ref 35.9–50)
GFR SERPLBLD CREATININE-BSD FMLA CKD-EPI: 77 ML/MIN/1.73 M 2
GLOBULIN SER CALC-MCNC: 2.5 G/DL (ref 1.9–3.5)
GLUCOSE SERPL-MCNC: 76 MG/DL (ref 65–99)
HCT VFR BLD AUTO: 35.7 % (ref 42–52)
HGB BLD-MCNC: 11.8 G/DL (ref 14–18)
HGB RETIC QN AUTO: 33.7 PG/CELL (ref 29–35)
IMM GRANULOCYTES # BLD AUTO: 0.02 K/UL (ref 0–0.11)
IMM GRANULOCYTES NFR BLD AUTO: 0.5 % (ref 0–0.9)
IMM RETICS NFR: 9.8 % (ref 2.6–16.1)
INR PPP: 0.97 (ref 0.87–1.13)
IRON SATN MFR SERPL: 20 % (ref 15–55)
IRON SERPL-MCNC: 56 UG/DL (ref 50–180)
LYMPHOCYTES # BLD AUTO: 0.82 K/UL (ref 1–4.8)
LYMPHOCYTES NFR BLD: 19.4 % (ref 22–41)
MAGNESIUM SERPL-MCNC: 2 MG/DL (ref 1.5–2.5)
MCH RBC QN AUTO: 30.6 PG (ref 27–33)
MCHC RBC AUTO-ENTMCNC: 33.1 G/DL (ref 32.3–36.5)
MCV RBC AUTO: 92.5 FL (ref 81.4–97.8)
MONOCYTES # BLD AUTO: 0.52 K/UL (ref 0–0.85)
MONOCYTES NFR BLD AUTO: 12.3 % (ref 0–13.4)
NEUTROPHILS # BLD AUTO: 2.72 K/UL (ref 1.82–7.42)
NEUTROPHILS NFR BLD: 64.5 % (ref 44–72)
NRBC # BLD AUTO: 0 K/UL
NRBC BLD-RTO: 0 /100 WBC (ref 0–0.2)
PLATELET # BLD AUTO: 182 K/UL (ref 164–446)
PMV BLD AUTO: 9.3 FL (ref 9–12.9)
POTASSIUM SERPL-SCNC: 3.9 MMOL/L (ref 3.6–5.5)
PROT SERPL-MCNC: 6.5 G/DL (ref 6–8.2)
PROTHROMBIN TIME: 13.2 SEC (ref 12–14.6)
RBC # BLD AUTO: 3.86 M/UL (ref 4.7–6.1)
RETICS # AUTO: 0.07 M/UL (ref 0.04–0.12)
RETICS/RBC NFR: 1.9 % (ref 0.8–2.6)
SODIUM SERPL-SCNC: 138 MMOL/L (ref 135–145)
TIBC SERPL-MCNC: 281 UG/DL (ref 250–450)
UIBC SERPL-MCNC: 225 UG/DL (ref 110–370)
WBC # BLD AUTO: 4.2 K/UL (ref 4.8–10.8)

## 2025-03-12 PROCEDURE — G0422 INTENS CARDIAC REHAB W/EXERC: HCPCS | Performed by: INTERNAL MEDICINE

## 2025-03-12 PROCEDURE — 83735 ASSAY OF MAGNESIUM: CPT

## 2025-03-12 PROCEDURE — 80053 COMPREHEN METABOLIC PANEL: CPT

## 2025-03-12 PROCEDURE — 71045 X-RAY EXAM CHEST 1 VIEW: CPT

## 2025-03-12 PROCEDURE — 83540 ASSAY OF IRON: CPT

## 2025-03-12 PROCEDURE — 3078F DIAST BP <80 MM HG: CPT | Performed by: PHYSICIAN ASSISTANT

## 2025-03-12 PROCEDURE — 99215 OFFICE O/P EST HI 40 MIN: CPT | Performed by: PHYSICIAN ASSISTANT

## 2025-03-12 PROCEDURE — 770006 HCHG ROOM/CARE - MED/SURG/GYN SEMI*

## 2025-03-12 PROCEDURE — 85025 COMPLETE CBC W/AUTO DIFF WBC: CPT

## 2025-03-12 PROCEDURE — 82607 VITAMIN B-12: CPT

## 2025-03-12 PROCEDURE — G0423 INTENS CARDIAC REHAB NO EXER: HCPCS | Mod: 59 | Performed by: INTERNAL MEDICINE

## 2025-03-12 PROCEDURE — 82728 ASSAY OF FERRITIN: CPT

## 2025-03-12 PROCEDURE — A9270 NON-COVERED ITEM OR SERVICE: HCPCS | Performed by: HOSPITALIST

## 2025-03-12 PROCEDURE — 700102 HCHG RX REV CODE 250 W/ 637 OVERRIDE(OP): Performed by: HOSPITALIST

## 2025-03-12 PROCEDURE — 94760 N-INVAS EAR/PLS OXIMETRY 1: CPT

## 2025-03-12 PROCEDURE — 73501 X-RAY EXAM HIP UNI 1 VIEW: CPT | Mod: TC,FY,LT | Performed by: RADIOLOGY

## 2025-03-12 PROCEDURE — 99223 1ST HOSP IP/OBS HIGH 75: CPT | Mod: AI | Performed by: HOSPITALIST

## 2025-03-12 PROCEDURE — 99285 EMERGENCY DEPT VISIT HI MDM: CPT

## 2025-03-12 PROCEDURE — 36415 COLL VENOUS BLD VENIPUNCTURE: CPT

## 2025-03-12 PROCEDURE — 93005 ELECTROCARDIOGRAM TRACING: CPT | Mod: TC | Performed by: EMERGENCY MEDICINE

## 2025-03-12 PROCEDURE — 85046 RETICYTE/HGB CONCENTRATE: CPT

## 2025-03-12 PROCEDURE — 3077F SYST BP >= 140 MM HG: CPT | Performed by: PHYSICIAN ASSISTANT

## 2025-03-12 PROCEDURE — 85610 PROTHROMBIN TIME: CPT

## 2025-03-12 PROCEDURE — 83550 IRON BINDING TEST: CPT

## 2025-03-12 PROCEDURE — 85730 THROMBOPLASTIN TIME PARTIAL: CPT

## 2025-03-12 RX ORDER — MORPHINE SULFATE 4 MG/ML
2 INJECTION INTRAVENOUS
Status: DISCONTINUED | OUTPATIENT
Start: 2025-03-12 | End: 2025-03-13

## 2025-03-12 RX ORDER — POLYETHYLENE GLYCOL 3350 17 G/17G
1 POWDER, FOR SOLUTION ORAL
Status: DISCONTINUED | OUTPATIENT
Start: 2025-03-12 | End: 2025-03-17 | Stop reason: HOSPADM

## 2025-03-12 RX ORDER — LABETALOL HYDROCHLORIDE 5 MG/ML
10 INJECTION, SOLUTION INTRAVENOUS EVERY 4 HOURS PRN
Status: DISCONTINUED | OUTPATIENT
Start: 2025-03-12 | End: 2025-03-17 | Stop reason: HOSPADM

## 2025-03-12 RX ORDER — ONDANSETRON 2 MG/ML
4 INJECTION INTRAMUSCULAR; INTRAVENOUS EVERY 4 HOURS PRN
Status: DISCONTINUED | OUTPATIENT
Start: 2025-03-12 | End: 2025-03-17 | Stop reason: HOSPADM

## 2025-03-12 RX ORDER — CARVEDILOL 3.12 MG/1
TABLET ORAL
COMMUNITY
Start: 2025-02-20 | End: 2025-03-12

## 2025-03-12 RX ORDER — TAMSULOSIN HYDROCHLORIDE 0.4 MG/1
0.4 CAPSULE ORAL 2 TIMES DAILY
Status: DISCONTINUED | OUTPATIENT
Start: 2025-03-12 | End: 2025-03-17 | Stop reason: HOSPADM

## 2025-03-12 RX ORDER — ONDANSETRON 4 MG/1
4 TABLET, ORALLY DISINTEGRATING ORAL EVERY 4 HOURS PRN
Status: DISCONTINUED | OUTPATIENT
Start: 2025-03-12 | End: 2025-03-17 | Stop reason: HOSPADM

## 2025-03-12 RX ORDER — ACETAMINOPHEN 325 MG/1
650 TABLET ORAL EVERY 6 HOURS PRN
Status: DISCONTINUED | OUTPATIENT
Start: 2025-03-12 | End: 2025-03-17 | Stop reason: HOSPADM

## 2025-03-12 RX ORDER — EZETIMIBE 10 MG/1
10 TABLET ORAL EVERY EVENING
Status: DISCONTINUED | OUTPATIENT
Start: 2025-03-12 | End: 2025-03-17 | Stop reason: HOSPADM

## 2025-03-12 RX ORDER — AMOXICILLIN 250 MG
2 CAPSULE ORAL EVERY EVENING
Status: DISCONTINUED | OUTPATIENT
Start: 2025-03-12 | End: 2025-03-17 | Stop reason: HOSPADM

## 2025-03-12 RX ORDER — CARVEDILOL 6.25 MG/1
TABLET ORAL
Status: ON HOLD | COMMUNITY
Start: 2025-03-06 | End: 2025-03-17

## 2025-03-12 RX ORDER — FINASTERIDE 5 MG/1
5 TABLET, FILM COATED ORAL DAILY
Status: DISCONTINUED | OUTPATIENT
Start: 2025-03-13 | End: 2025-03-17 | Stop reason: HOSPADM

## 2025-03-12 RX ORDER — OXYCODONE HYDROCHLORIDE 5 MG/1
2.5 TABLET ORAL
Refills: 0 | Status: DISCONTINUED | OUTPATIENT
Start: 2025-03-12 | End: 2025-03-13

## 2025-03-12 RX ORDER — CLOPIDOGREL BISULFATE 75 MG/1
75 TABLET ORAL DAILY
Status: DISCONTINUED | OUTPATIENT
Start: 2025-03-13 | End: 2025-03-17 | Stop reason: HOSPADM

## 2025-03-12 RX ORDER — ROSUVASTATIN CALCIUM 10 MG/1
20 TABLET, COATED ORAL EVERY EVENING
Status: DISCONTINUED | OUTPATIENT
Start: 2025-03-12 | End: 2025-03-17 | Stop reason: HOSPADM

## 2025-03-12 RX ORDER — MEXILETINE HYDROCHLORIDE 150 MG/1
150 CAPSULE ORAL EVERY 8 HOURS
Status: DISCONTINUED | OUTPATIENT
Start: 2025-03-12 | End: 2025-03-17 | Stop reason: HOSPADM

## 2025-03-12 RX ORDER — OXYCODONE HYDROCHLORIDE 5 MG/1
5 TABLET ORAL
Refills: 0 | Status: DISCONTINUED | OUTPATIENT
Start: 2025-03-12 | End: 2025-03-13

## 2025-03-12 RX ORDER — SODIUM CHLORIDE 9 MG/ML
INJECTION, SOLUTION INTRAVENOUS CONTINUOUS
Status: ACTIVE | OUTPATIENT
Start: 2025-03-13 | End: 2025-03-13

## 2025-03-12 RX ORDER — ASPIRIN 81 MG/1
81 TABLET ORAL EVERY MORNING
Status: DISCONTINUED | OUTPATIENT
Start: 2025-03-13 | End: 2025-03-14

## 2025-03-12 RX ORDER — CARVEDILOL 3.12 MG/1
3.12 TABLET ORAL 2 TIMES DAILY WITH MEALS
Status: DISCONTINUED | OUTPATIENT
Start: 2025-03-12 | End: 2025-03-17 | Stop reason: HOSPADM

## 2025-03-12 RX ADMIN — SENNOSIDES AND DOCUSATE SODIUM 2 TABLET: 50; 8.6 TABLET ORAL at 20:41

## 2025-03-12 RX ADMIN — EZETIMIBE 10 MG: 10 TABLET ORAL at 20:41

## 2025-03-12 RX ADMIN — OXYCODONE 5 MG: 5 TABLET ORAL at 20:40

## 2025-03-12 RX ADMIN — MEXILETINE HYDROCHLORIDE 150 MG: 150 CAPSULE ORAL at 21:27

## 2025-03-12 RX ADMIN — CARVEDILOL 3.12 MG: 3.12 TABLET, FILM COATED ORAL at 20:41

## 2025-03-12 RX ADMIN — TAMSULOSIN HYDROCHLORIDE 0.4 MG: 0.4 CAPSULE ORAL at 20:40

## 2025-03-12 RX ADMIN — ROSUVASTATIN CALCIUM 20 MG: 10 TABLET, FILM COATED ORAL at 20:41

## 2025-03-12 RX ADMIN — ACETAMINOPHEN 650 MG: 325 TABLET ORAL at 20:40

## 2025-03-12 ASSESSMENT — LIFESTYLE VARIABLES
EVER FELT BAD OR GUILTY ABOUT YOUR DRINKING: NO
HAVE YOU EVER FELT YOU SHOULD CUT DOWN ON YOUR DRINKING: NO
CONSUMPTION TOTAL: NEGATIVE
AVERAGE NUMBER OF DAYS PER WEEK YOU HAVE A DRINK CONTAINING ALCOHOL: 0
TOTAL SCORE: 0
TOTAL SCORE: 0
EVER HAD A DRINK FIRST THING IN THE MORNING TO STEADY YOUR NERVES TO GET RID OF A HANGOVER: NO
DOES PATIENT WANT TO STOP DRINKING: NO
HAVE PEOPLE ANNOYED YOU BY CRITICIZING YOUR DRINKING: NO
HOW MANY TIMES IN THE PAST YEAR HAVE YOU HAD 5 OR MORE DRINKS IN A DAY: 0
ALCOHOL_USE: NO
TOTAL SCORE: 0
ON A TYPICAL DAY WHEN YOU DRINK ALCOHOL HOW MANY DRINKS DO YOU HAVE: 0

## 2025-03-12 ASSESSMENT — FIBROSIS 4 INDEX
FIB4 SCORE: 1.76
FIB4 SCORE: 1.76

## 2025-03-12 ASSESSMENT — COGNITIVE AND FUNCTIONAL STATUS - GENERAL
CLIMB 3 TO 5 STEPS WITH RAILING: A LOT
SUGGESTED CMS G CODE MODIFIER DAILY ACTIVITY: CI
MOBILITY SCORE: 19
MOVING TO AND FROM BED TO CHAIR: A LITTLE
WALKING IN HOSPITAL ROOM: A LITTLE
DAILY ACTIVITIY SCORE: 23
STANDING UP FROM CHAIR USING ARMS: A LITTLE
DRESSING REGULAR LOWER BODY CLOTHING: A LITTLE
SUGGESTED CMS G CODE MODIFIER MOBILITY: CK

## 2025-03-12 ASSESSMENT — ENCOUNTER SYMPTOMS
PSYCHIATRIC NEGATIVE: 1
EYES NEGATIVE: 1
CONSTITUTIONAL NEGATIVE: 1
HEADACHES: 0
NAUSEA: 0
SEIZURES: 0
DIARRHEA: 0
DIZZINESS: 0
FALLS: 1
FEVER: 0
CONSTIPATION: 0
ABDOMINAL PAIN: 0
FOCAL WEAKNESS: 0
BLOOD IN STOOL: 0
NERVOUS/ANXIOUS: 0
COUGH: 0
LOSS OF CONSCIOUSNESS: 0
RESPIRATORY NEGATIVE: 1
FALLS: 1
BRUISES/BLEEDS EASILY: 0
CARDIOVASCULAR NEGATIVE: 1
DIAPHORESIS: 0
WHEEZING: 0
NEUROLOGICAL NEGATIVE: 1
CHILLS: 0
VOMITING: 0
HEMOPTYSIS: 0
HEADACHES: 0
HEARTBURN: 0
GASTROINTESTINAL NEGATIVE: 1
DOUBLE VISION: 0
PALPITATIONS: 0
DIZZINESS: 0

## 2025-03-12 ASSESSMENT — PAIN DESCRIPTION - PAIN TYPE: TYPE: ACUTE PAIN

## 2025-03-12 NOTE — PROGRESS NOTES
Subjective:     Sandip Zhao  is a 84 y.o. male who presents for Blood Pressure Problem (Low blood pressure) and Fall (Today, fall on to Lt side,  Lt hip pain.)       He presents today for multiple complaints.  First complaint is of low blood pressure recordings and on which did prompt him to follow-up in the urgent care today.  He did undergo a recent blood pressure medication increase and upon checking blood pressure at home the readings were low.  He was not experiencing any headaches or dizziness at home.    Upon arriving to the urgent care he was standing in the lobby, went to turn, his feet were stuck on the ground and he did suffer a fall.  He landed directly onto the left hip during the fall.  Did not strike his head during the fall, no headaches, no loss consciousness..  He immediately felt pain and was unable to stand.  Patient was picked up, placed into a wheelchair and wheeled into an exam room.  He was not experiencing any pain while sitting in the wheelchair.       Review of Systems   Musculoskeletal:  Positive for falls and joint pain.   Neurological:  Negative for dizziness and headaches.      No Known Allergies  Past Medical History:   Diagnosis Date    Anemia     Arrhythmia     Bigeminy PVC's    Arthritis     Asthma     H/O    BPH (benign prostatic hyperplasia)     Broken rib 09/09/2024    Fell 8/16/2024 Left rib #8. Recent fall broke left rib #8 and stitches on left side of head, stitches have been removed.    CAD (coronary artery disease)     stents x3    Dental disorder 09/09/2024    Full Set Dentures    Environmental allergies     Fall 08/2024    Missed a step and fell, broke 8th Rib, left side. Cardiologist's Dr. Mora and Dr. Giordano are aware of this, per patient's wife.    Gall stones 09/09/2024    Currently has gall stones.    Hemorrhagic disorder (HCC) 09/26/2024    Taking Plavix    Hemorrhoids     High cholesterol     Hypertension     Inguinal hernia 09/26/2024    MI  "(myocardial infarction) ([facility]) 07/04/2015    Pain     Chronic Arthritic Pain    Pneumonia 09/26/2024    H/O Once    Status post placement of implantable loop recorder         Objective:   BP (!) 140/60   Pulse 72   Temp 36.6 °C (97.9 °F) (Temporal)   Resp 16   Ht 1.651 m (5' 5\")   Wt 66.2 kg (146 lb)   SpO2 99%   BMI 24.30 kg/m²   Physical Exam  Vitals and nursing note reviewed.   Constitutional:       General: He is not in acute distress.     Appearance: He is not ill-appearing or toxic-appearing.   HENT:      Head: Normocephalic.      Nose: No rhinorrhea.   Eyes:      General: No scleral icterus.     Conjunctiva/sclera: Conjunctivae normal.   Pulmonary:      Effort: Pulmonary effort is normal. No respiratory distress.      Breath sounds: No stridor.   Musculoskeletal:      Cervical back: Neck supple.      Comments: After patient was placed in a wheelchair move into the exam room I did perform a limited left hip examination.  Palpation over the greater trochanteric region was negative for tenderness.  No pain on the shaft of the femur.  I was able to perform internal and external hip rotation with minimal pain.   Neurological:      Mental Status: He is alert and oriented to person, place, and time.   Psychiatric:         Mood and Affect: Mood normal.         Behavior: Behavior normal.         Thought Content: Thought content normal.         Judgment: Judgment normal.             Diagnostic testing:    Left hip x-ray series  Radiologist IMPRESSION:     Nondisplaced left basicervical femoral fracture.    Assessment/Plan:     Encounter Diagnoses   Name Primary?    Closed nondisplaced midcervical fracture of left femur, initial encounter ([facility])     Ground-level fall     Left hip pain          Plan for care for today's complaint includes obtaining left hip x-ray series due to ground-level fall that occurred in urgent care today, this did reveal a nondisplaced left basicervical femoral fracture.  We did transport " the patient to the local hospital via EMS due to femoral fracture.  Patient was initially prompted to the urgent care due to low blood pressure readings at home, his blood pressure after his fall in the urgent care was 140/60. Patient did not strike head during fall. Vital signs stable during urgent care     See AVS Instructions below for written guidance provided to patient on after-visit management and care in addition to our verbal discussion during the visit.    Please note that this dictation was created using voice recognition software. I have attempted to correct all errors, but there may be sound-alike, spelling, grammar and possibly content errors that I did not discover before finalizing the note.    [place] Pablo PA-C

## 2025-03-12 NOTE — PROGRESS NOTES
Sandip Zhao attended Intensive Cardiac Rehab today from 1000 to 1200. During his time he exercised and attended class.   His education today was a nutrition and cooking class titled: [employer]. Patient received handouts and class discussion pertaining to the topic.

## 2025-03-13 ENCOUNTER — APPOINTMENT (OUTPATIENT)
Dept: CARDIOLOGY | Facility: MEDICAL CENTER | Age: 85
End: 2025-03-13
Payer: MEDICARE

## 2025-03-13 ENCOUNTER — ANESTHESIA EVENT (OUTPATIENT)
Dept: SURGERY | Facility: MEDICAL CENTER | Age: 85
DRG: 481 | End: 2025-03-13
Payer: MEDICARE

## 2025-03-13 ENCOUNTER — APPOINTMENT (OUTPATIENT)
Dept: RADIOLOGY | Facility: MEDICAL CENTER | Age: 85
DRG: 481 | End: 2025-03-13
Attending: ORTHOPAEDIC SURGERY
Payer: MEDICARE

## 2025-03-13 ENCOUNTER — ANESTHESIA (OUTPATIENT)
Dept: SURGERY | Facility: MEDICAL CENTER | Age: 85
DRG: 481 | End: 2025-03-13
Payer: MEDICARE

## 2025-03-13 PROBLEM — I21.9 MI (MYOCARDIAL INFARCTION) (HCC): Status: ACTIVE | Noted: 2025-03-13

## 2025-03-13 PROBLEM — Z71.89 ADVANCE CARE PLANNING: Status: ACTIVE | Noted: 2025-03-13

## 2025-03-13 LAB
ANION GAP SERPL CALC-SCNC: 10 MMOL/L (ref 7–16)
BUN SERPL-MCNC: 14 MG/DL (ref 8–22)
CALCIUM SERPL-MCNC: 8.5 MG/DL (ref 8.4–10.2)
CHLORIDE SERPL-SCNC: 106 MMOL/L (ref 96–112)
CHOLEST SERPL-MCNC: 96 MG/DL (ref 100–199)
CO2 SERPL-SCNC: 22 MMOL/L (ref 20–33)
CREAT SERPL-MCNC: 0.9 MG/DL (ref 0.5–1.4)
ERYTHROCYTE [DISTWIDTH] IN BLOOD BY AUTOMATED COUNT: 43.9 FL (ref 35.9–50)
FERRITIN SERPL-MCNC: 126 NG/ML (ref 22–322)
GFR SERPLBLD CREATININE-BSD FMLA CKD-EPI: 84 ML/MIN/1.73 M 2
GLUCOSE SERPL-MCNC: 113 MG/DL (ref 65–99)
HCT VFR BLD AUTO: 30.9 % (ref 42–52)
HDLC SERPL-MCNC: 39 MG/DL
HGB BLD-MCNC: 10.4 G/DL (ref 14–18)
LDLC SERPL CALC-MCNC: 48 MG/DL
MCH RBC QN AUTO: 30.4 PG (ref 27–33)
MCHC RBC AUTO-ENTMCNC: 33.7 G/DL (ref 32.3–36.5)
MCV RBC AUTO: 90.4 FL (ref 81.4–97.8)
PLATELET # BLD AUTO: 168 K/UL (ref 164–446)
PMV BLD AUTO: 9.8 FL (ref 9–12.9)
POTASSIUM SERPL-SCNC: 3.8 MMOL/L (ref 3.6–5.5)
RBC # BLD AUTO: 3.42 M/UL (ref 4.7–6.1)
SODIUM SERPL-SCNC: 138 MMOL/L (ref 135–145)
TRIGL SERPL-MCNC: 46 MG/DL (ref 0–149)
VIT B12 SERPL-MCNC: 888 PG/ML (ref 211–911)
WBC # BLD AUTO: 5.6 K/UL (ref 4.8–10.8)

## 2025-03-13 PROCEDURE — 0QH706Z INSERTION OF INTRAMEDULLARY INTERNAL FIXATION DEVICE INTO LEFT UPPER FEMUR, OPEN APPROACH: ICD-10-PCS | Performed by: STUDENT IN AN ORGANIZED HEALTH CARE EDUCATION/TRAINING PROGRAM

## 2025-03-13 PROCEDURE — 700102 HCHG RX REV CODE 250 W/ 637 OVERRIDE(OP): Performed by: STUDENT IN AN ORGANIZED HEALTH CARE EDUCATION/TRAINING PROGRAM

## 2025-03-13 PROCEDURE — 160041 HCHG SURGERY MINUTES - EA ADDL 1 MIN LEVEL 4: Performed by: STUDENT IN AN ORGANIZED HEALTH CARE EDUCATION/TRAINING PROGRAM

## 2025-03-13 PROCEDURE — 700101 HCHG RX REV CODE 250: Performed by: STUDENT IN AN ORGANIZED HEALTH CARE EDUCATION/TRAINING PROGRAM

## 2025-03-13 PROCEDURE — 110371 HCHG SHELL REV 272: Performed by: STUDENT IN AN ORGANIZED HEALTH CARE EDUCATION/TRAINING PROGRAM

## 2025-03-13 PROCEDURE — 700111 HCHG RX REV CODE 636 W/ 250 OVERRIDE (IP): Performed by: STUDENT IN AN ORGANIZED HEALTH CARE EDUCATION/TRAINING PROGRAM

## 2025-03-13 PROCEDURE — 700111 HCHG RX REV CODE 636 W/ 250 OVERRIDE (IP): Mod: JZ | Performed by: STUDENT IN AN ORGANIZED HEALTH CARE EDUCATION/TRAINING PROGRAM

## 2025-03-13 PROCEDURE — 502000 HCHG MISC OR IMPLANTS RC 0278: Performed by: STUDENT IN AN ORGANIZED HEALTH CARE EDUCATION/TRAINING PROGRAM

## 2025-03-13 PROCEDURE — 73552 X-RAY EXAM OF FEMUR 2/>: CPT | Mod: LT

## 2025-03-13 PROCEDURE — 160002 HCHG RECOVERY MINUTES (STAT): Performed by: STUDENT IN AN ORGANIZED HEALTH CARE EDUCATION/TRAINING PROGRAM

## 2025-03-13 PROCEDURE — 160009 HCHG ANES TIME/MIN: Performed by: STUDENT IN AN ORGANIZED HEALTH CARE EDUCATION/TRAINING PROGRAM

## 2025-03-13 PROCEDURE — 700102 HCHG RX REV CODE 250 W/ 637 OVERRIDE(OP): Performed by: HOSPITALIST

## 2025-03-13 PROCEDURE — 36415 COLL VENOUS BLD VENIPUNCTURE: CPT

## 2025-03-13 PROCEDURE — 99233 SBSQ HOSP IP/OBS HIGH 50: CPT | Performed by: INTERNAL MEDICINE

## 2025-03-13 PROCEDURE — 160035 HCHG PACU - 1ST 60 MINS PHASE I: Performed by: STUDENT IN AN ORGANIZED HEALTH CARE EDUCATION/TRAINING PROGRAM

## 2025-03-13 PROCEDURE — 770020 HCHG ROOM/CARE - TELE (206)

## 2025-03-13 PROCEDURE — 700105 HCHG RX REV CODE 258: Performed by: STUDENT IN AN ORGANIZED HEALTH CARE EDUCATION/TRAINING PROGRAM

## 2025-03-13 PROCEDURE — A9270 NON-COVERED ITEM OR SERVICE: HCPCS | Performed by: STUDENT IN AN ORGANIZED HEALTH CARE EDUCATION/TRAINING PROGRAM

## 2025-03-13 PROCEDURE — A9270 NON-COVERED ITEM OR SERVICE: HCPCS | Performed by: HOSPITALIST

## 2025-03-13 PROCEDURE — C1713 ANCHOR/SCREW BN/BN,TIS/BN: HCPCS | Performed by: STUDENT IN AN ORGANIZED HEALTH CARE EDUCATION/TRAINING PROGRAM

## 2025-03-13 PROCEDURE — 94760 N-INVAS EAR/PLS OXIMETRY 1: CPT

## 2025-03-13 PROCEDURE — 80061 LIPID PANEL: CPT

## 2025-03-13 PROCEDURE — 700105 HCHG RX REV CODE 258: Performed by: HOSPITALIST

## 2025-03-13 PROCEDURE — 160048 HCHG OR STATISTICAL LEVEL 1-5: Performed by: STUDENT IN AN ORGANIZED HEALTH CARE EDUCATION/TRAINING PROGRAM

## 2025-03-13 PROCEDURE — 160036 HCHG PACU - EA ADDL 30 MINS PHASE I: Performed by: STUDENT IN AN ORGANIZED HEALTH CARE EDUCATION/TRAINING PROGRAM

## 2025-03-13 PROCEDURE — 80048 BASIC METABOLIC PNL TOTAL CA: CPT

## 2025-03-13 PROCEDURE — 160029 HCHG SURGERY MINUTES - 1ST 30 MINS LEVEL 4: Performed by: STUDENT IN AN ORGANIZED HEALTH CARE EDUCATION/TRAINING PROGRAM

## 2025-03-13 PROCEDURE — 160015 HCHG STAT PREOP MINUTES: Performed by: STUDENT IN AN ORGANIZED HEALTH CARE EDUCATION/TRAINING PROGRAM

## 2025-03-13 PROCEDURE — 85027 COMPLETE CBC AUTOMATED: CPT

## 2025-03-13 PROCEDURE — 99497 ADVNCD CARE PLAN 30 MIN: CPT | Performed by: INTERNAL MEDICINE

## 2025-03-13 DEVICE — IMPLANTABLE DEVICE: Type: IMPLANTABLE DEVICE | Site: HIP | Status: FUNCTIONAL

## 2025-03-13 DEVICE — PIN PRECISION TM TAPER D3.2/3.9 X 450MM (1EA): Type: IMPLANTABLE DEVICE | Site: HIP | Status: FUNCTIONAL

## 2025-03-13 DEVICE — K-WIRE 3MM X 285MM (1EA): Type: IMPLANTABLE DEVICE | Site: HIP | Status: FUNCTIONAL

## 2025-03-13 DEVICE — LOCKING SCREW DIA 5X37.5MM: Type: IMPLANTABLE DEVICE | Site: HIP | Status: FUNCTIONAL

## 2025-03-13 RX ORDER — HYDROMORPHONE HYDROCHLORIDE 1 MG/ML
0.2 INJECTION, SOLUTION INTRAMUSCULAR; INTRAVENOUS; SUBCUTANEOUS
Status: DISCONTINUED | OUTPATIENT
Start: 2025-03-13 | End: 2025-03-13 | Stop reason: HOSPADM

## 2025-03-13 RX ORDER — OXYCODONE HCL 5 MG/5 ML
10 SOLUTION, ORAL ORAL
Status: COMPLETED | OUTPATIENT
Start: 2025-03-13 | End: 2025-03-13

## 2025-03-13 RX ORDER — TRANEXAMIC ACID 100 MG/ML
INJECTION, SOLUTION INTRAVENOUS PRN
Status: DISCONTINUED | OUTPATIENT
Start: 2025-03-13 | End: 2025-03-13 | Stop reason: SURG

## 2025-03-13 RX ORDER — ALBUTEROL SULFATE 5 MG/ML
2.5 SOLUTION RESPIRATORY (INHALATION)
Status: DISCONTINUED | OUTPATIENT
Start: 2025-03-13 | End: 2025-03-13 | Stop reason: HOSPADM

## 2025-03-13 RX ORDER — HALOPERIDOL 5 MG/ML
1 INJECTION INTRAMUSCULAR
Status: DISCONTINUED | OUTPATIENT
Start: 2025-03-13 | End: 2025-03-13 | Stop reason: HOSPADM

## 2025-03-13 RX ORDER — ONDANSETRON 2 MG/ML
4 INJECTION INTRAMUSCULAR; INTRAVENOUS
Status: DISCONTINUED | OUTPATIENT
Start: 2025-03-13 | End: 2025-03-13 | Stop reason: HOSPADM

## 2025-03-13 RX ORDER — BUPIVACAINE HYDROCHLORIDE AND EPINEPHRINE 5; 5 MG/ML; UG/ML
INJECTION, SOLUTION PERINEURAL
Status: DISCONTINUED | OUTPATIENT
Start: 2025-03-13 | End: 2025-03-13 | Stop reason: HOSPADM

## 2025-03-13 RX ORDER — ONDANSETRON 2 MG/ML
INJECTION INTRAMUSCULAR; INTRAVENOUS PRN
Status: DISCONTINUED | OUTPATIENT
Start: 2025-03-13 | End: 2025-03-13 | Stop reason: SURG

## 2025-03-13 RX ORDER — HYDROMORPHONE HYDROCHLORIDE 1 MG/ML
0.4 INJECTION, SOLUTION INTRAMUSCULAR; INTRAVENOUS; SUBCUTANEOUS
Status: DISCONTINUED | OUTPATIENT
Start: 2025-03-13 | End: 2025-03-13 | Stop reason: HOSPADM

## 2025-03-13 RX ORDER — CEFAZOLIN SODIUM 1 G/3ML
INJECTION, POWDER, FOR SOLUTION INTRAMUSCULAR; INTRAVENOUS PRN
Status: DISCONTINUED | OUTPATIENT
Start: 2025-03-13 | End: 2025-03-13 | Stop reason: SURG

## 2025-03-13 RX ORDER — HYDROMORPHONE HYDROCHLORIDE 1 MG/ML
0.1 INJECTION, SOLUTION INTRAMUSCULAR; INTRAVENOUS; SUBCUTANEOUS
Status: DISCONTINUED | OUTPATIENT
Start: 2025-03-13 | End: 2025-03-13 | Stop reason: HOSPADM

## 2025-03-13 RX ORDER — ROCURONIUM BROMIDE 10 MG/ML
INJECTION, SOLUTION INTRAVENOUS PRN
Status: DISCONTINUED | OUTPATIENT
Start: 2025-03-13 | End: 2025-03-13 | Stop reason: SURG

## 2025-03-13 RX ORDER — OXYCODONE HYDROCHLORIDE 5 MG/1
2.5 TABLET ORAL EVERY 6 HOURS PRN
Refills: 0 | Status: DISCONTINUED | OUTPATIENT
Start: 2025-03-13 | End: 2025-03-17 | Stop reason: HOSPADM

## 2025-03-13 RX ORDER — ACETAMINOPHEN 500 MG
1000 TABLET ORAL ONCE
Status: COMPLETED | OUTPATIENT
Start: 2025-03-13 | End: 2025-03-13

## 2025-03-13 RX ORDER — DEXAMETHASONE SODIUM PHOSPHATE 4 MG/ML
INJECTION, SOLUTION INTRA-ARTICULAR; INTRALESIONAL; INTRAMUSCULAR; INTRAVENOUS; SOFT TISSUE PRN
Status: DISCONTINUED | OUTPATIENT
Start: 2025-03-13 | End: 2025-03-13 | Stop reason: SURG

## 2025-03-13 RX ORDER — OXYCODONE HCL 5 MG/5 ML
5 SOLUTION, ORAL ORAL
Status: COMPLETED | OUTPATIENT
Start: 2025-03-13 | End: 2025-03-13

## 2025-03-13 RX ORDER — EPHEDRINE SULFATE 50 MG/ML
5 INJECTION, SOLUTION INTRAVENOUS
Status: DISCONTINUED | OUTPATIENT
Start: 2025-03-13 | End: 2025-03-13 | Stop reason: HOSPADM

## 2025-03-13 RX ORDER — DIPHENHYDRAMINE HYDROCHLORIDE 50 MG/ML
12.5 INJECTION, SOLUTION INTRAMUSCULAR; INTRAVENOUS
Status: DISCONTINUED | OUTPATIENT
Start: 2025-03-13 | End: 2025-03-13 | Stop reason: HOSPADM

## 2025-03-13 RX ORDER — HYDROMORPHONE HYDROCHLORIDE 1 MG/ML
0.25 INJECTION, SOLUTION INTRAMUSCULAR; INTRAVENOUS; SUBCUTANEOUS EVERY 6 HOURS PRN
Status: DISCONTINUED | OUTPATIENT
Start: 2025-03-13 | End: 2025-03-17 | Stop reason: HOSPADM

## 2025-03-13 RX ORDER — LABETALOL HYDROCHLORIDE 5 MG/ML
5 INJECTION, SOLUTION INTRAVENOUS PRN
Status: DISCONTINUED | OUTPATIENT
Start: 2025-03-13 | End: 2025-03-13 | Stop reason: HOSPADM

## 2025-03-13 RX ORDER — LIDOCAINE HYDROCHLORIDE 20 MG/ML
INJECTION, SOLUTION EPIDURAL; INFILTRATION; INTRACAUDAL; PERINEURAL PRN
Status: DISCONTINUED | OUTPATIENT
Start: 2025-03-13 | End: 2025-03-13 | Stop reason: SURG

## 2025-03-13 RX ORDER — SODIUM CHLORIDE, SODIUM LACTATE, POTASSIUM CHLORIDE, CALCIUM CHLORIDE 600; 310; 30; 20 MG/100ML; MG/100ML; MG/100ML; MG/100ML
INJECTION, SOLUTION INTRAVENOUS CONTINUOUS
Status: ACTIVE | OUTPATIENT
Start: 2025-03-13 | End: 2025-03-13

## 2025-03-13 RX ORDER — OXYCODONE HYDROCHLORIDE 5 MG/1
5 TABLET ORAL EVERY 6 HOURS PRN
Refills: 0 | Status: DISCONTINUED | OUTPATIENT
Start: 2025-03-13 | End: 2025-03-17 | Stop reason: HOSPADM

## 2025-03-13 RX ADMIN — CEFAZOLIN 2 G: 1 INJECTION, POWDER, FOR SOLUTION INTRAMUSCULAR; INTRAVENOUS at 15:23

## 2025-03-13 RX ADMIN — MEXILETINE HYDROCHLORIDE 150 MG: 150 CAPSULE ORAL at 22:26

## 2025-03-13 RX ADMIN — SUGAMMADEX 200 MG: 100 INJECTION, SOLUTION INTRAVENOUS at 16:08

## 2025-03-13 RX ADMIN — TAMSULOSIN HYDROCHLORIDE 0.4 MG: 0.4 CAPSULE ORAL at 05:59

## 2025-03-13 RX ADMIN — ACETAMINOPHEN 1000 MG: 500 TABLET ORAL at 17:42

## 2025-03-13 RX ADMIN — MEXILETINE HYDROCHLORIDE 150 MG: 150 CAPSULE ORAL at 14:11

## 2025-03-13 RX ADMIN — OXYCODONE 5 MG: 5 TABLET ORAL at 05:59

## 2025-03-13 RX ADMIN — FENTANYL CITRATE 50 MCG: 50 INJECTION, SOLUTION INTRAMUSCULAR; INTRAVENOUS at 15:26

## 2025-03-13 RX ADMIN — ROCURONIUM BROMIDE 50 MG: 10 INJECTION INTRAVENOUS at 15:26

## 2025-03-13 RX ADMIN — ONDANSETRON 4 MG: 2 INJECTION INTRAMUSCULAR; INTRAVENOUS at 16:08

## 2025-03-13 RX ADMIN — LIDOCAINE HYDROCHLORIDE 60 MG: 20 INJECTION, SOLUTION EPIDURAL; INFILTRATION; INTRACAUDAL; PERINEURAL at 15:26

## 2025-03-13 RX ADMIN — OXYCODONE HYDROCHLORIDE 2.5 MG: 5 TABLET ORAL at 11:49

## 2025-03-13 RX ADMIN — PROPOFOL 60 MG: 10 INJECTION, EMULSION INTRAVENOUS at 15:26

## 2025-03-13 RX ADMIN — PROPOFOL 20 MG: 10 INJECTION, EMULSION INTRAVENOUS at 16:08

## 2025-03-13 RX ADMIN — SODIUM CHLORIDE: 9 INJECTION, SOLUTION INTRAVENOUS at 00:00

## 2025-03-13 RX ADMIN — FENTANYL CITRATE 50 MCG: 50 INJECTION, SOLUTION INTRAMUSCULAR; INTRAVENOUS at 16:08

## 2025-03-13 RX ADMIN — OXYCODONE HYDROCHLORIDE 5 MG: 5 SOLUTION ORAL at 17:42

## 2025-03-13 RX ADMIN — TRANEXAMIC ACID 1000 MG: 100 INJECTION, SOLUTION INTRAVENOUS at 15:26

## 2025-03-13 RX ADMIN — FINASTERIDE 5 MG: 5 TABLET, FILM COATED ORAL at 06:00

## 2025-03-13 RX ADMIN — DEXAMETHASONE SODIUM PHOSPHATE 8 MG: 4 INJECTION INTRA-ARTICULAR; INTRALESIONAL; INTRAMUSCULAR; INTRAVENOUS; SOFT TISSUE at 15:26

## 2025-03-13 RX ADMIN — SODIUM CHLORIDE, POTASSIUM CHLORIDE, SODIUM LACTATE AND CALCIUM CHLORIDE: 600; 310; 30; 20 INJECTION, SOLUTION INTRAVENOUS at 15:23

## 2025-03-13 RX ADMIN — PROPOFOL 25 MCG/KG/MIN: 10 INJECTION, EMULSION INTRAVENOUS at 15:32

## 2025-03-13 RX ADMIN — MEXILETINE HYDROCHLORIDE 150 MG: 150 CAPSULE ORAL at 06:00

## 2025-03-13 RX ADMIN — CARVEDILOL 3.12 MG: 3.12 TABLET, FILM COATED ORAL at 07:52

## 2025-03-13 RX ADMIN — ACETAMINOPHEN 650 MG: 325 TABLET ORAL at 05:59

## 2025-03-13 RX ADMIN — FENTANYL CITRATE 25 MCG: 50 INJECTION, SOLUTION INTRAMUSCULAR; INTRAVENOUS at 17:43

## 2025-03-13 ASSESSMENT — PAIN DESCRIPTION - PAIN TYPE
TYPE: SURGICAL PAIN
TYPE: SURGICAL PAIN
TYPE: ACUTE PAIN
TYPE: ACUTE PAIN
TYPE: SURGICAL PAIN
TYPE: SURGICAL PAIN
TYPE: ACUTE PAIN
TYPE: SURGICAL PAIN
TYPE: ACUTE PAIN
TYPE: SURGICAL PAIN

## 2025-03-13 ASSESSMENT — ENCOUNTER SYMPTOMS
BLURRED VISION: 0
DIZZINESS: 0
CHILLS: 0
VOMITING: 0
FALLS: 1
COUGH: 0
HEARTBURN: 0
FEVER: 0
MYALGIAS: 1
NERVOUS/ANXIOUS: 0
PALPITATIONS: 0
SHORTNESS OF BREATH: 0
ABDOMINAL PAIN: 0
DOUBLE VISION: 0
HEADACHES: 0

## 2025-03-13 ASSESSMENT — PAIN SCALES - GENERAL: PAIN_LEVEL: 3

## 2025-03-13 ASSESSMENT — LIFESTYLE VARIABLES: SUBSTANCE_ABUSE: 0

## 2025-03-13 NOTE — PROGRESS NOTES
4 Eyes Skin Assessment Completed by UZAIR da silva and UZAIR shell.    Head WDL  Ears WDL  Nose WDL  Mouth WDL  Neck WDL  Breast/Chest WDL  Shoulder Blades WDL  Spine Redness and Blanching  (R) Arm/Elbow/Hand WDL  (L) Arm/Elbow/Hand WDL  Abdomen WDL  Groin WDL  Scrotum/Coccyx/Buttocks Redness and Blanching  (R) Leg WDL  (L) Leg WDL  (R) Heel/Foot/Toe WDL  (L) Heel/Foot/Toe WDL          Devices In Places Pulse Ox      Interventions In Place Pillows    Possible Skin Injury No    Pictures Uploaded Into Epic N/A  Wound Consult Placed N/A  RN Wound Prevention Protocol Ordered No

## 2025-03-13 NOTE — PROGRESS NOTES
Assumed care of patient at bedside report from NOC RN. Updated on POC. Patient currently A & O x 4; on room air  O2 92 percent saturation; up two person assist; without complaints of acute pain. Assessment completed. Call light within reach. Whiteboard updated. Fall precautions in place. Bed locked and in lowest position. All questions answered. No other needs indicated at this time.

## 2025-03-13 NOTE — ANESTHESIA POSTPROCEDURE EVALUATION
Patient: Sandip Zhao    Procedure Summary       Date: 03/13/25 Room / Location:  OR  / SURGERY [facility]    Anesthesia Start: 1523 Anesthesia Stop: 1628    Procedure: INSERTION, INTRAMEDULLARY ANDIE, FEMUR, PROXIMAL (Left: Hip) Diagnosis: (Basicervical vertical femoral neck fracture left hip)    Surgeons: Kana Giles M.D. Responsible Provider: Ben Hayes M.D.    Anesthesia Type: general ASA Status: 3 - Emergent            Final Anesthesia Type: general  Last vitals  BP   Blood Pressure : (!) 163/89    Temp   37.1 °C (98.8 °F)    Pulse   72   Resp   16    SpO2   95 %      Anesthesia Post Evaluation    Patient location during evaluation: PACU  Patient participation: complete - patient participated  Level of consciousness: awake and alert  Pain score: 3    Airway patency: patent  Anesthetic complications: no  Cardiovascular status: hemodynamically stable  Respiratory status: acceptable  Hydration status: euvolemic    PONV: none          There were no known notable events for this encounter.     Nurse Pain Score: 3 (NPRS)

## 2025-03-13 NOTE — CONSULTS
Orthopaedic Consult Note      Sandip Zhao is a 84 y.o. male who presents after a ground-level fall with a isolated left hip pain and is here for management. Patient denies numbness, parasthesias, loss of conciousness or other trauma    Past Medical History:   Diagnosis Date    Anemia     Arrhythmia     Bigeminy PVC's    Arthritis     Asthma     H/O    BPH (benign prostatic hyperplasia)     Broken rib 09/09/2024    Fell 8/16/2024 Left rib #8. Recent fall broke left rib #8 and stitches on left side of head, stitches have been removed.    CAD (coronary artery disease)     stents x3    Dental disorder 09/09/2024    Full Set Dentures    Environmental allergies     Fall 08/2024    Missed a step and fell, broke 8th Rib, left side. Cardiologist's Dr. Mora and Dr. Giordano are aware of this, per patient's wife.    Gall stones 09/09/2024    Currently has gall stones.    Hemorrhagic disorder (Formerly [facility]) 09/26/2024    Taking Plavix    Hemorrhoids     High cholesterol     Hypertension     Inguinal hernia 09/26/2024    MI (myocardial infarction) (Formerly [facility]) 07/04/2015    Pain     Chronic Arthritic Pain    Pneumonia 09/26/2024    H/O Once    Status post placement of implantable loop recorder        Past Surgical History:   Procedure Laterality Date    OTHER CARDIAC SURGERY  09/17/2024    Angiogram    OTHER CARDIAC SURGERY  09/09/2024    Loop recorder placed about a year ago.    ABDOMINAL EXPLORATION      APPENDECTOMY      When a child    KNEE ARTHROSCOPY         Medications  No current facility-administered medications on file prior to encounter.     Current Outpatient Medications on File Prior to Encounter   Medication Sig Dispense Refill    carvedilol (COREG) 6.25 MG Tab Take 1 tablet twice a day by oral route for 90 days.      clopidogrel (PLAVIX) 75 MG Tab Take 1 Tablet by mouth every day. 90 Tablet 3    docusate sodium (COLACE) 100 MG Cap Take 200 mg by mouth 2 times a day.      tamsulosin (FLOMAX) 0.4 MG capsule Take 1 Capsule  by mouth 2 times a day. 90 Capsule 1    finasteride (PROSCAR) 5 MG Tab Take 1 Tablet by mouth every day. 30 Tablet 1    mexiletine (MEXITIL) 150 MG Cap Take 150 mg by mouth every 8 hours.      Cyanocobalamin (B-12 PO) Take 1 Tablet by mouth every day.      rosuvastatin (CRESTOR) 20 MG Tab Take 20 mg by mouth every evening.      aspirin 81 MG EC tablet Take 81 mg by mouth every morning.      ezetimibe (ZETIA) 10 MG Tab Take 10 mg by mouth every evening.      acetaminophen (TYLENOL) 500 MG Tab Take 500-1,000 mg by mouth every 6 hours as needed.         Allergies  Patient has no known allergies.    ROS  Negative. All other systems were reviewed and found to be negative    Family History   Problem Relation Age of Onset    Hypertension Father     Heart Disease Father         mitral valve replacement       Social History     Socioeconomic History    Marital status:    Tobacco Use    Smoking status: Former     Types: Cigarettes    Smokeless tobacco: Never    Tobacco comments:     Quit smoking cigarettes around 1990.   Vaping Use    Vaping status: Never Used   Substance and Sexual Activity    Alcohol use: Not Currently     Comment: No alcohol since 2/2018.    Drug use: Never     Social Drivers of Health     Financial Resource Strain: Low Risk  (11/15/2024)    Overall Financial Resource Strain (CARDIA)     Difficulty of Paying Living Expenses: Not very hard   Food Insecurity: No Food Insecurity (11/18/2024)    Hunger Vital Sign     Worried About Running Out of Food in the Last Year: Never true     Ran Out of Food in the Last Year: Never true   Transportation Needs: No Transportation Needs (11/18/2024)    PRAPARE - Transportation     Lack of Transportation (Medical): No     Lack of Transportation (Non-Medical): No   Physical Activity: Insufficiently Active (11/15/2024)    Exercise Vital Sign     Days of Exercise per Week: 7 days     Minutes of Exercise per Session: 20 min   Stress: Stress Concern Present (11/15/2024)  "   [employer] - Occupational Stress Questionnaire     Feeling of Stress : To some extent   Social Connections: Moderately Integrated (11/15/2024)    Social Connection and Isolation Panel [NHANES]     Frequency of Communication with Friends and Family: Twice a week     Frequency of Social Gatherings with Friends and Family: More than three times a week     Attends Christian Services: More than 4 times per year     Active Member of Clubs or Organizations: No     Attends Club or Organization Meetings: Never     Marital Status:    Intimate Partner Violence: Not At Risk (3/12/2025)    Humiliation, Afraid, Rape, and Kick questionnaire     Fear of Current or Ex-Partner: No     Emotionally Abused: No     Physically Abused: No     Sexually Abused: No   Housing Stability: Low Risk  (11/18/2024)    Housing Stability Vital Sign     Unable to Pay for Housing in the Last Year: No     Number of Times Moved in the Last Year: 0     Homeless in the Last Year: No       Physical Exam  Vitals  BP (!) 163/89   Pulse 72   Temp 37.1 °C (98.8 °F) (Temporal)   Resp 16   Ht 1.651 m (5' 5\")   Wt 66.2 kg (146 lb)   SpO2 95%   General: Skin intact  Skin: Intact, no open wounds unless otherwise noted below  Extremities: Benign with the exception below  Left hip exam: Skin is intact  Tender to palpation trochanter  Pain with attempted range of motion  Compartment soft compressible  Gross neurovascular intact    Radiographs:  DX-CHEST-PORTABLE (1 VIEW)   Final Result         No acute cardiac or pulmonary abnormality is identified.      DX-FEMUR-2+ LEFT    (Results Pending)   DX-PORTABLE FLUORO > 1 HOUR    (Results Pending)       Laboratory Values  Recent Labs     03/12/25 1847 03/13/25  0115   WBC 4.2* 5.6   RBC 3.86* 3.42*   HEMOGLOBIN 11.8* 10.4*   HEMATOCRIT 35.7* 30.9*   MCV 92.5 90.4   MCH 30.6 30.4   MCHC 33.1 33.7   RDW 46.5 43.9   PLATELETCT 182 168   MPV 9.3 9.8     Recent Labs     03/12/25 1847 " 03/13/25  0110   SODIUM 138 138   POTASSIUM 3.9 3.8   CHLORIDE 103 106   CO2 24 22   GLUCOSE 76 113*   BUN 15 14     Recent Labs     03/12/25  1847   APTT 30.2   INR 0.97         Impression: Basicervical vertical femoral neck fracture left hip    Plan:Operative intervention. Risks and benefits of surgery were discussed which include but are not limited to bleeding, infection, neurovascular damage, malunion, nonunion, instability, limb length discrepancy, DVT, PE, MI, Stroke and death. They understand these risks and wish to proceed.

## 2025-03-13 NOTE — ANESTHESIA TIME REPORT
Anesthesia Start and Stop Event Times       Date Time Event    3/13/2025 1441 Ready for Procedure     1523 Anesthesia Start     1628 Anesthesia Stop          Responsible Staff  03/13/25      Name Role Begin End    Ben Hayes M.D. Anesth 1523 1628          Overtime Reason:  overtime with call-back    Comments:

## 2025-03-13 NOTE — DISCHARGE PLANNING
Care Transition Team Assessment    LMSW met with Pt and Pt's wife Kimberley at bedside. Pt was willing to participate in assessment.  LMSW Introduced self and department roles. Pt A&Ox4 and able to verify the information on the face sheet. Pt lives with his wife in a single-story house that has no step to enter. Pt's wife reports daughter and son in law also live on the property in an unit therefore Pt has a large family support system.   Prior to this hospitalization pt was independent at home with ADLs and most IADLs. Pt was in the cardiac rehab program at [facility] and had 8 more session to go before completion prior to this hospitalization. Pt's wife reports she would like to be part of the therapeutic decisions and choice for placement.  Pt does not use any DME at baseline but reports has an old walker at home. Pt is retired and receives SSI monthly deposits. Pt denies any SA or MH concerns. Pt does have ACPs documents on file.    Information Source  Orientation Level: Oriented X4  Information Given By: Patient, Spouse  Informant's Name: Kimberley Zhao  Who is responsible for making decisions for patient? : Patient    Readmission Evaluation  Is this a readmission?: No    Elopement Risk  Legal Hold: No  Ambulatory or Self Mobile in Wheelchair: No-Not an Elopement Risk    Interdisciplinary Discharge Planning  Patient or legal guardian wants to designate a caregiver: No    Discharge Preparedness  What is your plan after discharge?: Uncertain - pending medical team collaboration  What are your discharge supports?: Spouse  Prior Functional Level: Ambulatory  Difficulity with ADLs: None  Difficulity with IADLs: None    Functional Assesment  Prior Functional Level: Ambulatory    Finances  Financial Barriers to Discharge: No  Prescription Coverage: Yes    Vision / Hearing Impairment  Vision Impairment : Yes  Right Eye Vision: Wears Glasses  Left Eye Vision: Wears Glasses  Hearing Impairment : Yes  Hearing Impairment: Patient  Declines to Wear Hearing Device(s)  Does Pt Need Special Equipment for the Hearing Impaired?: No    Domestic Abuse  Have you ever been the victim of abuse or violence?: No  Possible Abuse/Neglect Reported to:: Not Applicable    Psychological Assessment  History of Substance Abuse: None  History of Psychiatric Problems: No    Discharge Risks or Barriers  Discharge risks or barriers?: Complex medical needs  Patient risk factors: Vulnerable adult    Anticipated Discharge Information  Discharge Disposition: Disch to  rehab facility or distinct part unit (62)

## 2025-03-13 NOTE — H&P
Hospital Medicine History & Physical Note    Date of Service  3/12/2025    Primary Care Physician  Meagan Mcneil M.D.    Consultants  orthopedics    Specialist Names: Dr. Giles    Code Status  Full Code    Chief Complaint  Chief Complaint   Patient presents with    Leg Injury     Was checking in at  to have his BP checked and tripped in reception area.   xray and told him he has a left femur fracture.         History of Presenting Illness  Sandip Zhao is a 84 y.o. male who presented 3/12/2025 with ground-level fall when the patient went for a blood pressure and heart rate evaluation to urgent care today.  The patient has bigeminy and a history of coronary artery disease with 3 stents in place.  The patient has been under the care of cardiology and they have have been titrating his Coreg.  They initially started him on 3.125 mg twice daily and they went up to 6.25 mg yesterday.  The patient this morning did take his new dose and went to urgent care for evaluation of heart rate and blood pressure.  While standing there he was shuffling his feet to get to the blood pressure check and he tripped over his own feet landing on his left side.  After this he was not able to ambulate and he was brought into the emergency room for evaluation where he was found on imaging studies to have a left hip fracture.  Patient will be n.p.o. at midnight and will be evaluated by orthopedics Dr. Giles tomorrow for surgical repair.  I will initiate the patient on IV pain management morphine 2 mg now and 2 mg every 3 hours as needed    I discussed the plan of care with patient, family, bedside RN, , pharmacy, and emergency room physician Dr. Noel Yanez .    Review of Systems  Review of Systems   Constitutional: Negative.  Negative for chills, diaphoresis and fever.   HENT: Negative.     Eyes: Negative.  Negative for double vision.   Respiratory: Negative.  Negative for cough, hemoptysis and  wheezing.    Cardiovascular: Negative.  Negative for chest pain, palpitations and leg swelling.   Gastrointestinal: Negative.  Negative for abdominal pain, blood in stool, constipation, diarrhea, heartburn, nausea and vomiting.   Genitourinary: Negative.  Negative for frequency, hematuria and urgency.   Musculoskeletal:  Positive for falls and joint pain (Left hip).   Skin: Negative.  Negative for itching and rash.   Neurological: Negative.  Negative for dizziness, focal weakness, seizures, loss of consciousness and headaches.   Endo/Heme/Allergies: Negative.  Does not bruise/bleed easily.   Psychiatric/Behavioral: Negative.  Negative for suicidal ideas. The patient is not nervous/anxious.    All other systems reviewed and are negative.      Past Medical History   has a past medical history of Anemia, Arrhythmia, Arthritis, Asthma, BPH (benign prostatic hyperplasia), Broken rib (09/09/2024), CAD (coronary artery disease), Dental disorder (09/09/2024), Environmental allergies, Fall (08/2024), Gall stones (09/09/2024), Hemorrhagic disorder ([facility]) (09/26/2024), Hemorrhoids, High cholesterol, Hypertension, Inguinal hernia (09/26/2024), MI (myocardial infarction) ([facility]) (07/04/2015), Pain, Pneumonia (09/26/2024), and Status post placement of implantable loop recorder.    Surgical History   has a past surgical history that includes abdominal exploration; appendectomy; knee arthroscopy; other cardiac surgery (09/09/2024); and other cardiac surgery (09/17/2024).     Family History  family history includes Heart Disease in his father; Hypertension in his father.   Family history reviewed with patient. There is no family history that is pertinent to the chief complaint.     Social History   reports that he has quit smoking. His smoking use included cigarettes. He has never used smokeless tobacco. He reports that he does not currently use alcohol. He reports that he does not use drugs.    Allergies  No Known  Allergies    Medications  Prior to Admission Medications   Prescriptions Last Dose Informant Patient Reported? Taking?   Cyanocobalamin (B-12 PO)  Significant Other, Family Member Yes No   Sig: Take 1 Tablet by mouth every day.   acetaminophen (TYLENOL) 500 MG Tab  Significant Other, Family Member Yes No   Sig: Take 500-1,000 mg by mouth every 6 hours as needed.   aspirin 81 MG EC tablet  Significant Other, Family Member Yes No   Sig: Take 81 mg by mouth every morning.   carvedilol (COREG) 6.25 MG Tab   Yes No   Sig: Take 1 tablet twice a day by oral route for 90 days.   clopidogrel (PLAVIX) 75 MG Tab   No No   Sig: Take 1 Tablet by mouth every day.   docusate sodium (COLACE) 100 MG Cap  Family Member Yes No   Sig: Take 200 mg by mouth 2 times a day.   ezetimibe (ZETIA) 10 MG Tab  Significant Other, Family Member Yes No   Sig: Take 10 mg by mouth every evening.   finasteride (PROSCAR) 5 MG Tab  Significant Other, Family Member No No   Sig: Take 1 Tablet by mouth every day.   mexiletine (MEXITIL) 150 MG Cap  Significant Other, Family Member Yes No   Sig: Take 150 mg by mouth every 8 hours.   rosuvastatin (CRESTOR) 20 MG Tab  Significant Other, Family Member Yes No   Sig: Take 20 mg by mouth every evening.   tamsulosin (FLOMAX) 0.4 MG capsule  Significant Other, Family Member No No   Sig: Take 1 Capsule by mouth 2 times a day.      Facility-Administered Medications: None       Physical Exam  Temp:  [36.4 °C (97.6 °F)-36.6 °C (97.9 °F)] 36.4 °C (97.6 °F)  Pulse:  [70-72] 70  Resp:  [16-18] 16  BP: (129-165)/(60-84) 162/77  SpO2:  [98 %-100 %] 100 %  Blood Pressure : (!) 162/77   Temperature: 36.4 °C (97.6 °F)   Pulse: 70   Respiration: 16   Pulse Oximetry: 100 %       Physical Exam  Vitals and nursing note reviewed. Exam conducted with a chaperone present.   Constitutional:       General: He is not in acute distress.     Appearance: Normal appearance. He is well-developed and normal weight. He is not ill-appearing,  toxic-appearing or diaphoretic.   HENT:      Head: Normocephalic and atraumatic.      Right Ear: External ear normal.      Left Ear: External ear normal.      Nose: Nose normal.      Mouth/Throat:      Mouth: Mucous membranes are dry.      Pharynx: Oropharynx is clear.   Eyes:      General:         Right eye: No discharge.         Left eye: No discharge.      Extraocular Movements: Extraocular movements intact.      Conjunctiva/sclera: Conjunctivae normal.      Pupils: Pupils are equal, round, and reactive to light.   Neck:      Thyroid: No thyromegaly.      Vascular: No JVD.   Cardiovascular:      Rate and Rhythm: Bradycardia present. Rhythm irregular.      Pulses: Normal pulses.      Heart sounds: Murmur heard.   Pulmonary:      Effort: Pulmonary effort is normal.      Breath sounds: Normal breath sounds.   Chest:      Chest wall: No tenderness.   Abdominal:      General: Abdomen is flat. Bowel sounds are normal. There is no distension.      Palpations: Abdomen is soft. There is no mass.      Tenderness: There is no abdominal tenderness. There is no guarding or rebound.   Musculoskeletal:         General: Tenderness and signs of injury present. Normal range of motion.      Cervical back: Normal range of motion and neck supple.      Right lower leg: No edema.      Comments: Left lower extremity is externally rotated and shortened.  Left hip is tender and painful   Lymphadenopathy:      Cervical: No cervical adenopathy.   Skin:     General: Skin is warm and dry.      Capillary Refill: Capillary refill takes more than 3 seconds.      Findings: No rash.   Neurological:      General: No focal deficit present.      Mental Status: He is alert and oriented to person, place, and time. Mental status is at baseline.      GCS: GCS eye subscore is 4. GCS verbal subscore is 5. GCS motor subscore is 6.      Cranial Nerves: No cranial nerve deficit.      Deep Tendon Reflexes: Reflexes are normal and symmetric.   Psychiatric:     "     Mood and Affect: Mood normal.         Behavior: Behavior normal.         Thought Content: Thought content normal.         Judgment: Judgment normal.         Laboratory:  Recent Labs     03/12/25 1847   WBC 4.2*   RBC 3.86*   HEMOGLOBIN 11.8*   HEMATOCRIT 35.7*   MCV 92.5   MCH 30.6   MCHC 33.1   RDW 46.5   PLATELETCT 182   MPV 9.3     Recent Labs     03/12/25 1847   SODIUM 138   POTASSIUM 3.9   CHLORIDE 103   CO2 24   GLUCOSE 76   BUN 15   CREATININE 0.97   CALCIUM 8.9     Recent Labs     03/12/25 1847   ALTSGPT 27   ASTSGOT 24   ALKPHOSPHAT 88   TBILIRUBIN 0.5   GLUCOSE 76     Recent Labs     03/12/25 1847   APTT 30.2   INR 0.97     No results for input(s): \"NTPROBNP\" in the last 72 hours.      No results for input(s): \"TROPONINT\" in the last 72 hours.    Imaging:  DX-CHEST-PORTABLE (1 VIEW)   Final Result         No acute cardiac or pulmonary abnormality is identified.          X-Ray:  I have personally reviewed the images and compared with prior images.  EKG:  I have personally reviewed the images and compared with prior images.    Assessment/Plan:  Justification for Admission Status  I anticipate this patient will require at least two midnights for appropriate medical management, necessitating inpatient admission because patient has acute ground-level fall with fracture of the left hip.  Patient will need operative repair and at least 48 hours of inpatient management    Patient will need a Med/Surg bed on MEDICAL service .  The need is secondary to left hip fracture.    * Closed left hip fracture, initial encounter (HCC)- (present on admission)  Assessment & Plan  Patient today went to urgent care to have a blood pressure check.  The patient has bigeminy and lately has been titrating his Coreg by the cardiologist.  Initially was started on 3.125 and as of late he went up to 6.25.  The cardiologist wanted to have the blood pressure and heart rate rechecked so he went to urgent care.  Usually shuffles his " feet and there they had no slip david and because of this he tripped over his own feet landing on the floor.  Following the fall he was not able to ambulate and he was favoring his left hip.  Patient comes into the emergency room for evaluation is found on imaging studies to have nondisplaced left basicervical femoral fracture  Orthopedics Dr. Giles was consulted and he recommends placing the patient n.p.o. at midnight  I will initiate pain management with IV morphine 2 mg now and then 2 mg every 3 hours as needed.  I will also add as needed oxycodone.  I will make the patient n.p.o. at midnight I will give him fluid resuscitation.  I anticipate surgery tomorrow.  After surgery patient will need PT OT evaluation and placement to a skilled facility which I have discussed with them and they would like to go to [facility].    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    Pure hypercholesterolemia- (present on admission)  Assessment & Plan  I will can sinew on Crestor and Zetia  I will check a fasting lipid panel    Anemia- (present on admission)  Assessment & Plan  I will check an iron panel    Benign prostatic hyperplasia with urinary obstruction- (present on admission)  Assessment & Plan  I will continue with Flomax and finasteride        VTE prophylaxis: SCDs/TEDs

## 2025-03-13 NOTE — THERAPY
Physical Therapy Contact Note    Patient Name: Sandip Zhao  Age:  84 y.o., Sex:  male  Medical Record #: [id number]  Today's Date: 3/13/2025       03/13/25 0722   Interdisciplinary Plan of Care Collaboration   IDT Collaboration with  Nursing   Collaboration Comments PT order received. Pt is currently awaiting surgical intervention, will plan to evaluation once pt is post-op.

## 2025-03-13 NOTE — ED NOTES
Medication history reviewed with patient and his wife. Med rec is complete .    Allergies reviewed.     Patient denies any outpatient antibiotics in the last 30 days.     Anticoagulants (rivaroxaban, apixaban, edoxaban, dabigatran, enoxaparin) taken in the last 14 days? (Yes/No), No    Denny Silva, BetitoD

## 2025-03-13 NOTE — ASSESSMENT & PLAN NOTE
Patient has a history of ventricular bigeminy which is being followed by cardiology and currently is on Coreg 3.125 mg twice daily      3/14: I spoke to the patient's cardiologist from outpatient and for now we will continue with Coreg 3.125 mg twice daily.

## 2025-03-13 NOTE — ANESTHESIA PREPROCEDURE EVALUATION
Case: [id number] Date/Time: 03/13/25 1445    Procedure: INSERTION, INTRAMEDULLARY ANDIE, FEMUR, PROXIMAL (Left)    Location: [facility] OR 05 / SURGERY [facility]    Surgeons: Kana Giles M.D.            Relevant Problems   CARDIAC   (positive) Coronary artery disease due to calcified coronary lesion   (positive) MI (myocardial infarction) (HCC)   (positive) Primary hypertension (Resolved)   (positive) Ventricular bigeminy     Most Recent TTE:  Left Ventricle  Normal left ventricular chamber size and thickness.  Normal left   ventricular systolic function. The left ventricular ejection fraction is visually estimated to be 60%. Normal regional wall motion.  Indeterminate LV diastolic function with impaired relaxation filling pattern     Right Ventricle  Normal right ventricular size. Normal right ventricular systolic   function.     Right Atrium  Normal right atrial size. The inferior vena cava is not well   visualized.     Left Atrium  Normal left atrial size. Left atrial volume index is 31 mL/sq m.     Mitral Valve  Mild mitral annular calcification. No mitral stenosis. Trace mitral regurgitation.     Aortic Valve  Calcified aortic valve leaflets. Tricuspid aortic valve. Mild aortic valve stenosis. Vmax is 2.8 m/s. Transvalvular gradients are - Peak: 32 mmHg, Mean: 18 mmHg. No aortic insufficiency.     Tricuspid Valve  Structurally normal tricuspid valve. No tricuspid stenosis. Trace   tricuspid regurgitation. Estimated right ventricular systolic pressure is 30 mmHg.     Pulmonic Valve  The pulmonic valve is not well visualized. No pulmonic stenosis. No pulmonic insufficiency.     Pericardium  No pericardial effusion.     Aorta  Normal aortic root for body surface area. The ascending aorta is not well visualized.    Most Recent EKG:  sinus rhythm with ventricular bigeminy and a   ventricular rate of 70, native beats are narrow with no ST segment elevation or depression.     Physical Exam    Airway   Mallampati:  II  TM distance: >3 FB  Neck ROM: full       Cardiovascular - normal exam  Rhythm: regular  Rate: normal     Dental - normal exam  (+) lower dentures, upper dentures           Pulmonary - normal exam  Breath sounds clear to auscultation     Abdominal    Neurological - normal exam                   Anesthesia Plan    ASA 3- EMERGENT   ASA physical status 3 criteria: CAD (>3 months), hypertension - poorly controlled and MI or angina - history (> 3 months)ASA physical status emergent criteria: displaced fracture with possible neurovascular compromise    Plan - general       Airway plan will be ETT          Induction: intravenous    Postoperative Plan: Postoperative administration of opioids is intended.    Pertinent diagnostic labs and testing reviewed    Informed Consent:    Anesthetic plan and risks discussed with patient.    Use of blood products discussed with: patient whom consented to blood products.

## 2025-03-13 NOTE — PROGRESS NOTES
Telemetry Shift Summary     Rhythm: SR  HR: 68-71  Ectopy: rPVC, fBig, rTrig, rCoup    Measurements: 0.16/0.08/0.36    Normal Values  Rhythm: SR  HR:   Measurements: 0.12-0.20/0.08-0.10/0.30-0.52

## 2025-03-13 NOTE — ED PROVIDER NOTES
ED Provider Note    CHIEF COMPLAINT  Chief Complaint   Patient presents with    Leg Injury     Was checking in at  to have his BP checked and tripped in reception area.   xray and told him he has a left femur fracture.         EXTERNAL RECORDS REVIEWED  Other reviewed the x-ray from urgent care and the patient does have a left proximal femur fracture through the neck of the femur    SHAISTA/RINA    Sandip Zhao is a 84 y.o. male who presents from urgent care after the patient fell and suffered a fracture to the left proximal femur.  The patient was at urgent care have his blood pressure checked when he tripped and fell.  He is unaware of any other injuries.  Does not have any paresthesias nor functional loss of his extremities.  The patient does not have any other pain except that to the left hip region.  An x-ray was performed urgent care that showed a fracture of the proximal femur and the patient was transferred here for further evaluation.    PAST MEDICAL HISTORY   has a past medical history of Anemia, Arrhythmia, Arthritis, Asthma, BPH (benign prostatic hyperplasia), Broken rib (09/09/2024), CAD (coronary artery disease), Dental disorder (09/09/2024), Environmental allergies, Fall (08/2024), Gall stones (09/09/2024), Hemorrhagic disorder ([facility]) (09/26/2024), Hemorrhoids, High cholesterol, Hypertension, Inguinal hernia (09/26/2024), MI (myocardial infarction) ([facility]) (07/04/2015), Pain, Pneumonia (09/26/2024), and Status post placement of implantable loop recorder.    SURGICAL HISTORY   has a past surgical history that includes abdominal exploration; appendectomy; knee arthroscopy; other cardiac surgery (09/09/2024); and other cardiac surgery (09/17/2024).    FAMILY HISTORY  Family History   Problem Relation Age of Onset    Hypertension Father     Heart Disease Father         mitral valve replacement       SOCIAL HISTORY  Social History     Tobacco Use    Smoking status: Former     Types: Cigarettes    Smokeless  "tobacco: Never    Tobacco comments:     Quit smoking cigarettes around .   Vaping Use    Vaping status: Never Used   Substance and Sexual Activity    Alcohol use: Not Currently     Comment: No alcohol since 2018.    Drug use: Never    Sexual activity: Not on file     Comment: food and beverage management       CURRENT MEDICATIONS  Home Medications    **Home medications have not yet been reviewed for this encounter**         ALLERGIES  No Known Allergies    PHYSICAL EXAM  VITAL SIGNS: BP (!) 165/73   Pulse 72   Temp 36.6 °C (97.9 °F) (Temporal)   Resp 18   Ht 1.651 m (5' 5\")   Wt 66.2 kg (146 lb)   SpO2 100%   BMI 24.30 kg/m²    General The patient appears uncomfortable but nontoxic    HEENT atraumatic    Cervical, thoracic, lumbar spine does not have any midline tenderness nor step-offs    Pulmonary the patient's lungs are clear to auscultation bilaterally    Cardiovascular S1-S2 with a regular rate and rhythm    GI abdomen soft    Pelvis is stable    Extremities patient's left lower extremity is flexed at the knee and externally rotated without obvious deformities     Neurologic examination GCS of 15        EKG/LABS  Results for orders placed or performed during the hospital encounter of 25   EKG (NOW)    Collection Time: 25  6:56 PM   Result Value Ref Range    Report       [facility] Emergency Dept.    Test Date:  2025  Pt Name:    EVELYN WEAVER             Department: [facility]  MRN:        [id number]                      [facility]  Gender:     Male                         Technician: [id number]  :        [id number]                   Requested By:ALPA CHAVEZ  Order #:    [id number]                    Reading MD: ALPA CHAVEZ MD    Measurements  Intervals                                Axis  Rate:       70                           P:          68  CT:         201                          QRS:        3  QRSD:       100                          " T:          12  QT:         401  QTc:        433    Interpretive Statements  Twelve-lead EKG shows a sinus rhythm with ventricular bigeminy and a  ventricular rate of 70, native beats are narrow with no ST segment elevation  or depression.  Electronically Signed On 03- 18:56:49 PDT by NOEL YANEZ MD         I have independently interpreted this EKG    RADIOLOGY/PROCEDURES   I have independently interpreted the diagnostic imaging associated with this visit and am waiting the final reading from the radiologist.   My preliminary interpretation is as follows: X-ray from the urgent care was reviewed and the patient does have a fracture of the left proximal femur through the neck    Radiologist interpretation:  DX-CHEST-PORTABLE (1 VIEW)   Final Result         No acute cardiac or pulmonary abnormality is identified.          COURSE & MEDICAL DECISION MAKING    This an 84-year-old gentleman who presents the Emergency Department after a fall.  Does have a fracture through the neck of the left proximal femur.  Therefore the patient will be admitted to the medicine team with orthopedics in consultation.  Preoperative workup has been obtained.    FINAL DIAGNOSIS  Left proximal femur fracture    Disposition  The patient will be admitted in stable condition     Electronically signed by: Noel Yanez M.D., 3/12/2025 6:20 PM

## 2025-03-13 NOTE — ASSESSMENT & PLAN NOTE
"3/13: Patient to undergo surgery today. PT/OT after surgery.    3/14: Patient underwent \"Left hip intertrochanteric nailing\" yesterday by orthopedic surgery.  As per nursing PT recommending home health.  Will order home health.    3/15: Wife is concerned about the patient going home so PT will reevaluate the patient for safe discharge planning.    3/16: PT now recommending SNF, pending placement  "

## 2025-03-13 NOTE — ED TRIAGE NOTES
"Chief Complaint   Patient presents with    Leg Injury     Was checking in at  to have his BP checked and tripped in reception area.   xray and told him he has a left femur fracture.       BP (!) 165/73   Pulse 72   Temp 36.6 °C (97.9 °F) (Temporal)   Resp 18   Ht 1.651 m (5' 5\")   Wt 66.2 kg (146 lb)   SpO2 100%   BMI 24.30 kg/m²     "

## 2025-03-13 NOTE — ANESTHESIA PROCEDURE NOTES
Airway    Date/Time: 3/13/2025 3:28 PM    Performed by: Ben Hayes M.D.  Authorized by: Ben Hayes M.D.    Location:  OR  Urgency:  Elective  Difficult Airway: No    Indications for Airway Management:  Anesthesia      Spontaneous Ventilation: absent    Sedation Level:  Deep  Preoxygenated: Yes    Patient Position:  Sniffing  Mask Difficulty Assessment:  0 - not attempted  Final Airway Type:  Endotracheal airway  Final Endotracheal Airway:  ETT  Cuffed: Yes    Technique Used for Successful ETT Placement:  Direct laryngoscopy    Insertion Site:  Oral  Blade Type:  Liz  Laryngoscope Blade/Videolaryngoscope Blade Size:  4  ETT Size (mm):  7.0  Measured from:  Teeth  ETT to Teeth (cm):  22  Placement Verified by: auscultation and capnometry    Cormack-Lehane Classification:  Grade I - full view of glottis  Number of Attempts at Approach:  1  Number of Other Approaches Attempted:  0

## 2025-03-13 NOTE — PROGRESS NOTES
"Hospital Medicine Daily Progress Note    Date of Service  3/13/2025    Chief Complaint  Sandip Zhao is a 84 y.o. male admitted 3/12/2025 with hip pain.    Hospital Course  As per chart review:  \"84 y.o. male who presented 3/12/2025 with ground-level fall when the patient went for a blood pressure and heart rate evaluation to urgent care today.  The patient has bigeminy and a history of coronary artery disease with 3 stents in place.  The patient has been under the care of cardiology and they have have been titrating his Coreg.  They initially started him on 3.125 mg twice daily and they went up to 6.25 mg yesterday.  The patient this morning did take his new dose and went to urgent care for evaluation of heart rate and blood pressure.  While standing there he was shuffling his feet to get to the blood pressure check and he tripped over his own feet landing on his left side.  After this he was not able to ambulate and he was brought into the emergency room for evaluation where he was found on imaging studies to have a left hip fracture.\"    Interval Problem Update  3/13: Patient seen at bedside this morning.  Still complaining of pain.  Patient n.p.o. as he will most likely undergo surgery today.  We appreciate further recommendations per orthopedic surgery.    I have discussed this patient's plan of care and discharge plan at IDT rounds today with Case Management, Nursing, Nursing leadership, and other members of the IDT team.    Consultants/Specialty  orthopedics    Code Status  Full Code    Disposition  The patient is not medically cleared for discharge to home or a post-acute facility.        Review of Systems  Review of Systems   Constitutional:  Positive for malaise/fatigue. Negative for chills and fever.   HENT:  Negative for hearing loss and nosebleeds.    Eyes:  Negative for blurred vision and double vision.   Respiratory:  Negative for cough and shortness of breath.    Cardiovascular:  Negative for " chest pain and palpitations.   Gastrointestinal:  Negative for abdominal pain, heartburn and vomiting.   Genitourinary:  Negative for dysuria and urgency.   Musculoskeletal:  Positive for falls, joint pain and myalgias.   Skin:  Negative for itching and rash.   Neurological:  Negative for dizziness and headaches.   Psychiatric/Behavioral:  Negative for substance abuse. The patient is not nervous/anxious.    All other systems reviewed and are negative.       Physical Exam  Temp:  [36.4 °C (97.5 °F)-37.2 °C (98.9 °F)] 36.4 °C (97.5 °F)  Pulse:  [62-72] 62  Resp:  [16-18] 18  BP: (112-165)/(55-89) 116/62  SpO2:  [94 %-100 %] 96 %    Physical Exam  Vitals and nursing note reviewed.   Constitutional:       Appearance: He is ill-appearing.   HENT:      Head: Normocephalic and atraumatic.      Right Ear: External ear normal.      Left Ear: External ear normal.      Nose: Nose normal.      Mouth/Throat:      Mouth: Mucous membranes are moist.      Pharynx: Oropharynx is clear.   Eyes:      General:         Right eye: No discharge.         Left eye: No discharge.   Cardiovascular:      Rate and Rhythm: Normal rate.      Heart sounds: No murmur heard.  Pulmonary:      Effort: Pulmonary effort is normal. No respiratory distress.      Breath sounds: Normal breath sounds.   Abdominal:      General: Abdomen is flat. Bowel sounds are normal. There is no distension.      Palpations: Abdomen is soft.      Tenderness: There is no abdominal tenderness.   Musculoskeletal:         General: Tenderness present.      Cervical back: Normal range of motion and neck supple.   Skin:     General: Skin is warm.   Neurological:      General: No focal deficit present.      Mental Status: He is alert and oriented to person, place, and time.   Psychiatric:         Mood and Affect: Mood normal.         Behavior: Behavior normal.         Fluids  No intake or output data in the 24 hours ending 03/13/25 1207     Laboratory  Recent Labs      03/12/25 1847 03/13/25  0115   WBC 4.2* 5.6   RBC 3.86* 3.42*   HEMOGLOBIN 11.8* 10.4*   HEMATOCRIT 35.7* 30.9*   MCV 92.5 90.4   MCH 30.6 30.4   MCHC 33.1 33.7   RDW 46.5 43.9   PLATELETCT 182 168   MPV 9.3 9.8     Recent Labs     03/12/25 1847 03/13/25  0110   SODIUM 138 138   POTASSIUM 3.9 3.8   CHLORIDE 103 106   CO2 24 22   GLUCOSE 76 113*   BUN 15 14   CREATININE 0.97 0.90   CALCIUM 8.9 8.5     Recent Labs     03/12/25 1847   APTT 30.2   INR 0.97         Recent Labs     03/13/25  0110   TRIGLYCERIDE 46   HDL 39*   LDL 48       Imaging  DX-CHEST-PORTABLE (1 VIEW)   Final Result         No acute cardiac or pulmonary abnormality is identified.      DX-FEMUR-2+ LEFT    (Results Pending)   DX-PORTABLE FLUORO > 1 HOUR    (Results Pending)        Assessment/Plan  * Closed left hip fracture, initial encounter (HCC)- (present on admission)  Assessment & Plan  3/13: Patient to undergo surgery today. PT/OT after surgery.    Pure hypercholesterolemia- (present on admission)  Assessment & Plan  3/13: continue Crestor and Zetia    Coronary artery disease due to calcified coronary lesion- (present on admission)  Assessment & Plan  History of coronary artery disease for which the patient is on Crestor and Zetia as well as Coreg and aspirin  For now I will continue his medication management optimization  Currently patient is chest pain-free    Ventricular bigeminy- (present on admission)  Assessment & Plan  Patient has a history of ventricular bigeminy which is being followed by cardiology and currently is on Coreg 3.125 mg twice daily  I will continue with the Coreg.    Anemia- (present on admission)  Assessment & Plan  Seems to be chronic  Monitor closely    Benign prostatic hyperplasia with urinary obstruction- (present on admission)  Assessment & Plan  3/13: continue Flomax and finasteride    Advance care planning  Assessment & Plan  3/13: I discussed with patient for at least 16 minutes further goals of care.  This  included CODE STATUS which includes full code and DNR/DNI.  The patient would like to be full code.  We also discussed further treatment goals.  For now the patient want to have full treatment options available.  This includes invasive or noninvasive procedures as needed.  In the event that the patient cannot make decisions for himself he would like his wife Kimberley to make decisions for him.         VTE prophylaxis: SCDs    I have performed a physical exam and reviewed and updated ROS and Plan today (3/13/2025). In review of yesterday's note (3/12/2025), there are no changes except as documented above.      I spend at least 51 minutes providing care for this patient.  This included face-to-face interview, physical examination.  Review of lab work including CBC, BMP.  Discussing with multidisciplinary team including case management, nursing staff and pharmacy.  Creating plan of care, reviewing orders.    Moreover, I discussed with patient for at least 16 minutes further goals of care.  This included CODE STATUS which includes full code and DNR/DNI.  The patient would like to be full code.  We also discussed further treatment goals.  For now the patient want to have full treatment options available.  This includes invasive or noninvasive procedures as needed.  In the event that the patient cannot make decisions for himself he would like his wife Kimberley to make decisions for him.

## 2025-03-13 NOTE — OR NURSING
Late entry; Patient allergies and NPO status verified, home medication reconciliation completed and belongings secured. Surgical site verified with patient. Patient verbalizes understanding of pain scale, expected course of stay and plan of care; patient and family state verbal understanding at this time. IV access verified. Sequential in place as ordered.

## 2025-03-13 NOTE — ASSESSMENT & PLAN NOTE
3/13: I discussed with patient for at least 16 minutes further goals of care.  This included CODE STATUS which includes full code and DNR/DNI.  The patient would like to be full code.  We also discussed further treatment goals.  For now the patient want to have full treatment options available.  This includes invasive or noninvasive procedures as needed.  In the event that the patient cannot make decisions for himself he would like his wife Kimberley to make decisions for him.

## 2025-03-13 NOTE — THERAPY
Occupational Therapy Contact Note    Patient Name: Sandip Zhao  Age:  84 y.o., Sex:  male  Medical Record #: [id number]  Today's Date: 3/13/2025   03/13/25 0858   Interdisciplinary Plan of Care Collaboration   Collaboration Comments Received OT Eval order. Pt pending surgery; will await post-op orders.

## 2025-03-13 NOTE — DISCHARGE PLANNING
Post Acute Navigator Team    Per chart review, patient has been identified as a candidate for a goals of care discussion.     EOL Index medium risk score 41  Medium LACE + score 32  Decreased 6 click Mobility score 23   Low 6 click ADL score 19   Readmission: No     This Post Acute Navigator will round on patient tomorrow 3/14/25 after patient has had surgery to provide information on [facility] for when patient is medically cleared to discharge home.    Please reach out to me directly should care team feel patient will benefit from a discharge goals of care conversation regarding outpatient palliative care or hospice.

## 2025-03-13 NOTE — CARE PLAN
Problem: Knowledge Deficit - Standard  Goal: Patient and family/care givers will demonstrate understanding of plan of care, disease process/condition, diagnostic tests and medications  Outcome: Progressing     Problem: Fall Risk  Goal: Patient will remain free from falls  Outcome: Progressing   The patient is Stable - Low risk of patient condition declining or worsening    Shift Goals  Clinical Goals: pain control, npo midnight  Patient Goals: rest    Progress made toward(s) clinical / shift goals:      Patient is not progressing towards the following goals:

## 2025-03-13 NOTE — CARE PLAN
The patient is Stable - Low risk of patient condition declining or worsening    Shift Goals  Clinical Goals: Pain Management, NPO, Surgery, Safety  Patient Goals: Rest    Progress made toward(s) clinical / shift goals:    Problem: Knowledge Deficit - Standard  Goal: Patient and family/care givers will demonstrate understanding of plan of care, disease process/condition, diagnostic tests and medications  Description: Target End Date:  1-3 days or as soon as patient condition allowsDocument in Patient Education1.  Patient and family/caregiver oriented to unit, equipment, visitation policy and means for communicating concern2.  Complete/review Learning Assessment3.  Assess knowledge level of disease process/condition, treatment plan, diagnostic tests and medications4.  Explain disease process/condition, treatment plan, diagnostic tests and medications  Outcome: Progressing    Patient verbalized understanding of plan of care. All questions answered.     Problem: Fall Risk  Goal: Patient will remain free from falls  Description: Target End Date:  Prior to discharge or change in level of careDocument interventions on the Luo Dario Fall Risk Assessment1.  Assess for fall risk factors2.  Implement fall precautions  Outcome: Progressing    Patient remained free from falls during shift. Bed locked in lowest position. Bed alarm on.    Patient is not progressing towards the following goals:

## 2025-03-13 NOTE — ASSESSMENT & PLAN NOTE
History of coronary artery disease for which the patient is on Crestor and Zetia as well as Coreg and aspirin  For now I will continue his medication management optimization  Currently patient is chest pain-free

## 2025-03-14 ENCOUNTER — HOME HEALTH ADMISSION (OUTPATIENT)
Dept: HOME HEALTH SERVICES | Facility: HOME HEALTHCARE | Age: 85
End: 2025-03-14
Payer: MEDICARE

## 2025-03-14 PROBLEM — R73.9 HYPERGLYCEMIA: Status: ACTIVE | Noted: 2025-03-14

## 2025-03-14 LAB
ANION GAP SERPL CALC-SCNC: 12 MMOL/L (ref 7–16)
BUN SERPL-MCNC: 13 MG/DL (ref 8–22)
CALCIUM SERPL-MCNC: 8.7 MG/DL (ref 8.4–10.2)
CHLORIDE SERPL-SCNC: 104 MMOL/L (ref 96–112)
CO2 SERPL-SCNC: 20 MMOL/L (ref 20–33)
CREAT SERPL-MCNC: 0.88 MG/DL (ref 0.5–1.4)
ERYTHROCYTE [DISTWIDTH] IN BLOOD BY AUTOMATED COUNT: 43.7 FL (ref 35.9–50)
EST. AVERAGE GLUCOSE BLD GHB EST-MCNC: 117 MG/DL
GFR SERPLBLD CREATININE-BSD FMLA CKD-EPI: 85 ML/MIN/1.73 M 2
GLUCOSE SERPL-MCNC: 167 MG/DL (ref 65–99)
HBA1C MFR BLD: 5.7 % (ref 4–5.6)
HCT VFR BLD AUTO: 30.9 % (ref 42–52)
HGB BLD-MCNC: 10.4 G/DL (ref 14–18)
MCH RBC QN AUTO: 30.3 PG (ref 27–33)
MCHC RBC AUTO-ENTMCNC: 33.7 G/DL (ref 32.3–36.5)
MCV RBC AUTO: 90.1 FL (ref 81.4–97.8)
PLATELET # BLD AUTO: 167 K/UL (ref 164–446)
PMV BLD AUTO: 10.1 FL (ref 9–12.9)
POTASSIUM SERPL-SCNC: 3.9 MMOL/L (ref 3.6–5.5)
RBC # BLD AUTO: 3.43 M/UL (ref 4.7–6.1)
SODIUM SERPL-SCNC: 136 MMOL/L (ref 135–145)
WBC # BLD AUTO: 6.4 K/UL (ref 4.8–10.8)

## 2025-03-14 PROCEDURE — 97162 PT EVAL MOD COMPLEX 30 MIN: CPT

## 2025-03-14 PROCEDURE — 97166 OT EVAL MOD COMPLEX 45 MIN: CPT

## 2025-03-14 PROCEDURE — 770020 HCHG ROOM/CARE - TELE (206)

## 2025-03-14 PROCEDURE — A9270 NON-COVERED ITEM OR SERVICE: HCPCS | Performed by: INTERNAL MEDICINE

## 2025-03-14 PROCEDURE — 97535 SELF CARE MNGMENT TRAINING: CPT

## 2025-03-14 PROCEDURE — 83036 HEMOGLOBIN GLYCOSYLATED A1C: CPT

## 2025-03-14 PROCEDURE — 700102 HCHG RX REV CODE 250 W/ 637 OVERRIDE(OP): Performed by: INTERNAL MEDICINE

## 2025-03-14 PROCEDURE — 85027 COMPLETE CBC AUTOMATED: CPT

## 2025-03-14 PROCEDURE — 94760 N-INVAS EAR/PLS OXIMETRY 1: CPT

## 2025-03-14 PROCEDURE — 36415 COLL VENOUS BLD VENIPUNCTURE: CPT

## 2025-03-14 PROCEDURE — 700102 HCHG RX REV CODE 250 W/ 637 OVERRIDE(OP): Performed by: HOSPITALIST

## 2025-03-14 PROCEDURE — 99233 SBSQ HOSP IP/OBS HIGH 50: CPT | Performed by: INTERNAL MEDICINE

## 2025-03-14 PROCEDURE — A9270 NON-COVERED ITEM OR SERVICE: HCPCS | Performed by: HOSPITALIST

## 2025-03-14 PROCEDURE — 80048 BASIC METABOLIC PNL TOTAL CA: CPT

## 2025-03-14 RX ORDER — ASPIRIN 81 MG/1
81 TABLET ORAL 2 TIMES DAILY
Status: DISCONTINUED | OUTPATIENT
Start: 2025-03-14 | End: 2025-03-17

## 2025-03-14 RX ADMIN — POLYETHYLENE GLYCOL 3350 1 PACKET: 17 POWDER, FOR SOLUTION ORAL at 09:27

## 2025-03-14 RX ADMIN — SENNOSIDES AND DOCUSATE SODIUM 2 TABLET: 50; 8.6 TABLET ORAL at 17:54

## 2025-03-14 RX ADMIN — TAMSULOSIN HYDROCHLORIDE 0.4 MG: 0.4 CAPSULE ORAL at 17:54

## 2025-03-14 RX ADMIN — ASPIRIN 81 MG: 81 TABLET, COATED ORAL at 06:21

## 2025-03-14 RX ADMIN — MEXILETINE HYDROCHLORIDE 150 MG: 150 CAPSULE ORAL at 06:20

## 2025-03-14 RX ADMIN — ROSUVASTATIN CALCIUM 20 MG: 10 TABLET, FILM COATED ORAL at 17:54

## 2025-03-14 RX ADMIN — TAMSULOSIN HYDROCHLORIDE 0.4 MG: 0.4 CAPSULE ORAL at 06:20

## 2025-03-14 RX ADMIN — EZETIMIBE 10 MG: 10 TABLET ORAL at 17:54

## 2025-03-14 RX ADMIN — MEXILETINE HYDROCHLORIDE 150 MG: 150 CAPSULE ORAL at 15:20

## 2025-03-14 RX ADMIN — ASPIRIN 81 MG: 81 TABLET, COATED ORAL at 17:53

## 2025-03-14 RX ADMIN — CARVEDILOL 3.12 MG: 3.12 TABLET, FILM COATED ORAL at 17:54

## 2025-03-14 RX ADMIN — CLOPIDOGREL BISULFATE 75 MG: 75 TABLET ORAL at 06:20

## 2025-03-14 RX ADMIN — FINASTERIDE 5 MG: 5 TABLET, FILM COATED ORAL at 06:21

## 2025-03-14 RX ADMIN — MEXILETINE HYDROCHLORIDE 150 MG: 150 CAPSULE ORAL at 21:10

## 2025-03-14 RX ADMIN — CARVEDILOL 3.12 MG: 3.12 TABLET, FILM COATED ORAL at 09:07

## 2025-03-14 ASSESSMENT — ENCOUNTER SYMPTOMS
FEVER: 0
BLURRED VISION: 0
SHORTNESS OF BREATH: 0
DIZZINESS: 0
MYALGIAS: 1
CHILLS: 0
ABDOMINAL PAIN: 0
VOMITING: 0
NERVOUS/ANXIOUS: 0
PALPITATIONS: 0
HEARTBURN: 0
COUGH: 0
DOUBLE VISION: 0
HEADACHES: 0
FALLS: 1

## 2025-03-14 ASSESSMENT — LIFESTYLE VARIABLES: SUBSTANCE_ABUSE: 0

## 2025-03-14 ASSESSMENT — GAIT ASSESSMENTS
DEVIATION: ANTALGIC;STEP TO
ASSISTIVE DEVICE: FRONT WHEEL WALKER
GAIT LEVEL OF ASSIST: STANDBY ASSIST
DISTANCE (FEET): 150

## 2025-03-14 ASSESSMENT — COGNITIVE AND FUNCTIONAL STATUS - GENERAL
CLIMB 3 TO 5 STEPS WITH RAILING: A LITTLE
SUGGESTED CMS G CODE MODIFIER DAILY ACTIVITY: CJ
STANDING UP FROM CHAIR USING ARMS: A LITTLE
DRESSING REGULAR LOWER BODY CLOTHING: A LITTLE
HELP NEEDED FOR BATHING: A LITTLE
DAILY ACTIVITIY SCORE: 21
WALKING IN HOSPITAL ROOM: A LITTLE
SUGGESTED CMS G CODE MODIFIER MOBILITY: CJ
TOILETING: A LITTLE
MOBILITY SCORE: 21

## 2025-03-14 ASSESSMENT — PAIN DESCRIPTION - PAIN TYPE
TYPE: ACUTE PAIN
TYPE: ACUTE PAIN

## 2025-03-14 ASSESSMENT — ACTIVITIES OF DAILY LIVING (ADL): TOILETING: INDEPENDENT

## 2025-03-14 NOTE — DISCHARGE PLANNING
ATTN: Case Management  RE: Referral for Home Health    As of 3/14, we have accepted the Home Health referral for the patient listed above.    A [facility]  will contact the patient within 48 hours. If you have any questions or concerns regarding the patient’s transition to Home Health, please do not hesitate to contact us at [phone].      We look forward to collaborating with you,  [facility] Health Team

## 2025-03-14 NOTE — DISCHARGE PLANNING
Case Management Discharge Planning    Admission Date: 3/12/2025  GMLOS: 3.1  ALOS: 2    6-Clicks ADL Score: 23  6-Clicks Mobility Score: 21      Anticipated Discharge Dispo: Discharge Disposition: Disch to  rehab facility or distinct part unit (62)    DME Needed: Yes    DME Ordered: Yes, walker at bedside    Action(s) Taken: LMSW went to bedside to get choice for HH and [employer] walker.   Choice for HH #1 [facility], #2 [facility].  Patient requested this LMSW call and update pt spouse.    LMSW called Kimberley. Kimberley reported that she is frustrated because she does not think pt will be able to get into their bed at home. Kimberley reported that she has been looking all over town to try and find a step stool for pt to use to get into bed but is unsure pt will be able to get onto a step stool. LMSW informed Kimberley that this LMSW will pass on the message to the IDT. Kimberley requesting call from CM with updates later today and tomorrow.    Escalations Completed: None    Medically Clear: No    Next Steps: LMSW will continue to assist with DC needs/barriers.    Barriers to Discharge: Medical clearance    Is the patient up for discharge tomorrow: No

## 2025-03-14 NOTE — OR NURSING
1619: Pt arrived to [facility] stage 1 on gurney, brakes locked, siderails up x2, HOB elevated, pt on monitor, warm blankets applied. Report received from OR RN and anesthesia MD. Oxygen via SM at 6L, oral airway in place. Pt has unlabored, spontaneous breathing, equal rise and fall of chest bilaterally. BS are CTA, HRR. Surgical site is C/D/I, left leg is pink and warm and left DP is 2+    1624: Pt woke up, oral airway removed. Pt fell back asleep.     1630: Switched pt to a NC. Pt fell back asleep.     1645: Pt is waking up, Ox4, WADE to command, clear speech. Pt reports tolerable pain, offered pain meds and pt declined. Pt is sipping on water.     1700: BP cuff wasn't cycling, this was RN's fault for not catching this issue. Report given to UZAIR Winslow.     1750: Resumed care of pt. Updated family.     1800:Provided pt a urinal, and he was able to void.    1815: Pt is wide awake, VSS, BP improved, pt reports tolerable pain, denies nausea. Pt is ready to transition to the floor. Report given to UZAIR Garcia.     1824: Pt was taken to [facility] floor with CNA, all belongings sent with pt.

## 2025-03-14 NOTE — PROGRESS NOTES
"Hospital Medicine Daily Progress Note    Date of Service  3/14/2025    Chief Complaint  Sandip Zhao is a 84 y.o. male admitted 3/12/2025 with hip pain.    Hospital Course  As per chart review:  \"84 y.o. male who presented 3/12/2025 with ground-level fall when the patient went for a blood pressure and heart rate evaluation to urgent care today.  The patient has bigeminy and a history of coronary artery disease with 3 stents in place.  The patient has been under the care of cardiology and they have have been titrating his Coreg.  They initially started him on 3.125 mg twice daily and they went up to 6.25 mg yesterday.  The patient this morning did take his new dose and went to urgent care for evaluation of heart rate and blood pressure.  While standing there he was shuffling his feet to get to the blood pressure check and he tripped over his own feet landing on his left side.  After this he was not able to ambulate and he was brought into the emergency room for evaluation where he was found on imaging studies to have a left hip fracture.\"    Interval Problem Update  3/13: Patient seen at bedside this morning.  Still complaining of pain.  Patient n.p.o. as he will most likely undergo surgery today.  We appreciate further recommendations per orthopedic surgery.    3/14: Patient seen at bedside this morning.  The patient mentions the pain is much better.  Patient underwent surgery yesterday.  As per the patient's wife I called the patient's cardiologist however no change in management at this time.  Continue with Coreg 3.125 mg twice daily.  I have ordered home health.  Continue to monitor closely.    I have discussed this patient's plan of care and discharge plan at IDT rounds today with Case Management, Nursing, Nursing leadership, and other members of the IDT team.    Consultants/Specialty  orthopedics    Code Status  Full Code    Disposition  The patient is not medically cleared for discharge to home or a " post-acute facility.        Review of Systems  Review of Systems   Constitutional:  Positive for malaise/fatigue. Negative for chills and fever.   HENT:  Negative for hearing loss and nosebleeds.    Eyes:  Negative for blurred vision and double vision.   Respiratory:  Negative for cough and shortness of breath.    Cardiovascular:  Negative for chest pain and palpitations.   Gastrointestinal:  Negative for abdominal pain, heartburn and vomiting.   Genitourinary:  Negative for dysuria and urgency.   Musculoskeletal:  Positive for falls, joint pain and myalgias.   Skin:  Negative for itching and rash.   Neurological:  Negative for dizziness and headaches.   Psychiatric/Behavioral:  Negative for substance abuse. The patient is not nervous/anxious.    All other systems reviewed and are negative.       Physical Exam  Temp:  [36 °C (96.8 °F)-37.1 °C (98.8 °F)] 36.4 °C (97.5 °F)  Pulse:  [53-86] 63  Resp:  [14-18] 18  BP: ()/(43-89) 101/65  SpO2:  [91 %-100 %] 93 %    Physical Exam  Vitals and nursing note reviewed.   Constitutional:       Appearance: He is ill-appearing.   HENT:      Head: Normocephalic and atraumatic.      Right Ear: External ear normal.      Left Ear: External ear normal.      Nose: Nose normal.      Mouth/Throat:      Mouth: Mucous membranes are moist.      Pharynx: Oropharynx is clear.   Eyes:      General:         Right eye: No discharge.         Left eye: No discharge.   Cardiovascular:      Rate and Rhythm: Normal rate.      Heart sounds: No murmur heard.  Pulmonary:      Effort: Pulmonary effort is normal. No respiratory distress.      Breath sounds: Normal breath sounds.   Abdominal:      General: Abdomen is flat. Bowel sounds are normal. There is no distension.      Palpations: Abdomen is soft.      Tenderness: There is no abdominal tenderness.   Musculoskeletal:         General: Tenderness present.      Cervical back: Normal range of motion and neck supple.      Comments: Dressing in place  "  Skin:     General: Skin is warm.   Neurological:      General: No focal deficit present.      Mental Status: He is alert and oriented to person, place, and time.   Psychiatric:         Mood and Affect: Mood normal.         Behavior: Behavior normal.         Fluids    Intake/Output Summary (Last 24 hours) at 3/14/2025 1310  Last data filed at 3/14/2025 0907  Gross per 24 hour   Intake 1490 ml   Output 1050 ml   Net 440 ml        Laboratory  Recent Labs     03/12/25 1847 03/13/25  0115 03/14/25  0403   WBC 4.2* 5.6 6.4   RBC 3.86* 3.42* 3.43*   HEMOGLOBIN 11.8* 10.4* 10.4*   HEMATOCRIT 35.7* 30.9* 30.9*   MCV 92.5 90.4 90.1   MCH 30.6 30.4 30.3   MCHC 33.1 33.7 33.7   RDW 46.5 43.9 43.7   PLATELETCT 182 168 167   MPV 9.3 9.8 10.1     Recent Labs     03/12/25 1847 03/13/25  0110 03/14/25  0403   SODIUM 138 138 136   POTASSIUM 3.9 3.8 3.9   CHLORIDE 103 106 104   CO2 24 22 20   GLUCOSE 76 113* 167*   BUN 15 14 13   CREATININE 0.97 0.90 0.88   CALCIUM 8.9 8.5 8.7     Recent Labs     03/12/25 1847   APTT 30.2   INR 0.97         Recent Labs     03/13/25  0110   TRIGLYCERIDE 46   HDL 39*   LDL 48       Imaging  DX-FEMUR-2+ LEFT   Final Result      Digitized intraoperative radiograph is submitted for review. This examination is not for diagnostic purpose but for guidance during a surgical procedure. Please see the patient's chart for full procedural details.         INTERPRETING LOCATION: [address] [place] [address], [id number]      DX-PORTABLE FLUORO > 1 HOUR   Final Result      Portable fluoroscopy utilized for 33 seconds.         INTERPRETING LOCATION: [address] [place] [address], [id number]      DX-CHEST-PORTABLE (1 VIEW)   Final Result         No acute cardiac or pulmonary abnormality is identified.           Assessment/Plan  * Closed left hip fracture, initial encounter (HCC)- (present on admission)  Assessment & Plan  3/13: Patient to undergo surgery today. PT/OT after surgery.    3/14: Patient underwent \"Left hip intertrochanteric " "nailing\" yesterday by orthopedic surgery.  As per nursing PT recommending home health.  Will order home health.    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    Anemia- (present on admission)  Assessment & Plan  Seems to be chronic  Monitor closely    Benign prostatic hyperplasia with urinary obstruction- (present on admission)  Assessment & Plan  3/14: continue Flomax and finasteride    Advance care planning  Assessment & Plan  3/13: I discussed with patient for at least 16 minutes further goals of care.  This included CODE STATUS which includes full code and DNR/DNI.  The patient would like to be full code.  We also discussed further treatment goals.  For now the patient want to have full treatment options available.  This includes invasive or noninvasive procedures as needed.  In the event that the patient cannot make decisions for himself he would like his wife Kimberley to make decisions for him.         VTE prophylaxis: Aspirin BID    I have performed a physical exam and reviewed and updated ROS and Plan today (3/14/2025). In review of yesterday's note (3/13/2025), there are no changes except as documented above.      I spend at least 52 minutes providing care for " this patient.  This included face-to-face interview, physical examination.  Review of lab work including CBC, BMP.  Discussing case with patient's outpatient cardiologist. Discussing with multidisciplinary team including case management, nursing staff and pharmacy.  Creating plan of care, reviewing orders.

## 2025-03-14 NOTE — CARE PLAN
The patient is Stable - Low risk of patient condition declining or worsening    Shift Goals  Clinical Goals: monitor labs and vitals  Patient Goals: rest and comfort    Progress made toward(s) clinical / shift goals:    Problem: Knowledge Deficit - Standard  Goal: Patient and family/care givers will demonstrate understanding of plan of care, disease process/condition, diagnostic tests and medications  Outcome: Progressing     Problem: Fall Risk  Goal: Patient will remain free from falls  Outcome: Progressing     Problem: Pain - Standard  Goal: Alleviation of pain or a reduction in pain to the patient’s comfort goal  Outcome: Progressing       Patient is not progressing towards the following goals:

## 2025-03-14 NOTE — PROGRESS NOTES
Telemetry Shift Summary     Rhythm: SR with first degree  HR: 70-77  Ectopy: rPVC/oPVC/fPVC/rPAC/fBig    Measurements: .21/.07/.35

## 2025-03-14 NOTE — DISCHARGE PLANNING
Received Choice Form at: 1311  Agency/Facility Name:  [facility]  Sent Referral per Choice Form at: 1325    Enhanced HH requested at 134

## 2025-03-14 NOTE — CARE PLAN
The patient is Stable - Low risk of patient condition declining or worsening    Shift Goals  Clinical Goals: Monitor labs and vitals  Patient Goals: rest and comfort    Progress made toward(s) clinical / shift goals: Patient assessed and consulted with MD at bedside. Patient consulted with case management. Patient evaluated by PT & OT. Patient's family at bedside. Patient's diet was advanced.       Problem: Knowledge Deficit - Standard  Goal: Patient and family/care givers will demonstrate understanding of plan of care, disease process/condition, diagnostic tests and medications  Outcome: Progressing  Note: Patient verbalizes understanding of plan of care and barriers to discharge. Patient asks appropriate questions about plan of care.       Problem: Fall Risk  Goal: Patient will remain free from falls  Outcome: Progressing  Note: Fall precautions are in place. Patient uses call light appropriately.         Patient is not progressing towards the following goals:

## 2025-03-14 NOTE — DISCHARGE PLANNING
Post Acute Navigator Team     Per chart review, patient has been identified as a candidate for a goals of care discussion.      EOL Index medium risk score 41  Medium LACE + score 32  Decreased 6 click Mobility score 23   Low 6 click ADL score 19   Readmission: No      This RN Post Acute Navigator met with patient to provide information and education on [facility] including home health and Transitional Care Management. Patient is eager to recover and transition back to cardiac rehab to improve his endurance.     **PANRN notified by [facility] that patient is out of service area but are reviewing case.     1304-This PANRN LVM for patient's spouse Kimberley to provide information on [facility] including HH.    1308-This PANRN spoke with Kimberley regarding dc plan and [facility] services offered for [facility] members. Kimberley was very overwhelmed and concerned that patient may come home too soon and fall again. This PANRN explained role and that care team will work together for a safe discharge. This PANRN used therapeutic listening and communication to ease Kimberley's frustration. Kimberley was calm at the end of discussion. All questions answered and concerns acknowledged.    Voalte sent to care team to provide update on Kimberley's concern and request to work with patient stepping up on stool, as patient has to do this to get on and off his bed.    4800- Kimberley contacted this PANRN and was upset regarding patient not eating. Kimberley reports that she has family that live in town but they are only able to provide emotional support for her. Per Kimberley, they will have to stay at a hotel the next few days due to electrical panel being replaced and no running water. This PANRN informed Kimberley that care team will be updated regarding concerns and home situation.    Care team updated

## 2025-03-14 NOTE — OP REPORT
OPERATIVE NOTE     DATE OF PROCEDURE:  03/13/2025           PRE-OP DIAGNOSIS:   1.  Left hip  basicervical neck fracture           POST-OP DIAGNOSIS: same           PROCEDURE:   1. Left hip intertrochanteric nailing           SURGEON: Kana Giles M.D. - Primary           ASSISTANT:  Olaf monterroso cfa   insert assist    ANESTHESIA:  general           ESTIMATED BLOOD LOSS:  50 cc                  SPECIMENS:  none           COMPLICATIONS:  none           CONDITION: Stable           OPERATIVE INDICATIONS AND DESCRIPTION OF PROCEDURE:     I met the patient in the preoperative holding area.  I had a full discussion with them regarding multiple options for the patient's condition including nonoperative management.  Again today I offered them nonoperative treatment modalities.  Regarding operative options I specifically I outlined  left hip intertrochanteric nailing. I discussed some possible complications including bleeding possibly requiring transfusion, infection, neurovascular damage, malunion, nonunion, failure of implants, failure of surgery, chronic pain, need for revision surgery, instability, limb length discrepancy, prolonged rehab, weight bearing restrictions, DVT, PE, MI, stroke and death. After going over risks and benefits making no promises either guaranteed or implied patient elected to proceed.  At this point informed consent was signed.  A surgical marking pen was used to place my initials on the patient's  left hip.    Patient was brought back to the operating room.  General anesthesia was introduced.  They were then transferred to the operating room table.  They were placed in  on a fracture table in the supine position.  All bony prominences were padded very well and axillary roll was placed to prevent neuropraxia's.  At this point the operative extremity was prepped in sterile standard fashion.  Timeout was performed all parties in room ceasing activity.  Antibiotics TXA equipment was all  confirmed correct operative site with imaging was available and all confirmed the correct operative site.  I was allowed to proceed everybody in the room agreed     we began with a gentle reduction under fluoroscopy.  Demonstrated near anatomic alignment.  At this point marked out our incision which was 4 cm in length proximal to the greater trochanter sharp dissection carried down through skin Bovie cautery used to maintain hemostasis.  Local anesthesia/bupivacaine was utilized.  Blunt dissection carried down tip of trochanter in line with the femur.  This was all done under..  Fired a guide pin to level lesser trochanter.  Opening Reamer utilized sleeve protector used to preserve and protect hip abductors.  We placed our Paras 10 mm 120 degree short nail.  Now that this down so that our femoral pin would hit the center of femoral head staying low along the calcar.  Keeping a low tip apex distance.  We measured for reamed inserted our screw.  Tensioned our construct locked our construct and backed off quarter turn to allow for controlled collapse.  Small stab incision was made placed our distal fixation fixation.  Jig was removed final fluoroscopy shots demonstrated anatomic alignment wounds were copiously irrigated closed interrupted layered fashion sterile bandages were placed    POSTOPERATIVE PLAN:  Weight-bearin% weight-bearing for 2 weeks then progress  25% each week thereafter until 100%; use walker  DVT prophylaxis: Aspirin 81 twice daily  Antibiotics: Preop in 24 hours postop  Showering: Okay to shower And get incision wetpostop day 4, no lotions no scrubbing's, place new dressing.  No tub soaks baths or swimming  Follow-up:  in 2 weeks.  Call with any questions or concerns patient as well as his significant other have been counseled on signs of infection and to call immediately if they develop any of these  Physical Therapy: Okay to begin immediately  Dressings: Begin changing daily postoperative  day 4   Staples: Must be removed at rehab 2 weeks postop    Did call the patient's  wife to discuss the surgery with them.  Counseled them on signs of infection which they understood.  If they have any issues including any concerns with infection including redness drainage swelling pain they are to call our office immediately or go to the local emergency department    This is dictated with voice recognition software please excuse any errors

## 2025-03-14 NOTE — OR NURSING
1710 Received report from Dipika DUNNE, assumed care of pt. Pt awake, denies nausea and pain with non-labored and spontaneous respirations via 2L NC.   1730 Pt hypertensive appears to be baseline for pt continues to deny pain and nausea.   1739 pt reports discomfort 3-5/10 plan to medicate see MAR.   1750 Updated Dr. Hayes on pt status orders received to give 5mg of labetalol PRN as long as pain is controlled. Will continue to monitor and re-eval once pain is controlled.  1752 Report to UZAIR Bar.

## 2025-03-14 NOTE — THERAPY
Physical Therapy   Initial Evaluation     Patient Name: Sandip Zhao  Age:  84 y.o., Sex:  male  Medical Record #: [id number]  Today's Date: 3/14/2025     Precautions  Precautions: (P) Fall Risk;Partial Weight Bearing Left Lower Extremity (See Comments for Percentage) (50% PWB)    Assessment  Patient is 84 y.o. male who was recently admitted for GLF and presented with L hip fracture. Pt is now L hip intertrochanteric nailing with orders to be 50% PWB on L LE. Pt was agreeable to therapy evaluation and presented with safe upright mobility with FWW use. Pt did require VC, demo, and education on 50% PWB as pt was noted to not be following this initially. With step to gait pattern and offloading on FWW with B UE pt was able to maintain 50% PWB throughout the session. Pt was provided with education on positioning, elevation, icing, and walking program upon d/c to home. Anticipate pt to d/c home once medically clear with recs for HH therapy services and FWW use.     Plan    Physical Therapy Initial Treatment Plan   Duration: (P) Evaluation only    DC Equipment Recommendations: (P) Front-Wheel Walker  Discharge Recommendations: (P) Recommend home health for continued physical therapy services     Objective       03/14/25 0915   Initial Contact Note    Initial Contact Note Order Received and Verified, Evaluation Only - Patient Does Not Require Further Acute Physical Therapy at this Time.  However, May Benefit from Post Acute Therapy for Higher Level Functional Deficits.   Precautions   Precautions Fall Risk;Partial Weight Bearing Left Lower Extremity (See Comments for Percentage)  (50% PWB)   Pain 0 - 10 Group   Location Hip   Location Orientation Left   Description Aching   Therapist Pain Assessment During Activity;Prior to Activity;Post Activity;Nurse Notified;2   Prior Living Situation   Prior Services None   Housing / Facility 1 Story House   Steps Into Home 0   Steps In Home 0   Equipment Owned Front-Wheel Walker    Lives with - Patient's Self Care Capacity Spouse   Prior Level of Functional Mobility   Bed Mobility Independent   Transfer Status Independent   Ambulation Independent   Ambulation Distance   (community)   Assistive Devices Used None   Stairs Independent   History of Falls   History of Falls Yes   Date of Last Fall   (recent admission for GLF)   Cognition    Cognition / Consciousness WDL   Level of Consciousness Alert   Comments pleasant/cooperative   Passive ROM Upper Body   Passive ROM Upper Body WDL   Active ROM Upper Body   Active ROM Upper Body  WDL   Strength Upper Body   Upper Body Strength  WDL   Sensation Upper Body   Upper Extremity Sensation  WDL   Upper Body Muscle Tone   Upper Body Muscle Tone  WDL   Passive ROM Lower Body   Passive ROM Lower Body WDL   Active ROM Lower Body    Active ROM Lower Body  WDL   Strength Lower Body   Lower Body Strength  X   Comments limited in L LE due to pain, otherwise able to demo functional strength for B LE   Sensation Lower Body   Lower Extremity Sensation   WDL   Lower Body Muscle Tone   Lower Body Muscle Tone  WDL   Neurological Concerns   Neurological Concerns No   Coordination Upper Body   Coordination WDL   Coordination Lower Body    Coordination Lower Body  WDL   Balance Assessment   Sitting Balance (Static) Good   Sitting Balance (Dynamic) Fair +   Standing Balance (Static) Good   Standing Balance (Dynamic) Fair +   Weight Shift Sitting Good   Weight Shift Standing Fair   Comments w/fww use   Bed Mobility    Supine to Sit Supervised   Sit to Supine Supervised   Scooting Supervised   Rolling Supervised   Gait Analysis   Gait Level Of Assist Standby Assist   Assistive Device Front Wheel Walker   Distance (Feet) 150   # of Times Distance was Traveled 1   Deviation Antalgic;Step To   Weight Bearing Status 50% PWB L LE   Functional Mobility   Sit to Stand Standby Assist   Bed, Chair, Wheelchair Transfer Standby Assist   Transfer Method Stand Step   Mobility w/fww  use   6 Clicks Assessment - How much HELP from from another person do you currently need... (If the patient hasn't done an activity recently, how much help from another person do you think he/she would need if he/she tried?)   Turning from your back to your side while in a flat bed without using bedrails? 4   Moving from lying on your back to sitting on the side of a flat bed without using bedrails? 4   Moving to and from a bed to a chair (including a wheelchair)? 4   Standing up from a chair using your arms (e.g., wheelchair, or bedside chair)? 3   Walking in hospital room? 3   Climbing 3-5 steps with a railing? 3   6 clicks Mobility Score 21   Activity Tolerance   Sitting in Chair functional   Sitting Edge of Bed 5 mins   Standing 10 mins   Comments no adverse events noted   Edema / Skin Assessment   Edema / Skin  Not Assessed   Education Group   Education Provided Role of Physical Therapist;Gait Training;Use of Assistive Device   Role of Physical Therapist Patient Response Patient;Acceptance;Demonstration;Explanation;Verbal Demonstration;Action Demonstration   Gait Training Patient Response Patient;Acceptance;Explanation;Demonstration;Action Demonstration;Verbal Demonstration   Use of Assistive Device Patient Response Patient;Acceptance;Explanation;Demonstration;Verbal Demonstration;Action Demonstration   Physical Therapy Initial Treatment Plan    Duration Evaluation only   Anticipated Discharge Equipment and Recommendations   DC Equipment Recommendations Front-Wheel Walker   Discharge Recommendations Recommend home health for continued physical therapy services   Interdisciplinary Plan of Care Collaboration   IDT Collaboration with  Nursing   Patient Position at End of Therapy Seated;Chair Alarm On;Call Light within Reach;Tray Table within Reach;Phone within Reach   Collaboration Comments aware of visit and recs   Session Information   Date / Session Number  3/14 eval only

## 2025-03-14 NOTE — THERAPY
Occupational Therapy   Initial Evaluation     Patient Name: Sandip Zhao  Age:  84 y.o., Sex:  male  Medical Record #: [id number]  Today's Date: 3/14/2025     Precautions: (P) Partial Weight Bearing Left Lower Extremity (See Comments for Percentage), Fall Risk  Comments: (P) 50% PWB LLE    Assessment  Patient is 84 y.o. male admitted following GLF. S/p L hip IM nailing, POD 1. Additional factors influencing patient status / progress: Indep prior. Lives with spouse. Education on role of OT, PWB LLE, DME/AE use, home safety/fall prec's during ADL's. Pt able to perform most ADL's with SBA/CGA. Using FWW for functional mobility, SBA. See below for CLOF. No further acute OT needs.          Plan  1x only OT Eval    DC Equipment Recommendations: Front-Wheel Walker (wife may obtain a step stool for pt to get into high bed)  Discharge Recommendations: Recommend home health for continued occupational therapy services     Subjective  Agreeable     Objective     03/14/25 1145   Prior Living Situation   Prior Services None   Housing / Facility 1 Story House   Steps Into Home 0   Steps In Home 0   Bathroom Set up Walk In Shower;Shower Chair   Equipment Owned Front-Wheel Walker;Tub / Shower Seat;Hand Held Shower   Lives with - Patient's Self Care Capacity Spouse   Prior Level of ADL Function   Self Feeding Independent   Grooming / Hygiene Independent   Bathing Independent   Dressing Independent   Toileting Independent   Prior Level of IADL Function   Medication Management Independent   Laundry Independent   Kitchen Mobility Independent   Finances Independent   Home Management Independent   Shopping Independent   Prior Level Of Mobility Independent Without Device in Home   Driving / Transportation Driving Independent   Occupation (Pre-Hospital Vocational) Retired Due To Age   Leisure Interests Hobbies;Exercise   History of Falls   History of Falls Yes   Precautions   Precautions Partial Weight Bearing Left Lower Extremity (See  Comments for Percentage);Fall Risk   Comments 50% PWB LLE   Vitals   O2 Delivery Device None - Room Air   Pain 0 - 10 Group   Location Hip   Therapist Pain Assessment Post Activity Pain Same as Prior to Activity;Nurse Notified   Cognition    Cognition / Consciousness WDL   Level of Consciousness Alert   Comments [place]. A bit impulsive.   Passive ROM Upper Body   Passive ROM Upper Body WDL   Active ROM Upper Body   Active ROM Upper Body  WDL   Strength Upper Body   Upper Body Strength  WDL   Sensation Upper Body   Upper Extremity Sensation  WDL   Upper Body Muscle Tone   Upper Body Muscle Tone  WDL   Coordination Upper Body   Coordination WDL   Balance Assessment   Sitting Balance (Static) Good   Sitting Balance (Dynamic) Good   Standing Balance (Static) Good   Standing Balance (Dynamic) Fair +   Weight Shift Sitting Good   Weight Shift Standing Fair   Bed Mobility    Supine to Sit Supervised   Sit to Supine Supervised   Scooting Supervised   ADL Assessment   Eating Independent   Grooming Supervision;Seated   Upper Body Dressing Independent   Lower Body Dressing Contact Guard Assist   Toileting Standby Assist   How much help from another person does the patient currently need...   Putting on and taking off regular lower body clothing? 3   Bathing (including washing, rinsing, and drying)? 3   Toileting, which includes using a toilet, bedpan, or urinal? 3   Putting on and taking off regular upper body clothing? 4   Taking care of personal grooming such as brushing teeth? 4   Eating meals? 4   6 Clicks Daily Activity Score 21   Functional Mobility   Sit to Stand Standby Assist   Bed, Chair, Wheelchair Transfer Standby Assist   Toilet Transfers Standby Assist   Transfer Method Stand Step   Comments fww. able to maintain PWB LLE.   Visual Perception   Visual Perception  Not Tested   Activity Tolerance   Sitting in Chair 1 hr   Sitting Edge of Bed 7   Standing 7   Education Group   Education Provided Home  Safety;Transfers;Activities of Daily Living;Adaptive Equipment;Weight Bearing Precautions   Role of Occupational Therapist Patient Response Patient;Acceptance;Explanation;Verbal Demonstration   Home Safety Patient Response Patient;Acceptance;Explanation;Verbal Demonstration   Transfers Patient Response Patient;Acceptance;Explanation;Verbal Demonstration;Action Demonstration   ADL Patient Response Patient;Acceptance;Explanation;Verbal Demonstration;Action Demonstration   Adaptive Equipment Patient Response Patient;Acceptance;Explanation;Verbal Demonstration   Weight Bearing Precautions Patient Response Patient;Acceptance;Explanation;Verbal Demonstration   Anticipated Discharge Equipment and Recommendations   DC Equipment Recommendations Front-Wheel Walker  (wife may obtain a step stool for pt to get into high bed)   Discharge Recommendations Recommend home health for continued occupational therapy services   Interdisciplinary Plan of Care Collaboration   IDT Collaboration with  Nursing;Family / Caregiver;Physician;   Patient Position at End of Therapy Seated;Call Light within Reach;Tray Table within Reach;Phone within Reach;Family / Friend in Room   Collaboration Comments OT Eval/Education only.

## 2025-03-15 LAB
ANION GAP SERPL CALC-SCNC: 7 MMOL/L (ref 7–16)
BUN SERPL-MCNC: 17 MG/DL (ref 8–22)
CALCIUM SERPL-MCNC: 8.7 MG/DL (ref 8.4–10.2)
CHLORIDE SERPL-SCNC: 106 MMOL/L (ref 96–112)
CO2 SERPL-SCNC: 25 MMOL/L (ref 20–33)
CREAT SERPL-MCNC: 0.83 MG/DL (ref 0.5–1.4)
ERYTHROCYTE [DISTWIDTH] IN BLOOD BY AUTOMATED COUNT: 46.1 FL (ref 35.9–50)
GFR SERPLBLD CREATININE-BSD FMLA CKD-EPI: 86 ML/MIN/1.73 M 2
GLUCOSE SERPL-MCNC: 111 MG/DL (ref 65–99)
HCT VFR BLD AUTO: 28.5 % (ref 42–52)
HGB BLD-MCNC: 9.5 G/DL (ref 14–18)
MCH RBC QN AUTO: 30.6 PG (ref 27–33)
MCHC RBC AUTO-ENTMCNC: 33.3 G/DL (ref 32.3–36.5)
MCV RBC AUTO: 91.9 FL (ref 81.4–97.8)
PLATELET # BLD AUTO: 167 K/UL (ref 164–446)
PMV BLD AUTO: 9.9 FL (ref 9–12.9)
POTASSIUM SERPL-SCNC: 4 MMOL/L (ref 3.6–5.5)
RBC # BLD AUTO: 3.1 M/UL (ref 4.7–6.1)
SODIUM SERPL-SCNC: 138 MMOL/L (ref 135–145)
WBC # BLD AUTO: 6.6 K/UL (ref 4.8–10.8)

## 2025-03-15 PROCEDURE — 700102 HCHG RX REV CODE 250 W/ 637 OVERRIDE(OP): Performed by: HOSPITALIST

## 2025-03-15 PROCEDURE — 97535 SELF CARE MNGMENT TRAINING: CPT

## 2025-03-15 PROCEDURE — 99233 SBSQ HOSP IP/OBS HIGH 50: CPT | Performed by: INTERNAL MEDICINE

## 2025-03-15 PROCEDURE — 94760 N-INVAS EAR/PLS OXIMETRY 1: CPT

## 2025-03-15 PROCEDURE — 80048 BASIC METABOLIC PNL TOTAL CA: CPT

## 2025-03-15 PROCEDURE — 85027 COMPLETE CBC AUTOMATED: CPT

## 2025-03-15 PROCEDURE — 36415 COLL VENOUS BLD VENIPUNCTURE: CPT

## 2025-03-15 PROCEDURE — 97162 PT EVAL MOD COMPLEX 30 MIN: CPT

## 2025-03-15 PROCEDURE — 770020 HCHG ROOM/CARE - TELE (206)

## 2025-03-15 PROCEDURE — 700102 HCHG RX REV CODE 250 W/ 637 OVERRIDE(OP): Performed by: INTERNAL MEDICINE

## 2025-03-15 PROCEDURE — A9270 NON-COVERED ITEM OR SERVICE: HCPCS | Performed by: HOSPITALIST

## 2025-03-15 PROCEDURE — A9270 NON-COVERED ITEM OR SERVICE: HCPCS | Performed by: INTERNAL MEDICINE

## 2025-03-15 RX ORDER — BISACODYL 10 MG
10 SUPPOSITORY, RECTAL RECTAL
Status: DISCONTINUED | OUTPATIENT
Start: 2025-03-15 | End: 2025-03-17 | Stop reason: HOSPADM

## 2025-03-15 RX ADMIN — TAMSULOSIN HYDROCHLORIDE 0.4 MG: 0.4 CAPSULE ORAL at 16:31

## 2025-03-15 RX ADMIN — CARVEDILOL 3.12 MG: 3.12 TABLET, FILM COATED ORAL at 16:31

## 2025-03-15 RX ADMIN — POLYETHYLENE GLYCOL 3350 1 PACKET: 17 POWDER, FOR SOLUTION ORAL at 05:05

## 2025-03-15 RX ADMIN — BISACODYL 10 MG: 10 SUPPOSITORY RECTAL at 09:29

## 2025-03-15 RX ADMIN — CLOPIDOGREL BISULFATE 75 MG: 75 TABLET ORAL at 05:05

## 2025-03-15 RX ADMIN — ROSUVASTATIN CALCIUM 20 MG: 10 TABLET, FILM COATED ORAL at 16:30

## 2025-03-15 RX ADMIN — EZETIMIBE 10 MG: 10 TABLET ORAL at 16:31

## 2025-03-15 RX ADMIN — ASPIRIN 81 MG: 81 TABLET, COATED ORAL at 05:05

## 2025-03-15 RX ADMIN — TAMSULOSIN HYDROCHLORIDE 0.4 MG: 0.4 CAPSULE ORAL at 05:05

## 2025-03-15 RX ADMIN — FINASTERIDE 5 MG: 5 TABLET, FILM COATED ORAL at 05:05

## 2025-03-15 RX ADMIN — CARVEDILOL 3.12 MG: 3.12 TABLET, FILM COATED ORAL at 07:55

## 2025-03-15 RX ADMIN — MEXILETINE HYDROCHLORIDE 150 MG: 150 CAPSULE ORAL at 05:05

## 2025-03-15 RX ADMIN — ASPIRIN 81 MG: 81 TABLET, COATED ORAL at 16:31

## 2025-03-15 RX ADMIN — MEXILETINE HYDROCHLORIDE 150 MG: 150 CAPSULE ORAL at 15:40

## 2025-03-15 RX ADMIN — SENNOSIDES AND DOCUSATE SODIUM 2 TABLET: 50; 8.6 TABLET ORAL at 16:31

## 2025-03-15 RX ADMIN — MEXILETINE HYDROCHLORIDE 150 MG: 150 CAPSULE ORAL at 21:59

## 2025-03-15 ASSESSMENT — ENCOUNTER SYMPTOMS
DOUBLE VISION: 0
DIZZINESS: 0
HEADACHES: 0
PALPITATIONS: 0
SHORTNESS OF BREATH: 0
COUGH: 0
CHILLS: 0
FALLS: 1
BLURRED VISION: 0
HEARTBURN: 0
VOMITING: 0
ABDOMINAL PAIN: 0
FEVER: 0
NERVOUS/ANXIOUS: 0
MYALGIAS: 1

## 2025-03-15 ASSESSMENT — PAIN DESCRIPTION - PAIN TYPE: TYPE: SURGICAL PAIN

## 2025-03-15 ASSESSMENT — GAIT ASSESSMENTS
DEVIATION: ANTALGIC;STEP TO;DECREASED HEEL STRIKE;DECREASED TOE OFF
GAIT LEVEL OF ASSIST: STANDBY ASSIST
ASSISTIVE DEVICE: FRONT WHEEL WALKER
DISTANCE (FEET): 75

## 2025-03-15 ASSESSMENT — LIFESTYLE VARIABLES: SUBSTANCE_ABUSE: 0

## 2025-03-15 NOTE — PROGRESS NOTES
Telemetry Shift Summary     Per monitor tech:  Rhythm SR  HR Range 69-76  Ectopy rPvc  Measurements 0.16/0.10/0.37      Normal Values  Rhythm SR  HR Range:   Measurements: 0.12-0.20/0.06-0.10/0.30-0.52

## 2025-03-15 NOTE — CARE PLAN
The patient is Stable - Low risk of patient condition declining or worsening    Shift Goals  Clinical Goals: Safety, pain mgt  Patient Goals: Rest/ BM    Progress made toward(s) clinical / shift goals:      Problem: Knowledge Deficit - Standard  Goal: Patient and family/care givers will demonstrate understanding of plan of care, disease process/condition, diagnostic tests and medications  Outcome: Progressing  Note: Reviewed POC. Discussed and provided education on medications and treatment. Monitor for sings of bleeding on the surgical site. Kept dressing intact, clean and dry. Instructed patient to increase fluid intake and early mobility to promote motility. Stool softener given as needed.      Problem: Fall Risk  Goal: Patient will remain free from falls  Outcome: Progressing  Note: Fall and safety precaution in placed. Bed alarm on and side rails up. Instructed patient to call whenever assistance is needed. Patient was able to ambulate to the using FWW with minimal assistance.      Problem: Pain - Standard  Goal: Alleviation of pain or a reduction in pain to the patient’s comfort goal  Outcome: Progressing  Note: Non pharmacological and pharmacological interventions discussed. Provided comfort. Discussed side effect of pain medications as well.        Patient is not progressing towards the following goals:

## 2025-03-15 NOTE — PROGRESS NOTES
"Hospital Medicine Daily Progress Note    Date of Service  3/15/2025    Chief Complaint  Sandip Zhao is a 84 y.o. male admitted 3/12/2025 with hip pain.    Hospital Course  As per chart review:  \"84 y.o. male who presented 3/12/2025 with ground-level fall when the patient went for a blood pressure and heart rate evaluation to urgent care today.  The patient has bigeminy and a history of coronary artery disease with 3 stents in place.  The patient has been under the care of cardiology and they have have been titrating his Coreg.  They initially started him on 3.125 mg twice daily and they went up to 6.25 mg yesterday.  The patient this morning did take his new dose and went to urgent care for evaluation of heart rate and blood pressure.  While standing there he was shuffling his feet to get to the blood pressure check and he tripped over his own feet landing on his left side.  After this he was not able to ambulate and he was brought into the emergency room for evaluation where he was found on imaging studies to have a left hip fracture.\"    Interval Problem Update  3/13: Patient seen at bedside this morning.  Still complaining of pain.  Patient n.p.o. as he will most likely undergo surgery today.  We appreciate further recommendations per orthopedic surgery.    3/14: Patient seen at bedside this morning.  The patient mentions the pain is much better.  Patient underwent surgery yesterday.  As per the patient's wife I called the patient's cardiologist however no change in management at this time.  Continue with Coreg 3.125 mg twice daily.  I have ordered home health.  Continue to monitor closely.    3/15: Patient seen at bedside this morning.  Hemoglobin this morning was 9.5, I suspect this is due to recent surgery.  The patient's wife is concerned about the patient discharging home only on home health.  Home health was ordered due to physical therapy's recommendation.  Physical therapy to reevaluate the patient " for safe discharge planning today.  We appreciate further commendations.  Continue to monitor closely.    I have discussed this patient's plan of care and discharge plan at IDT rounds today with Case Management, Nursing, Nursing leadership, and other members of the IDT team.    Consultants/Specialty  orthopedics    Code Status  Full Code    Disposition  The patient is not medically cleared for discharge to home or a post-acute facility.        Review of Systems  Review of Systems   Constitutional:  Positive for malaise/fatigue. Negative for chills and fever.   HENT:  Negative for hearing loss and nosebleeds.    Eyes:  Negative for blurred vision and double vision.   Respiratory:  Negative for cough and shortness of breath.    Cardiovascular:  Negative for chest pain and palpitations.   Gastrointestinal:  Negative for abdominal pain, heartburn and vomiting.   Genitourinary:  Negative for dysuria and urgency.   Musculoskeletal:  Positive for falls, joint pain and myalgias.   Skin:  Negative for itching and rash.   Neurological:  Negative for dizziness and headaches.   Psychiatric/Behavioral:  Negative for substance abuse. The patient is not nervous/anxious.    All other systems reviewed and are negative.       Physical Exam  Temp:  [36.6 °C (97.8 °F)-37.3 °C (99.1 °F)] 37.3 °C (99.1 °F)  Pulse:  [65-78] 65  Resp:  [18] 18  BP: (121-172)/(58-80) 128/60  SpO2:  [91 %-97 %] 96 %    Physical Exam  Vitals and nursing note reviewed.   Constitutional:       Appearance: He is ill-appearing.   HENT:      Head: Normocephalic and atraumatic.      Right Ear: External ear normal.      Left Ear: External ear normal.      Nose: Nose normal.      Mouth/Throat:      Mouth: Mucous membranes are moist.      Pharynx: Oropharynx is clear.   Eyes:      General:         Right eye: No discharge.         Left eye: No discharge.   Cardiovascular:      Rate and Rhythm: Normal rate.      Heart sounds: No murmur heard.  Pulmonary:      Effort:  Pulmonary effort is normal. No respiratory distress.      Breath sounds: Normal breath sounds.   Abdominal:      General: Abdomen is flat. Bowel sounds are normal. There is no distension.      Palpations: Abdomen is soft.      Tenderness: There is no abdominal tenderness.   Musculoskeletal:         General: Tenderness present.      Cervical back: Normal range of motion and neck supple.      Comments: Dressing in place   Skin:     General: Skin is warm.   Neurological:      General: No focal deficit present.      Mental Status: He is alert and oriented to person, place, and time.   Psychiatric:         Mood and Affect: Mood normal.         Behavior: Behavior normal.         Fluids    Intake/Output Summary (Last 24 hours) at 3/15/2025 1152  Last data filed at 3/15/2025 0900  Gross per 24 hour   Intake 440 ml   Output 550 ml   Net -110 ml        Laboratory  Recent Labs     03/13/25  0115 03/14/25  0403 03/15/25  0344   WBC 5.6 6.4 6.6   RBC 3.42* 3.43* 3.10*   HEMOGLOBIN 10.4* 10.4* 9.5*   HEMATOCRIT 30.9* 30.9* 28.5*   MCV 90.4 90.1 91.9   MCH 30.4 30.3 30.6   MCHC 33.7 33.7 33.3   RDW 43.9 43.7 46.1   PLATELETCT 168 167 167   MPV 9.8 10.1 9.9     Recent Labs     03/13/25  0110 03/14/25  0403 03/15/25  0344   SODIUM 138 136 138   POTASSIUM 3.8 3.9 4.0   CHLORIDE 106 104 106   CO2 22 20 25   GLUCOSE 113* 167* 111*   BUN 14 13 17   CREATININE 0.90 0.88 0.83   CALCIUM 8.5 8.7 8.7     Recent Labs     03/12/25  1847   APTT 30.2   INR 0.97         Recent Labs     03/13/25  0110   TRIGLYCERIDE 46   HDL 39*   LDL 48       Imaging  DX-FEMUR-2+ LEFT   Final Result      Digitized intraoperative radiograph is submitted for review. This examination is not for diagnostic purpose but for guidance during a surgical procedure. Please see the patient's chart for full procedural details.         INTERPRETING LOCATION: [address] [place] [address], [id number]      DX-PORTABLE FLUORO > 1 HOUR   Final Result      Portable fluoroscopy utilized for 33  "seconds.         INTERPRETING LOCATION: [address] [place], [place] [id number], [id number]      DX-CHEST-PORTABLE (1 VIEW)   Final Result         No acute cardiac or pulmonary abnormality is identified.           Assessment/Plan  * Closed left hip fracture, initial encounter (HCC)- (present on admission)  Assessment & Plan  3/13: Patient to undergo surgery today. PT/OT after surgery.    3/14: Patient underwent \"Left hip intertrochanteric nailing\" yesterday by orthopedic surgery.  As per nursing PT recommending home health.  Will order home health.    3/15: Wife is concerned about the patient going home so PT will reevaluate the patient for safe discharge planning.    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    Anemia- (present on admission)  Assessment & Plan  Seems to be chronic  Monitor closely    Benign prostatic hyperplasia with urinary obstruction- (present on admission)  Assessment & Plan  3/15: continue Flomax and finasteride    Advance care planning  Assessment & Plan  3/13: I discussed with patient for at least 16 minutes further goals of care.  This included CODE STATUS which includes full code and DNR/DNI.  The patient would like to be full code.  We also discussed further " treatment goals.  For now the patient want to have full treatment options available.  This includes invasive or noninvasive procedures as needed.  In the event that the patient cannot make decisions for himself he would like his wife Kimberley to make decisions for him.         VTE prophylaxis: Aspirin BID    I have performed a physical exam and reviewed and updated ROS and Plan today (3/15/2025). In review of yesterday's note (3/14/2025), there are no changes except as documented above.      I spend at least 51 minutes providing care for this patient.  This included face-to-face interview, physical examination.  Review of lab work including CBC, BMP.  Discussing with multidisciplinary team including case management, nursing staff and pharmacy.  Creating plan of care, reviewing orders.

## 2025-03-15 NOTE — PROGRESS NOTES
Telemetry Shift Summary     Rhythm: SR  Rate: 74-85  Measurements: .16/.08/.36  Ectopy (reported by Monitor Tech): rPVC/rTrig     Normal Values  Rhythm: Sinus  HR:   Measurements: 0.12-0.20/0.06-0.10/0.30-0.52

## 2025-03-15 NOTE — CARE PLAN
The patient is Stable - Low risk of patient condition declining or worsening    Shift Goals  Clinical Goals: Safety, pain mgt  Patient Goals: Rest/ BM    Progress made toward(s) clinical / shift goals:  education done on POC, all    Patient is not progressing towards the following goals:

## 2025-03-16 LAB
ERYTHROCYTE [DISTWIDTH] IN BLOOD BY AUTOMATED COUNT: 44.6 FL (ref 35.9–50)
HCT VFR BLD AUTO: 28.6 % (ref 42–52)
HGB BLD-MCNC: 9.5 G/DL (ref 14–18)
MCH RBC QN AUTO: 29.9 PG (ref 27–33)
MCHC RBC AUTO-ENTMCNC: 33.2 G/DL (ref 32.3–36.5)
MCV RBC AUTO: 89.9 FL (ref 81.4–97.8)
PLATELET # BLD AUTO: 167 K/UL (ref 164–446)
PMV BLD AUTO: 9.5 FL (ref 9–12.9)
RBC # BLD AUTO: 3.18 M/UL (ref 4.7–6.1)
WBC # BLD AUTO: 6.1 K/UL (ref 4.8–10.8)

## 2025-03-16 PROCEDURE — 770020 HCHG ROOM/CARE - TELE (206)

## 2025-03-16 PROCEDURE — 700102 HCHG RX REV CODE 250 W/ 637 OVERRIDE(OP): Performed by: HOSPITALIST

## 2025-03-16 PROCEDURE — 99233 SBSQ HOSP IP/OBS HIGH 50: CPT | Performed by: INTERNAL MEDICINE

## 2025-03-16 PROCEDURE — A9270 NON-COVERED ITEM OR SERVICE: HCPCS | Performed by: HOSPITALIST

## 2025-03-16 PROCEDURE — A9270 NON-COVERED ITEM OR SERVICE: HCPCS | Performed by: INTERNAL MEDICINE

## 2025-03-16 PROCEDURE — 85027 COMPLETE CBC AUTOMATED: CPT

## 2025-03-16 PROCEDURE — 700102 HCHG RX REV CODE 250 W/ 637 OVERRIDE(OP): Performed by: INTERNAL MEDICINE

## 2025-03-16 PROCEDURE — 36415 COLL VENOUS BLD VENIPUNCTURE: CPT

## 2025-03-16 PROCEDURE — 94760 N-INVAS EAR/PLS OXIMETRY 1: CPT

## 2025-03-16 RX ORDER — MULTIVITAMIN WITH IRON
1000 TABLET ORAL DAILY
Status: DISCONTINUED | OUTPATIENT
Start: 2025-03-16 | End: 2025-03-17 | Stop reason: HOSPADM

## 2025-03-16 RX ADMIN — TAMSULOSIN HYDROCHLORIDE 0.4 MG: 0.4 CAPSULE ORAL at 17:28

## 2025-03-16 RX ADMIN — FINASTERIDE 5 MG: 5 TABLET, FILM COATED ORAL at 05:09

## 2025-03-16 RX ADMIN — MEXILETINE HYDROCHLORIDE 150 MG: 150 CAPSULE ORAL at 05:09

## 2025-03-16 RX ADMIN — CARVEDILOL 3.12 MG: 3.12 TABLET, FILM COATED ORAL at 17:28

## 2025-03-16 RX ADMIN — TAMSULOSIN HYDROCHLORIDE 0.4 MG: 0.4 CAPSULE ORAL at 05:08

## 2025-03-16 RX ADMIN — CLOPIDOGREL BISULFATE 75 MG: 75 TABLET ORAL at 05:09

## 2025-03-16 RX ADMIN — EZETIMIBE 10 MG: 10 TABLET ORAL at 17:27

## 2025-03-16 RX ADMIN — ASPIRIN 81 MG: 81 TABLET, COATED ORAL at 17:27

## 2025-03-16 RX ADMIN — ROSUVASTATIN CALCIUM 20 MG: 10 TABLET, FILM COATED ORAL at 17:27

## 2025-03-16 RX ADMIN — SENNOSIDES AND DOCUSATE SODIUM 2 TABLET: 50; 8.6 TABLET ORAL at 17:27

## 2025-03-16 RX ADMIN — ASPIRIN 81 MG: 81 TABLET, COATED ORAL at 05:08

## 2025-03-16 RX ADMIN — CYANOCOBALAMIN TAB 500 MCG 1000 MCG: 500 TAB at 12:07

## 2025-03-16 RX ADMIN — MEXILETINE HYDROCHLORIDE 150 MG: 150 CAPSULE ORAL at 13:40

## 2025-03-16 RX ADMIN — OXYCODONE 5 MG: 5 TABLET ORAL at 22:43

## 2025-03-16 RX ADMIN — CARVEDILOL 3.12 MG: 3.12 TABLET, FILM COATED ORAL at 08:33

## 2025-03-16 RX ADMIN — POLYETHYLENE GLYCOL 3350 1 PACKET: 17 POWDER, FOR SOLUTION ORAL at 05:08

## 2025-03-16 RX ADMIN — MEXILETINE HYDROCHLORIDE 150 MG: 150 CAPSULE ORAL at 21:02

## 2025-03-16 ASSESSMENT — ENCOUNTER SYMPTOMS
DOUBLE VISION: 0
MYALGIAS: 1
ABDOMINAL PAIN: 0
NERVOUS/ANXIOUS: 0
COUGH: 0
FEVER: 0
HEADACHES: 0
VOMITING: 0
CHILLS: 0
FALLS: 1
DIZZINESS: 0
PALPITATIONS: 0
BLURRED VISION: 0
SHORTNESS OF BREATH: 0
HEARTBURN: 0

## 2025-03-16 ASSESSMENT — FIBROSIS 4 INDEX: FIB4 SCORE: 2.32

## 2025-03-16 ASSESSMENT — LIFESTYLE VARIABLES: SUBSTANCE_ABUSE: 0

## 2025-03-16 ASSESSMENT — PAIN DESCRIPTION - PAIN TYPE
TYPE: ACUTE PAIN
TYPE: ACUTE PAIN

## 2025-03-16 NOTE — PROGRESS NOTES
Telemetry Shift Summary     Per monitor tech:  Rhythm SR  HR Range 64-83  Ectopy f-rPvc/Pac/fBig/Trig  Measurements 0.16/0.08/0.36      Normal Values  Rhythm SR  HR Range:   Measurements: 0.12-0.20/0.06-0.10/0.30-0.52

## 2025-03-16 NOTE — PROGRESS NOTES
"Hospital Medicine Daily Progress Note    Date of Service  3/16/2025    Chief Complaint  Sandip Zhao is a 84 y.o. male admitted 3/12/2025 with hip pain.    Hospital Course  As per chart review:  \"84 y.o. male who presented 3/12/2025 with ground-level fall when the patient went for a blood pressure and heart rate evaluation to urgent care today.  The patient has bigeminy and a history of coronary artery disease with 3 stents in place.  The patient has been under the care of cardiology and they have have been titrating his Coreg.  They initially started him on 3.125 mg twice daily and they went up to 6.25 mg yesterday.  The patient this morning did take his new dose and went to urgent care for evaluation of heart rate and blood pressure.  While standing there he was shuffling his feet to get to the blood pressure check and he tripped over his own feet landing on his left side.  After this he was not able to ambulate and he was brought into the emergency room for evaluation where he was found on imaging studies to have a left hip fracture.\"    Interval Problem Update  3/13: Patient seen at bedside this morning.  Still complaining of pain.  Patient n.p.o. as he will most likely undergo surgery today.  We appreciate further recommendations per orthopedic surgery.    3/14: Patient seen at bedside this morning.  The patient mentions the pain is much better.  Patient underwent surgery yesterday.  As per the patient's wife I called the patient's cardiologist however no change in management at this time.  Continue with Coreg 3.125 mg twice daily.  I have ordered home health.  Continue to monitor closely.    3/15: Patient seen at bedside this morning.  Hemoglobin this morning was 9.5, I suspect this is due to recent surgery.  The patient's wife is concerned about the patient discharging home only on home health.  Home health was ordered due to physical therapy's recommendation.  Physical therapy to reevaluate the patient " for safe discharge planning today.  We appreciate further commendations.  Continue to monitor closely.    3/16: Patient seen at bedside this morning.  Hemoglobin remained stable at 9.5.  The patient is medically clear for discharge.  PT now recommending skilled nursing facility placement.  We appreciate further commendations by case management.  Referral to rehab has also been placed.    I have discussed this patient's plan of care and discharge plan at IDT rounds today with Case Management, Nursing, Nursing leadership, and other members of the IDT team.    Consultants/Specialty  orthopedics    Code Status  Full Code    Disposition  The patient is not medically cleared for discharge to home or a post-acute facility.        Review of Systems  Review of Systems   Constitutional:  Positive for malaise/fatigue. Negative for chills and fever.   HENT:  Negative for hearing loss and nosebleeds.    Eyes:  Negative for blurred vision and double vision.   Respiratory:  Negative for cough and shortness of breath.    Cardiovascular:  Negative for chest pain and palpitations.   Gastrointestinal:  Negative for abdominal pain, heartburn and vomiting.   Genitourinary:  Negative for dysuria and urgency.   Musculoskeletal:  Positive for falls, joint pain and myalgias.   Skin:  Negative for itching and rash.   Neurological:  Negative for dizziness and headaches.   Psychiatric/Behavioral:  Negative for substance abuse. The patient is not nervous/anxious.    All other systems reviewed and are negative.       Physical Exam  Temp:  [36.4 °C (97.6 °F)-36.9 °C (98.5 °F)] 36.4 °C (97.6 °F)  Pulse:  [68-75] 71  Resp:  [18-20] 20  BP: (112-157)/(57-71) 112/57  SpO2:  [92 %-97 %] 93 %    Physical Exam  Vitals and nursing note reviewed.   Constitutional:       Appearance: He is ill-appearing.   HENT:      Head: Normocephalic and atraumatic.      Right Ear: External ear normal.      Left Ear: External ear normal.      Nose: Nose normal.       Mouth/Throat:      Mouth: Mucous membranes are moist.      Pharynx: Oropharynx is clear.   Eyes:      General:         Right eye: No discharge.         Left eye: No discharge.   Cardiovascular:      Rate and Rhythm: Normal rate.      Heart sounds: No murmur heard.  Pulmonary:      Effort: Pulmonary effort is normal. No respiratory distress.      Breath sounds: Normal breath sounds.   Abdominal:      General: Abdomen is flat. Bowel sounds are normal. There is no distension.      Palpations: Abdomen is soft.      Tenderness: There is no abdominal tenderness.   Musculoskeletal:         General: Tenderness present.      Cervical back: Normal range of motion and neck supple.      Comments: Dressing in place   Skin:     General: Skin is warm.   Neurological:      General: No focal deficit present.      Mental Status: He is alert and oriented to person, place, and time.   Psychiatric:         Mood and Affect: Mood normal.         Behavior: Behavior normal.         Fluids    Intake/Output Summary (Last 24 hours) at 3/16/2025 1220  Last data filed at 3/16/2025 0200  Gross per 24 hour   Intake 200 ml   Output 1450 ml   Net -1250 ml        Laboratory  Recent Labs     03/14/25  0403 03/15/25  0344 03/16/25  0202   WBC 6.4 6.6 6.1   RBC 3.43* 3.10* 3.18*   HEMOGLOBIN 10.4* 9.5* 9.5*   HEMATOCRIT 30.9* 28.5* 28.6*   MCV 90.1 91.9 89.9   MCH 30.3 30.6 29.9   MCHC 33.7 33.3 33.2   RDW 43.7 46.1 44.6   PLATELETCT 167 167 167   MPV 10.1 9.9 9.5     Recent Labs     03/14/25  0403 03/15/25  0344   SODIUM 136 138   POTASSIUM 3.9 4.0   CHLORIDE 104 106   CO2 20 25   GLUCOSE 167* 111*   BUN 13 17   CREATININE 0.88 0.83   CALCIUM 8.7 8.7                       Imaging  DX-FEMUR-2+ LEFT   Final Result      Digitized intraoperative radiograph is submitted for review. This examination is not for diagnostic purpose but for guidance during a surgical procedure. Please see the patient's chart for full procedural details.         INTERPRETING  "LOCATION: [address], [place], [address]      DX-PORTABLE FLUORO > 1 HOUR   Final Result      Portable fluoroscopy utilized for 33 seconds.         INTERPRETING LOCATION: [address], [place], [address]      DX-CHEST-PORTABLE (1 VIEW)   Final Result         No acute cardiac or pulmonary abnormality is identified.           Assessment/Plan  * Closed left hip fracture, initial encounter (HCC)- (present on admission)  Assessment & Plan  3/13: Patient to undergo surgery today. PT/OT after surgery.    3/14: Patient underwent \"Left hip intertrochanteric nailing\" yesterday by orthopedic surgery.  As per nursing PT recommending home health.  Will order home health.    3/15: Wife is concerned about the patient going home so PT will reevaluate the patient for safe discharge planning.    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    Anemia- (present on admission)  Assessment & Plan  Seems to be chronic  Monitor closely    Benign prostatic hyperplasia with urinary obstruction- (present on admission)  Assessment & Plan  3/16: continue Flomax and finasteride    Advance care planning  Assessment & Plan  3/13: I discussed with " patient for at least 16 minutes further goals of care.  This included CODE STATUS which includes full code and DNR/DNI.  The patient would like to be full code.  We also discussed further treatment goals.  For now the patient want to have full treatment options available.  This includes invasive or noninvasive procedures as needed.  In the event that the patient cannot make decisions for himself he would like his wife Kimberley to make decisions for him.         VTE prophylaxis: Aspirin BID    I have performed a physical exam and reviewed and updated ROS and Plan today (3/16/2025). In review of yesterday's note (3/15/2025), there are no changes except as documented above.      I spend at least 52 minutes providing care for this patient.  This included face-to-face interview, physical examination.  Review of lab work including CBC.  Discussing with multidisciplinary team including case management, nursing staff and pharmacy.  Creating plan of care, reviewing orders.

## 2025-03-16 NOTE — CARE PLAN
The patient is Stable - Low risk of patient condition declining or worsening    Shift Goals  Clinical Goals: Safety, pain mgt  Patient Goals: Rest/ BM    Progress made toward(s) clinical / shift goals:  education done on POC, all questions and concerns were addressed.      Problem: Knowledge Deficit - Standard  Goal: Patient and family/care givers will demonstrate understanding of plan of care, disease process/condition, diagnostic tests and medications  3/15/2025 1855 by Jenny Wick, R.N.  Outcome: Progressing  3/15/2025 1035 by Jenny Wick R.N.  Outcome: Progressing     Problem: Pain - Standard  Goal: Alleviation of pain or a reduction in pain to the patient’s comfort goal  3/15/2025 1855 by Jenny Wick, R.N.  Outcome: Progressing

## 2025-03-16 NOTE — CARE PLAN
The patient is Stable - Low risk of patient condition declining or worsening    Shift Goals  Clinical Goals: Safety, monitor VS  Patient Goals: Comfort, DC    Progress made toward(s) clinical / shift goals:      Problem: Knowledge Deficit - Standard  Goal: Patient and family/care givers will demonstrate understanding of plan of care, disease process/condition, diagnostic tests and medications  Outcome: Progressing  Note: Reviewed POC. Discussed and provided education on medications and treatment. Monitor for bleeding on surgical sites and pain management. Maintained dressing clean, dry and intact     Problem: Fall Risk  Goal: Patient will remain free from falls  Outcome: Progressing  Note: Fall and safety precaution in placed. Bed alarm on and side rails up. Instructed to call whenever assistance is needed.      Problem: Pain - Standard  Goal: Alleviation of pain or a reduction in pain to the patient’s comfort goal  Outcome: Progressing  Note: Non pharmacological and pharmacological interventions discussed. Patient declined pain medicine. Continue to monitor.        Patient is not progressing towards the following goals:

## 2025-03-16 NOTE — THERAPY
Physical Therapy   Initial Evaluation     Patient Name: Sandip Zhao  Age:  84 y.o., Sex:  male  Medical Record #: [id number]  Today's Date: 3/15/2025     Precautions  Precautions: Partial Weight Bearing Left Lower Extremity (See Comments for Percentage)    Assessment  Patient is 84 y.o. male with a diagnosis of closed L hip fracture.  Additional factors influencing patient status / progress include pt's wife and caretaker concerned that pt should not be moved to a motel and then home as her power is off as a grid is changed and it will be off until next week.pt was not willing to walk as far with PT but is able to get to the EOB and transfer with CGA. I will have PT tomorrow   assess his gait endurance and transfers as well as his bed mobility and determine if he will need SNF VS home with spouse as she feels he needs SNF .    Plan    Physical Therapy Initial Treatment Plan   Treatment Plan : Bed Mobility, Gait Training, Self Care / Home Evaluation, Therapeutic Activities, Therapeutic Exercise  Treatment Frequency: Other (See Comments)  Duration: 1 Visit    DC Equipment Recommendations: Front-Wheel Walker  Discharge Recommendations: Recommend post-acute placement for additional physical therapy services prior to discharge home       Subjective  Pt pleasant and cooperative. Wife believes pt is dealing with cognitive changes and is not safe due to [place] and he had safety impairments due to this .      Objective     03/15/25 1020   Prior Living Situation   Prior Services None   Housing / Facility 1 Story House   Steps Into Home 0   Steps In Home 0   Bathroom Set up Walk In Shower;Shower Chair   Equipment Owned Front-Wheel Walker;Tub / Shower Seat;Hand Held Shower   Lives with - Patient's Self Care Capacity Spouse   Prior Level of Functional Mobility   Bed Mobility Independent   Transfer Status Independent   Ambulation Independent   Ambulation Distance short community and household   Assistive Devices Used None   Stairs  Independent   History of Falls   History of Falls Yes   Date of Last Fall   (prior to this admit)   Cognition    Comments pt is [place] and has mild cognitive impairment ( per spouse)   Gait Analysis   Gait Level Of Assist Standby Assist   Assistive Device Front Wheel Walker   Distance (Feet) 75   # of Times Distance was Traveled 1   Deviation Antalgic;Step To;Decreased Heel Strike;Decreased Toe Off   Weight Bearing Status 50% PWB L LE   Comments pt  is tired and feels he wants to return to his room   Functional Mobility   Sit to Stand Standby Assist   Bed, Chair, Wheelchair Transfer Contact Guard Assist   Transfer Method Stand Step   Mobility with FWW today   Comments pt 's wife very concerned about pt coming home and falling . We discussed the fact she has no power as they are redoing a power grid wher they live and the well pump is off so there is no power and she does not want to go to a motel and then home whem th power returns next week.   Patient / Family Goals    Patient / Family Goal #1 pt's wife would like pt to go to a SNF vs [facility].   Short Term Goals    Short Term Goal # 1 pt will ambulate 150' in hallway with SBA on sunday   Short Term Goal # 2 pt will transfer with SBA using the FWW  to safely return home.   Short Term Goal # 3 pt will get from supine to EOB with HOB flat an Rail up to safely return to bed at home.   Anticipated Discharge Equipment and Recommendations   DC Equipment Recommendations Front-Wheel Walker   Discharge Recommendations Recommend post-acute placement for additional physical therapy services prior to discharge home   Session Information   Date / Session Number  3/15-1 ( 1/2 , 3/21)

## 2025-03-17 VITALS
HEIGHT: 65 IN | BODY MASS INDEX: 24.61 KG/M2 | RESPIRATION RATE: 20 BRPM | HEART RATE: 78 BPM | TEMPERATURE: 98.6 F | OXYGEN SATURATION: 94 % | DIASTOLIC BLOOD PRESSURE: 59 MMHG | WEIGHT: 147.71 LBS | SYSTOLIC BLOOD PRESSURE: 132 MMHG

## 2025-03-17 LAB
ANION GAP SERPL CALC-SCNC: 9 MMOL/L (ref 7–16)
BUN SERPL-MCNC: 17 MG/DL (ref 8–22)
CALCIUM SERPL-MCNC: 8.5 MG/DL (ref 8.4–10.2)
CHLORIDE SERPL-SCNC: 100 MMOL/L (ref 96–112)
CO2 SERPL-SCNC: 24 MMOL/L (ref 20–33)
CREAT SERPL-MCNC: 0.95 MG/DL (ref 0.5–1.4)
GFR SERPLBLD CREATININE-BSD FMLA CKD-EPI: 79 ML/MIN/1.73 M 2
GLUCOSE SERPL-MCNC: 111 MG/DL (ref 65–99)
HCT VFR BLD AUTO: 28.7 % (ref 42–52)
HGB BLD-MCNC: 9.5 G/DL (ref 14–18)
MAGNESIUM SERPL-MCNC: 1.8 MG/DL (ref 1.5–2.5)
POTASSIUM SERPL-SCNC: 4.2 MMOL/L (ref 3.6–5.5)
SODIUM SERPL-SCNC: 133 MMOL/L (ref 135–145)

## 2025-03-17 PROCEDURE — 99239 HOSP IP/OBS DSCHRG MGMT >30: CPT | Performed by: INTERNAL MEDICINE

## 2025-03-17 PROCEDURE — 94760 N-INVAS EAR/PLS OXIMETRY 1: CPT

## 2025-03-17 PROCEDURE — A9270 NON-COVERED ITEM OR SERVICE: HCPCS | Performed by: INTERNAL MEDICINE

## 2025-03-17 PROCEDURE — 83735 ASSAY OF MAGNESIUM: CPT

## 2025-03-17 PROCEDURE — A9270 NON-COVERED ITEM OR SERVICE: HCPCS | Performed by: HOSPITALIST

## 2025-03-17 PROCEDURE — 36415 COLL VENOUS BLD VENIPUNCTURE: CPT

## 2025-03-17 PROCEDURE — 700102 HCHG RX REV CODE 250 W/ 637 OVERRIDE(OP): Performed by: INTERNAL MEDICINE

## 2025-03-17 PROCEDURE — 700102 HCHG RX REV CODE 250 W/ 637 OVERRIDE(OP): Performed by: HOSPITALIST

## 2025-03-17 PROCEDURE — 85014 HEMATOCRIT: CPT

## 2025-03-17 PROCEDURE — 700111 HCHG RX REV CODE 636 W/ 250 OVERRIDE (IP): Performed by: INTERNAL MEDICINE

## 2025-03-17 PROCEDURE — 80048 BASIC METABOLIC PNL TOTAL CA: CPT

## 2025-03-17 PROCEDURE — 85018 HEMOGLOBIN: CPT

## 2025-03-17 RX ORDER — AMOXICILLIN 250 MG
2 CAPSULE ORAL EVERY EVENING
Status: SHIPPED
Start: 2025-03-17

## 2025-03-17 RX ORDER — POLYETHYLENE GLYCOL 3350 17 G/17G
17 POWDER, FOR SOLUTION ORAL
Status: SHIPPED
Start: 2025-03-17

## 2025-03-17 RX ORDER — MAGNESIUM SULFATE HEPTAHYDRATE 40 MG/ML
2 INJECTION, SOLUTION INTRAVENOUS ONCE
Status: COMPLETED | OUTPATIENT
Start: 2025-03-17 | End: 2025-03-17

## 2025-03-17 RX ORDER — OXYCODONE HYDROCHLORIDE 5 MG/1
5 TABLET ORAL EVERY 12 HOURS PRN
Qty: 6 TABLET | Refills: 0 | Status: SHIPPED | OUTPATIENT
Start: 2025-03-17 | End: 2025-03-20

## 2025-03-17 RX ORDER — ASPIRIN 81 MG/1
81 TABLET ORAL DAILY
Status: DISCONTINUED | OUTPATIENT
Start: 2025-03-18 | End: 2025-03-17 | Stop reason: HOSPADM

## 2025-03-17 RX ORDER — CARVEDILOL 3.12 MG/1
3.12 TABLET ORAL 2 TIMES DAILY WITH MEALS
Status: SHIPPED
Start: 2025-03-17

## 2025-03-17 RX ADMIN — CLOPIDOGREL BISULFATE 75 MG: 75 TABLET ORAL at 05:20

## 2025-03-17 RX ADMIN — ASPIRIN 81 MG: 81 TABLET, COATED ORAL at 05:19

## 2025-03-17 RX ADMIN — MEXILETINE HYDROCHLORIDE 150 MG: 150 CAPSULE ORAL at 14:10

## 2025-03-17 RX ADMIN — OXYCODONE 5 MG: 5 TABLET ORAL at 05:20

## 2025-03-17 RX ADMIN — OXYCODONE 5 MG: 5 TABLET ORAL at 15:52

## 2025-03-17 RX ADMIN — CYANOCOBALAMIN TAB 500 MCG 1000 MCG: 500 TAB at 05:20

## 2025-03-17 RX ADMIN — MAGNESIUM SULFATE HEPTAHYDRATE 2 G: 2 INJECTION, SOLUTION INTRAVENOUS at 11:28

## 2025-03-17 RX ADMIN — FINASTERIDE 5 MG: 5 TABLET, FILM COATED ORAL at 05:20

## 2025-03-17 RX ADMIN — ACETAMINOPHEN 650 MG: 325 TABLET ORAL at 02:30

## 2025-03-17 RX ADMIN — MEXILETINE HYDROCHLORIDE 150 MG: 150 CAPSULE ORAL at 05:21

## 2025-03-17 RX ADMIN — TAMSULOSIN HYDROCHLORIDE 0.4 MG: 0.4 CAPSULE ORAL at 05:20

## 2025-03-17 ASSESSMENT — PAIN DESCRIPTION - PAIN TYPE
TYPE: ACUTE PAIN;SURGICAL PAIN
TYPE: ACUTE PAIN
TYPE: ACUTE PAIN

## 2025-03-17 NOTE — DISCHARGE PLANNING
[facility] Transitional Care Coordination    Referral from:  Dr. Nakul Ferro  Insurance Provider on Facesheet:  [employer]  Potential Rehab diagnosis:  Unilateral hip fracture    Chart review indicates patient has ongoing medical management and limited therapy needs to possibly meet inpatient rehab facility criteria with the goal of returning to community.      D/C Support:  Spouse    No Physiatry consult per protocol.  Current documentation does not reflect ongoing acute therapy need in 2 of 3 disciplines meeting CMS criteria for IRF level care.  If there are interval changes, please reach out to [facility] [id number].  Please reach out if PMR consult requested for medical management.     Last Covid test:    Thank you for the referral.

## 2025-03-17 NOTE — PROGRESS NOTES
Telemetry Summary    Rhythm: SR  Rate: 70's-80's  Ectopy: Frequent PVC's, Bigem, Couplets  Measurements: (.16/.09/.35)    Normal Values  Rhythm: SR  Rate:   Measurements: 0.12-0.20/0.06-0.10/0.30-0.52;e

## 2025-03-17 NOTE — CARE PLAN
The patient is Stable - Low risk of patient condition declining or worsening    Shift Goals  Clinical Goals: Maintain safety, DC planniing  Patient Goals: Feel good    Progress made toward(s) clinical / shift goals:  Pt pleasant and doing well this morning. CM working on DC planning. Medicating pt per MAR.     Problem: Knowledge Deficit - Standard  Goal: Patient and family/care givers will demonstrate understanding of plan of care, disease process/condition, diagnostic tests and medications  Outcome: Progressing     Problem: Fall Risk  Goal: Patient will remain free from falls  Outcome: Progressing     Problem: Pain - Standard  Goal: Alleviation of pain or a reduction in pain to the patient’s comfort goal  Outcome: Progressing       Patient is not progressing towards the following goals:

## 2025-03-17 NOTE — DISCHARGE PLANNING
"Case Management Discharge Planning    Admission Date: 3/12/2025  GMLOS: 4.4  ALOS: 5    6-Clicks ADL Score: 21  6-Clicks Mobility Score: 21      Anticipated Discharge Dispo: Discharge Disposition: Disch to  rehab facility or distinct part unit (62)    DME Needed: No    Action(s) Taken: LMSW spoke to pts wife, Kimberley regarding discharge planning and Rehabs denial.   Pt's wife states \" \" I'm very upset and angry over this whole process and how I'm not involved in discussing my 's therapy needs.\" \" [facility] and he said he could get in Rehab.\" \" I want to speak with the doctor.\" \" No on has considered how he walks and shuffles his feet.\"  This writer used therapeutic listening and communication to ease Kimberley's frustration. Kimberley was calm at the end of discussion but still expresses a need to discuss the patient's needs with the doctor.  This writer informed Kimberley pt was accepted into [facility].    Addendum: LMSW called pt's wife Kimberley who reports Dr. Mota called her and and spoke with her in depth and answered her questions and addressed her concerns. Pt and wife are in agreement to discharge to [facility] today, transport has been arranged for 1630 today via WC transport from [facility].    Escalations Completed: None    Medically Clear: Yes    Next Steps: Choice for post acute    Barriers to Discharge: None           "

## 2025-03-17 NOTE — CARE PLAN
The patient is Stable - Low risk of patient condition declining or worsening    Shift Goals  Clinical Goals: safety; pain control  Patient Goals: rest    Progress made toward(s) clinical / shift goals:  Patient at risk for falls, bed alarm on, walker out of sight, nonskid socks on, call light within reach, personal belongings within reach, toileting offered.     Educated patient on pain rating scales, frequent pain assessments in place, medicating per MAR, non pharmaceutical pain relief such as heat pads and positioning in use.        Patient is not progressing towards the following goals:

## 2025-03-17 NOTE — DISCHARGE PLANNING
-[phone]  Per [facility], DPA sent SNF referral to [facility].     -[phone]  DPA confirmed with Pierre at [facility] that facility can accept pt today and has wheelchair transport available if applicable. DPA to follow up with facility mode of transport is confirmed with [facility].     -[phone]  DPA confirmed with Raysa at [facility] that facility can accept pt today and provide wheelchair transport. Facility will pickup pt at 1630 today.

## 2025-03-17 NOTE — PROGRESS NOTES
Called report to [facility], all questions answered. Gave call back number.   Spoke with pt wife at bedside, all questions answered regarding discharge and transfer, and medications.

## 2025-03-17 NOTE — CARE PLAN
The patient is Stable - Low risk of patient condition declining or worsening    Shift Goals  Clinical Goals: Safety, wean 02  Patient Goals: Sleep    Progress made toward(s) clinical / shift goals:  Able to wean pt to room air. Care clustered to promote sleep.     Problem: Fall Risk  Goal: Patient will remain free from falls  Outcome: Progressing       Patient is not progressing towards the following goals: Pain not well controlled on current regimen.      Problem: Pain - Standard  Goal: Alleviation of pain or a reduction in pain to the patient’s comfort goal  Outcome: Not Progressing

## 2025-03-17 NOTE — DISCHARGE SUMMARY
"Discharge Summary    CHIEF COMPLAINT ON ADMISSION  Chief Complaint   Patient presents with    Leg Injury     Was checking in at  to have his BP checked and tripped in reception area.   xray and told him he has a left femur fracture.         Reason for Admission  EMS     Admission Date  3/12/2025    CODE STATUS  Full Code    HPI & HOSPITAL COURSE  As per chart review:  \"84 y.o. male who presented 3/12/2025 with ground-level fall when the patient went for a blood pressure and heart rate evaluation to urgent care today.  The patient has bigeminy and a history of coronary artery disease with 3 stents in place.  The patient has been under the care of cardiology and they have have been titrating his Coreg.  They initially started him on 3.125 mg twice daily and they went up to 6.25 mg yesterday.  The patient this morning did take his new dose and went to urgent care for evaluation of heart rate and blood pressure.  While standing there he was shuffling his feet to get to the blood pressure check and he tripped over his own feet landing on his left side.  After this he was not able to ambulate and he was brought into the emergency room for evaluation where he was found on imaging studies to have a left hip fracture.\"    The patient was admitted for further management and care.  Orthopedic surgery was consulted and the patient underwent left hip intertrochanteric nailing.  Initially PT had recommended home health, however the wife was worried about the patient going home so PT reevaluated the patient and recommended postacute placement.  Rehab did not accept the patient, however the patient has not been accepted to skilled nursing facility.  The patient will be transferred today to a skilled nursing facility.    The patient already takes aspirin and Plavix at home.  I discussed with Dr. Giles regarding the DVT prophylaxis whether he wanted aspirin twice daily plus Plavix or just to take his aspirin and Plavix.  As " per Dr. Giles it is okay to continue just his regular aspirin and Plavix for DVT prophylaxis.    The patient has known PVCs and bigeminy's.  This was seen during telemetry.  I did talk to the patient's cardiologist over the phone regarding the patient's history.  We will titrate him back to 3.125 mg of Coreg and the patient's cardiologist will follow-up with the patient as an outpatient.  I asked if there was further needs from cardiology, from their standpoint no further needs and they will follow-up with the patient.  The patient denying any chest pain, palpitations or any other symptom at this time.    The patient seen at bedside this morning.  Overall the patient feels much better.  As per case management the patient can be transferred to skilled nursing facility today.  The patient will call close follow-up with PCP, orthopedic surgery and cardiology as an outpatient.    The patient did have a magnesium of 1.8 today so we have given the patient IV magnesium replacement.  At the skilled nursing facility the patient should be monitored for electrolytes and replace as needed.  The patient states that he is having bowel movements.    Therefore, he is discharged in fair and stable condition to skilled nursing facility.    The patient met 2-midnight criteria for an inpatient stay at the time of discharge.    Discharge Date  3/17/2025    FOLLOW UP ITEMS POST DISCHARGE  The patient will require close follow-up with PCP, orthopedic surgery and his cardiologist.    DISCHARGE DIAGNOSES  Principal Problem:    Closed left hip fracture, initial encounter ([facility]) (POA: Yes)  Active Problems:    Benign prostatic hyperplasia with urinary obstruction (POA: Yes)    Anemia (POA: Yes)    Ventricular bigeminy (POA: Yes)    Coronary artery disease due to calcified coronary lesion (POA: Yes)    Pure hypercholesterolemia (POA: Yes)    Hyperglycemia (POA: Unknown)    Advance care planning (POA: Unknown)  Resolved Problems:    * No  resolved hospital problems. *      FOLLOW UP  Future Appointments   Date Time Provider Department Center   3/18/2025 10:00 AM ICR RN ICR [facility]   3/19/2025 10:00 AM ICR RN ICR [facility]   3/20/2025 10:00 AM ICR RN ICR [facility]   3/25/2025 10:00 AM ICR RN ICR [facility]   3/26/2025 10:00 AM ICR RN ICR [facility]   3/27/2025 10:00 AM ICR RN ICR [facility]   4/1/2025 10:00 AM ICR RN ICR [facility]     [facility]  [address] [address] [address]  [phone]        Meagan Mcneil M.D.  [address] [id number] [phone]    Schedule an appointment as soon as possible for a visit      Wilner Perdue M.D.  [address] [id number] [phone]          [facility] CARDIOLOGY  [address] [place] [address]  Schedule an appointment as soon as possible for a visit        MEDICATIONS ON DISCHARGE     Medication List        START taking these medications        Instructions   oxyCODONE immediate-release 5 MG Tabs  Commonly known as: Roxicodone   Take 1 Tablet by mouth every 12 hours as needed for Severe Pain for up to 3 days.  Dose: 5 mg     polyethylene glycol/lytes Pack  Commonly known as: Miralax   Take 1 Packet by mouth 1 time a day as needed (if no bowel movement in last 2 days).  Dose: 17 g     senna-docusate 8.6-50 MG Tabs  Commonly known as: Pericolace Or Senokot S   Take 2 Tablets by mouth every evening.  Dose: 2 Tablet            CHANGE how you take these medications        Instructions   carvedilol 3.125 MG Tabs  What changed:   medication strength  See the new instructions.  Commonly known as: Coreg   Take 1 Tablet by mouth 2 times a day with meals.  Dose: 3.125 mg            CONTINUE taking these medications        Instructions   acetaminophen 500 MG Tabs  Commonly known as: Tylenol   Take 500-1,000 mg by mouth every 6 hours as needed.  Dose: 500-1,000 mg     aspirin 81 MG EC tablet   Take 81 mg by mouth every  morning.  Dose: 81 mg     B-12 PO   Take 1 Tablet by mouth every day.  Dose: 1 Tablet     clopidogrel 75 MG Tabs  Commonly known as: Plavix   Take 1 Tablet by mouth every day.  Dose: 75 mg     docusate sodium 100 MG Caps  Commonly known as: Colace   Take 200 mg by mouth 2 times a day.  Dose: 200 mg     ezetimibe 10 MG Tabs  Commonly known as: Zetia   Take 10 mg by mouth every evening.  Dose: 10 mg     finasteride 5 MG Tabs  Commonly known as: Proscar   Take 1 Tablet by mouth every day.  Dose: 5 mg     mexiletine 150 MG Caps  Commonly known as: Mexitil   Take 150 mg by mouth every 8 hours.  Dose: 150 mg     rosuvastatin 20 MG Tabs  Commonly known as: Crestor   Take 20 mg by mouth every evening.  Dose: 20 mg     tamsulosin 0.4 MG capsule  Commonly known as: Flomax   Take 1 Capsule by mouth 2 times a day.  Dose: 0.4 mg              Allergies  No Known Allergies    DIET  Orders Placed This Encounter   Procedures    Diet Order Diet: Low Fiber(GI Soft)     Standing Status:   Standing     Number of Occurrences:   1     Diet::   Low Fiber(GI Soft) [2]       ACTIVITY  As tolerated and directed by skilled nursing.      CONSULTATIONS  Orthopedic Surgery    PROCEDURES  PROCEDURE:   1. Left hip intertrochanteric nailing      DX-FEMUR-2+ LEFT   Final Result      Digitized intraoperative radiograph is submitted for review. This examination is not for diagnostic purpose but for guidance during a surgical procedure. Please see the patient's chart for full procedural details.         INTERPRETING LOCATION: [address] [place] [address], [id number]      DX-PORTABLE FLUORO > 1 HOUR   Final Result      Portable fluoroscopy utilized for 33 seconds.         INTERPRETING LOCATION: [address] [place] [address], [address]      DX-CHEST-PORTABLE (1 VIEW)   Final Result         No acute cardiac or pulmonary abnormality is identified.           LABORATORY  Lab Results   Component Value Date    SODIUM 133 (L) 03/17/2025    POTASSIUM 4.2 03/17/2025    CHLORIDE 100  03/17/2025    CO2 24 03/17/2025    GLUCOSE 111 (H) 03/17/2025    BUN 17 03/17/2025    CREATININE 0.95 03/17/2025        Lab Results   Component Value Date    WBC 6.1 03/16/2025    HEMOGLOBIN 9.5 (L) 03/17/2025    HEMATOCRIT 28.7 (L) 03/17/2025    PLATELETCT 167 03/16/2025        Total time of the discharge process exceeds 31 minutes.

## 2025-03-18 ENCOUNTER — TELEPHONE (OUTPATIENT)
Dept: HOME HEALTH SERVICES | Facility: HOME HEALTHCARE | Age: 85
End: 2025-03-18

## 2025-03-18 ENCOUNTER — APPOINTMENT (OUTPATIENT)
Dept: CARDIOLOGY | Facility: MEDICAL CENTER | Age: 85
End: 2025-03-18
Payer: MEDICARE

## 2025-03-18 NOTE — TELEPHONE ENCOUNTER
Called and spoke to spouse regarding home health start of care. [facility] transferred to [facility]. Let her know we can disregard this referral and wait for [facility] to send us a order closer to discharge. Spouse agreeable. NON-ADMIT

## 2025-03-19 ENCOUNTER — APPOINTMENT (OUTPATIENT)
Dept: CARDIOLOGY | Facility: MEDICAL CENTER | Age: 85
End: 2025-03-19
Payer: MEDICARE

## 2025-04-09 ENCOUNTER — HOME HEALTH ADMISSION (OUTPATIENT)
Dept: HOME HEALTH SERVICES | Facility: HOME HEALTHCARE | Age: 85
End: 2025-04-09
Payer: MEDICARE

## 2025-04-12 ENCOUNTER — TELEPHONE (OUTPATIENT)
Dept: HOME HEALTH SERVICES | Facility: HOME HEALTHCARE | Age: 85
End: 2025-04-12
Payer: MEDICARE

## 2025-04-14 ENCOUNTER — TELEPHONE (OUTPATIENT)
Dept: HOME HEALTH SERVICES | Facility: HOME HEALTHCARE | Age: 85
End: 2025-04-14
Payer: MEDICARE

## 2025-04-15 ENCOUNTER — TELEPHONE (OUTPATIENT)
Dept: HOME HEALTH SERVICES | Facility: HOME HEALTHCARE | Age: 85
End: 2025-04-15
Payer: MEDICARE

## 2025-04-15 NOTE — TELEPHONE ENCOUNTER
Spoke with spouse and scheduled start of care for tomorrow between 9-10am. Later start of care 4/16/25 per request

## 2025-04-16 ENCOUNTER — HOME CARE VISIT (OUTPATIENT)
Dept: HOME HEALTH SERVICES | Facility: HOME HEALTHCARE | Age: 85
End: 2025-04-16
Payer: MEDICARE

## 2025-04-16 VITALS
DIASTOLIC BLOOD PRESSURE: 62 MMHG | SYSTOLIC BLOOD PRESSURE: 130 MMHG | WEIGHT: 141 LBS | HEART RATE: 72 BPM | TEMPERATURE: 98 F | BODY MASS INDEX: 23.46 KG/M2 | RESPIRATION RATE: 16 BRPM | OXYGEN SATURATION: 98 %

## 2025-04-16 PROCEDURE — 665999 HH PPS REVENUE DEBIT

## 2025-04-16 PROCEDURE — G0299 HHS/HOSPICE OF RN EA 15 MIN: HCPCS

## 2025-04-16 PROCEDURE — 665005 NO-PAY RAP - HOME HEALTH

## 2025-04-16 PROCEDURE — 665998 HH PPS REVENUE CREDIT

## 2025-04-16 PROCEDURE — 665001 SOC-HOME HEALTH

## 2025-04-16 ASSESSMENT — ENCOUNTER SYMPTOMS
NAUSEA: DENIES
SUBJECTIVE PAIN PROGRESSION: UNCHANGED
CONSTIPATION: 1
LOWEST PAIN SEVERITY IN PAST 24 HOURS: 0/10
PAIN: 1
STOOL FREQUENCY: DAILY
PAIN LOCATION: HIP
PAIN LOCATION - PAIN SEVERITY: 0/10
PAIN SEVERITY GOAL: 0/10
PAIN LOCATION - EXACERBATING FACTORS: ACTIVITY
FORGETFULNESS: 1
VOMITING: DENIES
PAIN LOCATION - PAIN QUALITY: SORE
LAST BOWEL MOVEMENT: 67311
HIGHEST PAIN SEVERITY IN PAST 24 HOURS: 5/10
PAIN LOCATION - RELIEVING FACTORS: REST

## 2025-04-16 ASSESSMENT — ACTIVITIES OF DAILY LIVING (ADL): OASIS_M1830: 03

## 2025-04-16 ASSESSMENT — FIBROSIS 4 INDEX: FIB4 SCORE: 2.32

## 2025-04-16 NOTE — Clinical Note
PRIMARY DIAGNOSIS: Left hip fracture  SKILLED NEED: Health assessment, medication education, education and monitoring of disease processes.  NURSING FREQUENCY: 1w1; SOC only  ZIP CODE: [id number]  DISCIPLINES ORDERED: PT, OT, MSW; Declined HHA.   INSURANCE: [facility]  CERTIFICATION PERIOD: 4/16/25- 6/14/25  SPECIAL CONSIDERATION: None

## 2025-04-17 ENCOUNTER — OFFICE VISIT (OUTPATIENT)
Dept: URGENT CARE | Facility: CLINIC | Age: 85
End: 2025-04-17
Payer: MEDICARE

## 2025-04-17 ENCOUNTER — HOSPITAL ENCOUNTER (OUTPATIENT)
Facility: MEDICAL CENTER | Age: 85
End: 2025-04-17
Attending: PHYSICIAN ASSISTANT
Payer: MEDICARE

## 2025-04-17 ENCOUNTER — DOCUMENTATION (OUTPATIENT)
Dept: MEDICAL GROUP | Facility: PHYSICIAN GROUP | Age: 85
End: 2025-04-17

## 2025-04-17 ENCOUNTER — HOME CARE VISIT (OUTPATIENT)
Dept: HOME HEALTH SERVICES | Facility: HOME HEALTHCARE | Age: 85
End: 2025-04-17
Payer: MEDICARE

## 2025-04-17 VITALS
SYSTOLIC BLOOD PRESSURE: 148 MMHG | WEIGHT: 140 LBS | HEART RATE: 61 BPM | HEIGHT: 64 IN | DIASTOLIC BLOOD PRESSURE: 60 MMHG | RESPIRATION RATE: 18 BRPM | BODY MASS INDEX: 23.9 KG/M2 | TEMPERATURE: 97.9 F | OXYGEN SATURATION: 98 %

## 2025-04-17 DIAGNOSIS — R30.0 DYSURIA: ICD-10-CM

## 2025-04-17 LAB
APPEARANCE UR: CLEAR
BILIRUB UR STRIP-MCNC: NEGATIVE MG/DL
COLOR UR AUTO: YELLOW
GLUCOSE UR STRIP.AUTO-MCNC: NEGATIVE MG/DL
KETONES UR STRIP.AUTO-MCNC: NEGATIVE MG/DL
LEUKOCYTE ESTERASE UR QL STRIP.AUTO: NEGATIVE
NITRITE UR QL STRIP.AUTO: NEGATIVE
PH UR STRIP.AUTO: 5.5 [PH] (ref 5–8)
PROT UR QL STRIP: NEGATIVE MG/DL
RBC UR QL AUTO: NEGATIVE
SP GR UR STRIP.AUTO: 1.02
UROBILINOGEN UR STRIP-MCNC: 0.2 MG/DL

## 2025-04-17 PROCEDURE — 3077F SYST BP >= 140 MM HG: CPT | Performed by: PHYSICIAN ASSISTANT

## 2025-04-17 PROCEDURE — 99213 OFFICE O/P EST LOW 20 MIN: CPT | Performed by: PHYSICIAN ASSISTANT

## 2025-04-17 PROCEDURE — 665998 HH PPS REVENUE CREDIT

## 2025-04-17 PROCEDURE — 665999 HH PPS REVENUE DEBIT

## 2025-04-17 PROCEDURE — 81002 URINALYSIS NONAUTO W/O SCOPE: CPT | Performed by: PHYSICIAN ASSISTANT

## 2025-04-17 PROCEDURE — 3078F DIAST BP <80 MM HG: CPT | Performed by: PHYSICIAN ASSISTANT

## 2025-04-17 PROCEDURE — 87086 URINE CULTURE/COLONY COUNT: CPT

## 2025-04-17 ASSESSMENT — ENCOUNTER SYMPTOMS
VOMITING: 0
CHILLS: 0
ABDOMINAL PAIN: 0
FEVER: 0
FLANK PAIN: 0
BLOOD IN STOOL: 0

## 2025-04-17 ASSESSMENT — FIBROSIS 4 INDEX: FIB4 SCORE: 2.32

## 2025-04-17 NOTE — PROGRESS NOTES
Medication chart review for [facility] services    Received referral from [facility].   Medications reviewed  compared with discharge summary if available.  Discharge summary date, if applicable:   3/17    Current medication list per [facility]     Medication list one, patient is currently taking    Current Outpatient Medications:     carvedilol, 3.125 mg, Oral, BID WITH MEALS    senna-docusate, 2 Tablet, Oral, Q EVENING    polyethylene glycol/lytes, 17 g, Oral, QDAY PRN (Patient not taking: Reported on 4/16/2025)    clopidogrel, 75 mg, Oral, DAILY    docusate sodium, 200 mg, Oral, BID    tamsulosin, 0.4 mg, Oral, BID    acetaminophen, 500-1,000 mg, Oral, Q6HRS PRN    finasteride, 5 mg, Oral, DAILY    mexiletine, 150 mg, Oral, Q8HRS    Cyanocobalamin (B-12 PO), 1 Tablet, Oral, DAILY    rosuvastatin, 20 mg, Oral, Q EVENING    aspirin, 81 mg, Oral, QAM    ezetimibe, 10 mg, Oral, Q EVENING      Medication list two, drugs that the patient has been prescribed or recommended to take by their healthcare provider on discharge summary    START taking these medications         Instructions   oxyCODONE immediate-release 5 MG Tabs  Commonly known as: Roxicodone    Take 1 Tablet by mouth every 12 hours as needed for Severe Pain for up to 3 days.  Dose: 5 mg      polyethylene glycol/lytes Pack  Commonly known as: Miralax    Take 1 Packet by mouth 1 time a day as needed (if no bowel movement in last 2 days).  Dose: 17 g      senna-docusate 8.6-50 MG Tabs  Commonly known as: Pericolace Or Senokot S    Take 2 Tablets by mouth every evening.  Dose: 2 Tablet                CHANGE how you take these medications         Instructions   carvedilol 3.125 MG Tabs  What changed:   medication strength  See the new instructions.  Commonly known as: Coreg    Take 1 Tablet by mouth 2 times a day with meals.  Dose: 3.125 mg                CONTINUE taking these medications         Instructions   acetaminophen 500 MG Tabs  Commonly known  as: Tylenol    Take 500-1,000 mg by mouth every 6 hours as needed.  Dose: 500-1,000 mg      aspirin 81 MG EC tablet    Take 81 mg by mouth every morning.  Dose: 81 mg      B-12 PO    Take 1 Tablet by mouth every day.  Dose: 1 Tablet      clopidogrel 75 MG Tabs  Commonly known as: Plavix    Take 1 Tablet by mouth every day.  Dose: 75 mg      docusate sodium 100 MG Caps  Commonly known as: Colace    Take 200 mg by mouth 2 times a day.  Dose: 200 mg      ezetimibe 10 MG Tabs  Commonly known as: Zetia    Take 10 mg by mouth every evening.  Dose: 10 mg      finasteride 5 MG Tabs  Commonly known as: Proscar    Take 1 Tablet by mouth every day.  Dose: 5 mg      mexiletine 150 MG Caps  Commonly known as: Mexitil    Take 150 mg by mouth every 8 hours.  Dose: 150 mg      rosuvastatin 20 MG Tabs  Commonly known as: Crestor    Take 20 mg by mouth every evening.  Dose: 20 mg      tamsulosin 0.4 MG capsule  Commonly known as: Flomax    Take 1 Capsule by mouth 2 times a day.  Dose: 0.4 mg           No Known Allergies    Labs     Lab Results   Component Value Date/Time    SODIUM 133 (L) 03/17/2025 01:23 AM    POTASSIUM 4.2 03/17/2025 01:23 AM    CHLORIDE 100 03/17/2025 01:23 AM    CO2 24 03/17/2025 01:23 AM    GLUCOSE 111 (H) 03/17/2025 01:23 AM    BUN 17 03/17/2025 01:23 AM    CREATININE 0.95 03/17/2025 01:23 AM     Lab Results   Component Value Date/Time    ALKPHOSPHAT 88 03/12/2025 06:47 PM    ASTSGOT 24 03/12/2025 06:47 PM    ALTSGPT 27 03/12/2025 06:47 PM    TBILIRUBIN 0.5 03/12/2025 06:47 PM    INR 0.97 03/12/2025 06:47 PM    ALBUMIN 4.0 03/12/2025 06:47 PM        Assessment for clinically significant drug interactions, drug omissions/additions, duplicative therapies.            CC   Meagan Mcneil M.D.  [address] [address]  Fax: [phone]    [facility]  Phone [phone] fax [phone]    This note was created using voice recognition software (Dragon).  The accuracy of the dictation is limited by the abilities of the software. I have reviewed the note prior to signing, however some errors in grammar and context are still possible. If you have any questions related to this note please do not hesitate to contact our office.       No follow-ups on file.

## 2025-04-17 NOTE — PROGRESS NOTES
Subjective:   Sandip Zhao is a 84 y.o. male who presents today with   Chief Complaint   Patient presents with    Painful Urination     X2 weeks, noticed blood in urine two weeks ago and this morning     Dysuria   This is a new problem. The current episode started 1 to 4 weeks ago. The problem occurs every urination. The problem has been unchanged. The quality of the pain is described as burning. The pain is mild. There has been no fever. Associated symptoms include hematuria. Pertinent negatives include no chills, flank pain, frequency, urgency or vomiting.     Patient with history of urinary tract infections in the past as well as BPH.   Patient did have surgery on his hip last month but states that they did not have a catheter at that time.  Patient states he is only had 3 episodes of blood in his urine over the last 2 to 3 weeks and states that it does not happen consistently and only happens about once each time that it happens.    PMH:  has a past medical history of Anemia, Arrhythmia, Arthritis, Asthma, BPH (benign prostatic hyperplasia), Broken rib (09/09/2024), CAD (coronary artery disease), Dental disorder (09/09/2024), Environmental allergies, Fall (08/2024), Gall stones (09/09/2024), Hemorrhagic disorder ([facility]) (09/26/2024), Hemorrhoids, High cholesterol, Hypertension, Inguinal hernia (09/26/2024), MI (myocardial infarction) ([facility]) (07/04/2015), Pain, Pneumonia (09/26/2024), and Status post placement of implantable loop recorder.  MEDS:   Current Outpatient Medications:     carvedilol (COREG) 3.125 MG Tab, Take 1 Tablet by mouth 2 times a day with meals., Disp: , Rfl:     senna-docusate (PERICOLACE OR SENOKOT S) 8.6-50 MG Tab, Take 2 Tablets by mouth every evening., Disp: , Rfl:     clopidogrel (PLAVIX) 75 MG Tab, Take 1 Tablet by mouth every day., Disp: 90 Tablet, Rfl: 3    docusate sodium (COLACE) 100 MG Cap, Take 200 mg by mouth 2 times a day. Indications: Constipation, Disp: , Rfl:     tamsulosin  (FLOMAX) 0.4 MG capsule, Take 1 Capsule by mouth 2 times a day., Disp: 90 Capsule, Rfl: 1    acetaminophen (TYLENOL) 500 MG Tab, Take 500-1,000 mg by mouth every 6 hours as needed. Indications: Pain, Disp: , Rfl:     finasteride (PROSCAR) 5 MG Tab, Take 1 Tablet by mouth every day., Disp: 30 Tablet, Rfl: 1    mexiletine (MEXITIL) 150 MG Cap, Take 150 mg by mouth every 8 hours. Indications: Irregularity of Ventricular Heart Rhythm, Disp: , Rfl:     Cyanocobalamin (B-12 PO), Take 1 Tablet by mouth every day. Indications: Supplement, Disp: , Rfl:     rosuvastatin (CRESTOR) 20 MG Tab, Take 20 mg by mouth every evening. Indications: High Amount of Fats in the Blood, Disp: , Rfl:     aspirin 81 MG EC tablet, Take 81 mg by mouth every morning. Indications: Acute Heart Attack, Disp: , Rfl:     ezetimibe (ZETIA) 10 MG Tab, Take 10 mg by mouth every evening. Indications: High Amount of Fats in the Blood, Disp: , Rfl:     polyethylene glycol/lytes (MIRALAX) Pack, Take 1 Packet by mouth 1 time a day as needed (if no bowel movement in last 2 days). (Patient not taking: Reported on 4/17/2025), Disp: , Rfl:   ALLERGIES: No Known Allergies  SURGHX:   Past Surgical History:   Procedure Laterality Date    PB OPEN FIX INTER/SUBTROCH FX,IMPLNT Left 3/13/2025    Procedure: INSERTION, INTRAMEDULLARY ANDIE, FEMUR, PROXIMAL;  Surgeon: Kana Giles M.D.;  Location: SURGERY [facility];  Service: Orthopedics    OTHER CARDIAC SURGERY  09/17/2024    Angiogram    OTHER CARDIAC SURGERY  09/09/2024    Loop recorder placed about a year ago.    ABDOMINAL EXPLORATION      APPENDECTOMY      When a child    KNEE ARTHROSCOPY       SOCHX:  reports that he has quit smoking. His smoking use included cigarettes. He has never used smokeless tobacco. He reports that he does not currently use alcohol. He reports that he does not use drugs.  FH: Reviewed with patient, not pertinent to this visit.       Review of Systems   Constitutional:  Negative for  "chills and fever.   Gastrointestinal:  Negative for abdominal pain, blood in stool and vomiting.   Genitourinary:  Positive for dysuria and hematuria. Negative for flank pain, frequency and urgency.        Objective:   BP (!) 148/60 (BP Location: Left arm, Patient Position: Sitting, BP Cuff Size: Adult)   Pulse 61   Temp 36.6 °C (97.9 °F) (Temporal)   Resp 18   Ht 1.626 m (5' 4\")   Wt 63.5 kg (140 lb)   SpO2 98%   BMI 24.03 kg/m²   Physical Exam  Vitals and nursing note reviewed.   Constitutional:       General: He is not in acute distress.     Appearance: Normal appearance. He is well-developed. He is not ill-appearing or toxic-appearing.   HENT:      Head: Normocephalic and atraumatic.      Right Ear: Hearing normal.      Left Ear: Hearing normal.   Cardiovascular:      Rate and Rhythm: Normal rate and regular rhythm.      Heart sounds: Normal heart sounds.   Pulmonary:      Effort: Pulmonary effort is normal.      Breath sounds: Normal breath sounds.   Abdominal:      Tenderness: There is no abdominal tenderness. There is no right CVA tenderness or left CVA tenderness.   Genitourinary:     Comments: Patient deferred  exam  Musculoskeletal:      Comments: Ambulating with walker at baseline.   Skin:     General: Skin is warm and dry.   Neurological:      Mental Status: He is alert.      Coordination: Coordination normal.   Psychiatric:         Mood and Affect: Mood normal.       UA no signs of infection    Assessment/Plan:   Assessment    1. Dysuria  - POCT Urinalysis  - URINE CULTURE(NEW); Future    Discussed with patient and his wife that he could be nonbacterial cystitis versus prostate related.  Recommend following up with urologist to they state they saw in February but they will call and get an appointment.  No acute concerning findings on exam that would warrant any need of further imaging or workup at this time but we will follow-up with urine culture and adjust treatment accordingly if needed at " that time  Suggest drinking plenty water.  Also discussed with patient that blood thinner could sometimes result in blood in the urine but of no concern at this time as it is not happening every time.    Differential diagnosis, natural history, supportive care, and indications for immediate follow-up discussed.   Patient given instructions and understanding of medications and treatment.    If not improving in 3-5 days, F/U with PCP or return to UC if symptoms worsen.    Patient agreeable to plan.      Please note that this dictation was created using voice recognition software. I have made every reasonable attempt to correct obvious errors, but I expect that there are errors of grammar and possibly content that I did not discover before finalizing the note.    Raffaele Hernandez PA-C

## 2025-04-18 PROCEDURE — 665999 HH PPS REVENUE DEBIT

## 2025-04-18 PROCEDURE — 665998 HH PPS REVENUE CREDIT

## 2025-04-18 NOTE — CASE COMMUNICATION
noted  ----- Message -----  From: Ellen Dang R.N.  Sent: 4/16/2025   9:25 PM PDT  To: Manju Springer R.N.; LENNY EngleW, MSW; *      PRIMARY DIAGNOSIS: Left hip fracture  SKILLED NEED: Health assessment, medication education, education and monitoring of disease processes.  NURSING FREQUENCY: 1w1; SOC only  ZIP CODE: [id number]  DISCIPLINES ORDERED: PT, OT, MSW; Declined HHA.   INSURANCE: [facility]  CERTIFICATION PERIOD: 4/16/25- 6/14 /25  SPECIAL CONSIDERATION: None

## 2025-04-19 PROCEDURE — 665999 HH PPS REVENUE DEBIT

## 2025-04-19 PROCEDURE — 665998 HH PPS REVENUE CREDIT

## 2025-04-20 LAB
BACTERIA UR CULT: NORMAL
SIGNIFICANT IND 70042: NORMAL
SITE SITE: NORMAL
SOURCE SOURCE: NORMAL

## 2025-04-20 PROCEDURE — 665998 HH PPS REVENUE CREDIT

## 2025-04-20 PROCEDURE — 665999 HH PPS REVENUE DEBIT

## 2025-04-21 ENCOUNTER — HOME CARE VISIT (OUTPATIENT)
Dept: HOME HEALTH SERVICES | Facility: HOME HEALTHCARE | Age: 85
End: 2025-04-21
Payer: MEDICARE

## 2025-04-21 ENCOUNTER — RESULTS FOLLOW-UP (OUTPATIENT)
Dept: URGENT CARE | Facility: CLINIC | Age: 85
End: 2025-04-21

## 2025-04-21 VITALS
SYSTOLIC BLOOD PRESSURE: 115 MMHG | OXYGEN SATURATION: 96 % | DIASTOLIC BLOOD PRESSURE: 65 MMHG | TEMPERATURE: 97.8 F | HEART RATE: 72 BPM | RESPIRATION RATE: 16 BRPM

## 2025-04-21 PROCEDURE — 665999 HH PPS REVENUE DEBIT

## 2025-04-21 PROCEDURE — G0151 HHCP-SERV OF PT,EA 15 MIN: HCPCS

## 2025-04-21 PROCEDURE — 665998 HH PPS REVENUE CREDIT

## 2025-04-21 ASSESSMENT — ENCOUNTER SYMPTOMS
PAIN: 1
PAIN LOCATION - PAIN SEVERITY: 5/10
ARTHRALGIAS: 1
PAIN LOCATION - PAIN FREQUENCY: WITH ACTIVITY
LOWEST PAIN SEVERITY IN PAST 24 HOURS: 0/10
MUSCLE WEAKNESS: 1
HIGHEST PAIN SEVERITY IN PAST 24 HOURS: 5/10
PAIN LOCATION - PAIN QUALITY: DULL ACHE
SUBJECTIVE PAIN PROGRESSION: UNCHANGED
PAIN SEVERITY GOAL: 0/10

## 2025-04-21 ASSESSMENT — PATIENT HEALTH QUESTIONNAIRE - PHQ9: CLINICAL INTERPRETATION OF PHQ2 SCORE: 0

## 2025-04-21 ASSESSMENT — ACTIVITIES OF DAILY LIVING (ADL)
AMBULATION ASSISTANCE: STAND BY ASSIST
AMBULATION ASSISTANCE ON FLAT SURFACES: 1
CURRENT_FUNCTION: STAND BY ASSIST
AMBULATION_DISTANCE/DURATION_TOLERATED: <150 FT ONE WAY

## 2025-04-22 ENCOUNTER — HOME CARE VISIT (OUTPATIENT)
Dept: HOME HEALTH SERVICES | Facility: HOME HEALTHCARE | Age: 85
End: 2025-04-22
Payer: MEDICARE

## 2025-04-22 PROCEDURE — 665999 HH PPS REVENUE DEBIT

## 2025-04-22 PROCEDURE — G0152 HHCP-SERV OF OT,EA 15 MIN: HCPCS

## 2025-04-22 PROCEDURE — 665998 HH PPS REVENUE CREDIT

## 2025-04-22 NOTE — CASE COMMUNICATION
noted  ----- Message -----  From: Grace Osorio, PT  Sent: 4/21/2025  10:40 PM PDT  To: Manju Springer R.N.; EMILY Aguilar, JULION.CORRY; *      PT evaluation completed, requesting follow up visits with frequency of 2w3 effective 04/21/2025.

## 2025-04-23 ENCOUNTER — HOME CARE VISIT (OUTPATIENT)
Dept: HOME HEALTH SERVICES | Facility: HOME HEALTHCARE | Age: 85
End: 2025-04-23
Payer: MEDICARE

## 2025-04-23 VITALS
OXYGEN SATURATION: 96 % | DIASTOLIC BLOOD PRESSURE: 70 MMHG | SYSTOLIC BLOOD PRESSURE: 132 MMHG | TEMPERATURE: 97.9 F | RESPIRATION RATE: 17 BRPM | HEART RATE: 68 BPM

## 2025-04-23 VITALS
TEMPERATURE: 98.1 F | SYSTOLIC BLOOD PRESSURE: 120 MMHG | OXYGEN SATURATION: 98 % | DIASTOLIC BLOOD PRESSURE: 65 MMHG | RESPIRATION RATE: 16 BRPM | HEART RATE: 68 BPM

## 2025-04-23 PROCEDURE — G0151 HHCP-SERV OF PT,EA 15 MIN: HCPCS

## 2025-04-23 PROCEDURE — 665999 HH PPS REVENUE DEBIT

## 2025-04-23 PROCEDURE — 665998 HH PPS REVENUE CREDIT

## 2025-04-23 SDOH — ECONOMIC STABILITY: HOUSING INSECURITY
HOME SAFETY: RECOMMENDED REMOVAL OF THROW RUGS IN FRONT OF SINK AND TOILET. SPOUSE VERBALIZED AGREEMENT AND PLAN TO REMOVE. OT CONTACT INFO LEFT ON PT BINDER. FUTURE VISITS ON PT CALENDAR.

## 2025-04-23 ASSESSMENT — ENCOUNTER SYMPTOMS
LOWEST PAIN SEVERITY IN PAST 24 HOURS: 0/10
PAIN LOCATION - PAIN QUALITY: ACHE
PAIN: 1
HIGHEST PAIN SEVERITY IN PAST 24 HOURS: 6/10
PAIN LOCATION - PAIN SEVERITY: 4/10
HIGHEST PAIN SEVERITY IN PAST 24 HOURS: 5/10
PAIN: 1
SUBJECTIVE PAIN PROGRESSION: GRADUALLY IMPROVING
PAIN LOCATION: LEFT GROIN AREA
SUBJECTIVE PAIN PROGRESSION: UNCHANGED
PAIN LOCATION - EXACERBATING FACTORS: AMBULATION
DIFFICULTY THINKING: 1
PAIN LOCATION - PAIN FREQUENCY: INTERMITTENT
PAIN SEVERITY GOAL: 0/10
PAIN SEVERITY GOAL: 0/10
PAIN LOCATION - PAIN FREQUENCY: WITH ACTIVITY
PAIN LOCATION - RELIEVING FACTORS: REST, TYLENOL
PAIN LOCATION: LEFT GROIN AND THIGH
LOWEST PAIN SEVERITY IN PAST 24 HOURS: 0/10
PAIN LOCATION - PAIN SEVERITY: 5/10
PAIN LOCATION - PAIN QUALITY: ACHY

## 2025-04-23 ASSESSMENT — ACTIVITIES OF DAILY LIVING (ADL)
SHOPPING: DEPENDENT
AMBULATION ASSISTANCE: 1
LIGHT HOUSEKEEPING: DEPENDENT
PREPARING MEALS: NEEDS ASSISTANCE
PHYSICAL TRANSFERS ASSESSED: 1
HOUSEKEEPING ASSESSED: 1
CURRENT_FUNCTION: STAND BY ASSIST
AMBULATION ASSISTANCE ON FLAT SURFACES: 1
BATHING ASSESSED: 1
TRANSPORTATION: DEPENDENT
LAUNDRY ASSESSED: 1
BATHING_CURRENT_FUNCTION: CONTACT GUARD ASSIST
TELEPHONE USE ASSESSED: 1
FEEDING ASSESSED: 1
AMBULATION ASSISTANCE: STAND BY ASSIST
TELEPHONE EQUIPMENT USED: HARD OF HEARING
SHOPPING ASSESSED: 1
FEEDING: INDEPENDENT
GROOMING ASSESSED: 1
DRESSING_UB_CURRENT_FUNCTION: SUPERVISION
TOILETING: 1
ORAL_CARE_ASSESSED: 1
TRANSPORTATION ASSESSED: 1
LAUNDRY: DEPENDENT
ORAL_CARE_CURRENT_FUNCTION: NEEDS ASSISTANCE
TOILETING: SUPERVISION
GROOMING_CURRENT_FUNCTION: SUPERVISION
DRESSING_LB_CURRENT_FUNCTION: STAND BY ASSIST
AMBULATION_DISTANCE/DURATION_TOLERATED: <150 FT ONE WAY
USING THE TELPHONE: NEEDS ASSISTANCE

## 2025-04-23 ASSESSMENT — PATIENT HEALTH QUESTIONNAIRE - PHQ9: CLINICAL INTERPRETATION OF PHQ2 SCORE: 0

## 2025-04-24 ENCOUNTER — HOME CARE VISIT (OUTPATIENT)
Dept: HOME HEALTH SERVICES | Facility: HOME HEALTHCARE | Age: 85
End: 2025-04-24
Payer: MEDICARE

## 2025-04-24 VITALS
RESPIRATION RATE: 14 BRPM | DIASTOLIC BLOOD PRESSURE: 76 MMHG | SYSTOLIC BLOOD PRESSURE: 154 MMHG | OXYGEN SATURATION: 97 % | TEMPERATURE: 97.4 F | HEART RATE: 69 BPM

## 2025-04-24 PROCEDURE — 665998 HH PPS REVENUE CREDIT

## 2025-04-24 PROCEDURE — G0152 HHCP-SERV OF OT,EA 15 MIN: HCPCS

## 2025-04-24 PROCEDURE — 665999 HH PPS REVENUE DEBIT

## 2025-04-24 NOTE — CASE COMMUNICATION
noted  ----- Message -----  From: Sailaja Vera, OT  Sent: 4/23/2025   6:44 PM PDT  To: Manju Springer R.N.; JULIO EllisN.CORRY; *      OT dahiana completed 4/22/2025. Requesting authorization for 2w1, 1w3 effective 4/22/2025 for continued skilled OT.

## 2025-04-24 NOTE — CASE COMMUNICATION
OT eval completed 4/22/2025. Requesting authorization for 2w1, 1w3 effective 4/22/2025 for continued skilled OT.

## 2025-04-25 PROCEDURE — 665998 HH PPS REVENUE CREDIT

## 2025-04-25 PROCEDURE — 665999 HH PPS REVENUE DEBIT

## 2025-04-26 PROCEDURE — 665999 HH PPS REVENUE DEBIT

## 2025-04-26 PROCEDURE — 665998 HH PPS REVENUE CREDIT

## 2025-04-27 PROCEDURE — 665999 HH PPS REVENUE DEBIT

## 2025-04-27 PROCEDURE — 665998 HH PPS REVENUE CREDIT

## 2025-04-28 ENCOUNTER — HOME CARE VISIT (OUTPATIENT)
Dept: HOME HEALTH SERVICES | Facility: HOME HEALTHCARE | Age: 85
End: 2025-04-28
Payer: MEDICARE

## 2025-04-28 VITALS
RESPIRATION RATE: 16 BRPM | HEART RATE: 74 BPM | OXYGEN SATURATION: 98 % | TEMPERATURE: 97.5 F | SYSTOLIC BLOOD PRESSURE: 135 MMHG | DIASTOLIC BLOOD PRESSURE: 68 MMHG

## 2025-04-28 PROCEDURE — G0151 HHCP-SERV OF PT,EA 15 MIN: HCPCS

## 2025-04-28 PROCEDURE — 665998 HH PPS REVENUE CREDIT

## 2025-04-28 PROCEDURE — 665999 HH PPS REVENUE DEBIT

## 2025-04-28 ASSESSMENT — ENCOUNTER SYMPTOMS
SUBJECTIVE PAIN PROGRESSION: GRADUALLY IMPROVING
SUBJECTIVE PAIN PROGRESSION: GRADUALLY IMPROVING
PAIN LOCATION: LEFT GROIN
PAIN LOCATION - PAIN QUALITY: DULL ACHE
PAIN LOCATION - PAIN FREQUENCY: WITH ACTIVITY
PAIN: 1
PAIN SEVERITY GOAL: 0/10
PAIN LOCATION - PAIN FREQUENCY: INTERMITTENT
HIGHEST PAIN SEVERITY IN PAST 24 HOURS: 3/10
PAIN: 1
PAIN LOCATION - PAIN QUALITY: ACHY
PAIN LOCATION - EXACERBATING FACTORS: WITH AMBULATION
PAIN LOCATION: LEFT GROIN/HIP
PAIN LOCATION - PAIN SEVERITY: 2/10
PAIN LOCATION - PAIN DURATION: ACUTE
HIGHEST PAIN SEVERITY IN PAST 24 HOURS: 5/10
LOWEST PAIN SEVERITY IN PAST 24 HOURS: 0/10
PAIN LOCATION - PAIN SEVERITY: 4/10
PAIN SEVERITY GOAL: 0/10
LOWEST PAIN SEVERITY IN PAST 24 HOURS: 0/10

## 2025-04-28 ASSESSMENT — ACTIVITIES OF DAILY LIVING (ADL): AMBULATION ASSISTANCE ON FLAT SURFACES: 1

## 2025-04-28 ASSESSMENT — PATIENT HEALTH QUESTIONNAIRE - PHQ9: CLINICAL INTERPRETATION OF PHQ2 SCORE: 0

## 2025-04-29 PROCEDURE — 665998 HH PPS REVENUE CREDIT

## 2025-04-29 PROCEDURE — 665999 HH PPS REVENUE DEBIT

## 2025-04-30 ENCOUNTER — HOME CARE VISIT (OUTPATIENT)
Dept: HOME HEALTH SERVICES | Facility: HOME HEALTHCARE | Age: 85
End: 2025-04-30
Payer: MEDICARE

## 2025-04-30 VITALS
SYSTOLIC BLOOD PRESSURE: 130 MMHG | TEMPERATURE: 97.8 F | OXYGEN SATURATION: 95 % | RESPIRATION RATE: 18 BRPM | DIASTOLIC BLOOD PRESSURE: 70 MMHG | HEART RATE: 74 BPM

## 2025-04-30 PROCEDURE — G0151 HHCP-SERV OF PT,EA 15 MIN: HCPCS

## 2025-04-30 ASSESSMENT — ENCOUNTER SYMPTOMS
PAIN LOCATION - PAIN FREQUENCY: WITH ACTIVITY
HIGHEST PAIN SEVERITY IN PAST 24 HOURS: 3/10
PAIN SEVERITY GOAL: 0/10
PAIN: 1
PAIN LOCATION: LEFT GROIN/THIGH
LOWEST PAIN SEVERITY IN PAST 24 HOURS: 0/10
SUBJECTIVE PAIN PROGRESSION: UNCHANGED
PAIN LOCATION - PAIN SEVERITY: 3/10
PAIN LOCATION - PAIN QUALITY: DULL ACHE

## 2025-04-30 ASSESSMENT — PATIENT HEALTH QUESTIONNAIRE - PHQ9: CLINICAL INTERPRETATION OF PHQ2 SCORE: 0

## 2025-04-30 NOTE — CASE COMMUNICATION
Please send to Vini Mcneil  Patient was discussed in IDT today due to recent admission. Discussed progress toward goals of care with the treatment team. The treatment team currently involves the following disciplines: nursing eval only and PT. Plan is to discharge from home health services when goals met.

## 2025-05-01 ENCOUNTER — HOME CARE VISIT (OUTPATIENT)
Dept: HOME HEALTH SERVICES | Facility: HOME HEALTHCARE | Age: 85
End: 2025-05-01
Payer: MEDICARE

## 2025-05-01 VITALS
TEMPERATURE: 98 F | OXYGEN SATURATION: 99 % | RESPIRATION RATE: 16 BRPM | HEART RATE: 69 BPM | SYSTOLIC BLOOD PRESSURE: 132 MMHG | DIASTOLIC BLOOD PRESSURE: 60 MMHG

## 2025-05-01 PROCEDURE — G0152 HHCP-SERV OF OT,EA 15 MIN: HCPCS

## 2025-05-05 ENCOUNTER — HOME CARE VISIT (OUTPATIENT)
Dept: HOME HEALTH SERVICES | Facility: HOME HEALTHCARE | Age: 85
End: 2025-05-05
Payer: MEDICARE

## 2025-05-05 VITALS
HEART RATE: 82 BPM | OXYGEN SATURATION: 98 % | TEMPERATURE: 98.1 F | RESPIRATION RATE: 16 BRPM | SYSTOLIC BLOOD PRESSURE: 135 MMHG | DIASTOLIC BLOOD PRESSURE: 70 MMHG

## 2025-05-05 PROCEDURE — G0151 HHCP-SERV OF PT,EA 15 MIN: HCPCS

## 2025-05-05 ASSESSMENT — ENCOUNTER SYMPTOMS
PAIN LOCATION - PAIN QUALITY: DULL ACHE
LOWEST PAIN SEVERITY IN PAST 24 HOURS: 0/10
PAIN LOCATION: LEFT THIGH
PAIN LOCATION - PAIN SEVERITY: 5/10
PAIN SEVERITY GOAL: 0/10
PAIN LOCATION - PAIN FREQUENCY: WITH ACTIVITY
PAIN: 1
SUBJECTIVE PAIN PROGRESSION: UNCHANGED
HIGHEST PAIN SEVERITY IN PAST 24 HOURS: 5/10

## 2025-05-05 ASSESSMENT — PATIENT HEALTH QUESTIONNAIRE - PHQ9: CLINICAL INTERPRETATION OF PHQ2 SCORE: 0

## 2025-05-07 ENCOUNTER — HOME CARE VISIT (OUTPATIENT)
Dept: HOME HEALTH SERVICES | Facility: HOME HEALTHCARE | Age: 85
End: 2025-05-07
Payer: MEDICARE

## 2025-05-07 VITALS
HEART RATE: 82 BPM | RESPIRATION RATE: 16 BRPM | OXYGEN SATURATION: 94 % | SYSTOLIC BLOOD PRESSURE: 125 MMHG | DIASTOLIC BLOOD PRESSURE: 70 MMHG | TEMPERATURE: 97.5 F

## 2025-05-07 PROCEDURE — G0151 HHCP-SERV OF PT,EA 15 MIN: HCPCS

## 2025-05-07 ASSESSMENT — ENCOUNTER SYMPTOMS
PAIN: 1
SUBJECTIVE PAIN PROGRESSION: GRADUALLY IMPROVING
HIGHEST PAIN SEVERITY IN PAST 24 HOURS: 4/10
PAIN LOCATION - PAIN QUALITY: DULL ACHE
PAIN SEVERITY GOAL: 0/10
LOWEST PAIN SEVERITY IN PAST 24 HOURS: 0/10
PAIN LOCATION - PAIN SEVERITY: 2/10
PAIN LOCATION - PAIN FREQUENCY: WITH ACTIVITY
PAIN LOCATION: LEFT HIP/THIGH

## 2025-05-07 ASSESSMENT — PATIENT HEALTH QUESTIONNAIRE - PHQ9: CLINICAL INTERPRETATION OF PHQ2 SCORE: 0

## 2025-05-07 NOTE — Clinical Note
Please request additional HHPT visits with frequency of 2w1 effective week of 05/11/2025 to transition patient to a LRAD; PT reassessment vs PT discharge next week.

## 2025-05-08 ENCOUNTER — HOME CARE VISIT (OUTPATIENT)
Dept: HOME HEALTH SERVICES | Facility: HOME HEALTHCARE | Age: 85
End: 2025-05-08
Payer: MEDICARE

## 2025-05-08 VITALS
DIASTOLIC BLOOD PRESSURE: 60 MMHG | HEART RATE: 70 BPM | RESPIRATION RATE: 17 BRPM | OXYGEN SATURATION: 98 % | SYSTOLIC BLOOD PRESSURE: 136 MMHG | TEMPERATURE: 97.7 F

## 2025-05-08 PROCEDURE — G0152 HHCP-SERV OF OT,EA 15 MIN: HCPCS

## 2025-05-08 ASSESSMENT — ENCOUNTER SYMPTOMS
PAIN LOCATION - RELIEVING FACTORS: REST, TYLENOL
HIGHEST PAIN SEVERITY IN PAST 24 HOURS: 5/10
PAIN LOCATION - PAIN QUALITY: ACHY
PAIN: 1
PAIN LOCATION - PAIN QUALITY: ACHY
PAIN LOCATION - RELIEVING FACTORS: REST, TYLENOL
PAIN LOCATION - PAIN SEVERITY: 3/10
PAIN LOCATION - PAIN FREQUENCY: INTERMITTENT
PAIN LOCATION - PAIN SEVERITY: 3/10
PAIN SEVERITY GOAL: 0/10
PAIN LOCATION: LEFT GROIN
PAIN LOCATION - EXACERBATING FACTORS: AMBULATION
LOWEST PAIN SEVERITY IN PAST 24 HOURS: 0/10
PAIN LOCATION: LEFT HIP
SUBJECTIVE PAIN PROGRESSION: GRADUALLY IMPROVING
PAIN LOCATION - PAIN FREQUENCY: INTERMITTENT
HIGHEST PAIN SEVERITY IN PAST 24 HOURS: 6/10
SUBJECTIVE PAIN PROGRESSION: GRADUALLY IMPROVING
PAIN: 1
LOWEST PAIN SEVERITY IN PAST 24 HOURS: 0/10
PAIN SEVERITY GOAL: 0/10

## 2025-05-08 NOTE — CASE COMMUNICATION
noted  ----- Message -----  From: Grace Osorio, PT  Sent: 5/7/2025  10:01 PM PDT  To: Manju Springer R.N.; EMILY Aguilar, JULIONSHERRI; *      Please request additional HHPT visits with frequency of 2w1 effective week of 05/11/2025 to transition patient to a LRAD; PT reassessment vs PT discharge next week.

## 2025-05-12 ENCOUNTER — HOME CARE VISIT (OUTPATIENT)
Dept: HOME HEALTH SERVICES | Facility: HOME HEALTHCARE | Age: 85
End: 2025-05-12
Payer: MEDICARE

## 2025-05-12 VITALS
HEART RATE: 74 BPM | OXYGEN SATURATION: 97 % | TEMPERATURE: 97.8 F | DIASTOLIC BLOOD PRESSURE: 65 MMHG | RESPIRATION RATE: 16 BRPM | SYSTOLIC BLOOD PRESSURE: 135 MMHG

## 2025-05-12 PROCEDURE — G0151 HHCP-SERV OF PT,EA 15 MIN: HCPCS

## 2025-05-12 ASSESSMENT — ENCOUNTER SYMPTOMS
PAIN LOCATION - PAIN QUALITY: DULL ACHE
PAIN SEVERITY GOAL: 0/10
PAIN LOCATION - PAIN SEVERITY: 3/10
PAIN: 1
PAIN LOCATION - PAIN FREQUENCY: WITH ACTIVITY
HIGHEST PAIN SEVERITY IN PAST 24 HOURS: 5/10
SUBJECTIVE PAIN PROGRESSION: GRADUALLY IMPROVING
LOWEST PAIN SEVERITY IN PAST 24 HOURS: 2/10

## 2025-05-12 ASSESSMENT — PATIENT HEALTH QUESTIONNAIRE - PHQ9: CLINICAL INTERPRETATION OF PHQ2 SCORE: 0

## 2025-05-14 ENCOUNTER — HOME CARE VISIT (OUTPATIENT)
Dept: HOME HEALTH SERVICES | Facility: HOME HEALTHCARE | Age: 85
End: 2025-05-14
Payer: MEDICARE

## 2025-05-14 VITALS
HEART RATE: 68 BPM | SYSTOLIC BLOOD PRESSURE: 122 MMHG | OXYGEN SATURATION: 95 % | TEMPERATURE: 98.1 F | RESPIRATION RATE: 16 BRPM | DIASTOLIC BLOOD PRESSURE: 70 MMHG

## 2025-05-14 PROCEDURE — G0151 HHCP-SERV OF PT,EA 15 MIN: HCPCS

## 2025-05-14 ASSESSMENT — ENCOUNTER SYMPTOMS
LOWEST PAIN SEVERITY IN PAST 24 HOURS: 0/10
HIGHEST PAIN SEVERITY IN PAST 24 HOURS: 5/10
PAIN LOCATION - PAIN SEVERITY: 2/10
PAIN: 1
PAIN SEVERITY GOAL: 0/10
PAIN LOCATION - PAIN FREQUENCY: WITH ACTIVITY
PAIN LOCATION - PAIN QUALITY: DULL ACHE
SUBJECTIVE PAIN PROGRESSION: GRADUALLY IMPROVING

## 2025-05-14 ASSESSMENT — PATIENT HEALTH QUESTIONNAIRE - PHQ9: CLINICAL INTERPRETATION OF PHQ2 SCORE: 0

## 2025-05-15 ENCOUNTER — HOME CARE VISIT (OUTPATIENT)
Dept: HOME HEALTH SERVICES | Facility: HOME HEALTHCARE | Age: 85
End: 2025-05-15
Payer: MEDICARE

## 2025-05-15 VITALS
RESPIRATION RATE: 17 BRPM | SYSTOLIC BLOOD PRESSURE: 112 MMHG | HEART RATE: 67 BPM | OXYGEN SATURATION: 99 % | TEMPERATURE: 97.4 F | DIASTOLIC BLOOD PRESSURE: 50 MMHG

## 2025-05-15 PROCEDURE — G0152 HHCP-SERV OF OT,EA 15 MIN: HCPCS

## 2025-05-18 ASSESSMENT — ENCOUNTER SYMPTOMS
PAIN LOCATION - EXACERBATING FACTORS: AMBULATION
PAIN LOCATION - PAIN QUALITY: ACHY
PAIN LOCATION: LEFT HIP
PAIN LOCATION - PAIN DURATION: ACUTE
PAIN SEVERITY GOAL: 0/10
SUBJECTIVE PAIN PROGRESSION: UNCHANGED
HIGHEST PAIN SEVERITY IN PAST 24 HOURS: 8/10
LOWEST PAIN SEVERITY IN PAST 24 HOURS: 0/10
PAIN LOCATION - PAIN FREQUENCY: INTERMITTENT
PAIN LOCATION - RELIEVING FACTORS: REST, TYLENOL
PAIN LOCATION - PAIN SEVERITY: 5/10
PAIN: 1

## 2025-05-18 ASSESSMENT — PATIENT HEALTH QUESTIONNAIRE - PHQ9: CLINICAL INTERPRETATION OF PHQ2 SCORE: 0

## 2025-05-18 ASSESSMENT — ACTIVITIES OF DAILY LIVING (ADL)
OASIS_M1830: 01
HOME_HEALTH_OASIS: 00

## 2025-05-20 NOTE — CASE COMMUNICATION
noted  ----- Message -----  From: Sailaja Vera, OT  Sent: 5/18/2025   1:51 PM PDT  To: Manju Springer R.N.; JULIO EllisNSHERRI; Erc*      Discharge OT and home health services effective 5/15/2025 with goals met. [facility] completed.

## 2025-05-20 NOTE — CASE COMMUNICATION
noted  ----- Message -----  From: Grace Osorio, PT  Sent: 5/14/2025  10:27 PM PDT  To: Manju Springer R.N.; Sailaja Vera, OT      Patient discharged from home health PT effective 05/14/2025 with all goals met.

## 2025-05-22 ENCOUNTER — APPOINTMENT (OUTPATIENT)
Dept: RADIOLOGY | Facility: MEDICAL CENTER | Age: 85
End: 2025-05-22
Attending: EMERGENCY MEDICINE
Payer: MEDICARE

## 2025-05-22 ENCOUNTER — HOSPITAL ENCOUNTER (EMERGENCY)
Facility: MEDICAL CENTER | Age: 85
End: 2025-05-22
Attending: EMERGENCY MEDICINE
Payer: MEDICARE

## 2025-05-22 VITALS
WEIGHT: 120.15 LBS | HEIGHT: 65 IN | OXYGEN SATURATION: 97 % | HEART RATE: 36 BPM | TEMPERATURE: 98.7 F | DIASTOLIC BLOOD PRESSURE: 66 MMHG | BODY MASS INDEX: 20.02 KG/M2 | RESPIRATION RATE: 16 BRPM | SYSTOLIC BLOOD PRESSURE: 150 MMHG

## 2025-05-22 DIAGNOSIS — N40.0 ENLARGED PROSTATE: ICD-10-CM

## 2025-05-22 DIAGNOSIS — N36.8 URETHRAL BLEEDING: ICD-10-CM

## 2025-05-22 DIAGNOSIS — R31.9 HEMATURIA, UNSPECIFIED TYPE: Primary | ICD-10-CM

## 2025-05-22 LAB
ALBUMIN SERPL BCP-MCNC: 3.8 G/DL (ref 3.2–4.9)
ALBUMIN/GLOB SERPL: 1.5 G/DL
ALP SERPL-CCNC: 77 U/L (ref 30–99)
ALT SERPL-CCNC: 36 U/L (ref 2–50)
ANION GAP SERPL CALC-SCNC: 11 MMOL/L (ref 7–16)
APPEARANCE UR: CLEAR
AST SERPL-CCNC: 21 U/L (ref 12–45)
BACTERIA #/AREA URNS HPF: ABNORMAL /HPF
BASOPHILS # BLD AUTO: 0.4 % (ref 0–1.8)
BASOPHILS # BLD: 0.02 K/UL (ref 0–0.12)
BILIRUB SERPL-MCNC: 0.5 MG/DL (ref 0.1–1.5)
BILIRUB UR QL STRIP.AUTO: NEGATIVE
BUN SERPL-MCNC: 11 MG/DL (ref 8–22)
CALCIUM ALBUM COR SERPL-MCNC: 9.2 MG/DL (ref 8.5–10.5)
CALCIUM SERPL-MCNC: 9 MG/DL (ref 8.5–10.5)
CASTS URNS QL MICRO: ABNORMAL /LPF (ref 0–2)
CHLORIDE SERPL-SCNC: 104 MMOL/L (ref 96–112)
CO2 SERPL-SCNC: 19 MMOL/L (ref 20–33)
COLOR UR: YELLOW
CREAT SERPL-MCNC: 0.91 MG/DL (ref 0.5–1.4)
EKG IMPRESSION: NORMAL
EOSINOPHIL # BLD AUTO: 0.04 K/UL (ref 0–0.51)
EOSINOPHIL NFR BLD: 0.7 % (ref 0–6.9)
EPITHELIAL CELLS 1715: ABNORMAL /HPF (ref 0–5)
ERYTHROCYTE [DISTWIDTH] IN BLOOD BY AUTOMATED COUNT: 46.5 FL (ref 35.9–50)
GFR SERPLBLD CREATININE-BSD FMLA CKD-EPI: 83 ML/MIN/1.73 M 2
GLOBULIN SER CALC-MCNC: 2.5 G/DL (ref 1.9–3.5)
GLUCOSE SERPL-MCNC: 112 MG/DL (ref 65–99)
GLUCOSE UR STRIP.AUTO-MCNC: NEGATIVE MG/DL
HCT VFR BLD AUTO: 37.3 % (ref 42–52)
HGB BLD-MCNC: 12.3 G/DL (ref 14–18)
IMM GRANULOCYTES # BLD AUTO: 0.02 K/UL (ref 0–0.11)
IMM GRANULOCYTES NFR BLD AUTO: 0.4 % (ref 0–0.9)
KETONES UR STRIP.AUTO-MCNC: ABNORMAL MG/DL
LEUKOCYTE ESTERASE UR QL STRIP.AUTO: NEGATIVE
LYMPHOCYTES # BLD AUTO: 1.09 K/UL (ref 1–4.8)
LYMPHOCYTES NFR BLD: 19.8 % (ref 22–41)
MCH RBC QN AUTO: 29.9 PG (ref 27–33)
MCHC RBC AUTO-ENTMCNC: 33 G/DL (ref 32.3–36.5)
MCV RBC AUTO: 90.8 FL (ref 81.4–97.8)
MICRO URNS: ABNORMAL
MONOCYTES # BLD AUTO: 0.65 K/UL (ref 0–0.85)
MONOCYTES NFR BLD AUTO: 11.8 % (ref 0–13.4)
NEUTROPHILS # BLD AUTO: 3.69 K/UL (ref 1.82–7.42)
NEUTROPHILS NFR BLD: 66.9 % (ref 44–72)
NITRITE UR QL STRIP.AUTO: NEGATIVE
NRBC # BLD AUTO: 0 K/UL
NRBC BLD-RTO: 0 /100 WBC (ref 0–0.2)
PH UR STRIP.AUTO: 5 [PH] (ref 5–8)
PLATELET # BLD AUTO: 199 K/UL (ref 164–446)
PMV BLD AUTO: 9.6 FL (ref 9–12.9)
POTASSIUM SERPL-SCNC: 4 MMOL/L (ref 3.6–5.5)
PROT SERPL-MCNC: 6.3 G/DL (ref 6–8.2)
PROT UR QL STRIP: NEGATIVE MG/DL
RBC # BLD AUTO: 4.11 M/UL (ref 4.7–6.1)
RBC # URNS HPF: >100 /HPF
RBC UR QL AUTO: ABNORMAL
SODIUM SERPL-SCNC: 134 MMOL/L (ref 135–145)
SP GR UR STRIP.AUTO: 1.01
UROBILINOGEN UR STRIP.AUTO-MCNC: 0.2 EU/DL
WBC # BLD AUTO: 5.5 K/UL (ref 4.8–10.8)
WBC #/AREA URNS HPF: ABNORMAL /HPF

## 2025-05-22 PROCEDURE — 80053 COMPREHEN METABOLIC PANEL: CPT

## 2025-05-22 PROCEDURE — 81001 URINALYSIS AUTO W/SCOPE: CPT

## 2025-05-22 PROCEDURE — 36415 COLL VENOUS BLD VENIPUNCTURE: CPT

## 2025-05-22 PROCEDURE — 74176 CT ABD & PELVIS W/O CONTRAST: CPT

## 2025-05-22 PROCEDURE — 99284 EMERGENCY DEPT VISIT MOD MDM: CPT

## 2025-05-22 PROCEDURE — 93005 ELECTROCARDIOGRAM TRACING: CPT | Mod: TC | Performed by: EMERGENCY MEDICINE

## 2025-05-22 PROCEDURE — 85025 COMPLETE CBC W/AUTO DIFF WBC: CPT

## 2025-05-22 ASSESSMENT — FIBROSIS 4 INDEX: FIB4 SCORE: 2.32

## 2025-05-22 NOTE — ED NOTES
Pt back from CT. SpO2 probe placed on pt and reading HR 35-40, Radial pulse is irregular and faster than monitor HR. EKG obtained showing Bigem. Pt confirms that he does have bigem.   Pt is asymptomatic and voices no complaints at this time.   PIV established, blood work sent to lab.   Safety precautions in place including gurney locked in lowest position, both side rails up, call light within reach. Pt educated to use call light if requiring any assistance with getting oob.

## 2025-05-22 NOTE — ED PROVIDER NOTES
ED Provider Note    CHIEF COMPLAINT  Chief Complaint   Patient presents with    Blood in Urine     Voiding blood  has Hx of same however today worse  sent here for evaluation        EXTERNAL RECORDS REVIEWED  Inpatient Notes patient had a left hip fracture in March.  He takes aspirin and Plavix    HPI/ROS  LIMITATION TO HISTORY   Select: : None  OUTSIDE HISTORIAN(S):  Significant other wife present at bedside    Sandip Zhao is a 84 y.o. male who presents for urethral bleeding.  Patient has a history of prior heart disease he is on Plavix he has had a recent hip fracture.  He said this morning he woke up with blood coming out onto his shorts when he urinates he had significant blood no significant clots he is able to urinate fully.  No lightheadedness or dizziness.  He has a history of enlarged prostate and some intermittent hematuria he is followed by Dr. Juarez from urology.    PAST MEDICAL HISTORY   has a past medical history of Anemia, Arrhythmia, Arthritis, Asthma, BPH (benign prostatic hyperplasia), Broken rib (09/09/2024), CAD (coronary artery disease), Dental disorder (09/09/2024), Environmental allergies, Fall (08/2024), Gall stones (09/09/2024), Hemorrhagic disorder ([facility]) (09/26/2024), Hemorrhoids, High cholesterol, Hypertension, Inguinal hernia (09/26/2024), MI (myocardial infarction) ([facility]) (07/04/2015), Pain, Pneumonia (09/26/2024), and Status post placement of implantable loop recorder.    SURGICAL HISTORY   has a past surgical history that includes abdominal exploration; appendectomy; knee arthroscopy; other cardiac surgery (09/09/2024); other cardiac surgery (09/17/2024); and open fix inter/subtroch fx,implnt (Left, 3/13/2025).    FAMILY HISTORY  Family History   Problem Relation Age of Onset    Hypertension Father     Heart Disease Father         mitral valve replacement       SOCIAL HISTORY  Social History     Tobacco Use    Smoking status: Former     Types: Cigarettes    Smokeless tobacco:  "Never    Tobacco comments:     Quit smoking cigarettes around 1990.   Vaping Use    Vaping status: Never Used   Substance and Sexual Activity    Alcohol use: Not Currently     Comment: No alcohol since 2/2018.    Drug use: Never    Sexual activity: Not on file     Comment: food and beverage management       CURRENT MEDICATIONS  Home Medications       Reviewed by Itzel Marrufo R.N. (Registered Nurse) on 05/22/25 at 1107  Med List Status: Partial     Medication Last Dose Status   acetaminophen (TYLENOL) 500 MG Tab  Active   aspirin 81 MG EC tablet  Active   carvedilol (COREG) 3.125 MG Tab  Active   clopidogrel (PLAVIX) 75 MG Tab  Active   Cyanocobalamin (B-12 PO)  Active   docusate sodium (COLACE) 100 MG Cap  Active   ezetimibe (ZETIA) 10 MG Tab  Active   finasteride (PROSCAR) 5 MG Tab  Active   mexiletine (MEXITIL) 150 MG Cap  Active   polyethylene glycol/lytes (MIRALAX) Pack  Active   rosuvastatin (CRESTOR) 20 MG Tab  Active   senna-docusate (PERICOLACE OR SENOKOT S) 8.6-50 MG Tab  Active   tamsulosin (FLOMAX) 0.4 MG capsule  Active                    ALLERGIES  Allergies[1]    PHYSICAL EXAM  VITAL SIGNS: /55   Pulse (!) 50   Temp 37.1 °C (98.7 °F) (Temporal)   Resp 16   Ht 1.638 m (5' 4.5\")   Wt 54.5 kg (120 lb 2.4 oz)   SpO2 96%   BMI 20.31 kg/m²    Constitutional: Alert in no apparent distress.  HENT: Normocephalic, Atraumatic, Bilateral external ears normal. Nose normal.   Eyes:  Conjunctiva normal, non-icteric.   Heart: Regular rate and rhythm, no murmurs.   Lungs: Non-labored respirations, clear to auscultation  Abdomen: Soft nontender nondistended  : Normal-appearing penis without lesions  Skin: Warm, Dry, No erythema, No rash.   Neurologic: Alert, Grossly non-focal.   Psychiatric: Affect normal, Judgment normal, Mood normal, Appears appropriate and not intoxicated.   Extremities: Intact distal pulses, No edema, No tenderness.       EKG/LABS  Results for orders placed or performed during " the hospital encounter of 05/22/25   URINALYSIS (UA)    Collection Time: 05/22/25 12:12 PM    Specimen: Urine   Result Value Ref Range    Color Yellow     Character Clear     Specific Gravity 1.013 <1.035    Ph 5.0 5.0 - 8.0    Glucose Negative Negative mg/dL    Ketones Trace (A) Negative mg/dL    Protein Negative Negative mg/dL    Bilirubin Negative Negative    Urobilinogen, Urine 0.2 <=1.0 EU/dL    Nitrite Negative Negative    Leukocyte Esterase Negative Negative    Occult Blood Large (A) Negative    Micro Urine Req Microscopic    URINE MICROSCOPIC (W/UA)    Collection Time: 05/22/25 12:12 PM   Result Value Ref Range    WBC 0-2 /hpf    RBC >100 (A) /hpf    Bacteria None Seen None /hpf    Epithelial Cells 0-2 0 - 5 /hpf    Urine Casts 0-2 0 - 2 /lpf   CBC WITH DIFFERENTIAL    Collection Time: 05/22/25  1:10 PM   Result Value Ref Range    WBC 5.5 4.8 - 10.8 K/uL    RBC 4.11 (L) 4.70 - 6.10 M/uL    Hemoglobin 12.3 (L) 14.0 - 18.0 g/dL    Hematocrit 37.3 (L) 42.0 - 52.0 %    MCV 90.8 81.4 - 97.8 fL    MCH 29.9 27.0 - 33.0 pg    MCHC 33.0 32.3 - 36.5 g/dL    RDW 46.5 35.9 - 50.0 fL    Platelet Count 199 164 - 446 K/uL    MPV 9.6 9.0 - 12.9 fL    Neutrophils-Polys 66.90 44.00 - 72.00 %    Lymphocytes 19.80 (L) 22.00 - 41.00 %    Monocytes 11.80 0.00 - 13.40 %    Eosinophils 0.70 0.00 - 6.90 %    Basophils 0.40 0.00 - 1.80 %    Immature Granulocytes 0.40 0.00 - 0.90 %    Nucleated RBC 0.00 0.00 - 0.20 /100 WBC    Neutrophils (Absolute) 3.69 1.82 - 7.42 K/uL    Lymphs (Absolute) 1.09 1.00 - 4.80 K/uL    Monos (Absolute) 0.65 0.00 - 0.85 K/uL    Eos (Absolute) 0.04 0.00 - 0.51 K/uL    Baso (Absolute) 0.02 0.00 - 0.12 K/uL    Immature Granulocytes (abs) 0.02 0.00 - 0.11 K/uL    NRBC (Absolute) 0.00 K/uL   COMP METABOLIC PANEL    Collection Time: 05/22/25  1:10 PM   Result Value Ref Range    Sodium 134 (L) 135 - 145 mmol/L    Potassium 4.0 3.6 - 5.5 mmol/L    Chloride 104 96 - 112 mmol/L    Co2 19 (L) 20 - 33 mmol/L    Anion  Gap 11.0 7.0 - 16.0    Glucose 112 (H) 65 - 99 mg/dL    Bun 11 8 - 22 mg/dL    Creatinine 0.91 0.50 - 1.40 mg/dL    Calcium 9.0 8.5 - 10.5 mg/dL    Correct Calcium 9.2 8.5 - 10.5 mg/dL    AST(SGOT) 21 12 - 45 U/L    ALT(SGPT) 36 2 - 50 U/L    Alkaline Phosphatase 77 30 - 99 U/L    Total Bilirubin 0.5 0.1 - 1.5 mg/dL    Albumin 3.8 3.2 - 4.9 g/dL    Total Protein 6.3 6.0 - 8.2 g/dL    Globulin 2.5 1.9 - 3.5 g/dL    A-G Ratio 1.5 g/dL   ESTIMATED GFR    Collection Time: 25  1:10 PM   Result Value Ref Range    GFR (CKD-EPI) 83 >60 mL/min/1.73 m 2   EKG (NOW)    Collection Time: 25  3:44 PM   Result Value Ref Range    Report       [facility]    Test Date:  2025  Pt Name:    EVELYN WEAVER             Department: [facility]  MRN:        [id number]                      [facility]  Gender:     Male                         Technician: [id number]  :        [id number]                   Requested By:LIANG HANSON  Order #:    [id number]                    Reading MD: LIANG HANSON    Measurements  Intervals                                Axis  Rate:       83                           P:          79  DC:         190                          QRS:        62  QRSD:       77                           T:          55  QT:         390  QTc:        459    Interpretive Statements  Sinus rhythm  Ventricular bigeminy  LVH by voltage  Baseline wander in lead(s) V2  Compared to ECG 2025 18:53:40  Left ventricular hypertrophy now present  ST (T wave) deviation no longer present  Impression: Ventricular bigeminy no change from previous.  No evidence of  ischemia  Electronically S igned On 2025 15:44:02 PDT by LIANG HANSON         I have independently interpreted this EKG    RADIOLOGY/PROCEDURES   I have independently interpreted the diagnostic imaging associated with this visit and am waiting the final reading from the radiologist.   My preliminary interpretation is as  follows: Enlarged prostate no other abnormalities.    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      Nonunited left femoral neck fracture with hardware in place          COURSE & MEDICAL DECISION MAKING    ASSESSMENT, COURSE AND PLAN  Care Narrative: This is an 84-year-old gentleman that presents with urethral bleeding/hematuria.  He is not hypoxic he is not tachycardic he is not hypotensive.  He does have a history of bigeminy and has a low heart rate on the monitor however EKG is unchanged.    Basic labs are obtained.  Patient has a hemoglobin of 12.3 that is actually improved from previous.  White count is normal.  Urinalysis shows evidence of hematuria but no evidence of infection.  Kidney function is normal.  Noncon CT was performed to evaluate for any obvious abnormality and she has an enlarged prostate which is known but otherwise fairly unremarkable.    2:17 PM  I spoke with Dr. Hood, urology who recommended outpatient workup as long as the patient is not having clots or difficulty voiding.  They will get him in for close follow-up    Patient is agreeable to this plan.  Patient and wife will be discharged with outpatient close urology follow-up.      DISPOSITION AND DISCUSSIONS  I have discussed management of the patient with the following physicians and OK's:  Dr. Hood    Discussion of management with other [facility] or appropriate source(s): None     Escalation of care considered, and ultimately not performed:acute inpatient care management, however at this  time, the patient is most appropriate for outpatient management    Barriers to care at this time, including but not limited to: none.     Decision tools and prescription drugs considered including, but not limited to: none.     The patient will return for new or worsening symptoms and is stable at the time of discharge. Patient was given return precautions. Patient and/or family member verbalizes understanding and will comply.    DISPOSITION:  Patient will be discharged home in stable condition.    FOLLOW UP:  Ignacio Juarez M.D.  [address] [facility]  [place] [address]  [phone]      Please make follow-up appointment with urology as soon as possible.    [facility], Emergency Dept  [address] [facility]  [place] [id number] [phone]    Return for inability to empty your bladder, inability to void, increased pain or other concerns.      OUTPATIENT MEDICATIONS:  Discharge Medication List as of 5/22/2025  2:28 PM            FINAL DIAGNOSIS  1. Hematuria, unspecified type    2. Urethral bleeding    3. Enlarged prostate         Electronically signed by: Radha Barnes M.D., 5/22/2025 12:32 PM           [1] No Known Allergies

## 2025-05-22 NOTE — ED TRIAGE NOTES
"/55   Pulse (!) 50   Temp 37.1 °C (98.7 °F) (Temporal)   Resp 16   Ht 1.638 m (5' 4.5\")   Wt 54.5 kg (120 lb 2.4 oz)   SpO2 96%   BMI 20.31 kg/m²   Chief Complaint   Patient presents with    Blood in Urine     Voiding blood  has Hx of same however today worse  sent here for evaluation      Comes in w/ wife   recent hip surgery  that has been going well   concerned about the bleeding today  \"he woke up this morning w/ bld in his underwear\" per wife  "

## 2025-05-22 NOTE — Clinical Note
REFERRAL APPROVAL NOTICE         Sent on May 22, 2025                   Sandip Zhao  [address]  [place] [address]                   Dear Mr. Zhao,    After a careful review of the medical information and benefit coverage, [facility] has processed your referral. See below for additional details.    If applicable, you must be actively enrolled with your insurance for coverage of the authorized service. If you have any questions regarding your coverage, please contact your insurance directly.    REFERRAL INFORMATION   Referral #:  [id number]  Referred-To Provider    Referred-By Provider:  Urology    Radha Barnes M.D.   [facility]      [address] [facility]  [address]  [place] [address]  [phone] [address]  [place] [address]  [phone]    Referral Start Date:  05/22/2025  Referral End Date:   05/22/2026             SCHEDULING  If you do not already have an appointment, please call [phone] to make an appointment.     MORE INFORMATION  If you do not already have a [facility] [employer] account, sign up at: [email or web address]  You can access your medical information, make appointments, see lab results, billing information, and more.  If you have questions regarding this referral, please contact  the [facility] Referrals department at:             [phone]. Monday - Friday 8:00AM - 5:00PM.     Sincerely,    [facility]

## 2025-06-06 ENCOUNTER — HOSPITAL ENCOUNTER (OUTPATIENT)
Dept: LAB | Facility: MEDICAL CENTER | Age: 85
End: 2025-06-06
Attending: INTERNAL MEDICINE
Payer: MEDICARE

## 2025-06-06 LAB
ALBUMIN SERPL BCP-MCNC: 4 G/DL (ref 3.2–4.9)
ALBUMIN/GLOB SERPL: 1.6 G/DL
ALP SERPL-CCNC: 79 U/L (ref 30–99)
ALT SERPL-CCNC: 36 U/L (ref 2–50)
ANION GAP SERPL CALC-SCNC: 8 MMOL/L (ref 7–16)
AST SERPL-CCNC: 23 U/L (ref 12–45)
BILIRUB SERPL-MCNC: 0.7 MG/DL (ref 0.1–1.5)
BUN SERPL-MCNC: 10 MG/DL (ref 8–22)
CALCIUM ALBUM COR SERPL-MCNC: 9.3 MG/DL (ref 8.5–10.5)
CALCIUM SERPL-MCNC: 9.3 MG/DL (ref 8.5–10.5)
CHLORIDE SERPL-SCNC: 102 MMOL/L (ref 96–112)
CHOLEST SERPL-MCNC: 130 MG/DL (ref 100–199)
CO2 SERPL-SCNC: 25 MMOL/L (ref 20–33)
CREAT SERPL-MCNC: 0.96 MG/DL (ref 0.5–1.4)
GFR SERPLBLD CREATININE-BSD FMLA CKD-EPI: 78 ML/MIN/1.73 M 2
GLOBULIN SER CALC-MCNC: 2.5 G/DL (ref 1.9–3.5)
GLUCOSE SERPL-MCNC: 105 MG/DL (ref 65–99)
HDLC SERPL-MCNC: 53 MG/DL
LDLC SERPL CALC-MCNC: 59 MG/DL
POTASSIUM SERPL-SCNC: 4.8 MMOL/L (ref 3.6–5.5)
PROT SERPL-MCNC: 6.5 G/DL (ref 6–8.2)
SODIUM SERPL-SCNC: 135 MMOL/L (ref 135–145)
TRIGL SERPL-MCNC: 88 MG/DL (ref 0–149)

## 2025-06-06 PROCEDURE — 80061 LIPID PANEL: CPT

## 2025-06-06 PROCEDURE — 36415 COLL VENOUS BLD VENIPUNCTURE: CPT

## 2025-06-06 PROCEDURE — 80053 COMPREHEN METABOLIC PANEL: CPT

## 2025-07-14 ENCOUNTER — HOSPITAL ENCOUNTER (OUTPATIENT)
Dept: RADIOLOGY | Facility: MEDICAL CENTER | Age: 85
End: 2025-07-14
Attending: STUDENT IN AN ORGANIZED HEALTH CARE EDUCATION/TRAINING PROGRAM
Payer: MEDICARE

## 2025-07-14 DIAGNOSIS — M17.9 OSTEOARTHRITIS OF KNEE, UNSPECIFIED: ICD-10-CM

## 2025-07-14 PROCEDURE — 73700 CT LOWER EXTREMITY W/O DYE: CPT | Mod: RT

## 2025-07-15 ENCOUNTER — APPOINTMENT (OUTPATIENT)
Dept: ADMISSIONS | Facility: MEDICAL CENTER | Age: 85
End: 2025-07-15
Attending: STUDENT IN AN ORGANIZED HEALTH CARE EDUCATION/TRAINING PROGRAM
Payer: MEDICARE

## 2025-07-16 ENCOUNTER — PRE-ADMISSION TESTING (OUTPATIENT)
Dept: ADMISSIONS | Facility: MEDICAL CENTER | Age: 85
End: 2025-07-16
Attending: STUDENT IN AN ORGANIZED HEALTH CARE EDUCATION/TRAINING PROGRAM
Payer: MEDICARE

## 2025-07-16 NOTE — PREPROCEDURE INSTRUCTIONS
PreAdmit Telephone Appointment: Reviewed the Preparing for your procedure handout with patient. Patient instructed per pharmacy guidelines regarding taking, holding or contacting provider for instructions on regularly prescribed medications before surgery.    Confirmed with patient where to check in morning of surgery.     Testing appointment and Nurse Navigator appointment 7/22.

## 2025-07-16 NOTE — PREADMIT AVS NOTE
Current Medications   Medication Instructions    carvedilol (COREG) 3.125 MG Tab Continue taking as prescribed.    senna-docusate (PERICOLACE OR SENOKOT S) 8.6-50 MG Tab Continue taking as prescribed.    clopidogrel (PLAVIX) 75 MG Tab Follow instructions from surgeon or specialist.    docusate sodium (COLACE) 100 MG Cap Follow instructions from surgeon or specialist.    tamsulosin (FLOMAX) 0.4 MG capsule Continue taking as prescribed.    acetaminophen (TYLENOL) 500 MG Tab As needed medication, may take if needed, including morning of procedure     finasteride (PROSCAR) 5 MG Tab Continue taking as prescribed.    mexiletine (MEXITIL) 150 MG Cap Continue taking as prescribed.    Cyanocobalamin (B-12 PO) Stop 7 days before surgery    rosuvastatin (CRESTOR) 20 MG Tab Continue taking as prescribed.    aspirin 81 MG EC tablet Follow instructions from surgeon or specialist.    ezetimibe (ZETIA) 10 MG Tab Stop 24 hours before surgery

## 2025-07-22 ENCOUNTER — APPOINTMENT (OUTPATIENT)
Dept: ADMISSIONS | Facility: MEDICAL CENTER | Age: 85
End: 2025-07-22
Attending: STUDENT IN AN ORGANIZED HEALTH CARE EDUCATION/TRAINING PROGRAM
Payer: MEDICARE

## 2025-07-22 ASSESSMENT — FIBROSIS 4 INDEX: FIB4 SCORE: 1.62

## 2025-07-22 NOTE — DISCHARGE PLANNING
DISCHARGE PLANNING NOTE - TOTAL JOINT    Procedure: RIGHT ROBOTIC ASSISTED TOTAL KNEE ARTHROPLASTY   Procedure Date: 7/28/25  Insurance: Payor: [facility] SENIOR CARE PLUS / Plan: [facility] SCP ESSENTIAL  / Product Type: Medicare Advantage /    Equipment currently available at home?  cane, front-wheel walker, shower chair, and WILL GET ICE PACKS  Steps into the home? 0  Steps within the home? 0  Toilet height? Standard  Type of shower? walk-in shower  Home Oxygen? NA  Portable tank?  NA  Oxygen Provider: HELEN  Planning same day discharge: NO, ANTICIPATE 3 NIGHTS AND THEN TO REHAB    Is Outpatient Physical Therapy set up after surgery? No   Did you take the Total Joint Class and where or did you receive an Educational booklet? No- flyer given for class, received NAON book.  Who will be your transportation home on day of discharge? PLAN FOR REHAB    Have you made arrangements to have someone stay with you at home for the first 3 days following discharge, and if so, whom? PLAN FOR REHAB    Have you notified your surgeon that you do not have transportation or someone to help you after discharge? PLAN FOR REHAB    Are you planning on going to a transitional care facility, for example a skilled nursing facility, post operatively for rehab, and if so, have you contacted your insurance plan to see if they cover this? NO, BUT WIFE WILL CALL      Met with the pt. Pt given a copy of Home Safety Checklist, Equipment Resource Guide, CHG scrub kit and instructions. Expected process in Recovery Room and dc criteria discussed with pt. All questions answered and verbalizes understanding of all instructions. No dc needs identified at this time. Anticipate dc to REHAB without barriers. MRSA swab obtained.

## 2025-07-28 ENCOUNTER — HOSPITAL ENCOUNTER (OUTPATIENT)
Facility: MEDICAL CENTER | Age: 85
End: 2025-07-30
Attending: STUDENT IN AN ORGANIZED HEALTH CARE EDUCATION/TRAINING PROGRAM | Admitting: STUDENT IN AN ORGANIZED HEALTH CARE EDUCATION/TRAINING PROGRAM
Payer: MEDICARE

## 2025-07-28 ENCOUNTER — ANESTHESIA (OUTPATIENT)
Dept: SURGERY | Facility: MEDICAL CENTER | Age: 85
End: 2025-07-28
Payer: MEDICARE

## 2025-07-28 ENCOUNTER — ANESTHESIA EVENT (OUTPATIENT)
Dept: SURGERY | Facility: MEDICAL CENTER | Age: 85
End: 2025-07-28
Payer: MEDICARE

## 2025-07-28 ENCOUNTER — APPOINTMENT (OUTPATIENT)
Dept: RADIOLOGY | Facility: MEDICAL CENTER | Age: 85
End: 2025-07-28
Attending: STUDENT IN AN ORGANIZED HEALTH CARE EDUCATION/TRAINING PROGRAM
Payer: MEDICARE

## 2025-07-28 PROBLEM — Z96.651 H/O TOTAL KNEE REPLACEMENT, RIGHT: Status: ACTIVE | Noted: 2025-07-28

## 2025-07-28 PROCEDURE — 700111 HCHG RX REV CODE 636 W/ 250 OVERRIDE (IP): Performed by: ANESTHESIOLOGY

## 2025-07-28 PROCEDURE — 700105 HCHG RX REV CODE 258: Performed by: ANESTHESIOLOGY

## 2025-07-28 PROCEDURE — 700101 HCHG RX REV CODE 250: Performed by: ANESTHESIOLOGY

## 2025-07-28 RX ORDER — HYDROMORPHONE HYDROCHLORIDE 2 MG/ML
INJECTION, SOLUTION INTRAMUSCULAR; INTRAVENOUS; SUBCUTANEOUS PRN
Status: DISCONTINUED | OUTPATIENT
Start: 2025-07-28 | End: 2025-07-28 | Stop reason: SURG

## 2025-07-28 RX ORDER — DEXAMETHASONE SODIUM PHOSPHATE 4 MG/ML
INJECTION, SOLUTION INTRA-ARTICULAR; INTRALESIONAL; INTRAMUSCULAR; INTRAVENOUS; SOFT TISSUE PRN
Status: DISCONTINUED | OUTPATIENT
Start: 2025-07-28 | End: 2025-07-28 | Stop reason: SURG

## 2025-07-28 RX ORDER — SODIUM CHLORIDE, SODIUM LACTATE, POTASSIUM CHLORIDE, CALCIUM CHLORIDE 600; 310; 30; 20 MG/100ML; MG/100ML; MG/100ML; MG/100ML
INJECTION, SOLUTION INTRAVENOUS
Status: DISCONTINUED | OUTPATIENT
Start: 2025-07-28 | End: 2025-07-28 | Stop reason: SURG

## 2025-07-28 RX ORDER — ONDANSETRON 2 MG/ML
INJECTION INTRAMUSCULAR; INTRAVENOUS PRN
Status: DISCONTINUED | OUTPATIENT
Start: 2025-07-28 | End: 2025-07-28 | Stop reason: SURG

## 2025-07-28 RX ORDER — CEFAZOLIN SODIUM 1 G/3ML
INJECTION, POWDER, FOR SOLUTION INTRAMUSCULAR; INTRAVENOUS PRN
Status: DISCONTINUED | OUTPATIENT
Start: 2025-07-28 | End: 2025-07-28 | Stop reason: SURG

## 2025-07-28 RX ORDER — LIDOCAINE HYDROCHLORIDE 20 MG/ML
INJECTION, SOLUTION EPIDURAL; INFILTRATION; INTRACAUDAL; PERINEURAL PRN
Status: DISCONTINUED | OUTPATIENT
Start: 2025-07-28 | End: 2025-07-28 | Stop reason: SURG

## 2025-07-28 RX ORDER — TRANEXAMIC ACID 100 MG/ML
INJECTION, SOLUTION INTRAVENOUS PRN
Status: DISCONTINUED | OUTPATIENT
Start: 2025-07-28 | End: 2025-07-28 | Stop reason: SURG

## 2025-07-28 RX ORDER — BUPIVACAINE HYDROCHLORIDE 2.5 MG/ML
INJECTION, SOLUTION EPIDURAL; INFILTRATION; INTRACAUDAL; PERINEURAL PRN
Status: DISCONTINUED | OUTPATIENT
Start: 2025-07-28 | End: 2025-07-28 | Stop reason: SURG

## 2025-07-28 RX ADMIN — LIDOCAINE HYDROCHLORIDE 100 MG: 20 INJECTION, SOLUTION EPIDURAL; INFILTRATION; INTRACAUDAL; PERINEURAL at 07:05

## 2025-07-28 RX ADMIN — PROPOFOL 150 MG: 10 INJECTION, EMULSION INTRAVENOUS at 07:05

## 2025-07-28 RX ADMIN — TRANEXAMIC ACID 1000 MG: 100 INJECTION, SOLUTION INTRAVENOUS at 08:10

## 2025-07-28 RX ADMIN — TRANEXAMIC ACID 1000 MG: 100 INJECTION, SOLUTION INTRAVENOUS at 07:05

## 2025-07-28 RX ADMIN — BUPIVACAINE HYDROCHLORIDE 20 ML: 2.5 INJECTION, SOLUTION EPIDURAL; INFILTRATION; INTRACAUDAL at 08:27

## 2025-07-28 RX ADMIN — HYDROMORPHONE HYDROCHLORIDE 0.5 MG: 2 INJECTION INTRAMUSCULAR; INTRAVENOUS; SUBCUTANEOUS at 08:00

## 2025-07-28 RX ADMIN — SODIUM CHLORIDE, POTASSIUM CHLORIDE, SODIUM LACTATE AND CALCIUM CHLORIDE: 600; 310; 30; 20 INJECTION, SOLUTION INTRAVENOUS at 07:00

## 2025-07-28 RX ADMIN — CEFAZOLIN 2 G: 1 INJECTION, POWDER, FOR SOLUTION INTRAMUSCULAR; INTRAVENOUS at 07:05

## 2025-07-28 RX ADMIN — HYDROMORPHONE HYDROCHLORIDE 0.5 MG: 2 INJECTION INTRAMUSCULAR; INTRAVENOUS; SUBCUTANEOUS at 07:45

## 2025-07-28 RX ADMIN — DEXAMETHASONE SODIUM PHOSPHATE 8 MG: 4 INJECTION INTRA-ARTICULAR; INTRALESIONAL; INTRAMUSCULAR; INTRAVENOUS; SOFT TISSUE at 07:05

## 2025-07-28 RX ADMIN — ONDANSETRON 4 MG: 2 INJECTION INTRAMUSCULAR; INTRAVENOUS at 08:35

## 2025-07-28 RX ADMIN — HYDROMORPHONE HYDROCHLORIDE 0.5 MG: 2 INJECTION INTRAMUSCULAR; INTRAVENOUS; SUBCUTANEOUS at 07:32

## 2025-07-28 ASSESSMENT — COGNITIVE AND FUNCTIONAL STATUS - GENERAL
HELP NEEDED FOR BATHING: A LITTLE
DAILY ACTIVITIY SCORE: 21
STANDING UP FROM CHAIR USING ARMS: A LITTLE
MOBILITY SCORE: 18
SUGGESTED CMS G CODE MODIFIER DAILY ACTIVITY: CJ
CLIMB 3 TO 5 STEPS WITH RAILING: A LITTLE
MOVING TO AND FROM BED TO CHAIR: A LITTLE
WALKING IN HOSPITAL ROOM: A LITTLE
TOILETING: A LITTLE
DRESSING REGULAR LOWER BODY CLOTHING: A LITTLE
SUGGESTED CMS G CODE MODIFIER MOBILITY: CK
TURNING FROM BACK TO SIDE WHILE IN FLAT BAD: A LITTLE
MOVING FROM LYING ON BACK TO SITTING ON SIDE OF FLAT BED: A LITTLE

## 2025-07-28 ASSESSMENT — PAIN DESCRIPTION - PAIN TYPE
TYPE: SURGICAL PAIN
TYPE: ACUTE PAIN;SURGICAL PAIN
TYPE: SURGICAL PAIN
TYPE: SURGICAL PAIN
TYPE: ACUTE PAIN;SURGICAL PAIN
TYPE: SURGICAL PAIN
TYPE: SURGICAL PAIN

## 2025-07-28 ASSESSMENT — FIBROSIS 4 INDEX: FIB4 SCORE: 1.63

## 2025-07-28 ASSESSMENT — ENCOUNTER SYMPTOMS
BRUISES/BLEEDS EASILY: 0
VOMITING: 0
EYE DISCHARGE: 0
FLANK PAIN: 0
COUGH: 0
CHILLS: 0
EYE REDNESS: 0
ABDOMINAL PAIN: 0
NERVOUS/ANXIOUS: 0
SHORTNESS OF BREATH: 0
FEVER: 0
FOCAL WEAKNESS: 0
MYALGIAS: 0
STRIDOR: 0

## 2025-07-28 NOTE — DISCHARGE PLANNING
"Case Management Discharge Planning    Admission Date: 7/28/2025  GMLOS:    ALOS: 1    6-Clicks ADL Score:    6-Clicks Mobility Score:        Anticipated Discharge Dispo:      DME Needed: No    Action(s) Taken: LMSW got a call from pt wife Kimberley. Kimberley reported that pt has had three heart surgeries, hip surgery and now knee replacement surgery. Kimberley reported that she is concerned about pt not getting his home meds while here in the hospital and reported she has been unable to get ahold of anyone regarding patients home meds stating \"he's (referring to the patient) is going to die over there, this is his life on the line here\".   LMSW informed Kimberley that this LMSW would send a message to Hospitalist regarding meds.  Kimberley reported that she is very interested in patient [facility]. Kimberley reported that she would like pt to stay in the hospital for a couple days to get stronger before returning home and if that is unable to happen she would like pt to go to [facility]. LMSW informed Kimberley that pt cannot stay in the hospital to receive therapies if pt has no acute needs, pt is currently in OBS status and informed Kimberley that PT/OT will evaluate pt and make recommendations.   Kimberley is adamant that CM follow up with her tomorrow regarding DC plan. Pending PT/OT recs.    LMSW asked Kimberley some assessment questions:  Kimberley reported that she is handicap and unable to physically support pt at their house. Kimberley reported she and pt life together in a one story house at address listed on facesheet. Kimberley confirmed pt PCP on facesheet. Kimberley reported that pt needs assistance with ADLs prior to admissions but was able to independently do IADLs. Kimberley reported that pt used walker at baseline.     Escalations Completed: None    Medically Clear: No          "

## 2025-07-28 NOTE — ANESTHESIA PROCEDURE NOTES
Airway    Date/Time: 7/28/2025 7:06 AM    Performed by: Tobey Gansert, M.D.  Authorized by: Tobey Gansert, M.D.    Location:  OR  Urgency:  Elective  Indications for Airway Management:  Anesthesia      Spontaneous Ventilation: absent    Sedation Level:  Deep  Preoxygenated: Yes    Final Airway Type:  Supraglottic airway  Final Supraglottic Airway:  Standard LMA    SGA Size:  4  Number of Attempts at Approach:  1

## 2025-07-28 NOTE — OP REPORT
OPERATIVE NOTE     DATE OF PROCEDURE: 7/28/2025           PRE-OP DIAGNOSIS:   Right knee osteoarthritis           POST-OP DIAGNOSIS: same           PROCEDURE:   Right total knee arthroplasty - robot assisted           SURGEON: Kana Giles M.D. - Primary           ASSISTANT: eliza dee, certified first assist    assistant was required for critical portions of the case including patient positioning manipulation possibly graft preparation retraction suture management wound closure as well as other critical portions of the case.  I be unable to perform these portion of the case by myself there were no other certified first assist available    ANESTHESIA: General Peripheral nerve block           ESTIMATED BLOOD LOSS: 50 cc                  SPECIMENS: None           COMPLICATIONS: None           CONDITION: Stable           OPERATIVE INDICATIONS AND DESCRIPTION OF PROCEDURE:     Implants:  Paras triathlon press fit total knee arthroplasty system  Size 5 femur  Size 5 tibia  Size 9 poly    I met the patient in the preoperative holding area.  I had a full discussion with them regarding multiple options for the patient's condition including nonoperative management.  Again today I offered them nonoperative treatment modalities.  Regarding operative options I specifically I outlined right total knee arthroplasty. I discussed some possible complications including bleeding possibly requiring transfusion, infection, neurovascular damage, malunion, nonunion, failure of implants, failure of surgery, chronic pain, need for revision surgery, instability, limb length discrepancy, prolonged rehab, weight bearing restrictions, DVT, PE, MI, stroke and death. After going over risks and benefits making no promises either guaranteed or implied patient elected to proceed.  At this point informed consent was signed.  A surgical marking pen was used to place my initials on the patient's right knee.    Patient was brought  back to the operating room.  They were placed supine on a regular table all bony prominences padded very well to prevent neuropraxia's.  They were secured to the table with a strap.  General anesthesia was introduced.  Patricia/imp positioner was used for for positioning.  At this point a tourniquet was placed and then the lower extremity was prepped in sterile standard fashion.  At this point a timeout was performed with all parties in the room ceasing activity. Informed consent sheet was visible confirming the patient's name date of birth MRN and matched their wristband. Antibiotics were confirmed and the right lower extremity was confirmed as the correct surgical site.  My surgical initials were visible on this extremity.  At this point we were cleared to proceed with the procedure.  1 g of TXA was given just prior to incision.    Exam under anesthesia revealed varus alignment of 13 degrees flexion contracture 15 degrees    We began by creating a longitudinal incision of the anterior aspect of the knee.  Is approximately 15 cm in length.  Sharp dissection was carried down through the skin Bovie cautery was used to maintain hemostasis at all times.  This point we exposed our extensor mechanism we performed a medial parapatellar approach.  Sharp dissection was carried down we have a small cuff of tendon to repair after the arthrotomy.  The arthrotomy was carried down to bone all the way to the medial aspect of the tibial tubercle.  At this point appropriate releases were done we exposed our joint space.  A small resection of fat pad was performed and the meniscus was removed.  We released the ACL.    At this point I placed our tibial array making small stab incisions.  I placed our femoral array intra incision within the femur.  All arrays had bicortical pin placement.  Placed our 2 checkpoints.  I then registered our Jacob robot.  Using our preoperative CT scan I registered using landmarks provided on the screen.  We  confirmed this in the system excepted the registration.  At this point I then removed osteophytes and I made intraoperative implant adjustments virtually to achieve appropriate balance.  Once we achieved appropriate balance the robot was brought in.  Taking great care to protect the collateral ligaments I made our distal femoral cuts then our chamfers and anterior posterior cuts on the femur.  I made our tibial cut while protecting all collateral ligaments and patellar tendon.  Bone fragments were removed.  I placed our trial components which were malleted into place.  I used the Bovie to vibha our tibial rotation after pinning it.  I placed a trial poly which I templated for and took it through range of motion.  Demonstrated excellent full motion good stability with appropriate gapping were very happy with this construct.  I drilled our lugs punched our tibia removed our trials.  This point the wound was copiously irrigated.  I press-fit  our final implants took it through final range of motion demonstrated excellent stability motion and were very happy with this construct.  I then removed our 2 checkpoints as well as our tibial and femoral array.    I turned our attention to the patella.  There is only mild degenerative change in the patella.  Small osteophytes were removed used a Bovie to denervated the patella.  There was good tracking on the patella    This point I did a Betadine wash for 5 minutes.  At this point copious irrigation 1 to 2 L with pulse lavage was used for irrigation.  I placed our posterior capsular injection after aspirating to make sure we are not in any vascular structures.  At this point any remaining bleeders were cauterized.  1 g vancomycin was placed deep within the wound.  During closure second gram TXA was given.  Our extensor mechanism was closed using several nonabsorbable sutures and then a barbed running suture.  Our skin was closed in interrupted subcutaneous/running subcuticular  fashion.  Sterile bandages were placed.  Counts were correct x2 patient was woken up from general anesthesia taken to PACU in stable condition    POSTOPERATIVE PLAN:  Weight-bearing: Weight-bear as tolerated  DVT prophylaxis: Aspirin 81 twice daily for 6 weeks  Antibiotics: Preop as well as 24 hours postop  Showering: Okay to shower postop day 7 and remove dressing, no lotions no scrubbing's, place new dressing.  No tub soaks baths or swimming  Prescriptions: have been E prescribed  Follow-up:  in 2 weeks.  Call with any questions or concerns patient as well as his significant other have been counseled on signs of infection and to call immediately if they develop any of these  Physical Therapy: Begin within 1 week  Admission: Inpatient admission  Consults: Hospitalist for medical management; case management for placement in rehab versus skilled nursing    Did call the patient's wife Kimberley to discuss the surgery with them.  Counseled them on signs of infection which they understood.  If they have any issues including any concerns with infection including redness drainage swelling pain they are to call our office immediately or go to the local emergency department    This is dictated with voice recognition software please excuse any errors

## 2025-07-28 NOTE — ANESTHESIA PREPROCEDURE EVALUATION
Case: [id number] Date/Time: 07/28/25 0645    Procedure: RIGHT ROBOTIC ASSISTED TOTAL KNEE ARTHROPLASTY    Pre-op diagnosis: OSTEOARTHRITIS OF KNEE    Location: [facility] OR 03 / SURGERY [facility]    Surgeons: Kana Giles M.D.            Relevant Problems   CARDIAC   (positive) Coronary artery disease due to calcified coronary lesion   (positive) MI (myocardial infarction) (HCC)   (positive) Ventricular bigeminy       Physical Exam    Airway   Mallampati: II  TM distance: >3 FB  Neck ROM: full       Cardiovascular - normal exam  Rhythm: regular  Rate: normal    (-) murmur     Dental - normal exam           Pulmonary - normal examBreath sounds clear to auscultation     Abdominal    Neurological - normal exam                   Anesthesia Plan    ASA 3   ASA physical status 3 criteria: CAD (>3 months)    Plan - general and peripheral nerve block     Peripheral nerve block will be post-op pain control  Airway plan will be LMA          Induction: intravenous    Postoperative Plan: Postoperative administration of opioids is intended.    Pertinent diagnostic labs and testing reviewed    Informed Consent:    Anesthetic plan and risks discussed with patient.    Use of blood products discussed with: patient whom consented to blood products.

## 2025-07-28 NOTE — OR NURSING
"0841 Pt to PACU from OR. Report from anesthesia and OR RN. On 6L O2 via mask. Respirations even and unlabored. VSS. Oral airway in place. Dressing to rt knee CDI, shanel noted. Bigeminy noted on monitor, baseline per anesthesia.    0905 - xray at bedside    0915 - pt states pain is \"mild, I can tolerate this\". Pt reminded to keep leg straight while laying in bed.      0920 - Family updated by telephone by Shanae BENAVIDES.    0924 - Report given to Daniela Benavides [facility]. All questions answered, verbalizes understanding. Patient transport request placed.     035 Pt transported back to room via gurney with all personal belongings. Accompanied by transport.       "

## 2025-07-28 NOTE — ASSESSMENT & PLAN NOTE
Continue carvedilol with hold parameters  
Cardiac diet.  I will start rosuvastatin, and ezetimibe   
Continue Flomax and finasteride, bladder scan  
History of coronary artery disease s/p multiple stents  Last stent was placed in November 2024   The patient is still on dual antiplatelet therapy  Plavix held 7 days prior to procedure.  Asa started today  Consider starting Plavix on postoperative day 2-3 according to clinical course  
I had a discussion with the patient and family [wife] regarding goals of care, diagnoses, prognosis, and CODE STATUS. We discussed his prognosis and comorbidities.  The patient has advanced age of 84 years.  He has a number of chronic medical problems including coronary artery disease, hyperlipidemia, ventricular arrhythmia, hyperlipidemia, primary hypertension and arthritis.  He is presenting with worsening knee pain and just had total knee arthroplasty.  I discussed cardiopulmonary resuscitation, intubation mechanical ventilation.  Currently patient/wife want to maintain a full code.  They are open to all forms of invasive or noninvasive diagnostic and therapeutic interventions.    
I will resume carvedilol with hold parameters  I will resume home mexiletine   -- does follow up with [facility] as an op  
Monitor H&H, if less than 7, transfuse  
Tolerated the procedure with Dr. Giles today July 28.  Ortho surgery continue to follow, have discussed plan of care with orthopedic surgery.  Weightbearing status as per orthopedic surgery recommendation, PT/OT dahiana, recommend postacute, SNF referral in place    
Rivaroxaban/Xarelto

## 2025-07-28 NOTE — ANESTHESIA PROCEDURE NOTES
Peripheral Block    Date/Time: 7/28/2025 8:25 AM    Performed by: Tobey Gansert, M.D.  Authorized by: Tobey Gansert, M.D.    Start Time:  7/28/2025 8:25 AM  End Time:  7/28/2025 8:30 AM  Reason for Block: at surgeon's request and post-op pain management ONLY    patient identified, IV checked, site marked, risks and benefits discussed, surgical consent, monitors and equipment checked, pre-op evaluation and timeout performed    Patient Position:  Supine  Prep: ChloraPrep    Monitoring:  Heart rate, continuous pulse ox and cardiac monitor  Block Region:  Lower Extremity  Lower Extremity - Block Type:  Selective FEMORAL nerve block at the Adductor Canal    Laterality:  Right  Procedures: ultrasound guided  Image captured, interpreted and electronically stored.  Local Infiltration:  Lidocaine  Strength:  1 %  Dose:  3 ml  Block Type:  Single-shot  Needle Length:  100mm  Needle Gauge:  21 G  Needle Localization:  Ultrasound guidance  Ultrasound picture in chart  Injection Assessment:  Negative aspiration for heme, no paresthesia on injection, incremental injection and local visualized surrounding nerve on ultrasound  Evidence of intravascular injection: No     US Guided Selective Femoral Nerve Block at Adductor Canal:   US probe placed at mid-thigh level on externally rotated leg and femur identified.  Probe directed medially until Sartorius Muscle (SM), Femoral Artery (FA) and Saphenous Nerve (SN) identified in Adductor Canal (AC).  Needle inserted anterolateral to probe in an in plane approach into a subsartorial perivascular perineural position.  After negative aspiration LA injected with ease and visualized spreading within the AC.

## 2025-07-28 NOTE — ANESTHESIA TIME REPORT
Anesthesia Start and Stop Event Times       Date Time Event    7/28/2025 0659 Ready for Procedure     0700 Anesthesia Start     0843 Anesthesia Stop          Responsible Staff  07/28/25      Name Role Begin End    Tobey Gansert, M.D. Anesth 0700 0843          Overtime Reason:  no overtime (within assigned shift)    Comments:

## 2025-07-28 NOTE — H&P
Hospital Medicine History & Physical Note    Date of Service  7/28/2025    Primary Care Physician  Meagan Mcneil M.D.    Consultants  Orthopedics, Dr. Giles    Code Status  Full Code    Chief Complaint  Right knee pain      History of Presenting Illness  Sandip Zhao is a 84 y.o. male with a past medical history of ventricular arrhythmias, coronary artery disease s/p multiple stents, last was in November 2024 still on dual antiplatelet therapy, hyperlipidemia, hypertension, prostatic hyperplasia osteoarthritis who presented 7/28/2025 for elective right total knee arthroplasty.  The patient tolerated the procedure well.  Hospitalist services were consulted to help with managing multiple medical problems in addition to discharge planning.  I evaluated the patient postoperatively on [facility] nursing staff.  He has minimal pain at surgery site.  He does have difficulty with passing urine.  He does not have fever or chills.    I discussed the plan of care with patient, patient wife, [facility] nursing staff Ortho, Dr. Giles    Review of Systems  Review of Systems   Constitutional:  Negative for chills and fever.   Eyes:  Negative for discharge and redness.   Respiratory:  Negative for cough, shortness of breath and stridor.    Cardiovascular:  Negative for chest pain and leg swelling.   Gastrointestinal:  Negative for abdominal pain and vomiting.   Genitourinary:  Negative for flank pain.        Difficultly will emptying his bladder   Musculoskeletal:  Positive for joint pain. Negative for myalgias.   Skin: Negative.    Neurological:  Negative for focal weakness.   Endo/Heme/Allergies:  Does not bruise/bleed easily.   Psychiatric/Behavioral:  The patient is not nervous/anxious.      Past Medical History   has a past medical history of Anemia, Arrhythmia, Arthritis, Asthma, Bowel habit changes, BPH (benign prostatic hyperplasia), Broken rib (09/09/2024), CAD (coronary artery disease), Dental disorder  (09/09/2024), Environmental allergies, Fall (08/2024), Gall stones (09/09/2024), Hemorrhagic disorder (HCC) (09/26/2024), Hemorrhoids, High cholesterol, Hypertension, Inguinal hernia (09/26/2024), MI (myocardial infarction) (HCC) (07/04/2015), Pain, Pneumonia (09/26/2024), and Status post placement of implantable loop recorder.    Surgical History   has a past surgical history that includes abdominal exploration; appendectomy; knee arthroscopy (Right); other cardiac surgery (09/09/2024); other cardiac surgery (09/17/2024); pr open fix inter/subtroch fx,implnt (Left, 03/13/2025); stent placement (2024); and stent placement (2015).     Family History  Family History   Problem Relation Age of Onset    Hypertension Father     Heart Disease Father         mitral valve replacement      Social History   reports that he has quit smoking. His smoking use included cigarettes. He has never used smokeless tobacco. He reports that he does not currently use alcohol. He reports that he does not use drugs.    Allergies  Allergies[1]    Medications  Prior to Admission Medications   Prescriptions Last Dose Informant Patient Reported? Taking?   Cyanocobalamin (B-12 PO) 7/21/2025 Significant Other, Family Member Yes No   Sig: Take 1 Tablet by mouth every day. Indications: Supplement   acetaminophen (TYLENOL) 500 MG Tab 7/27/2025 Significant Other, Family Member Yes Yes   Sig: Take 500-1,000 mg by mouth every 6 hours as needed. Indications: Pain   aspirin 81 MG EC tablet 7/21/2025 Significant Other, Family Member Yes No   Sig: Take 81 mg by mouth every morning. Indications: Acute Heart Attack   carvedilol (COREG) 3.125 MG Tab 7/26/2025  No No   Sig: Take 1 Tablet by mouth 2 times a day with meals.   clopidogrel (PLAVIX) 75 MG Tab 7/21/2025  No No   Sig: Take 1 Tablet by mouth every day.   docusate sodium (COLACE) 100 MG Cap 7/27/2025 Family Member Yes Yes   Sig: Take 200 mg by mouth 2 times a day. Indications: Constipation   ezetimibe  (ZETIA) 10 MG Tab 7/26/2025 Significant Other, Family Member Yes No   Sig: Take 10 mg by mouth every evening. Indications: High Amount of Fats in the Blood   finasteride (PROSCAR) 5 MG Tab 7/27/2025 Significant Other, Family Member No Yes   Sig: Take 1 Tablet by mouth every day.   mexiletine (MEXITIL) 150 MG Cap 7/28/2025 Morning Significant Other, Family Member Yes Yes   Sig: Take 150 mg by mouth every 8 hours. 6 am, 2 pm, 10 pm  Indications: Irregularity of Ventricular Heart Rhythm   rosuvastatin (CRESTOR) 20 MG Tab 7/26/2025 Significant Other, Family Member Yes No   Sig: Take 20 mg by mouth every evening. Indications: High Amount of Fats in the Blood   senna-docusate (PERICOLACE OR SENOKOT S) 8.6-50 MG Tab   No No   Sig: Take 2 Tablets by mouth every evening.   Patient taking differently: Take 2 Tablets by mouth every day. Indications: Constipation   tamsulosin (FLOMAX) 0.4 MG capsule 7/27/2025 Evening Significant Other, Family Member No Yes   Sig: Take 1 Capsule by mouth 2 times a day.      Facility-Administered Medications: None     Physical Exam  Temp:  [36.4 °C (97.5 °F)-36.6 °C (97.9 °F)] 36.5 °C (97.7 °F)  Pulse:  [72-89] 86  Resp:  [12-18] 17  BP: (107-147)/(59-79) 139/79  SpO2:  [90 %-100 %] 94 %  Blood Pressure : (!) 147/75   Temperature: 36.6 °C (97.9 °F)   Pulse: 78   Respiration: 18   Pulse Oximetry: 93 %     Physical Exam  Constitutional:       General: He is not in acute distress.     Appearance: He is not ill-appearing or diaphoretic.   HENT:      Head: Atraumatic.      Right Ear: External ear normal.      Left Ear: External ear normal.      Nose: No congestion or rhinorrhea.      Mouth/Throat:      Mouth: Mucous membranes are moist.   Eyes:      General: No scleral icterus.        Right eye: No discharge.         Left eye: No discharge.      Pupils: Pupils are equal, round, and reactive to light.   Cardiovascular:      Rate and Rhythm: Normal rate and regular rhythm.   Pulmonary:      Effort:  "Pulmonary effort is normal.      Comments: Saturating well on room air is able to speak in full sentences  Abdominal:      General: There is no distension.   Musculoskeletal:      Cervical back: Neck supple. No rigidity. No muscular tenderness.      Right lower leg: No edema.      Left lower leg: No edema.      Comments: Clean dressing over surgery site   Skin:     Coloration: Skin is not jaundiced or pale.   Neurological:      Mental Status: He is alert and oriented to person, place, and time.      Coordination: Coordination normal.   Psychiatric:         Mood and Affect: Mood normal.         Behavior: Behavior normal.       Laboratory:          No results for input(s): \"ALTSGPT\", \"ASTSGOT\", \"ALKPHOSPHAT\", \"TBILIRUBIN\", \"DBILIRUBIN\", \"GAMMAGT\", \"AMYLASE\", \"LIPASE\", \"ALB\", \"PREALBUMIN\", \"GLUCOSE\" in the last 72 hours.      No results for input(s): \"NTPROBNP\" in the last 72 hours.      No results for input(s): \"TROPONINT\" in the last 72 hours.    Imaging:  DX-KNEE 2- RIGHT   Final Result      Post right knee tricompartment arthroplasty.        Assessment/Plan:  Patient will need a Med/Surg bed on ORTHOPEDICS service. Admission order placed by orthopedics, hospitalist services consulting and will take over as primary team     * H/O total knee replacement, right- (present on admission)  Assessment & Plan  Tolerated the procedure with Dr. Giles today July 28.  Multimodal pain control  Consider physical and Occupational Therapy, pharmacologic prophylaxis when okay with orthopedics     Ventricular bigeminy- (present on admission)  Assessment & Plan  I will resume carvedilol with hold parameters  I will resume home mexiletine    Status post insertion of drug-eluting coronary artery stent - LAD, D1, RCA- (present on admission)  Assessment & Plan  History of coronary artery disease s/p multiple stents  Last stent was placed in November 2024   The patient is still on dual antiplatelet therapy  Plavix held 7 days prior " to procedure.  Plan to restart aspirin tomorrow.  Consider starting Plavix on postoperative day 2-3 according to clinical course    Anemia- (present on admission)  Assessment & Plan  Last hemoglobin is 12. Anticipate to drop secondary to his surgery.  Monitor hemoglobin. I will order a follow-up CBC.  Transfuse for a hemoglobin of less than or equal to 7     Primary hypertension- (present on admission)  Assessment & Plan  I will start carvedilol with hold parameters    Advance care planning- (present on admission)  Assessment & Plan  I had a discussion with the patient and family [wife] regarding goals of care, diagnoses, prognosis, and CODE STATUS. We discussed his prognosis and comorbidities.  The patient has advanced age of 84 years.  He has a number of chronic medical problems including coronary artery disease, hyperlipidemia, ventricular arrhythmia, hyperlipidemia, primary hypertension and arthritis.  He is presenting with worsening knee pain and just had total knee arthroplasty.  I discussed cardiopulmonary resuscitation, intubation mechanical ventilation.  Currently patient/wife want to maintain a full code.  They are open to all forms of invasive or noninvasive diagnostic and therapeutic interventions.      Pure hypercholesterolemia- (present on admission)  Assessment & Plan  Cardiac diet.  I will start rosuvastatin, and ezetimibe     Benign prostatic hyperplasia with urinary obstruction- (present on admission)  Assessment & Plan  I will resume home finasteride and tamsulosin.  I will order a bladder scan to rule out urinary retention      VTE prophylaxis: SCDs/TEDs, plan to start aspirin twice daily tomorrow July 29 as long as there is no bleeding or severe anemia    I had a discussion with the patient and family [wife] regarding goals of care, diagnoses, prognosis, and CODE STATUS. We discussed his prognosis and comorbidities.  The patient has advanced age of 84 years.  He has a number of chronic medical  problems including coronary artery disease, hyperlipidemia, ventricular arrhythmia, hyperlipidemia, primary hypertension and arthritis.  He is presenting with worsening knee pain and just had total knee arthroplasty.  I discussed cardiopulmonary resuscitation, intubation mechanical ventilation.  Currently patient/wife want to maintain a full code.  They are open to all forms of invasive or noninvasive diagnostic and therapeutic interventions. I spent 16 minutes on advanced care planning.            [1] No Known Allergies

## 2025-07-28 NOTE — PROGRESS NOTES
0930: received report fro PACU RN  1025: performed assessment  Pt is A&Ox4, no complaints of pain, Updated on POC: mobilize, void, monitor CMS  Call light within reach, hourly rounding in place, bed in lowest position, bed alarm on.     1055: LVM for Dr. Luke HURT in regard to medications that are not on his MAR, gave unit number to call me back for any questions.

## 2025-07-28 NOTE — ANESTHESIA POSTPROCEDURE EVALUATION
Patient: Sandip Zhao    Procedure Summary       Date: 07/28/25 Room / Location:  OR  / SURGERY [facility]    Anesthesia Start: 0700 Anesthesia Stop: 0843    Procedure: RIGHT ROBOTIC ASSISTED TOTAL KNEE ARTHROPLASTY (Right: Knee) Diagnosis: (OSTEOARTHRITIS OF KNEE)    Surgeons: Kana Giles M.D. Responsible Provider: Tobey Gansert, M.D.    Anesthesia Type: general, peripheral nerve block ASA Status: 3            Final Anesthesia Type: general, peripheral nerve block  Last vitals  BP   Blood Pressure : (!) 147/75    Temp   36.6 °C (97.9 °F)    Pulse   78   Resp   18    SpO2   93 %      Anesthesia Post Evaluation    Patient location during evaluation: PACU  Patient participation: complete - patient participated  Level of consciousness: awake and alert    Airway patency: patent  Anesthetic complications: no  Cardiovascular status: hemodynamically stable  Respiratory status: acceptable  Hydration status: euvolemic    PONV: none          There were no known notable events for this encounter.     Nurse Pain Score: 0 (NPRS)

## 2025-07-29 PROBLEM — Z71.89 ADVANCE CARE PLANNING: Status: RESOLVED | Noted: 2025-03-13 | Resolved: 2025-07-29

## 2025-07-29 ASSESSMENT — COGNITIVE AND FUNCTIONAL STATUS - GENERAL
DRESSING REGULAR LOWER BODY CLOTHING: A LOT
MOBILITY SCORE: 19
HELP NEEDED FOR BATHING: A LITTLE
STANDING UP FROM CHAIR USING ARMS: A LITTLE
MOVING TO AND FROM BED TO CHAIR: A LITTLE
SUGGESTED CMS G CODE MODIFIER MOBILITY: CK
MOVING FROM LYING ON BACK TO SITTING ON SIDE OF FLAT BED: A LITTLE
SUGGESTED CMS G CODE MODIFIER DAILY ACTIVITY: CJ
TOILETING: A LITTLE
CLIMB 3 TO 5 STEPS WITH RAILING: A LITTLE
DAILY ACTIVITIY SCORE: 20
WALKING IN HOSPITAL ROOM: A LITTLE

## 2025-07-29 ASSESSMENT — ENCOUNTER SYMPTOMS
DOUBLE VISION: 0
NECK PAIN: 0
HEMOPTYSIS: 0
BACK PAIN: 0
MYALGIAS: 0
NAUSEA: 0
NERVOUS/ANXIOUS: 0
HEARTBURN: 0
BLURRED VISION: 0
COUGH: 0

## 2025-07-29 ASSESSMENT — PAIN DESCRIPTION - PAIN TYPE
TYPE: SURGICAL PAIN
TYPE: ACUTE PAIN;SURGICAL PAIN

## 2025-07-29 ASSESSMENT — GAIT ASSESSMENTS
GAIT LEVEL OF ASSIST: CONTACT GUARD ASSIST
DISTANCE (FEET): 150
DISTANCE (FEET): 20
ASSISTIVE DEVICE: FRONT WHEEL WALKER

## 2025-07-29 ASSESSMENT — LIFESTYLE VARIABLES
EVER FELT BAD OR GUILTY ABOUT YOUR DRINKING: NO
AVERAGE NUMBER OF DAYS PER WEEK YOU HAVE A DRINK CONTAINING ALCOHOL: 0
HAVE YOU EVER FELT YOU SHOULD CUT DOWN ON YOUR DRINKING: NO
TOTAL SCORE: 0
EVER HAD A DRINK FIRST THING IN THE MORNING TO STEADY YOUR NERVES TO GET RID OF A HANGOVER: NO
CONSUMPTION TOTAL: NEGATIVE
HAVE PEOPLE ANNOYED YOU BY CRITICIZING YOUR DRINKING: NO
TOTAL SCORE: 0
HOW MANY TIMES IN THE PAST YEAR HAVE YOU HAD 5 OR MORE DRINKS IN A DAY: 0
ON A TYPICAL DAY WHEN YOU DRINK ALCOHOL HOW MANY DRINKS DO YOU HAVE: 0
ALCOHOL_USE: NO
TOTAL SCORE: 0

## 2025-07-29 ASSESSMENT — ACTIVITIES OF DAILY LIVING (ADL): TOILETING: INDEPENDENT

## 2025-07-29 NOTE — DISCHARGE PLANNING
Case Management Discharge Planning    Admission Date: 7/28/2025  GMLOS: 1.7  ALOS: 0    6-Clicks ADL Score: 21  6-Clicks Mobility Score: 19      Anticipated Discharge Dispo: Discharge Disposition: Disch to  rehab facility or distinct part unit (62)    DME Needed: No    Action(s) Taken: Updated Provider/Nurse on Discharge Plan    LSW received call from pt's wife. Pt's wife reported she has been told by MD that pt is needing rehab due to his age and heart condition. Pt's wife specifically mentioned [facility]. Pt's wife explained pt's history in detail with his knee and hip surgeries, as well as heart surgeries. Pt's wife reported pt previously was not able to go to rehab and had gone to [facility]. Pt's wife stated it wasn't a good fit for pt.     LSW explained pt did not qualify for rehab per Terrence's note earlier today. Pt's wife stated she was shocked due to all pt's doctors stating he would be a good fit. Pt's wife stated she had spoken with the  as well. LSW apologized and explained due to pt not meeting criteria for rehab, SNF would be the only option. Pt's wife inquired how she is able to appeal the rehab decision through Medicare. LSW explained it isn't [facility]CP that is denying, it's the rehab. Pt's wife again stated she was shocked and expressed disappointment. LSW stated she would reach out to Terrence to have him discuss it with her.   Pt has multiple accepting SNFs.     Escalations Completed: None    Medically Clear: No    Next Steps: LSW to follow    Barriers to Discharge: Medical clearance    Is the patient up for discharge tomorrow: No

## 2025-07-29 NOTE — CARE PLAN
Problem: Fall Risk  Goal: Patient will remain free from falls  Outcome: Progressing     Problem: Pain - Post Surgery  Goal: Alleviation or reduction of pain post surgery  Outcome: Progressing   The patient is Stable - Low risk of patient condition declining or worsening    Shift Goals  Clinical Goals: pain control,remain free from falls,CMS checks q4,tolerate ambulation  Patient Goals: rest  Family Goals: Stay updated on POC    Progress made toward(s) clinical / shift goals:  Ambulated >50 feet,CMS intact,pain at tolerable level,remained free from falls    Patient is not progressing towards the following goals:

## 2025-07-29 NOTE — PROGRESS NOTES
Hospital Medicine Daily Progress Note    Date of Service  7/29/2025    Chief Complaint  Sandip Zhao is a 84 y.o. male admitted 7/28/2025 with No chief complaint on file.        Hospital Course  This is a 84-year-old male with a past medical history significant for CAD status post stent in LAD and RCA; loop recorder in place, hyperlipidemia, hypertension, BPH, osteoarthritis presented to the ER on 7/20/2025 for elective right total knee arthroplasty.  Patient noted to be weak and debilitated thus was asked to be admitted by the hospitalist for further evaluation.  PT/OT has evaluated the patient, recommended postacute, SNF referral in place.    I discussed with Terrence from [facility] which stated that patient does not qualify [facility]    Interval event:  -- Patient is alert, awake managing with appropriate, vital sign has been reviewed  --Labs reviewed, sodium at 134, mag 1.9, required 2 g of IV magnesium, hemoglobin 10.8.  PT/OT has evaluated patient, recommend postacute, surgical in place.  --At bedside, discussed plan of care with the patient wife.  Weightbearing status as per orthopedic surgery recommendation.  Patient will need multimodal pain management and  dvt ppx     I have discussed this patient's plan of care and discharge plan at IDT rounds today with Case Management, Nursing, Nursing leadership, and other members of the IDT team.    Consultants/Specialty  orthopedics    Code Status  Full Code    Disposition  The patient is not medically cleared for discharge to home or a post-acute facility.      I have placed the appropriate orders for post-discharge needs.    Review of Systems  Review of Systems   Constitutional:  Negative for malaise/fatigue.   HENT:  Negative for congestion.    Eyes:  Negative for blurred vision and double vision.   Respiratory:  Negative for cough and hemoptysis.    Gastrointestinal:  Negative for heartburn and nausea.    Genitourinary:  Negative for dysuria.   Musculoskeletal:  Positive for joint pain. Negative for back pain, myalgias and neck pain.   Psychiatric/Behavioral:  The patient is not nervous/anxious.         Physical Exam  Temp:  [36.1 °C (96.9 °F)-36.5 °C (97.7 °F)] 36.2 °C (97.1 °F)  Pulse:  [63-86] 80  Resp:  [17-18] 17  BP: (130-159)/(63-81) 130/64  SpO2:  [94 %-97 %] 97 %    Physical Exam  Vitals and nursing note reviewed.   Constitutional:       Appearance: Normal appearance.   HENT:      Head: Normocephalic and atraumatic.   Eyes:      Extraocular Movements: Extraocular movements intact.      Pupils: Pupils are equal, round, and reactive to light.   Cardiovascular:      Rate and Rhythm: Normal rate.   Abdominal:      General: There is no distension.      Palpations: Abdomen is soft.   Musculoskeletal:      Cervical back: Neck supple.      Right lower leg: No edema.      Left lower leg: No edema.   Skin:     General: Skin is warm.   Neurological:      Mental Status: He is alert and oriented to person, place, and time.      Cranial Nerves: No cranial nerve deficit.         Fluids    Intake/Output Summary (Last 24 hours) at 7/29/2025 1435  Last data filed at 7/29/2025 1300  Gross per 24 hour   Intake 460 ml   Output 1275 ml   Net -815 ml        Laboratory  Recent Labs     07/29/25  0706   WBC 10.4   RBC 3.48*   HEMOGLOBIN 10.8*   HEMATOCRIT 32.2*   MCV 92.5   MCH 31.0   MCHC 33.5   RDW 46.5   PLATELETCT 194   MPV 9.5     Recent Labs     07/29/25  0706   SODIUM 134*   POTASSIUM 4.1   CHLORIDE 101   CO2 21   GLUCOSE 126*   BUN 13   CREATININE 0.84   CALCIUM 8.9                   Imaging  DX-KNEE 2- RIGHT   Final Result      Post right knee tricompartment arthroplasty.           Assessment/Plan  * H/O total knee replacement, right- (present on admission)  Assessment & Plan  Tolerated the procedure with Dr. Giles today July 28.  Ortho surgery continue to follow, have discussed plan of care with orthopedic surgery.   Weightbearing status as per orthopedic surgery recommendation, PT/OT dahiana, recommend postacute, SNF referral in place      Pure hypercholesterolemia- (present on admission)  Assessment & Plan  Cardiac diet.  I will start rosuvastatin, and ezetimibe     Status post insertion of drug-eluting coronary artery stent - LAD, D1, RCA- (present on admission)  Assessment & Plan  History of coronary artery disease s/p multiple stents  Last stent was placed in November 2024   The patient is still on dual antiplatelet therapy  Plavix held 7 days prior to procedure.  Asa started today  Consider starting Plavix on postoperative day 2-3 according to clinical course    Ventricular bigeminy- (present on admission)  Assessment & Plan  I will resume carvedilol with hold parameters  I will resume home mexiletine   -- does follow up with [facility]ds as an op    Anemia- (present on admission)  Assessment & Plan  Monitor H&H, if less than 7, transfuse    Primary hypertension- (present on admission)  Assessment & Plan   Continue carvedilol with hold parameters    Benign prostatic hyperplasia with urinary obstruction- (present on admission)  Assessment & Plan  Continue Flomax and finasteride, bladder scan         VTE prophylaxis: lovenox

## 2025-07-29 NOTE — CARE PLAN
The patient is Stable - Low risk of patient condition declining or worsening    Shift Goals  Clinical Goals: remain free from falls, manage pain at or above 3/10 with medication per MAR, monitor CMS, mobilize as tolerated, work with PT/OT  Patient Goals: rest, manage pain, mobilize, work with PT/OT  Family Goals: Stay updated on POC    Progress made toward(s) clinical / shift goals:  no falls, pain managed with medication per MAR, CMS monitored, mobilized as tolerated, worked with PT/OT    Problem: Fall Risk  Goal: Patient will remain free from falls  Outcome: Progressing     Problem: Knowledge Deficit - Standard  Goal: Patient and family/care givers will demonstrate understanding of plan of care, disease process/condition, diagnostic tests and medications  Outcome: Progressing     Problem: Post-Operative Knee Replacement  Goal: Patient's neurovascular status will be maintained or improve  Outcome: Progressing  Goal: Early mobilization post surgery  Outcome: Progressing     Problem: Pain - Post Surgery  Goal: Alleviation or reduction of pain post surgery  Outcome: Progressing     Problem: Bowel Elimination  Goal: Establish and maintain regular bowel function  Outcome: Progressing     Problem: Venous Thromboembolism (VTE) Prevention  Goal: The patient will remain free from venous thromboembolism (VTE)  Outcome: Progressing     Problem: Pain - Standard  Goal: Alleviation of pain or a reduction in pain to the patient’s comfort goal  Outcome: Progressing       Patient is not progressing towards the following goals:

## 2025-07-29 NOTE — DISCHARGE PLANNING
Call from Spouse to again review for IRF level of care. Discussed CMS requirement. If S[facility] was willing to support an IRF level of care I would be happy to advocate for the program Reached back to TIFFANIE Ledesma. Reviewed plan per total joint coordinator with spouse.

## 2025-07-29 NOTE — THERAPY
Physical Therapy   Initial Evaluation     Patient Name:  Sandip Zhao  Age:  84 y.o., Sex:  male  Medical Record #:  [id number]  Today's Date: 7/29/2025     Precautions  Weight Bearing: Weight Bearing as Tolerated Right Lower Extremity    Assessment  Patient is a 84 year old male who was seen s/p right TKA with WBAT orders for right lower extremity. Pt was agreeable to therapy evaluation and presented with safe upright functional mobility with AD use after therapy session. Pt was provided with education on elevation, icing, positioning, and supine/seated therapeutic exercises. Pt has reported history of multiple heart surgeries in 2024, as well as L hip fracture requiring surgery March of 2025. Pt is very motivated to participate and progress. Pt appears as if he would be able to tolerate and would benefit from more intensive rehab at a post acute facility prior to discharge. Pt requires CGA for mobility and wife is disabled, unable to assist pt upon discharge. Recommend post acute facility upon discharge. While at this hospital, pt would benefit from acute skilled PT services to address impairments and facilitate safe discharge planning.   The pt was provided with the following skilled therapy txt: Pt was provided with VC, demo, and facilitation during gait training in order to return demo terminal knee extension and appropriate heel strike/flexion of knee in order to return demo appropriate gait mechanics. Pt was provided with VC and TC for appropriate quad contraction and mechanics during supine/seated therapeutic exercises. Pt was provided with education on intensity, frequency, and duration of exercises.      Plan    Physical Therapy Initial Treatment Plan   Duration: 5 times a week until goals met.     DC Equipment Recommendations: Front-Wheel Walker  Discharge Recommendations: Recommend post-acute placement for additional physical therapy services prior to discharge home       Subjective    Pt and wife  report that they would really like for patient to go to rehab hospital upon discharge to improve strength and mobility prior to going home.      Objective       07/29/25 0956   Initial Contact Note    Initial Contact Note Order Received and Verified, Physical Therapy Evaluation in Progress with Full Report to Follow.   Precautions   Weight Bearing Weight Bearing as Tolerated Right Lower Extremity   Vitals   O2 Delivery Device None - Room Air   Pain 0 - 10 Group   Therapist Pain Assessment During Activity;Post Activity;Nurse Notified  (5/10 discomfort to R knee)   Prior Living Situation   Housing / Facility 1 Story House   Steps Into Home 0   Steps In Home 0   Equipment Owned None   Lives with - Patient's Self Care Capacity Spouse  (Spouse reports that she has a disability and is unable to assist pt.)   Prior Level of Functional Mobility   Bed Mobility Independent   Transfer Status Independent   Ambulation Independent   Ambulation Distance Community distances   Assistive Devices Used None   Stairs Independent   History of Falls   History of Falls Yes   Date of Last Fall   (March 2025 - fell and had L hip fracture)   Cognition    Level of Consciousness Alert   Active ROM Lower Body    Active ROM Lower Body  X   Comments Limited by post op R TKA   Strength Lower Body   Lower Body Strength  X   Gross Strength   (Limited by post op R TKA)   Balance Assessment   Sitting Balance (Static) Fair   Sitting Balance (Dynamic) Fair -   Standing Balance (Static) Fair   Standing Balance (Dynamic) Fair -   Weight Shift Sitting Good   Weight Shift Standing Fair   Comments Standing balance with FWW   Bed Mobility    Supine to Sit Standby Assist   Sit to Supine Standby Assist   Scooting Standby Assist   Rolling Modified Independent   Gait Analysis   Gait Level Of Assist Contact Guard Assist   Assistive Device Front Wheel Walker   Distance (Feet) 150   # of Times Distance was Traveled 1   Deviation   (Decreased gait velocity, decreased  L step length, slightly increased forward trunk flexion.)   # of Stairs Climbed 0   Weight Bearing Status WBAT R LE   Functional Mobility   Sit to Stand Contact Guard Assist   Bed, Chair, Wheelchair Transfer Contact Guard Assist   Transfer Method Stand Step   Mobility Bed mobility, sit to stand, stand step, gait   6 Clicks Assessment - How much HELP from from another person do you currently need... (If the patient hasn't done an activity recently, how much help from another person do you think he/she would need if he/she tried?)   Turning from your back to your side while in a flat bed without using bedrails? 4   Moving from lying on your back to sitting on the side of a flat bed without using bedrails? 3   Moving to and from a bed to a chair (including a wheelchair)? 3   Standing up from a chair using your arms (e.g., wheelchair, or bedside chair)? 3   Walking in hospital room? 3   Climbing 3-5 steps with a railing? 3   6 clicks Mobility Score 19   Patient / Family Goals    Patient / Family Goal #1 Pt reports that his goal is to walk without an AD without pain.   Education Group   Education Provided Role of Physical Therapist;Gait Training;Exercises - Supine;Exercises - Seated   Role of Physical Therapist Patient Response Patient;Significant Other;Acceptance;Explanation;Verbal Demonstration   Gait Training Patient Response Patient;Acceptance;Explanation;Demonstration;Action Demonstration;Verbal Demonstration;Significant Other   Exercises - Supine Patient Response Patient;Significant Other;Acceptance;Eager;Explanation;Demonstration;Handout;Verbal Demonstration;Action Demonstration   Exercises - Seated Patient Response Patient;Significant Other;Eager;Acceptance;Explanation;Demonstration;Handout;Verbal Demonstration;Action Demonstration   Physical Therapy Initial Treatment Plan    Duration Evaluation only   Anticipated Discharge Equipment and Recommendations   DC Equipment Recommendations Front-Wheel Walker    Discharge Recommendations Recommend post-acute placement for additional physical therapy services prior to discharge home   Interdisciplinary Plan of Care Collaboration   IDT Collaboration with  Nursing   Patient Position at End of Therapy Seated;Chair Alarm On;Call Light within Reach;Tray Table within Reach;Family / Friend in Room   Collaboration Comments 7/29: PT dahiana completed.   Session Information   Date / Session Number  7/29, 1 (1/5, 8/4)

## 2025-07-29 NOTE — PROGRESS NOTES
4 Eyes Skin Assessment Completed by Abdoul RN and Abi RN.    Skin assessment is primarily focused on high risk bony prominences. Pay special attention to skin beneath and around medical devices, high risk bony prominences, skin to skin areas and areas where the patient lacks sensation to feel pain and areas where the patient previously had breakdown.     Head (Occipital):  WDL   Ears (Under Medical Devices): WDL   Nose (Under Medical Devices): WDL   Mouth:  WDL   Neck: WDL   Breast/Chest:  WDL   Shoulder Blades:  WDL   Spine:   WDL   (R) Arm/Elbow/Hand: Bruising   (L) Arm/Elbow/Hand: WDL   Abdomen: WDL   Pannus/Groin:  WDL   Sacrum/Coccyx:   Pink and Blanching   (R) Ischial Tuberosity (Sit Bones):  WDL   (L) Ischial Tuberosity (Sit Bones):  WDL   (R) Leg:  SHARON dressing in place   (L) Leg:  WDL   (R) Heel:  Red and Blanching   (R) Foot/Toe: WDL   (L) Heel: Red and Blanching   (L) Foot/Toe:  WDL       DEVICES IN USE:   Respiratory Devices:  NA, patient on room air  Feeding Devices:  N/A   Lines & BP Monitoring Devices:  Peripheral IV    Orthopedic Devices:  N/A  Miscellaneous Devices:  SCDs    PROTOCOL INTERVENTIONS:   Standard/Trauma Bed:  Already in place  Float Heels with Pillows:  Already in place    WOUND PHOTOS:   N/A no wounds identified    WOUND CONSULT:   N/A, no advanced wound care needs identified

## 2025-07-29 NOTE — DISCHARGE PLANNING
PM&R referral from Dr. Rico. 85 yo single joint BMI 23. Current documentation does not support CMS criteria for IRF level of care. No consult per protocol.

## 2025-07-29 NOTE — DISCHARGE PLANNING
TCN following. HTH/SCP chart review completed. Please see TCC note 7/29/25. Given current chart review, PT recommendations for post acute placement and collaboration with CM, TCN has put in place auth for SNF level of care if post acute placement remains indicated for dc planning. If additional post acute SCP auth needs/considerations are indicated, please reach out to TCN via VOALTE as needed. Thank you.

## 2025-07-29 NOTE — PROGRESS NOTES
Received bedside report from day shift Daniela RN.Assumed pt care.  Pt seen in bed.AOx4,no signs of distress noted.  POC and goals discussed.  safety and fall precautions in place.Bed locked and lowest position.Instructed pt to use call light,verbalized understanding.Call light kept within reach.No other needs reported at this time.Care ongoing

## 2025-07-29 NOTE — PROGRESS NOTES
0700 Received report from Night shift nurse  0753 Performed assessment  Pt is A&Ox4, complains of 3/10 pain; declines medicinal intervention; ice pack applied, Updated on POC: work with PT/OT, mobilize, manage pain  Call light within reach, hourly rounding in place, bed in lowest position, bed alarm/strip alarm on.

## 2025-07-29 NOTE — HOSPITAL COURSE
This is a 84-year-old male with a past medical history significant for CAD status post stent in LAD and RCA; loop recorder in place, hyperlipidemia, hypertension, BPH, osteoarthritis presented to the ER on 7/20/2025 for elective right total knee arthroplasty.  Patient noted to be weak and debilitated thus was asked to be admitted by the hospitalist for further evaluation.  PT/OT has evaluated the patient, recommended postacute, SNF referral in place.    I discussed with Terrence from [facility] which [facility] that [facility]  After an extensive discussion by myself with the patient family along with the nursing staff, patient wife decided to go home with home health and patient agreed to go with wife decision    During the stay in the hospital, his vital signs remained stable, his magnesium was noted to be 1.9 was provided magnesium supplementation prior to discharge.  His hemoglobin is noted to be stable at 10.8    Weightbearing status as per tolerated as per orthopedic surgery recommendation.  Orthopedic recommended 81 mg for 6 which will be provided along with omeprazole.  With orthopedic surgery as an outpatient.  For all other chronic medical condition, he will resume his home medication     What Type Of Note Output Would You Prefer (Optional)?: Bullet Format How Severe Is Your Skin Lesion?: mild Has Your Skin Lesion Been Treated?: not been treated Is This A New Presentation, Or A Follow-Up?: Skin Lesion

## 2025-07-29 NOTE — PROGRESS NOTES
"Orthopaedic Progress Note  Pod1 right tka    Patient seen and examined  Reports no pain  No galindo catheter, voiding well   Denies fevers, chills, chest pain           Examination:   BP (!) 159/81   Pulse 82   Temp 36.4 °C (97.6 °F) (Temporal)   Resp 18   Ht 1.651 m (5' 5\")   Wt 65.1 kg (143 lb 8.3 oz)   SpO2 97%     No acute distress  Breathing unlabored  Dressing clean dry and intact   Motor: fires ehl, tibialis anterior, gastrocnemius  Sensation intact to light touch sural, saphenous, superficial peroneal, deep peroneal, tibial distributions  Compartments soft/compressible  Toes warm and well perfused, cap refill <2      A/P: pod1 r tka    DVT Prophylaxis- TEDS/SCDs, As bid  Weight Bearing Status-wbat  PT/OT  Antibiotics  Likely will require placement  Hospitalist admit  Case Coordination   "

## 2025-07-29 NOTE — CARE PLAN
The patient is Stable - Low risk of patient condition declining or worsening    Shift Goals  Clinical Goals: remain free from falls, manage pain at or above 3/10 with medication per MAR, void, walk 50ft  Patient Goals: manage pain, mobilize, void  Family Goals: Stay updated on POC    Progress made toward(s) clinical / shift goals:  no falls, no complaints of pain, pt voided, pt walked to and from the bathroom.    Problem: Fall Risk  Goal: Patient will remain free from falls  Outcome: Progressing     Problem: Knowledge Deficit - Standard  Goal: Patient and family/care givers will demonstrate understanding of plan of care, disease process/condition, diagnostic tests and medications  Outcome: Progressing     Problem: Post-Operative Knee Replacement  Goal: Patient will demonstrate understanding of knee replacement post-surgical weight bearing restrictions and DME usage during hospital stay and post discharge  Outcome: Progressing  Goal: Patient's neurovascular status will be maintained or improve  Outcome: Progressing  Goal: Early mobilization post surgery  Outcome: Progressing     Problem: Pain - Post Surgery  Goal: Alleviation or reduction of pain post surgery  Outcome: Progressing     Problem: Bowel Elimination  Goal: Establish and maintain regular bowel function  Outcome: Progressing     Problem: Risk for Post Op Fluid Imbalance  Goal: Promotion of fluid balance  Outcome: Progressing     Problem: Venous Thromboembolism (VTE) Prevention  Goal: The patient will remain free from venous thromboembolism (VTE)  Outcome: Progressing       Patient is not progressing towards the following goals:

## 2025-07-29 NOTE — THERAPY
Physical Therapy   Initial Evaluation     Patient Name:  Sandip Zhao  Age:  84 y.o., Sex:  male  Medical Record #:  [id number]  Today's Date: 7/29/2025     Precautions  Weight Bearing: Weight Bearing as Tolerated Right Lower Extremity    Assessment  Patient is a 84 year old male who was seen s/p right TKA with WBAT orders for right lower extremity. Pt was agreeable to therapy evaluation and presented with safe upright functional mobility with AD use after therapy session. Pt was provided with education on elevation, icing, positioning, and supine/seated therapeutic exercises. Pt has reported history of multiple heart surgeries in 2024, as well as L hip fracture requiring surgery March of 2025. Pt is very motivated to participate and progress. Pt appears as if he would be able to tolerate and would benefit from more intensive rehab at a post acute facility prior to discharge. Pt requires CGA for mobility and wife is disabled, unable to assist pt upon discharge. Recommend post acute facility upon discharge. While at this hospital, pt would benefit from acute skilled PT services to address impairments and facilitate safe discharge planning.   The pt was provided with the following skilled therapy txt: Pt was provided with VC, demo, and facilitation during gait training in order to return demo terminal knee extension and appropriate heel strike/flexion of knee in order to return demo appropriate gait mechanics. Pt was provided with VC and TC for appropriate quad contraction and mechanics during supine/seated therapeutic exercises. Pt was provided with education on intensity, frequency, and duration of exercises.       Plan    Physical Therapy Initial Treatment Plan   Treatment Plan : Bed Mobility, Family / Caregiver Training, Equipment, Gait Training, Neuro Re-Education / Balance, Self Care / Home Evaluation, Stair Training, Therapeutic Activities, Therapeutic Exercise  Treatment Frequency: 5 Times per Week  Duration:  Until Therapy Goals Met    DC Equipment Recommendations: Front-Wheel Walker  Discharge Recommendations: Recommend post-acute placement for additional physical therapy services prior to discharge home       Subjective     Pt and wife report that they would really like for patient to go to rehab hospital upon discharge to improve strength and mobility prior to going home.      Objective       07/29/25 0956   Time In/Time Out   Therapy Start Time 0920   Therapy End Time 0956   Total Therapy Time 36   Charge Group   PT Evaluation PT Evaluation High   PT Self Care / Home Evaluation (Units) 1   Total Time Spent   PT Evaluation Time Spent (Mins) 20   PT Self Care/Home Evaluation Time Spent (Mins) 16   PT Total Time Spent (Calculated) 36   Initial Contact Note    Initial Contact Note Order Received and Verified, Physical Therapy Evaluation in Progress with Full Report to Follow.   Precautions   Weight Bearing Weight Bearing as Tolerated Right Lower Extremity   Vitals   O2 Delivery Device None - Room Air   Pain 0 - 10 Group   Therapist Pain Assessment During Activity;Post Activity;Nurse Notified  (5/10 discomfort to R knee)   Prior Living Situation   Housing / Facility 1 Story House   Steps Into Home 0   Steps In Home 0   Equipment Owned None   Lives with - Patient's Self Care Capacity Spouse  (Spouse reports that she has a disability and is unable to assist pt.)   Prior Level of Functional Mobility   Bed Mobility Independent   Transfer Status Independent   Ambulation Independent   Ambulation Distance Community distances   Assistive Devices Used None   Stairs Independent   History of Falls   History of Falls Yes   Date of Last Fall   (March 2025 - fell and had L hip fracture)   Cognition    Level of Consciousness Alert   Active ROM Lower Body    Active ROM Lower Body  X   Comments Limited by post op R TKA   Strength Lower Body   Lower Body Strength  X   Gross Strength   (Limited by post op R TKA)   Balance Assessment    Sitting Balance (Static) Fair   Sitting Balance (Dynamic) Fair -   Standing Balance (Static) Fair   Standing Balance (Dynamic) Fair -   Weight Shift Sitting Good   Weight Shift Standing Fair   Comments Standing balance with FWW   Bed Mobility    Supine to Sit Standby Assist   Sit to Supine Standby Assist   Scooting Standby Assist   Rolling Modified Independent   Gait Analysis   Gait Level Of Assist Contact Guard Assist   Assistive Device Front Wheel Walker   Distance (Feet) 150   # of Times Distance was Traveled 1   Deviation   (Decreased gait velocity, decreased L step length, slightly increased forward trunk flexion.)   # of Stairs Climbed 0   Weight Bearing Status WBAT R LE   Functional Mobility   Sit to Stand Contact Guard Assist   Bed, Chair, Wheelchair Transfer Contact Guard Assist   Transfer Method Stand Step   Mobility Bed mobility, sit to stand, stand step, gait   6 Clicks Assessment - How much HELP from from another person do you currently need... (If the patient hasn't done an activity recently, how much help from another person do you think he/she would need if he/she tried?)   Turning from your back to your side while in a flat bed without using bedrails? 4   Moving from lying on your back to sitting on the side of a flat bed without using bedrails? 3   Moving to and from a bed to a chair (including a wheelchair)? 3   Standing up from a chair using your arms (e.g., wheelchair, or bedside chair)? 3   Walking in hospital room? 3   Climbing 3-5 steps with a railing? 3   6 clicks Mobility Score 19   Patient / Family Goals    Patient / Family Goal #1 Pt reports that his goal is to walk without an AD without pain.   Short Term Goals    Short Term Goal # 1 Pt will improve bed mobility to MI in one week.   Short Term Goal # 2 Pt will be able to transfer with FWW with MI in one week.   Short Term Goal # 3 Pt will be able to walk 150 ft with FWW with MI in one week.   Education Group   Education Provided Role  of Physical Therapist;Gait Training;Exercises - Supine;Exercises - Seated   Role of Physical Therapist Patient Response Patient;Significant Other;Acceptance;Explanation;Verbal Demonstration   Gait Training Patient Response Patient;Acceptance;Explanation;Demonstration;Action Demonstration;Verbal Demonstration;Significant Other   Exercises - Supine Patient Response Patient;Significant Other;Acceptance;Eager;Explanation;Demonstration;Handout;Verbal Demonstration;Action Demonstration   Exercises - Seated Patient Response Patient;Significant Other;Eager;Acceptance;Explanation;Demonstration;Handout;Verbal Demonstration;Action Demonstration   Physical Therapy Initial Treatment Plan    Treatment Plan  Bed Mobility;Family / Caregiver Training;Equipment;Gait Training;Neuro Re-Education / Balance;Self Care / Home Evaluation;Stair Training;Therapeutic Activities;Therapeutic Exercise   Treatment Frequency 5 Times per Week   Duration Until Therapy Goals Met   Problem List    Problems Impaired Bed Mobility;Impaired Transfers;Impaired Ambulation;Impaired Balance;Functional Strength Deficit;Decreased Activity Tolerance;Safety Awareness Deficits / Cognition;Limited Knowledge of Post-Op Precautions   Anticipated Discharge Equipment and Recommendations   DC Equipment Recommendations Front-Wheel Walker   Discharge Recommendations Recommend post-acute placement for additional physical therapy services prior to discharge home   Interdisciplinary Plan of Care Collaboration   IDT Collaboration with  Nursing   Patient Position at End of Therapy Seated;Chair Alarm On;Call Light within Reach;Tray Table within Reach;Family / Friend in Room   Collaboration Comments 7/29: PT dahiana completed.   Session Information   Date / Session Number  7/29: PT dahiana completed.

## 2025-07-30 ENCOUNTER — HOME HEALTH ADMISSION (OUTPATIENT)
Dept: HOME HEALTH SERVICES | Facility: HOME HEALTHCARE | Age: 85
End: 2025-07-30
Payer: MEDICARE

## 2025-07-30 ASSESSMENT — COGNITIVE AND FUNCTIONAL STATUS - GENERAL
CLIMB 3 TO 5 STEPS WITH RAILING: A LITTLE
MOBILITY SCORE: 18
MOVING FROM LYING ON BACK TO SITTING ON SIDE OF FLAT BED: A LITTLE
SUGGESTED CMS G CODE MODIFIER MOBILITY: CK
TURNING FROM BACK TO SIDE WHILE IN FLAT BAD: A LITTLE
MOVING TO AND FROM BED TO CHAIR: A LITTLE
STANDING UP FROM CHAIR USING ARMS: A LITTLE
WALKING IN HOSPITAL ROOM: A LITTLE

## 2025-07-30 ASSESSMENT — PATIENT HEALTH QUESTIONNAIRE - PHQ9
SUM OF ALL RESPONSES TO PHQ9 QUESTIONS 1 AND 2: 0
1. LITTLE INTEREST OR PLEASURE IN DOING THINGS: NOT AT ALL
2. FEELING DOWN, DEPRESSED, IRRITABLE, OR HOPELESS: NOT AT ALL

## 2025-07-30 ASSESSMENT — GAIT ASSESSMENTS
DISTANCE (FEET): 200
GAIT LEVEL OF ASSIST: STANDBY ASSIST
DEVIATION: DECREASED HEEL STRIKE;DECREASED TOE OFF
ASSISTIVE DEVICE: FRONT WHEEL WALKER

## 2025-07-30 ASSESSMENT — PAIN DESCRIPTION - PAIN TYPE
TYPE: SURGICAL PAIN

## 2025-07-30 NOTE — PROGRESS NOTES
Supervisor RN note:     Received phone call from patient's wife Kimberley regarding patients DC plan and questions regarding inpatient versus observation-outpatient. Explained to Kimberley that per the clinical admissions coordinator note, Sandip unfortunately did not meet Rehab admission criteria at this time and this was not base around inpatient versus observation outpatient. Reviewed that dc plan would likely be SNF and explained that referrals for SNF were sent out and the process would involve them making a choice and the facilities would have to accept for him to go to the facility. Kimberley inquired about [facility], advised that currently I could see an acceptance listed under the index tab but that the  would review official acceptance with her. I advised I would touch base with our  for this patient, Alie in the morning tomorrow and that we could go from there about which facility he could possibly go to. Reassured Kimberley we wanted a safe discharge plan. All questions from Kimberley were answered. Unit contact info provided should Kimberley have additional questions. Reset expectations regarding therapy and mobility goals while inpatient and apologized for confusion regarding DC planning. Kimberley verbalized understanding.

## 2025-07-30 NOTE — PROGRESS NOTES
Bedside report received from UZAIR Smith. Assumed care of patient. Daily plan of care discussed. Pt resting comfortably in bed with no signs of distress noted. Breathing even and unlabored. Patient waiting on PT eval for possible placement vs homehealth. Patient reports no further needs at this time. Call light within reach. Bed locked in lowest position. Plan of care on going.

## 2025-07-30 NOTE — DISCHARGE PLANNING
ATTN: Case Management  RE: Referral for Home Health    As of 07.30.2025, we have accepted the Home Health referral for the patient listed above.    [facility]  will contact the patient within 48 hours. If you have any questions or concerns regarding the patient’s transition to Home Health, please do not hesitate to contact us at [phone].      We look forward to collaborating with you,  [facility] Team

## 2025-07-30 NOTE — DISCHARGE PLANNING
"Case Management Discharge Planning    Admission Date: 7/28/2025  GMLOS: 1.7  ALOS: 0    6-Clicks ADL Score: 20  6-Clicks Mobility Score: 19      Anticipated Discharge Dispo: Discharge Disposition: Disch to IP rehab facility or distinct part unit (62)    DME Needed: No    Action(s) Taken: Updated Provider/Nurse on Discharge Plan    Pt discussed in 0815 rounds. Pt is medically cleared, pt's wife is [facility].     Left voicemail for [facility] to inquire about open beds.    1013-  LSW was notified pt's wife is no longer wanting placement and just wants to take pt home. MD will place HH orders.     1100-  LSW received HH orders. LSW contact pt's wife to follow up regarding choice. Pt's wife reported pt is going to discharge today. Kimberley ants to bring him home, doesn't want SNF or rehab. Kimbelrey reported no rehab had accepted him. LSW explained [facility] had not accepted, however several SNFs had. Kimberley stated \"but no one has called saying they have a bed\". Kimberley stated she just wants to take him home now. LSW reported MD had put in orders for HH, explained scope of HH. Kimberley stated they can explore HH. Provided choice, obtained choice for [facility]. Kimberley reported being frustrated regarding the process, and stated there were a lot of misinformation and miscommunication. Pt's wife is wanting updates regarding pt's medical progress and lab results.   Pt's wife reported when she spoke with rehab, they had told her the requirement for rehab was three midnight inpatient stay. Pt's wife reported she had spoken with [facility] and was told that was not the case.   Kimberley reported \"this has all been terrible\". Pt's wife reported this knee surgery was not elective, it was necessary and life threatening due to pt's heart condition.   Pt's wife reported she was considering calling the news channels to talk about the situation.   Pt's wife is requesting a call once pt is ready to discharge.   Notified RN supervisor and MD. "     Message sent to patient experience.     Choice form faxed to [facility].     1300-  [facility] has declined. Pending updated PT notes, will resend to [facility].    1340-  Received updated PT notes recommending HH.   Resent HH referral to [facility].     1349-  Spoke with [facility], they will review referral again with updated PT note.     Escalations Completed: None    Medically Clear: Yes    Next Steps: LSW to follow    Barriers to Discharge: None    Is the patient up for discharge tomorrow: No

## 2025-07-30 NOTE — PROGRESS NOTES
Supervisor RN note:     Patient wife Kimberley requesting DC piror to HH acceptance, updated MD. Contacted CM Supervisor regarding request for HH. Most recent PT note refected HH not post acute

## 2025-07-30 NOTE — PROGRESS NOTES
Educated patient on discharge instructions, rx medications and follow up with Kana ochoa M.D.  [id number] [facility] [address]  [place] [id number] [phone]    Schedule an appointment as soon as possible for a visit in 2 week(s)      Patient and wife voiced understanding . Patient alert and appropriate.  All questions and concerns addressed. No further questions or concerns at this time. Copy of discharge paperwork provided.  Patient out of department with transport in stable condition.

## 2025-07-30 NOTE — DISCHARGE PLANNING
ATTN: Case Management  RE: Referral for Home Health    Reason for referral denial: After Supervisor review patient not currently safe to return home and has been recommended for post-acute placement.              Unfortunately, we are not able to accept this referral for the reason listed above. If further clarity is needed, our Transitional Care Specialists are available to discuss any barriers to service at [phone].      We look forward to collaborating with you in the future,  [facility] Team

## 2025-07-30 NOTE — DISCHARGE SUMMARY
Discharge Summary    CHIEF COMPLAINT ON ADMISSION  No chief complaint on file.      Reason for Admission  H/O total knee replacement, right     Admission Date  7/28/2025    CODE STATUS  Full Code    HPI & HOSPITAL COURSE  This is a 84-year-old male with a past medical history significant for CAD status post stent in LAD and RCA; loop recorder in place, hyperlipidemia, hypertension, BPH, osteoarthritis presented here on  7/28/2025 for right total knee arthroplasty.  Patient noted to be weak and debilitated thus was asked to be admitted by the hospitalist for further evaluation.  PT/OT has evaluated the patient, recommended postacute, SNF referral in place.    I discussed with Terrence from [facility] which stated that patient does not qualify for single elective joint [facility]  After an extensive discussion by myself with the patient family along with the nursing staff, patient wife decided to go home with home health and patient agreed to go with wife decision    During the stay in the hospital, his vital signs remained stable, his magnesium was noted to be 1.9 was provided magnesium supplementation prior to discharge.  His hemoglobin is noted to be stable at 10.8    Weightbearing status as per tolerated as per orthopedic surgery recommendation.  Orthopedic recommended 81 mg for 6 which will be provided along with omeprazole.  With orthopedic surgery as an outpatient.  For all other chronic medical condition, he will resume his home medication    Therefore, he is discharged in good and stable condition to home with organized home healthcare and close outpatient follow-up.        Discharge Date  7/30/2025    FOLLOW UP ITEMS POST DISCHARGE  Meagan Mcneil M.D.      DISCHARGE DIAGNOSES  Principal Problem:    H/O total knee replacement, right (POA: Yes)  Active Problems:    Benign prostatic hyperplasia with urinary obstruction (POA: Yes)    Primary hypertension (POA: Yes)    Anemia (POA:  Yes)    Ventricular bigeminy (POA: Yes)    Status post insertion of drug-eluting coronary artery stent - LAD, D1, RCA (POA: Yes)    Pure hypercholesterolemia (POA: Yes)  Resolved Problems:    Advance care planning (POA: Yes)      FOLLOW UP  No future appointments.  No follow-up provider specified.    MEDICATIONS ON DISCHARGE     Medication List        START taking these medications        Instructions   oxyCODONE immediate-release 5 MG Tabs  Commonly known as: Roxicodone   Take 1 Tablet by mouth every 6 hours as needed for Severe Pain for up to 5 days.  Dose: 5 mg     polyethylene glycol/lytes 17 g Pack  Commonly known as: Miralax   Take 1 Packet by mouth 2 times a day as needed (if sennosides and/or docusate ineffective or not ordered) for up to 30 days.  Dose: 17 g            CHANGE how you take these medications        Instructions   aspirin 81 MG EC tablet  What changed: when to take this   Take 1 Tablet by mouth 2 times a day for 40 days. Indications: Acute Heart Attack  Dose: 81 mg            CONTINUE taking these medications        Instructions   acetaminophen 500 MG Tabs  Commonly known as: Tylenol   Take 500-1,000 mg by mouth every 6 hours as needed. Indications: Pain  Dose: 500-1,000 mg     B-12 PO   Take 1 Tablet by mouth every day. Indications: Supplement  Dose: 1 Tablet     carvedilol 3.125 MG Tabs  Commonly known as: Coreg   Take 1 Tablet by mouth 2 times a day with meals.  Dose: 3.125 mg     clopidogrel 75 MG Tabs  Commonly known as: Plavix   Take 1 Tablet by mouth every day.  Dose: 75 mg     docusate sodium 100 MG Caps  Commonly known as: Colace   Take 200 mg by mouth 2 times a day. Indications: Constipation  Dose: 200 mg     ezetimibe 10 MG Tabs  Commonly known as: Zetia   Take 10 mg by mouth every evening. Indications: High Amount of Fats in the Blood  Dose: 10 mg     finasteride 5 MG Tabs  Commonly known as: Proscar   Take 1 Tablet by mouth every day.  Dose: 5 mg     mexiletine 150 MG  Caps  Commonly known as: Mexitil   Take 150 mg by mouth every 8 hours. 6 am, 2 pm, 10 pm  Indications: Irregularity of Ventricular Heart Rhythm  Dose: 150 mg     rosuvastatin 20 MG Tabs  Commonly known as: Crestor   Take 20 mg by mouth every evening. Indications: High Amount of Fats in the Blood  Dose: 20 mg     senna-docusate 8.6-50 MG Tabs  Commonly known as: Pericolace Or Senokot S   Take 2 Tablets by mouth every evening.  Dose: 2 Tablet     tamsulosin 0.4 MG capsule  Commonly known as: Flomax   Take 1 Capsule by mouth 2 times a day.  Dose: 0.4 mg              Allergies  Allergies[1]    DIET  Orders Placed This Encounter   Procedures    Diet Order Diet: Regular     Standing Status:   Standing     Number of Occurrences:   1     Diet::   Regular [1]       ACTIVITY  As tolerated.  Weight bearing as tolerated    CONSULTATIONS  Ortho    PROCEDURES  Right total knee arthroplasty - robot assisted    LABORATORY  Lab Results   Component Value Date    SODIUM 137 07/30/2025    POTASSIUM 3.9 07/30/2025    CHLORIDE 104 07/30/2025    CO2 23 07/30/2025    GLUCOSE 106 (H) 07/30/2025    BUN 10 07/30/2025    CREATININE 0.73 07/30/2025        Lab Results   Component Value Date    WBC 7.4 07/30/2025    HEMOGLOBIN 10.8 (L) 07/30/2025    HEMATOCRIT 32.0 (L) 07/30/2025    PLATELETCT 180 07/30/2025        Total time of the discharge process exceeds 39 minutes.       [1] No Known Allergies

## 2025-07-30 NOTE — THERAPY
Physical Therapy   Daily Treatment     Patient Name:  Sandip Zhao  Age:  84 y.o., Sex:  male  Medical Record #:  [id number]  Today's Date: 7/30/2025     Precautions  Weight Bearing: Weight Bearing as Tolerated Right Lower Extremity    Assessment    Pt demonstrating an improvement in mobility today, able to ambulate safely with FWW.  Pt was provided with education on elevation, icing, positioning, and supine/seated therapeutic exercises. HEP handout issued, pt able to return demo all exercises.  Pt has support at home and has no further acute skilled PT needs at this time. Anticipate pt to d/c home once medically clear with recs for FWW use and OP therapy services.     Plan  Pt is ready to DC home, recommend HHPT.     DC Equipment Recommendations: None  Discharge Recommendations: Recommend home health for continued physical therapy services       07/30/25 0940   Cognition    Level of Consciousness Alert   Comments Pt agreeable for PT, Kashia   Passive ROM Lower Body   Comments R knee 0-85 deg   Active ROM Lower Body    Comments R knee 0-80 deg   Supine Lower Body Exercise   Short Arc Quad 1 set of 10;Right    Heel Slide 1 set of 10;Right   Ankle Pumps Reciprocal;1 set of 10   Quadriceps Isometrics 1 set of 10;Right   Sitting Lower Body Exercises   Comments seated knee flex x210 reps, LAQ x5 reps   Home Exercise Program   Home Exercise Program Patient Able to Complete Home Program Independently, Safely   Comments handout issued   Balance   Sitting Balance (Static) Good   Sitting Balance (Dynamic) Fair +   Standing Balance (Static) Fair +   Standing Balance (Dynamic) Fair   Weight Shift Sitting Fair   Weight Shift Standing Fair   Skilled Intervention Postural Facilitation;Sequencing;Tactile Cuing;Verbal Cuing   Comments stdg with FWW   Bed Mobility    Supine to Sit Supervised   Gait Analysis   Gait Level Of Assist Standby Assist   Assistive Device Front Wheel Walker   Distance (Feet) 200   # of Times Distance was  Traveled 1   Deviation Decreased Heel Strike;Decreased Toe Off   Weight Bearing Status WBAT R LE   Skilled Intervention Postural Facilitation;Sequencing;Tactile Cuing;Verbal Cuing   Functional Mobility   Sit to Stand Standby Assist   Bed, Chair, Wheelchair Transfer Standby Assist   Transfer Method Stand Step  (with FWW)   Short Term Goals    Short Term Goal # 1 Pt will improve bed mobility to MI in one week.   Goal Outcome # 1 Progressing as expected   Short Term Goal # 2 Pt will be able to transfer with FWW with MI in one week.   Goal Outcome # 2 Progressing as expected   Short Term Goal # 3 Pt will be able to walk 150 ft with FWW with MI in one week.   Goal Outcome # 3 Progressing as expected

## 2025-07-30 NOTE — FACE TO FACE
Face to Face Supporting Documentation - Home Health    The encounter with this patient was in whole or in part the primary reason for home health admission.    Date of encounter:   Patient:                    MRN:                       YOB: 2025  Sandip Zhao  [id number]  1940     Home health to see patient for:  Skilled Nursing care for assessment, interventions & education, Physical Therapy evaluation and treatment, and Occupational therapy evaluation and treatment    Skilled need for:  Medication Management medical management     Skilled nursing interventions to include:  Comment: medical management     Homebound status evidenced by:  Need the aid of supportive devices such as crutches, canes, wheelchairs or walkers. Leaving home requires a considerable and taxing effort. There is a normal inability to leave the home.    Community Physician to provide follow up care: Meagan Mcneil M.D.     Optional Interventions? No      I certify the face to face encounter for this home health care referral meets the CMS requirements and the encounter/clinical assessment with the patient was, in whole, or in part, for the medical condition(s) listed above, which is the primary reason for home health care. Based on my clinical findings: the service(s) are medically necessary, support the need for home health care, and the homebound criteria are met.  I certify that this patient has had a face to face encounter by myself.  Luke Rico M.D. - NPI: [id number]

## 2025-07-30 NOTE — THERAPY
Occupational Therapy   Initial Evaluation     Patient Name:  Sandip Zhao  Age:  84 y.o., Sex:  male  Medical Record #:  [id number]  Today's Date:  7/29/2025     Precautions  Weight Bearing: (P) Weight Bearing as Tolerated Right Lower Extremity    Assessment  Patient is 84 y.o. male with a diagnosis of Right TKA.  Additional factors influencing patient status / progress: WBAT RLE.      Pt resides in a [facility] in [place], [place] w/ his wife.  Wife is limited in her physical ability to assist.  PLOF Mod I to Indep for ADL's, transfers and ambulation w/out a device.    Pt demonstrated SBA bed mobility, CGA sit/stand, CGA FWW, CGA transfers, Min assist toileting, Mod assist LBCM, Mod I UBCM.  Therapist reviewed environmental/safety awareness, fall precautions, Joint protection, SHARON dressing management, AE/DME, ADL's and transfers.    Plan    Occupational Therapy Initial Treatment Plan   Treatment Interventions: (P) Self Care / Activities of Daily Living, Adaptive Equipment, Neuro Re-Education / Balance, Therapeutic Activity, Family / Caregiver Training  Treatment Frequency: (P) 3 Times per Week  Duration: (P) Until Therapy Goals Met    DC Equipment Recommendations: (P) None, Unable to determine at this time  Discharge Recommendations: (P) Other - (If patient make functional progress and 24/7 Supervision is available, then continued skilled occupational therapy via  would be appropriate.  Otherwise Post Acute placement.)     Subjective    Pt was alert and cooperative w/ tx.     Objective       07/29/25 1550    Services   Is patient using  services for this encounter? No   Initial Contact Note    Initial Contact Note Order Received and Verified, Occupational Therapy Evaluation in Progress with Full Report to Follow.   Prior Living Situation   Housing / Facility 1 Story House   Steps Into Home 0   Steps In Home 0   Bathroom Set up Walk In Shower;Shower Chair;Grab Bars   Equipment Owned Front-Wheel  Seema from [facility] phoned and wanted to report that they are resuming home care.    Walker;Single Point Cane;Tub / Shower Seat;Grab Bar(s) In Tub / Shower   Lives with - Patient's Self Care Capacity Spouse   Comments Pt resides in a [facility] in [place], [place] w/ his wife.  Wife is limited in her physical ability to assist.  PLOF Mod I to Indep for ADL's, transfers and ambulation w/out a device.   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)   Precautions   Weight Bearing Weight Bearing as Tolerated Right Lower Extremity   Vitals   Pulse Oximetry 95 %   O2 (LPM) 0   O2 Delivery Device None - Room Air   Pain   Intervention Cold Pack;Rest;Repositioned   Pain 0 - 10 Group   Location Knee   Location Orientation Right   Description Aching   Comfort Goal Comfort with Movement;Perform Activity   Non Verbal Descriptors   Non Verbal Scale  Calm;Unlabored Breathing   Cognition    Cognition / Consciousness WDL   Level of Consciousness Alert   Active ROM Upper Body   Active ROM Upper Body  WDL   Dominant Hand Right   Coordination Upper Body   Coordination WDL   Balance Assessment   Sitting Balance (Static) Fair   Sitting Balance (Dynamic) Fair   Standing Balance (Static) Fair   Standing Balance (Dynamic) Fair -   Weight Shift Sitting Fair   Comments OOB FWW   Bed Mobility    Supine to Sit Standby Assist   Sit to Supine Standby Assist   ADL Assessment   Upper Body Dressing Independent   Lower Body Dressing Moderate Assist   Toileting Minimal Assist    How much help from another person does the patient currently need...   Putting on and taking off regular lower body clothing? 2   Bathing (including washing, rinsing, and drying)? 3   Toileting, which includes using a toilet, bedpan, or urinal? 3   Putting on and taking off regular upper body clothing? 4   Taking care of personal grooming such as brushing teeth? 4   Eating meals? 4   6 Clicks Daily Activity Score 20   Functional Mobility   Sit to Stand Contact Guard Assist   Bed, Chair, Wheelchair Transfer Contact Guard Assist   Toilet Transfers Contact Guard Assist   Transfer Method Stand Step   Mobility CGA FWW   Distance (Feet) 20   # of Times Distance was Traveled 2   Edema / Skin Assessment   Comments Surgical site/dressing clean, dry and intact.  Review of wear/care of SHARON dressing.   Short Term Goals   Short Term Goal # 1 Supervision bed mobility (to/from supine/EOB sitting)   Short Term Goal # 2 Sup LBCM via AE/DME PRN   Short Term Goal # 3 Sup toileting/bathroom via AE/DME PRN   Short Term Goal # 4 Sup toilet transfer/bathroom via DME   Education Group   Education Provided Joint Protection;Home Safety;Transfers;Role of Occupational Therapist;Activities of Daily Living;Use of Call Light;Adaptive Equipment   Role of Occupational Therapist Patient Response Patient;Acceptance;Explanation;Verbal Demonstration;Significant Other   Joint Protection Patient Response Patient;Acceptance;Explanation;Demonstration;Teach Back;Verbal Demonstration;Action Demonstration;Significant Other   Home Safety Patient Response Patient;Acceptance;Explanation;Demonstration;Verbal Demonstration;Significant Other   Transfers Patient Response Patient;Acceptance;Explanation;Demonstration;Verbal Demonstration;Action Demonstration;Teach Back;Significant Other   ADL Patient Response Patient;Acceptance;Explanation;Demonstration;Teach Back;Verbal Demonstration;Action Demonstration;Significant Other   Adaptive Equipment Patient Response  Patient;Acceptance;Explanation;Demonstration;Teach Back;Verbal Demonstration;Action Demonstration;Significant Other   Use of Call Light Patient Response Patient;Acceptance;Explanation;Demonstration;Verbal Demonstration;Significant Other   Occupational Therapy Initial Treatment Plan    Treatment Interventions Self Care / Activities of Daily Living;Adaptive Equipment;Neuro Re-Education / Balance;Therapeutic Activity;Family / Caregiver Training   Treatment Frequency 3 Times per Week   Duration Until Therapy Goals Met   Problem List   Problem List Decreased Active Daily Living Skills;Decreased Functional Mobility;Decreased Activity Tolerance;Safety Awareness Deficits / Cognition;Impaired Postural Control / Balance   Anticipated Discharge Equipment and Recommendations   DC Equipment Recommendations None;Unable to determine at this time   Discharge Recommendations Other -  (If patient make functional progress and 24/7 Supervision is available, then continued skilled occupational therapy via  would be appropriate.  Otherwise Post Acute placement.)

## 2025-07-30 NOTE — PROGRESS NOTES
"Supervisor RN note:     Received call from patient wife Kimberley that \"plans have changed\" and she no longer wanted CM to continue setting up placement. She would like him discharged and was making arrangements on her own after speaking with the surgeons office. LAINA, BSRN, and Dr. Rico notified via voalte.   "

## 2025-07-30 NOTE — CARE PLAN
The patient is Stable - Low risk of patient condition declining or worsening    Shift Goals  Clinical Goals: Patient pain will be managed at a tolerable level of 3/10 or less throughout night, Patient will remain free from fall or injury  Patient Goals: Rest comfortably, pain management  Family Goals: n/a    Progress made toward(s) clinical / shift goals:  Patient's pain maintained at a tolerable level of 3/10 or less throughout shift with medication per MAR and rest. Pt did not sustain a fall or injury during shift. Able to rest comfortably throughout shift. Ambulated with steady gait.     Patient is not progressing towards the following goals:

## 2025-07-31 ENCOUNTER — PHARMACY VISIT (OUTPATIENT)
Dept: PHARMACY | Facility: MEDICAL CENTER | Age: 85
End: 2025-07-31
Payer: COMMERCIAL

## 2025-07-31 ENCOUNTER — TELEPHONE (OUTPATIENT)
Dept: HOME HEALTH SERVICES | Facility: HOME HEALTHCARE | Age: 85
End: 2025-07-31
Payer: MEDICARE

## 2025-07-31 PROCEDURE — RXMED WILLOW AMBULATORY MEDICATION CHARGE: Performed by: HOSPITALIST

## 2025-07-31 NOTE — TELEPHONE ENCOUNTER
Called and spoke to patient's spouse regarding HH scheduling. Scheduled start of care for 8/1 between 9-10am.

## 2025-08-01 ENCOUNTER — HOME CARE VISIT (OUTPATIENT)
Dept: HOME HEALTH SERVICES | Facility: HOME HEALTHCARE | Age: 85
End: 2025-08-01
Payer: MEDICARE

## 2025-08-01 VITALS
TEMPERATURE: 99 F | HEIGHT: 65 IN | WEIGHT: 147.5 LBS | DIASTOLIC BLOOD PRESSURE: 58 MMHG | HEART RATE: 79 BPM | BODY MASS INDEX: 24.57 KG/M2 | RESPIRATION RATE: 16 BRPM | SYSTOLIC BLOOD PRESSURE: 108 MMHG | OXYGEN SATURATION: 94 %

## 2025-08-01 PROCEDURE — G0493 RN CARE EA 15 MIN HH/HOSPICE: HCPCS

## 2025-08-01 PROCEDURE — 665998 HH PPS REVENUE CREDIT

## 2025-08-01 PROCEDURE — 665999 HH PPS REVENUE DEBIT

## 2025-08-01 PROCEDURE — 665005 NO-PAY RAP - HOME HEALTH

## 2025-08-01 PROCEDURE — 665001 SOC-HOME HEALTH

## 2025-08-01 ASSESSMENT — ENCOUNTER SYMPTOMS
VOMITING: DENIES
STOOL FREQUENCY: LESS THAN DAILY
HIGHEST PAIN SEVERITY IN PAST 24 HOURS: 7/10
PAIN LOCATION - PAIN SEVERITY: 0/10
PAIN: 1
PAIN SEVERITY GOAL: 0/10
SUBJECTIVE PAIN PROGRESSION: GRADUALLY IMPROVING
NAUSEA: DENIES
CONSTIPATION: 1
LOWEST PAIN SEVERITY IN PAST 24 HOURS: 0/10
PAIN LOCATION: RIGHT LEG
LAST BOWEL MOVEMENT: 67415

## 2025-08-01 ASSESSMENT — ACTIVITIES OF DAILY LIVING (ADL): OASIS_M1830: 05

## 2025-08-01 ASSESSMENT — FIBROSIS 4 INDEX: FIB4 SCORE: 1.79

## 2025-08-02 PROCEDURE — 665998 HH PPS REVENUE CREDIT

## 2025-08-02 PROCEDURE — 665999 HH PPS REVENUE DEBIT

## 2025-08-03 PROCEDURE — 665999 HH PPS REVENUE DEBIT

## 2025-08-03 PROCEDURE — 665998 HH PPS REVENUE CREDIT

## 2025-08-04 ENCOUNTER — HOME CARE VISIT (OUTPATIENT)
Dept: HOME HEALTH SERVICES | Facility: HOME HEALTHCARE | Age: 85
End: 2025-08-04
Payer: MEDICARE

## 2025-08-04 ENCOUNTER — DOCUMENTATION (OUTPATIENT)
Dept: MEDICAL GROUP | Facility: PHYSICIAN GROUP | Age: 85
End: 2025-08-04
Payer: MEDICARE

## 2025-08-04 VITALS
HEART RATE: 70 BPM | SYSTOLIC BLOOD PRESSURE: 128 MMHG | OXYGEN SATURATION: 97 % | TEMPERATURE: 99 F | DIASTOLIC BLOOD PRESSURE: 64 MMHG

## 2025-08-04 PROCEDURE — 665998 HH PPS REVENUE CREDIT

## 2025-08-04 PROCEDURE — 665999 HH PPS REVENUE DEBIT

## 2025-08-04 ASSESSMENT — ENCOUNTER SYMPTOMS
HIGHEST PAIN SEVERITY IN PAST 24 HOURS: 5/10
LOWEST PAIN SEVERITY IN PAST 24 HOURS: 0/10
PAIN: 1
PAIN LOCATION: RIGHT

## 2025-08-05 PROCEDURE — 665999 HH PPS REVENUE DEBIT

## 2025-08-05 PROCEDURE — 665998 HH PPS REVENUE CREDIT

## 2025-08-06 PROCEDURE — 665998 HH PPS REVENUE CREDIT

## 2025-08-06 PROCEDURE — 665999 HH PPS REVENUE DEBIT

## 2025-08-07 PROCEDURE — 665998 HH PPS REVENUE CREDIT

## 2025-08-07 PROCEDURE — 665999 HH PPS REVENUE DEBIT

## 2025-08-08 PROCEDURE — 665999 HH PPS REVENUE DEBIT

## 2025-08-08 PROCEDURE — 665998 HH PPS REVENUE CREDIT

## 2025-08-09 PROCEDURE — 665999 HH PPS REVENUE DEBIT

## 2025-08-09 PROCEDURE — 665998 HH PPS REVENUE CREDIT

## 2025-08-10 PROCEDURE — 665999 HH PPS REVENUE DEBIT

## 2025-08-10 PROCEDURE — 665998 HH PPS REVENUE CREDIT

## 2025-08-11 PROCEDURE — 665999 HH PPS REVENUE DEBIT

## 2025-08-11 PROCEDURE — 665998 HH PPS REVENUE CREDIT

## 2025-08-12 PROCEDURE — 665998 HH PPS REVENUE CREDIT

## 2025-08-12 PROCEDURE — 665999 HH PPS REVENUE DEBIT

## 2025-08-13 PROCEDURE — 665998 HH PPS REVENUE CREDIT

## 2025-08-13 PROCEDURE — 665999 HH PPS REVENUE DEBIT

## 2025-08-14 PROCEDURE — 665999 HH PPS REVENUE DEBIT

## 2025-08-14 PROCEDURE — 665998 HH PPS REVENUE CREDIT

## 2025-08-15 PROCEDURE — 665999 HH PPS REVENUE DEBIT

## 2025-08-15 PROCEDURE — 665998 HH PPS REVENUE CREDIT

## 2025-08-16 PROCEDURE — 665998 HH PPS REVENUE CREDIT

## 2025-08-16 PROCEDURE — 665999 HH PPS REVENUE DEBIT

## 2025-08-17 PROCEDURE — 665998 HH PPS REVENUE CREDIT

## 2025-08-17 PROCEDURE — 665999 HH PPS REVENUE DEBIT

## 2025-08-18 ENCOUNTER — APPOINTMENT (OUTPATIENT)
Dept: RADIOLOGY | Facility: MEDICAL CENTER | Age: 85
End: 2025-08-18
Attending: EMERGENCY MEDICINE
Payer: MEDICARE

## 2025-08-18 ENCOUNTER — HOSPITAL ENCOUNTER (EMERGENCY)
Facility: MEDICAL CENTER | Age: 85
End: 2025-08-18
Attending: EMERGENCY MEDICINE
Payer: MEDICARE

## 2025-08-18 VITALS
WEIGHT: 147 LBS | HEART RATE: 92 BPM | OXYGEN SATURATION: 99 % | TEMPERATURE: 97.5 F | SYSTOLIC BLOOD PRESSURE: 186 MMHG | BODY MASS INDEX: 24.49 KG/M2 | DIASTOLIC BLOOD PRESSURE: 88 MMHG | HEIGHT: 65 IN | RESPIRATION RATE: 17 BRPM

## 2025-08-18 DIAGNOSIS — K62.5 BRBPR (BRIGHT RED BLOOD PER RECTUM): Primary | ICD-10-CM

## 2025-08-18 LAB
ABO GROUP BLD: NORMAL
ALBUMIN SERPL BCP-MCNC: 3.9 G/DL (ref 3.2–4.9)
ALBUMIN/GLOB SERPL: 1.4 G/DL
ALP SERPL-CCNC: 123 U/L (ref 30–99)
ALT SERPL-CCNC: 34 U/L (ref 2–50)
ANION GAP SERPL CALC-SCNC: 13 MMOL/L (ref 7–16)
APTT PPP: 30.6 SEC (ref 24.7–36)
AST SERPL-CCNC: 31 U/L (ref 12–45)
BASOPHILS # BLD AUTO: 0.6 % (ref 0–1.8)
BASOPHILS # BLD: 0.04 K/UL (ref 0–0.12)
BILIRUB SERPL-MCNC: 0.7 MG/DL (ref 0.1–1.5)
BLD GP AB SCN SERPL QL: NORMAL
BUN SERPL-MCNC: 8 MG/DL (ref 8–22)
CALCIUM ALBUM COR SERPL-MCNC: 9.3 MG/DL (ref 8.5–10.5)
CALCIUM SERPL-MCNC: 9.2 MG/DL (ref 8.5–10.5)
CHLORIDE SERPL-SCNC: 103 MMOL/L (ref 96–112)
CO2 SERPL-SCNC: 21 MMOL/L (ref 20–33)
CREAT SERPL-MCNC: 0.81 MG/DL (ref 0.5–1.4)
EKG IMPRESSION: NORMAL
EOSINOPHIL # BLD AUTO: 0.37 K/UL (ref 0–0.51)
EOSINOPHIL NFR BLD: 5.8 % (ref 0–6.9)
ERYTHROCYTE [DISTWIDTH] IN BLOOD BY AUTOMATED COUNT: 43.1 FL (ref 35.9–50)
GFR SERPLBLD CREATININE-BSD FMLA CKD-EPI: 86 ML/MIN/1.73 M 2
GLOBULIN SER CALC-MCNC: 2.8 G/DL (ref 1.9–3.5)
GLUCOSE SERPL-MCNC: 105 MG/DL (ref 65–99)
HCT VFR BLD AUTO: 32.2 % (ref 42–52)
HGB BLD-MCNC: 11.2 G/DL (ref 14–18)
IMM GRANULOCYTES # BLD AUTO: 0.04 K/UL (ref 0–0.11)
IMM GRANULOCYTES NFR BLD AUTO: 0.6 % (ref 0–0.9)
INR PPP: 1.02 (ref 0.87–1.13)
LYMPHOCYTES # BLD AUTO: 0.98 K/UL (ref 1–4.8)
LYMPHOCYTES NFR BLD: 15.5 % (ref 22–41)
MCH RBC QN AUTO: 31.6 PG (ref 27–33)
MCHC RBC AUTO-ENTMCNC: 34.8 G/DL (ref 32.3–36.5)
MCV RBC AUTO: 91 FL (ref 81.4–97.8)
MONOCYTES # BLD AUTO: 0.57 K/UL (ref 0–0.85)
MONOCYTES NFR BLD AUTO: 9 % (ref 0–13.4)
NEUTROPHILS # BLD AUTO: 4.34 K/UL (ref 1.82–7.42)
NEUTROPHILS NFR BLD: 68.5 % (ref 44–72)
NRBC # BLD AUTO: 0 K/UL
NRBC BLD-RTO: 0 /100 WBC (ref 0–0.2)
PLATELET # BLD AUTO: 239 K/UL (ref 164–446)
PMV BLD AUTO: 8.4 FL (ref 9–12.9)
POTASSIUM SERPL-SCNC: 3.5 MMOL/L (ref 3.6–5.5)
PROT SERPL-MCNC: 6.7 G/DL (ref 6–8.2)
PROTHROMBIN TIME: 13.7 SEC (ref 12–14.6)
RBC # BLD AUTO: 3.54 M/UL (ref 4.7–6.1)
RH BLD: NORMAL
SODIUM SERPL-SCNC: 137 MMOL/L (ref 135–145)
WBC # BLD AUTO: 6.3 K/UL (ref 4.8–10.8)

## 2025-08-18 PROCEDURE — 85610 PROTHROMBIN TIME: CPT

## 2025-08-18 PROCEDURE — 99285 EMERGENCY DEPT VISIT HI MDM: CPT

## 2025-08-18 PROCEDURE — 85025 COMPLETE CBC W/AUTO DIFF WBC: CPT

## 2025-08-18 PROCEDURE — 36415 COLL VENOUS BLD VENIPUNCTURE: CPT

## 2025-08-18 PROCEDURE — 71045 X-RAY EXAM CHEST 1 VIEW: CPT

## 2025-08-18 PROCEDURE — 93005 ELECTROCARDIOGRAM TRACING: CPT | Mod: TC | Performed by: EMERGENCY MEDICINE

## 2025-08-18 PROCEDURE — 86901 BLOOD TYPING SEROLOGIC RH(D): CPT

## 2025-08-18 PROCEDURE — 665999 HH PPS REVENUE DEBIT

## 2025-08-18 PROCEDURE — 665998 HH PPS REVENUE CREDIT

## 2025-08-18 PROCEDURE — 94760 N-INVAS EAR/PLS OXIMETRY 1: CPT

## 2025-08-18 PROCEDURE — 86850 RBC ANTIBODY SCREEN: CPT

## 2025-08-18 PROCEDURE — 85730 THROMBOPLASTIN TIME PARTIAL: CPT

## 2025-08-18 PROCEDURE — 80053 COMPREHEN METABOLIC PANEL: CPT

## 2025-08-18 PROCEDURE — 86900 BLOOD TYPING SEROLOGIC ABO: CPT

## 2025-08-18 ASSESSMENT — FIBROSIS 4 INDEX: FIB4 SCORE: 1.79

## 2025-08-19 PROCEDURE — 665999 HH PPS REVENUE DEBIT

## 2025-08-19 PROCEDURE — 665998 HH PPS REVENUE CREDIT

## 2025-08-20 PROCEDURE — 665998 HH PPS REVENUE CREDIT

## 2025-08-20 PROCEDURE — 665999 HH PPS REVENUE DEBIT

## 2025-08-21 PROCEDURE — 665999 HH PPS REVENUE DEBIT

## 2025-08-21 PROCEDURE — 665998 HH PPS REVENUE CREDIT

## 2025-08-22 PROCEDURE — 665998 HH PPS REVENUE CREDIT

## 2025-08-22 PROCEDURE — 665999 HH PPS REVENUE DEBIT

## 2025-08-23 PROCEDURE — 665998 HH PPS REVENUE CREDIT

## 2025-08-23 PROCEDURE — 665999 HH PPS REVENUE DEBIT

## 2025-08-24 PROCEDURE — 665998 HH PPS REVENUE CREDIT

## 2025-08-24 PROCEDURE — 665999 HH PPS REVENUE DEBIT

## 2025-08-25 PROCEDURE — 665999 HH PPS REVENUE DEBIT

## 2025-08-25 PROCEDURE — 665998 HH PPS REVENUE CREDIT

## 2025-08-26 PROCEDURE — 665999 HH PPS REVENUE DEBIT

## 2025-08-26 PROCEDURE — 665998 HH PPS REVENUE CREDIT

## 2025-08-27 PROCEDURE — 665998 HH PPS REVENUE CREDIT

## 2025-08-27 PROCEDURE — 665999 HH PPS REVENUE DEBIT

## 2025-08-28 PROCEDURE — 665998 HH PPS REVENUE CREDIT

## 2025-08-28 PROCEDURE — 665999 HH PPS REVENUE DEBIT

## 2025-08-29 PROCEDURE — 665998 HH PPS REVENUE CREDIT

## 2025-08-29 PROCEDURE — 665999 HH PPS REVENUE DEBIT

## 2025-08-30 PROCEDURE — 665999 HH PPS REVENUE DEBIT

## 2025-08-30 PROCEDURE — 665998 HH PPS REVENUE CREDIT

## (undated) DEVICE — LOCKING DRILL 4.2X360MM

## (undated) DEVICE — BAG SPONGE COUNT 10.25 X 32 - BLUE (250/CA)

## (undated) DEVICE — SENSOR OXIMETER ADULT SPO2 RD SET (20EA/BX)

## (undated) DEVICE — GLOVE BIOGEL SZ 8 SURGICAL PF LTX - (50PR/BX 4BX/CA)

## (undated) DEVICE — HUMID-VENT HEAT AND MOISTURE EXCHANGE- (50/BX)

## (undated) DEVICE — SODIUM CHL IRRIGATION 0.9% 1000ML (12EA/CA)

## (undated) DEVICE — CANISTER SUCTION RIGID RED 1500CC (40EA/CA)

## (undated) DEVICE — WATER IRRIGATION STERILE 1000ML (12EA/CA)

## (undated) DEVICE — SPONGE GAUZESTER 4 X 4 4PLY - (128PK/CA)

## (undated) DEVICE — TRAY CATHETER FOLEY URINE METER W/STATLOCK 350ML (10EA/CA)

## (undated) DEVICE — SUTURE 2-0 MONOCRYL PLUS UNDYED CT-1 1 X 36 (36EA/BX)"

## (undated) DEVICE — ELECTRODE DUAL RETURN W/ CORD - (50/PK)

## (undated) DEVICE — TOWEL STOP TIMEOUT SAFETY FLAG (40EA/CA)

## (undated) DEVICE — SUCTION INSTRUMENT YANKAUER BULBOUS TIP W/O VENT (50EA/CA)

## (undated) DEVICE — DRAPE SURGICAL U 77X120 - (10/CA)

## (undated) DEVICE — GOWN WARMING STANDARD FLEX - (30/CA)

## (undated) DEVICE — TUBE CONNECTING SUCTION - CLEAR PLASTIC STERILE 72 IN (50EA/CA)

## (undated) DEVICE — PACK MAJOR BASIN - (2EA/CA)

## (undated) DEVICE — COVER LIGHT HANDLE FLEXIBLE - SOFT (2EA/PK 80PK/CA)

## (undated) DEVICE — SUTURE GENERAL